# Patient Record
Sex: FEMALE | Race: WHITE | Employment: OTHER | URBAN - METROPOLITAN AREA
[De-identification: names, ages, dates, MRNs, and addresses within clinical notes are randomized per-mention and may not be internally consistent; named-entity substitution may affect disease eponyms.]

---

## 2017-02-20 ENCOUNTER — HOSPITAL ENCOUNTER (OUTPATIENT)
Dept: GENERAL RADIOLOGY | Age: 82
Discharge: HOME | End: 2017-02-20
Payer: MEDICARE

## 2017-02-20 DIAGNOSIS — R52 PAIN: ICD-10-CM

## 2017-02-20 PROCEDURE — 72110 X-RAY EXAM L-2 SPINE 4/>VWS: CPT

## 2018-02-07 ENCOUNTER — HOSPITAL ENCOUNTER (OUTPATIENT)
Dept: MRI IMAGING | Age: 83
Discharge: HOME | End: 2018-02-09
Payer: MEDICARE

## 2018-02-07 DIAGNOSIS — R52 PAIN: ICD-10-CM

## 2018-02-07 PROCEDURE — 72148 MRI LUMBAR SPINE W/O DYE: CPT

## 2018-06-25 ENCOUNTER — HOSPITAL ENCOUNTER (OUTPATIENT)
Dept: MRI IMAGING | Age: 83
Discharge: HOME | End: 2018-06-27
Payer: MEDICARE

## 2018-06-25 DIAGNOSIS — M54.2 NECK PAIN: ICD-10-CM

## 2018-06-25 DIAGNOSIS — M47.812 OSTEOARTHRITIS OF CERVICAL SPINE, UNSPECIFIED SPINAL OSTEOARTHRITIS COMPLICATION STATUS: ICD-10-CM

## 2018-06-25 DIAGNOSIS — M54.12 CERVICAL RADICULOPATHY: ICD-10-CM

## 2018-06-25 PROCEDURE — 72141 MRI NECK SPINE W/O DYE: CPT

## 2018-08-20 ENCOUNTER — HOSPITAL ENCOUNTER (OUTPATIENT)
Dept: WOMENS IMAGING | Age: 83
Discharge: HOME | End: 2018-08-22
Payer: MEDICARE

## 2018-08-20 DIAGNOSIS — Z12.39 ENCOUNTER FOR SCREENING BREAST EXAMINATION: ICD-10-CM

## 2018-08-20 PROCEDURE — 77067 SCR MAMMO BI INCL CAD: CPT

## 2023-06-05 LAB
ANION GAP IN SER/PLAS: 14 MMOL/L (ref 10–20)
CARBON DIOXIDE, TOTAL (MMOL/L) IN SER/PLAS: 26 MMOL/L (ref 21–32)
CHLORIDE (MMOL/L) IN SER/PLAS: 103 MMOL/L (ref 98–107)
CREATININE (MG/DL) IN SER/PLAS: 1.52 MG/DL (ref 0.5–1.05)
GFR FEMALE: 33 ML/MIN/1.73M2
POTASSIUM (MMOL/L) IN SER/PLAS: 4.1 MMOL/L (ref 3.5–5.3)
SODIUM (MMOL/L) IN SER/PLAS: 139 MMOL/L (ref 136–145)
THYROTROPIN (MIU/L) IN SER/PLAS BY DETECTION LIMIT <= 0.05 MIU/L: 1.72 MIU/L (ref 0.44–3.98)
UREA NITROGEN (MG/DL) IN SER/PLAS: 44 MG/DL (ref 6–23)

## 2023-06-16 LAB
ALBUMIN (G/DL) IN SER/PLAS: 3.8 G/DL (ref 3.4–5)
ALBUMIN (MG/L) IN URINE: 449.9 MG/L
ALBUMIN/CREATININE (UG/MG) IN URINE: 1061.1 UG/MG CRT (ref 0–30)
ANION GAP IN SER/PLAS: 12 MMOL/L (ref 10–20)
CALCIUM (MG/DL) IN SER/PLAS: 9.2 MG/DL (ref 8.6–10.3)
CARBON DIOXIDE, TOTAL (MMOL/L) IN SER/PLAS: 28 MMOL/L (ref 21–32)
CHLORIDE (MMOL/L) IN SER/PLAS: 103 MMOL/L (ref 98–107)
CREATININE (MG/DL) IN SER/PLAS: 1.69 MG/DL (ref 0.5–1.05)
CREATININE (MG/DL) IN URINE: 42.4 MG/DL (ref 20–320)
GFR FEMALE: 29 ML/MIN/1.73M2
GLUCOSE (MG/DL) IN SER/PLAS: 86 MG/DL (ref 74–99)
PHOSPHATE (MG/DL) IN SER/PLAS: 4.7 MG/DL (ref 2.5–4.9)
POTASSIUM (MMOL/L) IN SER/PLAS: 4.8 MMOL/L (ref 3.5–5.3)
SODIUM (MMOL/L) IN SER/PLAS: 138 MMOL/L (ref 136–145)
URATE (MG/DL) IN SER/PLAS: 8.4 MG/DL (ref 2.3–6.7)
UREA NITROGEN (MG/DL) IN SER/PLAS: 47 MG/DL (ref 6–23)

## 2023-06-18 LAB
IMMUNOGLOBULIN LIGHT CHAINS KAPPA/LAMBDA (MASS RATIO) IN SERUM: 1.55 (ref 0.26–1.65)
IMMUNOGLOBULIN LIGHT CHAINS.KAPPA (MG/DL) IN SERUM: 4.44 MG/DL (ref 0.33–1.94)
IMMUNOGLOBULIN LIGHT CHAINS.LAMBDA (MG/DL) IN SERUM: 2.86 MG/DL (ref 0.57–2.63)

## 2023-07-25 LAB — URATE (MG/DL) IN SER/PLAS: 8.9 MG/DL (ref 2.3–6.7)

## 2023-07-31 ENCOUNTER — OFFICE VISIT (OUTPATIENT)
Dept: PRIMARY CARE | Facility: CLINIC | Age: 88
End: 2023-07-31
Payer: MEDICARE

## 2023-07-31 VITALS
DIASTOLIC BLOOD PRESSURE: 88 MMHG | WEIGHT: 136 LBS | BODY MASS INDEX: 25.03 KG/M2 | OXYGEN SATURATION: 96 % | HEIGHT: 62 IN | RESPIRATION RATE: 16 BRPM | SYSTOLIC BLOOD PRESSURE: 158 MMHG | HEART RATE: 56 BPM

## 2023-07-31 DIAGNOSIS — H40.003 GLAUCOMA SUSPECT OF BOTH EYES: ICD-10-CM

## 2023-07-31 DIAGNOSIS — J45.30 MILD PERSISTENT REACTIVE AIRWAY DISEASE WITHOUT COMPLICATION (HHS-HCC): ICD-10-CM

## 2023-07-31 DIAGNOSIS — I10 PRIMARY HYPERTENSION: Primary | ICD-10-CM

## 2023-07-31 DIAGNOSIS — R53.82 CHRONIC FATIGUE: ICD-10-CM

## 2023-07-31 DIAGNOSIS — E05.90 HYPERTHYROIDISM: ICD-10-CM

## 2023-07-31 DIAGNOSIS — E78.2 COMBINED HYPERLIPIDEMIA: ICD-10-CM

## 2023-07-31 DIAGNOSIS — E55.9 VITAMIN D DEFICIENCY: ICD-10-CM

## 2023-07-31 PROBLEM — S82.009A PATELLA FRACTURE: Status: ACTIVE | Noted: 2023-07-31

## 2023-07-31 PROBLEM — H02.834 DERMATOCHALASIS OF BOTH UPPER EYELIDS: Status: ACTIVE | Noted: 2018-06-18

## 2023-07-31 PROBLEM — H52.4 HYPEROPIA WITH PRESBYOPIA OF BOTH EYES: Status: ACTIVE | Noted: 2018-06-18

## 2023-07-31 PROBLEM — S82.042D: Status: ACTIVE | Noted: 2023-07-31

## 2023-07-31 PROBLEM — H47.393 CUP TO DISC ASYMMETRY, BILATERAL: Status: ACTIVE | Noted: 2018-06-18

## 2023-07-31 PROBLEM — H02.831 DERMATOCHALASIS OF BOTH UPPER EYELIDS: Status: ACTIVE | Noted: 2018-06-18

## 2023-07-31 PROBLEM — H40.033 ANATOMICAL NARROW ANGLE, BILATERAL: Status: ACTIVE | Noted: 2018-06-18

## 2023-07-31 PROBLEM — Z96.1 PSEUDOPHAKIA: Status: ACTIVE | Noted: 2018-07-18

## 2023-07-31 PROBLEM — I35.0 AORTIC STENOSIS: Status: ACTIVE | Noted: 2023-07-31

## 2023-07-31 PROBLEM — Z86.010 HISTORY OF COLONIC POLYPS: Status: ACTIVE | Noted: 2023-07-31

## 2023-07-31 PROBLEM — H52.03 HYPEROPIA WITH PRESBYOPIA OF BOTH EYES: Status: ACTIVE | Noted: 2018-06-18

## 2023-07-31 PROBLEM — M25.569 KNEE PAIN: Status: ACTIVE | Noted: 2023-07-31

## 2023-07-31 PROBLEM — R06.09 DYSPNEA ON MINIMAL EXERTION: Status: ACTIVE | Noted: 2023-07-31

## 2023-07-31 PROBLEM — Z86.0100 HISTORY OF COLONIC POLYPS: Status: ACTIVE | Noted: 2023-07-31

## 2023-07-31 PROBLEM — R42 LIGHTHEADEDNESS: Status: ACTIVE | Noted: 2023-07-31

## 2023-07-31 PROCEDURE — 99214 OFFICE O/P EST MOD 30 MIN: CPT | Performed by: FAMILY MEDICINE

## 2023-07-31 RX ORDER — HYDROCODONE BITARTRATE AND ACETAMINOPHEN 5; 325 MG/1; MG/1
1 TABLET ORAL EVERY 6 HOURS PRN
COMMUNITY

## 2023-07-31 RX ORDER — VALSARTAN AND HYDROCHLOROTHIAZIDE 160; 12.5 MG/1; MG/1
1 TABLET, FILM COATED ORAL
COMMUNITY
Start: 2022-08-15 | End: 2023-08-07 | Stop reason: DRUGHIGH

## 2023-07-31 RX ORDER — METHIMAZOLE 5 MG/1
TABLET ORAL
COMMUNITY
Start: 2018-06-25 | End: 2024-01-02 | Stop reason: SDUPTHER

## 2023-07-31 RX ORDER — ATORVASTATIN CALCIUM 20 MG/1
20 TABLET, FILM COATED ORAL
COMMUNITY
Start: 2023-06-16 | End: 2024-01-16 | Stop reason: SDUPTHER

## 2023-07-31 RX ORDER — GABAPENTIN 300 MG/1
1 CAPSULE ORAL DAILY
COMMUNITY
Start: 2022-10-10 | End: 2024-01-08 | Stop reason: ALTCHOICE

## 2023-07-31 RX ORDER — ACEBUTOLOL HYDROCHLORIDE 200 MG/1
200 CAPSULE ORAL 2 TIMES DAILY
COMMUNITY
Start: 2022-08-15 | End: 2024-03-04 | Stop reason: SDUPTHER

## 2023-07-31 RX ORDER — FAMOTIDINE 20 MG/1
1 TABLET, FILM COATED ORAL NIGHTLY
COMMUNITY
Start: 2023-06-16 | End: 2024-01-02 | Stop reason: SDUPTHER

## 2023-07-31 RX ORDER — AMLODIPINE BESYLATE 10 MG/1
10 TABLET ORAL
Qty: 30 TABLET | Refills: 2 | Status: SHIPPED | OUTPATIENT
Start: 2023-07-31 | End: 2023-08-22 | Stop reason: SDUPTHER

## 2023-07-31 RX ORDER — AMLODIPINE BESYLATE 5 MG/1
5 TABLET ORAL
COMMUNITY
Start: 2023-07-25 | End: 2023-07-31 | Stop reason: SDUPTHER

## 2023-07-31 RX ORDER — LISINOPRIL AND HYDROCHLOROTHIAZIDE 12.5; 2 MG/1; MG/1
1 TABLET ORAL DAILY
COMMUNITY
Start: 2023-06-17 | End: 2023-07-31 | Stop reason: ALTCHOICE

## 2023-07-31 ASSESSMENT — ENCOUNTER SYMPTOMS
DEPRESSION: 0
OCCASIONAL FEELINGS OF UNSTEADINESS: 0
LOSS OF SENSATION IN FEET: 0

## 2023-07-31 NOTE — PROGRESS NOTES
Subjective   Patient ID: Maggy Gore is a 87 y.o. female who presents for Establish Care.    Pt is in to establish care with PCP.     This patient is here today to follow up on HTN. This patient is currently taking losartan . This patient states that their current medication treatment for this issue is working well for them. This patient does take their blood pressure at home and states that it is usually within normal ranges. This patient denies any symptoms of headache, dizziness, blurry vision, or lightheadedness.  Has been having high reading. She documents.            Review of Systems  12 Systems have been reviewed as follows.  Constitutional: Fever, weight gain, weight loss, appetite change, night sweats, fatigue, chills.  Eyes : blurry, double vision, vision, loss, tearing, redness, pain, sensitivity to light, glaucoma.  Ears, nose, mouth, and throat: Hearing loss, ringing in the ears, ear pain, nasal congestion, nasal drainage, nosebleeds, mouth, throat, irritation tooth problem.  Cardiovascular :chest pain, pressure, heart racing, palpitations, sweating, leg swelling, high or low blood pressure  Pulmonary: Cough, yellow or green sputum, blood and sputum, shortness of breath, wheezing  Gastrointestinal: Nausea, vomiting, diarrhea, constipation, pain, blood in stool, or vomitus, heartburn, difficulty swallowing  Genitourinary: incontinence, abnormal bleeding, abnormal discharge, urinary frequency, urinary hesitancy, pain, impotence sexual problem, infection, urinary retention  Musculoskeletal: Pain, stiffness, joint, redness or warmth, arthritis, back pain, weakness, muscle wasting, sprain or fracture  Neuro: Weight weakness, dizziness, change in voice, change in taste change in vision, change in hearing, loss, or change of sensation, trouble walking, balance problems coordination problems, shaking, speech problem  Endocrine , cold or heat intolerance, blood sugar problem, weight gain or loss missed  "periods hot flashes, sweats, change in body hair, change in libido, increased thirst, increased urination  Heme/lymph: Swelling, bleeding, problem anemia, bruising, enlarged lymph nodes  Allergic/immunologic: H. plus nasal drip, watery itchy eyes, nasal drainage, immunosuppressed  The above were reviewed and noted negative except as noted in HPI and Problem List.    Objective   /88   Pulse 56   Resp 16   Ht 1.575 m (5' 2\")   Wt 61.7 kg (136 lb)   SpO2 96%   BMI 24.87 kg/m²     Physical Exam  Constitutional: Well developed, well nourished, alert and in no acute distress   Eyes: Normal external exam. Pupils equally round and reactive to light with normal accommodation and extraocular movements intact.  Neck: Supple, no lymphadenopathy or masses.   Cardiovascular: Regular rate and rhythm, normal S1 and S2, no murmurs, gallops, or rubs. Radial pulses normal. No peripheral edema.  Pulmonary: No respiratory distress, lungs clear to auscultation bilaterally. No wheezes, rhonchi, rales.  Abdomen: soft,non tender, non distended, without masses or HSM  Skin: Warm, well perfused, normal skin turgor and color.   Neurologic: Cranial nerves II-XII grossly intact.   Psychiatric: Mood calm and affect normal  Musculoskeletal: Moving all extremities without restriction    Assessment/Plan   Problem List Items Addressed This Visit       Glaucoma suspect of both eyes    Combined hyperlipidemia    Hyperthyroidism    Relevant Orders    Free T4 Index    Vitamin D deficiency    Relevant Orders    Vitamin D, Total    Reactive airway disease without complication     Other Visit Diagnoses       Primary hypertension    -  Primary    Relevant Medications    amLODIPine (Norvasc) 10 mg tablet    Other Relevant Orders    Lipid Panel    Follow Up In [facility] - PCP - Established    Chronic fatigue        Relevant Orders    CBC and Auto Differential         Bring in meds next    Continue current medications and therapy for " chronic medical conditions    Get BW

## 2023-08-01 ENCOUNTER — LAB (OUTPATIENT)
Dept: LAB | Facility: LAB | Age: 88
End: 2023-08-01
Payer: MEDICARE

## 2023-08-01 DIAGNOSIS — E55.9 VITAMIN D DEFICIENCY: ICD-10-CM

## 2023-08-01 DIAGNOSIS — I10 PRIMARY HYPERTENSION: ICD-10-CM

## 2023-08-01 DIAGNOSIS — R53.82 CHRONIC FATIGUE: ICD-10-CM

## 2023-08-01 DIAGNOSIS — E05.90 HYPERTHYROIDISM: ICD-10-CM

## 2023-08-01 PROBLEM — J45.909 REACTIVE AIRWAY DISEASE WITHOUT COMPLICATION (HHS-HCC): Status: ACTIVE | Noted: 2023-08-01

## 2023-08-01 LAB
BASOPHILS (10*3/UL) IN BLOOD BY AUTOMATED COUNT: 0.04 X10E9/L (ref 0–0.1)
BASOPHILS/100 LEUKOCYTES IN BLOOD BY AUTOMATED COUNT: 0.6 % (ref 0–2)
CALCIDIOL (25 OH VITAMIN D3) (NG/ML) IN SER/PLAS: 33 NG/ML
CHOLESTEROL (MG/DL) IN SER/PLAS: 158 MG/DL (ref 0–199)
CHOLESTEROL IN HDL (MG/DL) IN SER/PLAS: 65 MG/DL
CHOLESTEROL/HDL RATIO: 2.4
EOSINOPHILS (10*3/UL) IN BLOOD BY AUTOMATED COUNT: 0.31 X10E9/L (ref 0–0.4)
EOSINOPHILS/100 LEUKOCYTES IN BLOOD BY AUTOMATED COUNT: 4.7 % (ref 0–6)
ERYTHROCYTE DISTRIBUTION WIDTH (RATIO) BY AUTOMATED COUNT: 13.8 % (ref 11.5–14.5)
ERYTHROCYTE MEAN CORPUSCULAR HEMOGLOBIN CONCENTRATION (G/DL) BY AUTOMATED: 31.4 G/DL (ref 32–36)
ERYTHROCYTE MEAN CORPUSCULAR VOLUME (FL) BY AUTOMATED COUNT: 95 FL (ref 80–100)
ERYTHROCYTES (10*6/UL) IN BLOOD BY AUTOMATED COUNT: 3.57 X10E12/L (ref 4–5.2)
HEMATOCRIT (%) IN BLOOD BY AUTOMATED COUNT: 33.8 % (ref 36–46)
HEMOGLOBIN (G/DL) IN BLOOD: 10.6 G/DL (ref 12–16)
IMMATURE GRANULOCYTES/100 LEUKOCYTES IN BLOOD BY AUTOMATED COUNT: 0.6 % (ref 0–0.9)
LDL: 71 MG/DL (ref 0–99)
LEUKOCYTES (10*3/UL) IN BLOOD BY AUTOMATED COUNT: 6.6 X10E9/L (ref 4.4–11.3)
LYMPHOCYTES (10*3/UL) IN BLOOD BY AUTOMATED COUNT: 1.78 X10E9/L (ref 0.8–3)
LYMPHOCYTES/100 LEUKOCYTES IN BLOOD BY AUTOMATED COUNT: 26.8 % (ref 13–44)
MONOCYTES (10*3/UL) IN BLOOD BY AUTOMATED COUNT: 0.78 X10E9/L (ref 0.05–0.8)
MONOCYTES/100 LEUKOCYTES IN BLOOD BY AUTOMATED COUNT: 11.7 % (ref 2–10)
NEUTROPHILS (10*3/UL) IN BLOOD BY AUTOMATED COUNT: 3.69 X10E9/L (ref 1.6–5.5)
NEUTROPHILS/100 LEUKOCYTES IN BLOOD BY AUTOMATED COUNT: 55.6 % (ref 40–80)
PLATELETS (10*3/UL) IN BLOOD AUTOMATED COUNT: 396 X10E9/L (ref 150–450)
THYROXINE (T4) (UG/DL) IN SER/PLAS: 8.1 UG/DL (ref 4.5–11.1)
THYROXINE (T4) FREE INDEX IN SER/PLAS BY CALCULATION: 3.2 (ref 1.6–4.7)
TRIGLYCERIDE (MG/DL) IN SER/PLAS: 111 MG/DL (ref 0–149)
TRIIODOTHYRONINE RESIN UPTAKE (T3RU) % IN SER/PLAS: 40 % (ref 24–41)
VLDL: 22 MG/DL (ref 0–40)

## 2023-08-01 PROCEDURE — 84479 ASSAY OF THYROID (T3 OR T4): CPT

## 2023-08-01 PROCEDURE — 36415 COLL VENOUS BLD VENIPUNCTURE: CPT

## 2023-08-01 PROCEDURE — 84436 ASSAY OF TOTAL THYROXINE: CPT

## 2023-08-01 PROCEDURE — 80061 LIPID PANEL: CPT

## 2023-08-01 PROCEDURE — 82306 VITAMIN D 25 HYDROXY: CPT

## 2023-08-01 PROCEDURE — 85025 COMPLETE CBC W/AUTO DIFF WBC: CPT

## 2023-08-07 ENCOUNTER — OFFICE VISIT (OUTPATIENT)
Dept: PRIMARY CARE | Facility: CLINIC | Age: 88
End: 2023-08-07
Payer: MEDICARE

## 2023-08-07 ENCOUNTER — LAB (OUTPATIENT)
Dept: LAB | Facility: LAB | Age: 88
End: 2023-08-07
Payer: MEDICARE

## 2023-08-07 VITALS
RESPIRATION RATE: 16 BRPM | OXYGEN SATURATION: 94 % | HEIGHT: 62 IN | HEART RATE: 64 BPM | SYSTOLIC BLOOD PRESSURE: 160 MMHG | WEIGHT: 135 LBS | BODY MASS INDEX: 24.84 KG/M2 | DIASTOLIC BLOOD PRESSURE: 80 MMHG

## 2023-08-07 DIAGNOSIS — E05.90 HYPERTHYROIDISM: ICD-10-CM

## 2023-08-07 DIAGNOSIS — N18.31 STAGE 3A CHRONIC KIDNEY DISEASE (MULTI): ICD-10-CM

## 2023-08-07 DIAGNOSIS — I10 PRIMARY HYPERTENSION: ICD-10-CM

## 2023-08-07 DIAGNOSIS — E78.2 COMBINED HYPERLIPIDEMIA: ICD-10-CM

## 2023-08-07 PROBLEM — R68.89 SUSPECTED GLAUCOMA OF BOTH EYES: Status: ACTIVE | Noted: 2018-06-18

## 2023-08-07 PROBLEM — H25.10 NUCLEAR SENILE CATARACT: Status: ACTIVE | Noted: 2018-06-25

## 2023-08-07 LAB
ALANINE AMINOTRANSFERASE (SGPT) (U/L) IN SER/PLAS: 13 U/L (ref 7–45)
ALBUMIN (G/DL) IN SER/PLAS: 3.9 G/DL (ref 3.4–5)
ALKALINE PHOSPHATASE (U/L) IN SER/PLAS: 71 U/L (ref 33–136)
ANION GAP IN SER/PLAS: 11 MMOL/L (ref 10–20)
ASPARTATE AMINOTRANSFERASE (SGOT) (U/L) IN SER/PLAS: 16 U/L (ref 9–39)
BILIRUBIN TOTAL (MG/DL) IN SER/PLAS: 0.5 MG/DL (ref 0–1.2)
CALCIUM (MG/DL) IN SER/PLAS: 8.6 MG/DL (ref 8.6–10.3)
CARBON DIOXIDE, TOTAL (MMOL/L) IN SER/PLAS: 30 MMOL/L (ref 21–32)
CHLORIDE (MMOL/L) IN SER/PLAS: 99 MMOL/L (ref 98–107)
CREATININE (MG/DL) IN SER/PLAS: 1.55 MG/DL (ref 0.5–1.05)
GFR FEMALE: 32 ML/MIN/1.73M2
GLUCOSE (MG/DL) IN SER/PLAS: 89 MG/DL (ref 74–99)
POTASSIUM (MMOL/L) IN SER/PLAS: 4.3 MMOL/L (ref 3.5–5.3)
PROTEIN TOTAL: 6.5 G/DL (ref 6.4–8.2)
SODIUM (MMOL/L) IN SER/PLAS: 136 MMOL/L (ref 136–145)
UREA NITROGEN (MG/DL) IN SER/PLAS: 34 MG/DL (ref 6–23)

## 2023-08-07 PROCEDURE — 36415 COLL VENOUS BLD VENIPUNCTURE: CPT

## 2023-08-07 PROCEDURE — 99214 OFFICE O/P EST MOD 30 MIN: CPT | Performed by: FAMILY MEDICINE

## 2023-08-07 PROCEDURE — 80053 COMPREHEN METABOLIC PANEL: CPT

## 2023-08-07 RX ORDER — CYCLOBENZAPRINE HCL 10 MG
10 TABLET ORAL 2 TIMES DAILY PRN
COMMUNITY
Start: 2022-10-17 | End: 2024-03-04 | Stop reason: SDUPTHER

## 2023-08-07 RX ORDER — NIFEDIPINE 90 MG/1
90 TABLET, EXTENDED RELEASE ORAL DAILY
COMMUNITY
Start: 2021-07-28 | End: 2023-08-07 | Stop reason: ALTCHOICE

## 2023-08-07 RX ORDER — DAPAGLIFLOZIN 10 MG/1
10 TABLET, FILM COATED ORAL DAILY
Qty: 30 TABLET | Refills: 1 | Status: SHIPPED | OUTPATIENT
Start: 2023-08-07 | End: 2023-10-01

## 2023-08-07 RX ORDER — ONDANSETRON HYDROCHLORIDE 8 MG/1
8 TABLET, FILM COATED ORAL EVERY 8 HOURS PRN
COMMUNITY
End: 2024-02-16 | Stop reason: ENTERED-IN-ERROR

## 2023-08-07 RX ORDER — VALSARTAN AND HYDROCHLOROTHIAZIDE 320; 25 MG/1; MG/1
1 TABLET, FILM COATED ORAL DAILY
Qty: 30 TABLET | Refills: 1 | Status: SHIPPED | OUTPATIENT
Start: 2023-08-07 | End: 2023-10-01

## 2023-08-07 ASSESSMENT — ENCOUNTER SYMPTOMS: HYPERTENSION: 1

## 2023-08-07 NOTE — PROGRESS NOTES
Subjective   Patient ID: Maggy Gore is a 87 y.o. female who presents for Hypertension (Pt forgot to bring in her medication. Pt denies any associated symptoms. ).    Hypertension  This is a chronic problem. The current episode started more than 1 year ago. The problem is unchanged. The problem is uncontrolled. There are no associated agents to hypertension. Past treatments include beta blockers. The current treatment provides mild improvement.        Review of Systems  Constitutional: Fever, weight gain, weight loss, appetite change, night sweats, fatigue, chills.  Eyes : blurry, double vision, vision, loss, tearing, redness, pain, sensitivity to light, glaucoma.  Ears: nose, mouth, and throat: Hearing loss, ringing in the ears, ear pain, nasal congestion, nasal drainage, nosebleeds, mouth, throat, irritation tooth problem.  Cardiovascular: chest pain, pressure, heart racing, palpitations, sweating, leg swelling, high or low blood pressure  Pulmonary: Cough, yellow or green sputum, blood and sputum, shortness of breath, wheezing  Gastrointestinal: Nausea, vomiting, diarrhea, constipation, pain, blood in stool, or vomitus, heartburn, difficulty swallowing  Genitourinary: incontinence, abnormal bleeding, abnormal discharge, urinary frequency, urinary hesitancy, pain, impotence sexual problem, infection, urinary retention  Musculoskeletal: Pain, stiffness, joint, redness or warmth, arthritis, back pain, weakness, muscle wasting, sprain or fracture  Neuro: Weight weakness, dizziness, change in voice, change in taste change in vision, change in hearing, loss, or change of sensation, trouble walking, balance problems coordination problems, shaking, speech problem  Endocrine: cold or heat intolerance, blood sugar problem, weight gain or loss missed periods hot flashes, sweats, change in body hair, change in libido, increased thirst, increased urination  Heme/lymph: Swelling, bleeding, problem anemia, bruising,  "enlarged lymph nodes  Allergic/immunologic: H. plus nasal drip, watery itchy eyes, nasal drainage, immunosuppressed  The above were reviewed and noted negative except as noted in HPI and Problem List.   Objective   /80 (BP Location: Right arm, Patient Position: Sitting, BP Cuff Size: Adult)   Pulse 64   Resp 16   Ht 1.575 m (5' 2\")   Wt 61.2 kg (135 lb)   SpO2 94%   BMI 24.69 kg/m²     Physical Exam  Constitutional: Well developed, well nourished, alert and in no acute distress   Eyes: Normal external exam. Pupils equally round and reactive to light with normal accommodation and extraocular movements intact.  Neck: Supple, no lymphadenopathy or masses.   Cardiovascular: Regular rate and rhythm, normal S1 and S2, no murmurs, gallops, or rubs. Radial pulses normal. No peripheral edema.  Pulmonary: No respiratory distress, lungs clear to auscultation bilaterally. No wheezes, rhonchi, rales.  Abdomen: soft,non tender, non distended, without masses or HSM  Skin: Warm, well perfused, normal skin turgor and color.   Neurologic: Cranial nerves II-XII grossly intact.   Psychiatric: Mood calm and affect normal  Musculoskeletal: Moving all extremities without restriction     Assessment/Plan   Problem List Items Addressed This Visit       Combined hyperlipidemia    Relevant Orders    Comprehensive Metabolic Panel    Follow Up In [facility] - PCP - Established    Hyperthyroidism    Relevant Orders    Comprehensive Metabolic Panel    Follow Up [facility] - PCP - Established     Other Visit Diagnoses       Primary hypertension        Relevant Medications    valsartan-hydrochlorothiazide (Diovan-HCT) 320-25 mg tablet    Other Relevant Orders    Comprehensive Metabolic Panel    Follow Up In [facility] - PCP - Established    Stage 3a chronic kidney disease (CMS/HCC)        Relevant Medications    dapagliflozin propanediol (Farxiga) 10 mg    Other Relevant Orders    Comprehensive " Metabolic Panel    Follow Up In [facility] - PCP - Established          Patient was advised importance of proper diet/nutrition in addition adequate hydration. Patient was encouraged moderate exercise program to include 30 minutes daily for 5 days of the week or 150 minutes weekly. Patient will follow-up with us as scheduled.     Begin taking Farxiga.    Increase Valsartan hydrochlorothiazide to 320-25 mg     Discontinue taking nifedipine.     continue all current medications and therapy for chronic medical conditions     CMP ordered.     Bring in meds next     Scribe Attestation  By signing my name below, I, BEST [place]   , [place]   attest that this documentation has been prepared under the direction and in the presence of Ace Dhillon MD.     Provider Attestation - Scribe documentation    All medical record entries made by the Scribe were at my direction and personally dictated by me. I have reviewed the chart and agree that the record accurately reflects my personal performance of the history, physical exam, discussion and plan.

## 2023-08-22 ENCOUNTER — OFFICE VISIT (OUTPATIENT)
Dept: PRIMARY CARE | Facility: CLINIC | Age: 88
End: 2023-08-22
Payer: MEDICARE

## 2023-08-22 VITALS
RESPIRATION RATE: 16 BRPM | HEIGHT: 62 IN | WEIGHT: 136 LBS | SYSTOLIC BLOOD PRESSURE: 140 MMHG | BODY MASS INDEX: 25.03 KG/M2 | OXYGEN SATURATION: 98 % | HEART RATE: 77 BPM | DIASTOLIC BLOOD PRESSURE: 66 MMHG

## 2023-08-22 DIAGNOSIS — I10 PRIMARY HYPERTENSION: ICD-10-CM

## 2023-08-22 DIAGNOSIS — E05.90 HYPERTHYROIDISM: ICD-10-CM

## 2023-08-22 DIAGNOSIS — I10 ESSENTIAL HYPERTENSION: Primary | ICD-10-CM

## 2023-08-22 DIAGNOSIS — J45.20 MILD INTERMITTENT REACTIVE AIRWAY DISEASE WITHOUT COMPLICATION (HHS-HCC): ICD-10-CM

## 2023-08-22 DIAGNOSIS — E55.9 VITAMIN D DEFICIENCY: ICD-10-CM

## 2023-08-22 PROBLEM — M10.9 GOUT: Status: ACTIVE | Noted: 2023-08-22

## 2023-08-22 PROCEDURE — 99214 OFFICE O/P EST MOD 30 MIN: CPT | Performed by: FAMILY MEDICINE

## 2023-08-22 RX ORDER — AMLODIPINE BESYLATE 5 MG/1
5 TABLET ORAL
Start: 2023-08-22 | End: 2023-09-26 | Stop reason: SDUPTHER

## 2023-08-22 NOTE — PROGRESS NOTES
Subjective   Patient ID: Maggy Gore is a 87 y.o. female who presents for Hypertension and Chronic Kidney Disease.    Pt presents today for hypertension follow up. Pt is currently taking valsartan-hydrochlorothiazide 320-25 mg and amlodipine 5 mg.     Pt started taking farxiga 10 mg.          Review of Systems  12 Systems have been reviewed as follows.  Constitutional: Fever, weight gain, weight loss, appetite change, night sweats, fatigue, chills.  Eyes : blurry, double vision, vision, loss, tearing, redness, pain, sensitivity to light, glaucoma.  Ears, nose, mouth, and throat: Hearing loss, ringing in the ears, ear pain, nasal congestion, nasal drainage, nosebleeds, mouth, throat, irritation tooth problem.  Cardiovascular :chest pain, pressure, heart racing, palpitations, sweating, leg swelling, high or low blood pressure  Pulmonary: Cough, yellow or green sputum, blood and sputum, shortness of breath, wheezing  Gastrointestinal: Nausea, vomiting, diarrhea, constipation, pain, blood in stool, or vomitus, heartburn, difficulty swallowing  Genitourinary: incontinence, abnormal bleeding, abnormal discharge, urinary frequency, urinary hesitancy, pain, impotence sexual problem, infection, urinary retention  Musculoskeletal: Pain, stiffness, joint, redness or warmth, arthritis, back pain, weakness, muscle wasting, sprain or fracture  Neuro: Weight weakness, dizziness, change in voice, change in taste change in vision, change in hearing, loss, or change of sensation, trouble walking, balance problems coordination problems, shaking, speech problem  Endocrine , cold or heat intolerance, blood sugar problem, weight gain or loss missed periods hot flashes, sweats, change in body hair, change in libido, increased thirst, increased urination  Heme/lymph: Swelling, bleeding, problem anemia, bruising, enlarged lymph nodes  Allergic/immunologic: H. plus nasal drip, watery itchy eyes, nasal drainage, immunosuppressed  The  "above were reviewed and noted negative except as noted in HPI and Problem List.    Objective   /66 (BP Location: Left arm, Patient Position: Sitting, BP Cuff Size: Small adult)   Pulse 77   Resp 16   Ht 1.575 m (5' 2\")   Wt 61.7 kg (136 lb)   SpO2 98%   BMI 24.87 kg/m²     Physical Exam  Constitutional: Well developed, well nourished, alert and in no acute distress   Eyes: Normal external exam. Pupils equally round and reactive to light with normal accommodation and extraocular movements intact.  Neck: Supple, no lymphadenopathy or masses.   Cardiovascular: Regular rate and rhythm, normal S1 and S2, no murmurs, gallops, or rubs. Radial pulses normal. No peripheral edema.  Pulmonary: No respiratory distress, lungs clear to auscultation bilaterally. No wheezes, rhonchi, rales.  Abdomen: soft,non tender, non distended, without masses or HSM  Skin: Warm, well perfused, normal skin turgor and color.   Neurologic: Cranial nerves II-XII grossly intact.   Psychiatric: Mood calm and affect normal  Musculoskeletal: Moving all extremities without restriction    Assessment/Plan   Problem List Items Addressed This Visit       Hyperthyroidism    Vitamin D deficiency    Reactive airway disease without complication    Essential hypertension - Primary     Other Visit Diagnoses       Primary hypertension        Relevant Medications    amLODIPine (Norvasc) 5 mg tablet    Other Relevant Orders    Follow Up In [facility] - PCP - Established        Continue current medications and therapy for chronic medical conditions    Cont tapazole    BP improved           "

## 2023-09-26 ENCOUNTER — OFFICE VISIT (OUTPATIENT)
Dept: PRIMARY CARE | Facility: CLINIC | Age: 88
End: 2023-09-26
Payer: MEDICARE

## 2023-09-26 VITALS
DIASTOLIC BLOOD PRESSURE: 96 MMHG | RESPIRATION RATE: 16 BRPM | SYSTOLIC BLOOD PRESSURE: 168 MMHG | HEIGHT: 62 IN | HEART RATE: 61 BPM | OXYGEN SATURATION: 98 % | BODY MASS INDEX: 24.48 KG/M2 | WEIGHT: 133 LBS

## 2023-09-26 DIAGNOSIS — Z00.00 ROUTINE GENERAL MEDICAL EXAMINATION AT HEALTH CARE FACILITY: ICD-10-CM

## 2023-09-26 DIAGNOSIS — Z23 NEED FOR INFLUENZA VACCINATION: ICD-10-CM

## 2023-09-26 DIAGNOSIS — K21.9 GASTROESOPHAGEAL REFLUX DISEASE, UNSPECIFIED WHETHER ESOPHAGITIS PRESENT: ICD-10-CM

## 2023-09-26 DIAGNOSIS — Z00.00 ENCOUNTER FOR MEDICARE ANNUAL WELLNESS EXAM: Primary | ICD-10-CM

## 2023-09-26 DIAGNOSIS — J45.20 MILD INTERMITTENT REACTIVE AIRWAY DISEASE WITHOUT COMPLICATION (HHS-HCC): ICD-10-CM

## 2023-09-26 DIAGNOSIS — I10 ESSENTIAL HYPERTENSION: ICD-10-CM

## 2023-09-26 DIAGNOSIS — I10 PRIMARY HYPERTENSION: ICD-10-CM

## 2023-09-26 PROBLEM — S82.042D: Status: RESOLVED | Noted: 2023-07-31 | Resolved: 2023-09-26

## 2023-09-26 PROBLEM — R06.09 DYSPNEA ON MINIMAL EXERTION: Status: RESOLVED | Noted: 2023-07-31 | Resolved: 2023-09-26

## 2023-09-26 PROBLEM — M25.569 KNEE PAIN: Status: RESOLVED | Noted: 2023-07-31 | Resolved: 2023-09-26

## 2023-09-26 PROBLEM — R42 LIGHTHEADEDNESS: Status: RESOLVED | Noted: 2023-07-31 | Resolved: 2023-09-26

## 2023-09-26 PROCEDURE — G0008 ADMIN INFLUENZA VIRUS VAC: HCPCS | Performed by: FAMILY MEDICINE

## 2023-09-26 PROCEDURE — G0439 PPPS, SUBSEQ VISIT: HCPCS | Performed by: FAMILY MEDICINE

## 2023-09-26 PROCEDURE — 99214 OFFICE O/P EST MOD 30 MIN: CPT | Performed by: FAMILY MEDICINE

## 2023-09-26 PROCEDURE — 90662 IIV NO PRSV INCREASED AG IM: CPT | Performed by: FAMILY MEDICINE

## 2023-09-26 RX ORDER — AMLODIPINE BESYLATE 10 MG/1
10 TABLET ORAL
Qty: 30 TABLET | Refills: 3 | Status: SHIPPED | OUTPATIENT
Start: 2023-09-26 | End: 2024-01-02 | Stop reason: SDUPTHER

## 2023-09-26 RX ORDER — PANTOPRAZOLE SODIUM 40 MG/1
40 TABLET, DELAYED RELEASE ORAL DAILY
Qty: 30 TABLET | Refills: 1 | Status: SHIPPED | OUTPATIENT
Start: 2023-09-26 | End: 2023-11-28

## 2023-09-26 ASSESSMENT — PATIENT HEALTH QUESTIONNAIRE - PHQ9
1. LITTLE INTEREST OR PLEASURE IN DOING THINGS: NOT AT ALL
2. FEELING DOWN, DEPRESSED OR HOPELESS: NOT AT ALL
SUM OF ALL RESPONSES TO PHQ9 QUESTIONS 1 AND 2: 0

## 2023-09-26 ASSESSMENT — ENCOUNTER SYMPTOMS
OCCASIONAL FEELINGS OF UNSTEADINESS: 1
LOSS OF SENSATION IN FEET: 0
DEPRESSION: 0

## 2023-09-26 ASSESSMENT — ACTIVITIES OF DAILY LIVING (ADL)
TAKING_MEDICATION: INDEPENDENT
MANAGING_FINANCES: INDEPENDENT
DRESSING: INDEPENDENT
DOING_HOUSEWORK: INDEPENDENT
BATHING: INDEPENDENT
GROCERY_SHOPPING: INDEPENDENT

## 2023-09-26 NOTE — PROGRESS NOTES
"Subjective   Reason for Visit: Maggy Gore is an 87 y.o. female here for a Medicare Wellness visit.     Past Medical, Surgical, and Family History reviewed and updated in chart.    Reviewed all medications by prescribing practitioner or clinical pharmacist (such as prescriptions, OTCs, herbal therapies and supplements) and documented in the medical record.    HPI    Patient Care Team:  Ace Dhillon MD as PCP - General (Family Medicine)     Review of Systems    Objective   Vitals:  BP (!) 168/96   Pulse 61   Resp 16   Ht 1.575 m (5' 2\")   Wt 60.3 kg (133 lb)   SpO2 98%   BMI 24.33 kg/m²       Physical Exam    Assessment/Plan   Problem List Items Addressed This Visit     Reactive airway disease without complication    Essential hypertension    Gastroesophageal reflux disease   Other Visit Diagnoses     Encounter for Medicare annual wellness exam    -  Primary    Primary hypertension        Need for influenza vaccination        Routine general medical examination at health care facility                 "

## 2023-09-26 NOTE — PROGRESS NOTES
Subjective   Reason for Visit: Maggy Gore is an 87 y.o. female here for a Medicare Wellness visit.     Past Medical, Surgical, and Family History reviewed and updated in chart.    Reviewed all medications by prescribing practitioner or clinical pharmacist (such as prescriptions, OTCs, herbal therapies and supplements) and documented in the medical record.    Pt c/o nausea and vomiting, and head aches in the morning X week ago.         Patient Care Team:  Ace Dhillon MD as PCP - General (Family Medicine)     Review of Systems  12 Systems have been reviewed as follows.  Constitutional: Fever, weight gain, weight loss, appetite change, night sweats, fatigue, chills.  Eyes : blurry, double vision, vision, loss, tearing, redness, pain, sensitivity to light, glaucoma.  Ears, nose, mouth, and throat: Hearing loss, ringing in the ears, ear pain, nasal congestion, nasal drainage, nosebleeds, mouth, throat, irritation tooth problem.  Cardiovascular :chest pain, pressure, heart racing, palpitations, sweating, leg swelling, high or low blood pressure  Pulmonary: Cough, yellow or green sputum, blood and sputum, shortness of breath, wheezing  Gastrointestinal: Nausea, vomiting, diarrhea, constipation, pain, blood in stool, or vomitus, heartburn, difficulty swallowing  Genitourinary: incontinence, abnormal bleeding, abnormal discharge, urinary frequency, urinary hesitancy, pain, impotence sexual problem, infection, urinary retention  Musculoskeletal: Pain, stiffness, joint, redness or warmth, arthritis, back pain, weakness, muscle wasting, sprain or fracture  Neuro: Weight weakness, dizziness, change in voice, change in taste change in vision, change in hearing, loss, or change of sensation, trouble walking, balance problems coordination problems, shaking, speech problem  Endocrine , cold or heat intolerance, blood sugar problem, weight gain or loss missed periods hot flashes, sweats, change in body hair, change in  "libido, increased thirst, increased urination  Heme/lymph: Swelling, bleeding, problem anemia, bruising, enlarged lymph nodes  Allergic/immunologic: H. plus nasal drip, watery itchy eyes, nasal drainage, immunosuppressed  The above were reviewed and noted negative except as noted in HPI and Problem List.    Objective   Vitals:  BP (!) 168/96   Pulse 61   Resp 16   Ht 1.575 m (5' 2\")   Wt 60.3 kg (133 lb)   SpO2 98%   BMI 24.33 kg/m²       Physical Exam  Constitutional: Well developed, well nourished, alert and in no acute distress   Eyes: Normal external exam. Pupils equally round and reactive to light with normal accommodation and extraocular movements intact.  Neck: Supple, no lymphadenopathy or masses.   Cardiovascular: Regular rate and rhythm, normal S1 and S2, no murmurs, gallops, or rubs. Radial pulses normal. No peripheral edema.  Pulmonary: No respiratory distress, lungs clear to auscultation bilaterally. No wheezes, rhonchi, rales.  Abdomen: soft,non tender, non distended, without masses or HSM  Skin: Warm, well perfused, normal skin turgor and color.   Neurologic: Cranial nerves II-XII grossly intact.   Psychiatric: Mood calm and affect normal  Musculoskeletal: Moving all extremities without restriction    Assessment/Plan   Problem List Items Addressed This Visit       Reactive airway disease without complication    Relevant Orders    Follow Up In [facility] - PCP - Established    Essential hypertension    Relevant Orders    Follow Up [facility] - PCP - Established    Gastroesophageal reflux disease    Relevant Medications    pantoprazole (ProtoNix) 40 mg EC tablet    Other Relevant Orders    Follow Up [facility] - PCP - Established     Other Visit Diagnoses       Encounter for Medicare annual wellness exam    -  Primary    Relevant Orders    Follow Up [facility] - PCP - Established    Primary hypertension        Relevant Medications    amLODIPine " (Norvasc) 10 mg tablet    Other Relevant Orders    Follow Up [facility] - PCP - Established    Need for influenza vaccination        Relevant Orders    Flu vaccine, quadrivalent, high-dose, preservative free, age 65y+ (FLUZONE) (Completed)    Follow Up In [facility] - PCP - Established    Routine general medical examination at health care facility                     Continue current medications and therapy for chronic medical conditions    Provider Attestation - Scribe documentation    All medical record entries made by the Scribe were at my direction and personally dictated by me. I have reviewed the chart and agree that the record accurately reflects my personal performance of the history, physical exam, discussion and plan.    Scribe Attestation  By signing my name below, I, Ace Dhillon MD   , Scribe   attest that this documentation has been prepared under the direction and in the presence of Ace Dhillon MD.    Increase amlodipine to 10 mg     BP next    Start protonix 40 mg daily    Flu shot today    Fibercon qd

## 2023-09-28 DIAGNOSIS — I10 PRIMARY HYPERTENSION: ICD-10-CM

## 2023-09-28 DIAGNOSIS — N18.31 STAGE 3A CHRONIC KIDNEY DISEASE (MULTI): ICD-10-CM

## 2023-10-01 RX ORDER — VALSARTAN AND HYDROCHLOROTHIAZIDE 320; 25 MG/1; MG/1
1 TABLET, FILM COATED ORAL DAILY
Qty: 90 TABLET | Refills: 1 | Status: SHIPPED | OUTPATIENT
Start: 2023-10-01 | End: 2024-03-04 | Stop reason: SDUPTHER

## 2023-10-01 RX ORDER — DAPAGLIFLOZIN 10 MG/1
10 TABLET, FILM COATED ORAL DAILY
Qty: 90 TABLET | Refills: 1 | Status: SHIPPED | OUTPATIENT
Start: 2023-10-01 | End: 2024-01-08

## 2023-10-06 ENCOUNTER — OFFICE VISIT (OUTPATIENT)
Dept: ORTHOPEDIC SURGERY | Facility: CLINIC | Age: 88
End: 2023-10-06
Payer: MEDICARE

## 2023-10-06 ENCOUNTER — ANCILLARY PROCEDURE (OUTPATIENT)
Dept: RADIOLOGY | Facility: CLINIC | Age: 88
End: 2023-10-06
Payer: MEDICARE

## 2023-10-06 DIAGNOSIS — M19.071 OSTEOARTHRITIS OF JOINT OF TOE OF RIGHT FOOT: Primary | ICD-10-CM

## 2023-10-06 DIAGNOSIS — M79.674 PAIN OF TOE OF RIGHT FOOT: ICD-10-CM

## 2023-10-06 PROCEDURE — 1159F MED LIST DOCD IN RCRD: CPT | Performed by: FAMILY MEDICINE

## 2023-10-06 PROCEDURE — 1036F TOBACCO NON-USER: CPT | Performed by: FAMILY MEDICINE

## 2023-10-06 PROCEDURE — 99213 OFFICE O/P EST LOW 20 MIN: CPT | Mod: PO,25 | Performed by: FAMILY MEDICINE

## 2023-10-06 PROCEDURE — 1160F RVW MEDS BY RX/DR IN RCRD: CPT | Performed by: FAMILY MEDICINE

## 2023-10-06 PROCEDURE — 99213 OFFICE O/P EST LOW 20 MIN: CPT | Performed by: FAMILY MEDICINE

## 2023-10-06 PROCEDURE — 73630 X-RAY EXAM OF FOOT: CPT | Mod: RIGHT SIDE | Performed by: FAMILY MEDICINE

## 2023-10-06 PROCEDURE — 73630 X-RAY EXAM OF FOOT: CPT | Mod: RT,FY

## 2023-10-06 PROCEDURE — L3260 AMBULATORY SURGICAL BOOT EAC: HCPCS | Performed by: FAMILY MEDICINE

## 2023-10-06 RX ORDER — NAPROXEN 500 MG/1
500 TABLET ORAL
Qty: 28 TABLET | Refills: 0 | Status: SHIPPED | OUTPATIENT
Start: 2023-10-06 | End: 2023-10-20

## 2023-10-06 RX ORDER — METHYLPREDNISOLONE 4 MG/1
TABLET ORAL
Qty: 1 EACH | Refills: 0 | Status: SHIPPED | OUTPATIENT
Start: 2023-10-06 | End: 2023-12-28 | Stop reason: HOSPADM

## 2023-10-06 NOTE — LETTER
October 6, 2023     Patient: Maggy Gore   YOB: 1935   Date of Visit: 10/6/2023       To Whom It May Concern:    Please excuse Maggy Gore from any time missed on 10/6/23. It was necessary to receive treatment on this date.    If you have any questions or concerns, please don't hesitate to call.         Sincerely,        Cole C Budinsky, MD

## 2023-10-06 NOTE — PROGRESS NOTES
Acute Injury New Patient Visit    CC: No chief complaint on file.      HPI: 87-year-old female presents here today with complaints of right great toe pain over the last several days.  She denies any obvious injury or trauma.  Denies any numbness tingling or burning.  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  She denies any open cuts or sores.  Previous injection to the knuckles in the past of the hands have helped with her arthritic discomfort.  She presents here today for further evaluation.  States that maybe her shoes are little bit tight pressing on the medial aspect of her big toe.  No history of prior injury or trauma outside this event.    Review of Systems   GENERAL: Negative for malaise, significant weight loss, fever  MUSCULOSKELETAL: See HPI  NEURO:  Negative for numbness / tingling     Past Medical History  Past Medical History:   Diagnosis Date    Closed comminuted fracture of left patella with routine healing 07/31/2023    HTN (hypertension)        Medication review  Medication Documentation Review Audit       Reviewed by Cole C Budinsky, MD (Physician) on 10/06/23 at 1035      Medication Order Taking? Sig Documenting Provider Last Dose Status   acebutolol (Sectral) 200 mg capsule [id number] No Take 1 capsule (200 mg) by mouth twice a day. Historical Provider, MD Taking Active   amLODIPine (Norvasc) 10 mg tablet [id number]  Take 1 tablet (10 mg) by mouth once daily. Ace Dhillon MD  Active   atorvastatin (Lipitor) 20 mg tablet [id number] No Take 1 tablet (20 mg) by mouth once daily. Pari Provider, MD Taking Active   cyclobenzaprine (Flexeril) 10 mg tablet [id number] No Take 1 tablet (10 mg) by mouth 2 times a day as needed. Pari Provider, MD Taking Active   Discontinued 10/01/23 0010   famotidine (Pepcid) 20 mg tablet [id number] No Take 1 tablet (20 mg) by mouth once daily at bedtime. Historical Provider, MD Taking Active   Farxiga 10  mg [id number]  TAKE ONE TABLET BY MOUTH EVERY DAY Ace Dhillon MD  Active   gabapentin (Neurontin) 300 mg capsule [id number] No Take 1 capsule (300 mg) by mouth once daily. Historical Provider, MD Taking Active   HYDROcodone-acetaminophen (Norco) 7.5-325 mg tablet [id number] No Take 1 tablet by mouth 2 times a day. Historical Provider, MD Taking Active   methIMAzole (Tapazole) 5 mg tablet [id number] No  Historical Provider, MD Taking Active   ondansetron (Zofran) 8 mg tablet [id number] No Take 1 tablet (8 mg) by mouth every 8 hours if needed for nausea. Historical Provider, MD Taking Active   pantoprazole (ProtoNix) 40 mg EC tablet [id number]  Take 1 tablet (40 mg) by mouth once daily. Do not crush, chew, or split. Ace Dhillon MD  Active   Discontinued 10/01/23 0010   valsartan-hydrochlorothiazide (Diovan-HCT) 320-25 mg tablet [id number]  TAKE ONE TABLET BY MOUTH EVERY DAY Ace Dhillon MD  Active                    Allergies  Allergies   Allergen Reactions    Sulfa (Sulfonamide Antibiotics) Rash       Social History  Social History     Socioeconomic History    Marital status:      Spouse name: Not on file    Number of children: Not on file    Years of education: Not on file    Highest education level: Not on file   Occupational History    Not on file   Tobacco Use    Smoking status: Never    Smokeless tobacco: Never   Substance and Sexual Activity    Alcohol use: Never    Drug use: Never    Sexual activity: Not on file   Other Topics Concern    Not on file   Social History Narrative    Not on file     Social Determinants of Health     Financial Resource Strain: Not on file   Food Insecurity: Not on file   Transportation Needs: Not on file   Physical Activity: Not on file   Stress: Not on file   Social Connections: Not on file   Intimate Partner Violence: Not on file   Housing Stability: Not on file       Surgical History  Past Surgical History:   Procedure Laterality Date    APPENDECTOMY       HYSTERECTOMY         Physical Exam:  GENERAL:  Patient is awake, alert, and oriented to person place and time.  Patient appears well nourished and well kept.  Affect Calm, Not Acutely Distressed.  HEENT:  Normocephalic, Atraumatic, EOMI  CARDIOVASCULAR:  Hemodynamically stable.  RESPIRATORY:  Normal respirations with unlabored breathing.  NEURO: Gross sensation intact to the lower extremities bilaterally.  Extremity: Right great toe exam: On inspection obvious soft tissue swelling about the first MTP with minimal hallux deformity.  She has excellent great toe extension limited flexion secondary to pain tenderness over the extensor tendon noted swelling and tenderness palpation circumferentially about the MCP.  IP joint is unaffected negative midfoot squeeze high arch noted.  Calf is soft nontender.  Mild antalgic gait noted.      Diagnostics: See dictated report from today  XR foot right 3+ views  Interpreted By:  Budinsky, Cole,   STUDY:  XR FOOT RIGHT 3+ VIEWS;  ;  10/6/2023 10:17 am      INDICATION:  Signs/Symptoms:PAIN.      ACCESSION NUMBER(S):  [id number]      ORDERING CLINICIAN:  COLE BUDINSKY      FINDINGS:  Three views right foot demonstrate chronic degenerative changes and  soft tissue swelling about the 1st MTP joint. No obvious fracture or  dislocation. Bipartite medial sesamoid noted. Mild midfoot  osteoarthritic changes are also noted.          Signed by: Cole Budinsky 10/6/2023 10:35 AM  Dictation workstation:   [id number]      Procedure: None    Assessment: Right great toe pain, osteoarthritis    Plan: Discussed multiple options of treatment strategies going forward with the patient.  For now we will do a steroid pack anti-inflammatory and a postop shoe.  She was given a work note for today.  We will see her back in 3 to 4 weeks for repeat evaluation consider repeat x-rays if still having bony pain or discomfort.  We also discussed potentially considering a ultrasound injection at that time for  symptomatic relief.  She should consider using over-the-counter shoe inserts orthotics, she would also likely benefit from a wider toe box shoe.  If she is feeling significantly better does not need a shot but still has pain we can consider a carbon fiber insert next visit to use in her regular shoes.  Orders Placed This Encounter    Post-op shoe    XR foot right 3+ views    naproxen (EC Naprosyn) 500 mg EC tablet    methylPREDNISolone (Medrol Dospak) 4 mg tablets      At the conclusion of the visit there were no further questions by the patient/family regarding their plan of care.  Patient was instructed to call or return with any issues, questions, or concerns regarding their injury and/or treatment plan described above.     10/06/23 at 10:36 AM - Cole C Budinsky, MD    Office: [phone]    This note was prepared using voice recognition software.  The details of this note are correct and have been reviewed, and corrected to the best of my ability.  Some grammatical errors may persist related to the Dragon software.

## 2023-10-24 ENCOUNTER — OFFICE VISIT (OUTPATIENT)
Dept: PRIMARY CARE | Facility: CLINIC | Age: 88
End: 2023-10-24
Payer: MEDICARE

## 2023-10-24 VITALS
WEIGHT: 135 LBS | HEIGHT: 62 IN | OXYGEN SATURATION: 100 % | HEART RATE: 66 BPM | DIASTOLIC BLOOD PRESSURE: 58 MMHG | BODY MASS INDEX: 24.84 KG/M2 | SYSTOLIC BLOOD PRESSURE: 118 MMHG

## 2023-10-24 DIAGNOSIS — E03.9 ACQUIRED HYPOTHYROIDISM: ICD-10-CM

## 2023-10-24 DIAGNOSIS — E78.5 DYSLIPIDEMIA: ICD-10-CM

## 2023-10-24 DIAGNOSIS — R53.83 FATIGUE, UNSPECIFIED TYPE: ICD-10-CM

## 2023-10-24 DIAGNOSIS — K21.9 GASTROESOPHAGEAL REFLUX DISEASE, UNSPECIFIED WHETHER ESOPHAGITIS PRESENT: ICD-10-CM

## 2023-10-24 DIAGNOSIS — D64.9 ANEMIA, UNSPECIFIED TYPE: Primary | ICD-10-CM

## 2023-10-24 DIAGNOSIS — E55.9 VITAMIN D DEFICIENCY: ICD-10-CM

## 2023-10-24 DIAGNOSIS — I10 ESSENTIAL HYPERTENSION: ICD-10-CM

## 2023-10-24 DIAGNOSIS — K59.00 CONSTIPATION, UNSPECIFIED CONSTIPATION TYPE: ICD-10-CM

## 2023-10-24 DIAGNOSIS — J45.20 MILD INTERMITTENT REACTIVE AIRWAY DISEASE WITHOUT COMPLICATION (HHS-HCC): ICD-10-CM

## 2023-10-24 PROCEDURE — 99214 OFFICE O/P EST MOD 30 MIN: CPT | Performed by: FAMILY MEDICINE

## 2023-10-24 NOTE — PROGRESS NOTES
Subjective   Patient ID: Maggy Gore is a 87 y.o. female who presents for Hypertension.    This patient is here today to follow up on HTN.  This patient states that their current medication treatment for this issue is working well for them. This patient does take their blood pressure at home and states that it is usually within normal ranges. This patient denies any symptoms of headache, dizziness, blurry vision, or lightheadedness.           Review of Systems  12 Systems have been reviewed as follows.  Constitutional: Fever, weight gain, weight loss, appetite change, night sweats, fatigue, chills.  Eyes : blurry, double vision, vision, loss, tearing, redness, pain, sensitivity to light, glaucoma.  Ears, nose, mouth, and throat: Hearing loss, ringing in the ears, ear pain, nasal congestion, nasal drainage, nosebleeds, mouth, throat, irritation tooth problem.  Cardiovascular :chest pain, pressure, heart racing, palpitations, sweating, leg swelling, high or low blood pressure  Pulmonary: Cough, yellow or green sputum, blood and sputum, shortness of breath, wheezing  Gastrointestinal: Nausea, vomiting, diarrhea, constipation, pain, blood in stool, or vomitus, heartburn, difficulty swallowing  Genitourinary: incontinence, abnormal bleeding, abnormal discharge, urinary frequency, urinary hesitancy, pain, impotence sexual problem, infection, urinary retention  Musculoskeletal: Pain, stiffness, joint, redness or warmth, arthritis, back pain, weakness, muscle wasting, sprain or fracture  Neuro: Weight weakness, dizziness, change in voice, change in taste change in vision, change in hearing, loss, or change of sensation, trouble walking, balance problems coordination problems, shaking, speech problem  Endocrine , cold or heat intolerance, blood sugar problem, weight gain or loss missed periods hot flashes, sweats, change in body hair, change in libido, increased thirst, increased urination  Heme/lymph: Swelling,  "bleeding, problem anemia, bruising, enlarged lymph nodes  Allergic/immunologic: H. plus nasal drip, watery itchy eyes, nasal drainage, immunosuppressed  The above were reviewed and noted negative except as noted in HPI and Problem List.    Objective   /58   Pulse 66   Ht 1.575 m (5' 2\")   Wt 61.2 kg (135 lb)   SpO2 100%   BMI 24.69 kg/m²     Physical Exam  Constitutional: Well developed, well nourished, alert and in no acute distress   Eyes: Normal external exam. Pupils equally round and reactive to light with normal accommodation and extraocular movements intact.  Neck: Supple, no lymphadenopathy or masses.   Cardiovascular: Regular rate and rhythm, normal S1 and S2, no murmurs, gallops, or rubs. Radial pulses normal. No peripheral edema.  Pulmonary: No respiratory distress, lungs clear to auscultation bilaterally. No wheezes, rhonchi, rales.  Abdomen: soft,non tender, non distended, without masses or HSM  Skin: Warm, well perfused, normal skin turgor and color.   Neurologic: Cranial nerves II-XII grossly intact.   Psychiatric: Mood calm and affect normal  Musculoskeletal: Moving all extremities without restriction    Assessment/Plan   Problem List Items Addressed This Visit             ICD-10-CM    Vitamin D deficiency E55.9    Relevant Orders    Vitamin D 25-Hydroxy,Total (for eval of Vitamin D levels)    Reactive airway disease without complication J45.909    Relevant Orders    Follow Up In [facility] - PCP - Established    Essential hypertension I10    Relevant Orders    Follow Up [facility] - PCP - Established    Gastroesophageal reflux disease K21.9    Relevant Orders    Follow Up [facility] - PCP - Established    Constipation K59.00    Relevant Orders    Follow Up [facility] - PCP - Established    Fatigue R53.83    Relevant Orders    Follow Up [facility] - PCP - Established     Other Visit Diagnoses         Codes    Anemia, " unspecified type    -  Primary D64.9    Relevant Orders    CBC and Auto Differential    Iron and TIBC    Vitamin B12    Folate    Ferritin    Acquired hypothyroidism     E03.9    Relevant Orders    Free T4 Index    Dyslipidemia     E78.5    Relevant Orders    Lipid Panel    Comprehensive Metabolic Panel               Continue current medications and therapy for chronic medical conditions    Provider Attestation - Scribe documentation    All medical record entries made by the Scribe were at my direction and personally dictated by me. I have reviewed the chart and agree that the record accurately reflects my personal performance of the history, physical exam, discussion and plan.    Scribe Attestation  By signing my name below, I, Ace Dhillon MD   , Scribe   attest that this documentation has been prepared under the direction and in the presence of Ace Dhillon MD.    BW prior    Take fibercon twice daily    BP improved     BW next

## 2023-10-27 ENCOUNTER — APPOINTMENT (OUTPATIENT)
Dept: ORTHOPEDIC SURGERY | Facility: CLINIC | Age: 88
End: 2023-10-27
Payer: MEDICARE

## 2023-11-17 ENCOUNTER — LAB (OUTPATIENT)
Dept: LAB | Facility: LAB | Age: 88
End: 2023-11-17
Payer: MEDICARE

## 2023-11-17 DIAGNOSIS — E78.5 DYSLIPIDEMIA: ICD-10-CM

## 2023-11-17 DIAGNOSIS — E55.9 VITAMIN D DEFICIENCY: ICD-10-CM

## 2023-11-17 DIAGNOSIS — E03.9 ACQUIRED HYPOTHYROIDISM: ICD-10-CM

## 2023-11-17 DIAGNOSIS — D64.9 ANEMIA, UNSPECIFIED TYPE: ICD-10-CM

## 2023-11-17 LAB
25(OH)D3 SERPL-MCNC: 30 NG/ML (ref 30–100)
ALBUMIN SERPL BCP-MCNC: 4 G/DL (ref 3.4–5)
ALP SERPL-CCNC: 68 U/L (ref 33–136)
ALT SERPL W P-5'-P-CCNC: 10 U/L (ref 7–45)
ANION GAP SERPL CALC-SCNC: 13 MMOL/L (ref 10–20)
AST SERPL W P-5'-P-CCNC: 15 U/L (ref 9–39)
BASOPHILS # BLD AUTO: 0.05 X10*3/UL (ref 0–0.1)
BASOPHILS NFR BLD AUTO: 0.8 %
BILIRUB SERPL-MCNC: 0.6 MG/DL (ref 0–1.2)
BUN SERPL-MCNC: 29 MG/DL (ref 6–23)
CALCIUM SERPL-MCNC: 9.2 MG/DL (ref 8.6–10.3)
CHLORIDE SERPL-SCNC: 103 MMOL/L (ref 98–107)
CHOLEST SERPL-MCNC: 160 MG/DL (ref 0–199)
CHOLESTEROL/HDL RATIO: 2.3
CO2 SERPL-SCNC: 29 MMOL/L (ref 21–32)
CREAT SERPL-MCNC: 1.45 MG/DL (ref 0.5–1.05)
EOSINOPHIL # BLD AUTO: 0.26 X10*3/UL (ref 0–0.4)
EOSINOPHIL NFR BLD AUTO: 4 %
ERYTHROCYTE [DISTWIDTH] IN BLOOD BY AUTOMATED COUNT: 13.2 % (ref 11.5–14.5)
FERRITIN SERPL-MCNC: 109 NG/ML (ref 8–150)
FOLATE SERPL-MCNC: 16.4 NG/ML
FT4I SERPL CALC-MCNC: 2.9 (ref 1.6–4.7)
GFR SERPL CREATININE-BSD FRML MDRD: 35 ML/MIN/1.73M*2
GLUCOSE SERPL-MCNC: 86 MG/DL (ref 74–99)
HCT VFR BLD AUTO: 38 % (ref 36–46)
HDLC SERPL-MCNC: 68.5 MG/DL
HGB BLD-MCNC: 12.2 G/DL (ref 12–16)
IMM GRANULOCYTES # BLD AUTO: 0.02 X10*3/UL (ref 0–0.5)
IMM GRANULOCYTES NFR BLD AUTO: 0.3 % (ref 0–0.9)
IRON SATN MFR SERPL: 25 % (ref 25–45)
IRON SERPL-MCNC: 80 UG/DL (ref 35–150)
LDLC SERPL CALC-MCNC: 75 MG/DL
LYMPHOCYTES # BLD AUTO: 1.84 X10*3/UL (ref 0.8–3)
LYMPHOCYTES NFR BLD AUTO: 28.4 %
MCH RBC QN AUTO: 29.8 PG (ref 26–34)
MCHC RBC AUTO-ENTMCNC: 32.1 G/DL (ref 32–36)
MCV RBC AUTO: 93 FL (ref 80–100)
MONOCYTES # BLD AUTO: 0.57 X10*3/UL (ref 0.05–0.8)
MONOCYTES NFR BLD AUTO: 8.8 %
NEUTROPHILS # BLD AUTO: 3.74 X10*3/UL (ref 1.6–5.5)
NEUTROPHILS NFR BLD AUTO: 57.7 %
NON HDL CHOLESTEROL: 92 MG/DL (ref 0–149)
NRBC BLD-RTO: 0 /100 WBCS (ref 0–0)
PLATELET # BLD AUTO: 383 X10*3/UL (ref 150–450)
POTASSIUM SERPL-SCNC: 4.6 MMOL/L (ref 3.5–5.3)
PROT SERPL-MCNC: 6.7 G/DL (ref 6.4–8.2)
RBC # BLD AUTO: 4.09 X10*6/UL (ref 4–5.2)
SODIUM SERPL-SCNC: 140 MMOL/L (ref 136–145)
T3RU NFR SERPL: 32 % (ref 24–41)
T4 SERPL-MCNC: 9 UG/DL (ref 4.5–11.1)
TIBC SERPL-MCNC: 326 UG/DL (ref 240–445)
TRIGL SERPL-MCNC: 83 MG/DL (ref 0–149)
UIBC SERPL-MCNC: 246 UG/DL (ref 110–370)
VIT B12 SERPL-MCNC: 582 PG/ML (ref 211–911)
VLDL: 17 MG/DL (ref 0–40)
WBC # BLD AUTO: 6.5 X10*3/UL (ref 4.4–11.3)

## 2023-11-17 PROCEDURE — 83550 IRON BINDING TEST: CPT

## 2023-11-17 PROCEDURE — 84479 ASSAY OF THYROID (T3 OR T4): CPT

## 2023-11-17 PROCEDURE — 82728 ASSAY OF FERRITIN: CPT

## 2023-11-17 PROCEDURE — 82746 ASSAY OF FOLIC ACID SERUM: CPT

## 2023-11-17 PROCEDURE — 85025 COMPLETE CBC W/AUTO DIFF WBC: CPT

## 2023-11-17 PROCEDURE — 80053 COMPREHEN METABOLIC PANEL: CPT

## 2023-11-17 PROCEDURE — 36415 COLL VENOUS BLD VENIPUNCTURE: CPT

## 2023-11-17 PROCEDURE — 83540 ASSAY OF IRON: CPT

## 2023-11-17 PROCEDURE — 84436 ASSAY OF TOTAL THYROXINE: CPT

## 2023-11-17 PROCEDURE — 82306 VITAMIN D 25 HYDROXY: CPT

## 2023-11-17 PROCEDURE — 80061 LIPID PANEL: CPT

## 2023-11-17 PROCEDURE — 82607 VITAMIN B-12: CPT

## 2023-11-25 DIAGNOSIS — K21.9 GASTROESOPHAGEAL REFLUX DISEASE, UNSPECIFIED WHETHER ESOPHAGITIS PRESENT: ICD-10-CM

## 2023-11-28 RX ORDER — PANTOPRAZOLE SODIUM 40 MG/1
40 TABLET, DELAYED RELEASE ORAL DAILY
Qty: 30 TABLET | Refills: 0 | Status: SHIPPED | OUTPATIENT
Start: 2023-11-28 | End: 2024-01-16 | Stop reason: SDUPTHER

## 2023-12-04 ENCOUNTER — OFFICE VISIT (OUTPATIENT)
Dept: PRIMARY CARE | Facility: CLINIC | Age: 88
End: 2023-12-04
Payer: MEDICARE

## 2023-12-04 VITALS
SYSTOLIC BLOOD PRESSURE: 138 MMHG | OXYGEN SATURATION: 94 % | WEIGHT: 135 LBS | BODY MASS INDEX: 24.69 KG/M2 | DIASTOLIC BLOOD PRESSURE: 78 MMHG | HEART RATE: 69 BPM

## 2023-12-04 DIAGNOSIS — N18.32 STAGE 3B CHRONIC KIDNEY DISEASE (MULTI): ICD-10-CM

## 2023-12-04 DIAGNOSIS — K21.9 GASTROESOPHAGEAL REFLUX DISEASE, UNSPECIFIED WHETHER ESOPHAGITIS PRESENT: ICD-10-CM

## 2023-12-04 DIAGNOSIS — I10 ESSENTIAL HYPERTENSION: ICD-10-CM

## 2023-12-04 DIAGNOSIS — Q21.12 PFO (PATENT FORAMEN OVALE) (HHS-HCC): Primary | ICD-10-CM

## 2023-12-04 DIAGNOSIS — J45.20 MILD INTERMITTENT REACTIVE AIRWAY DISEASE WITHOUT COMPLICATION (HHS-HCC): ICD-10-CM

## 2023-12-04 DIAGNOSIS — K59.00 CONSTIPATION, UNSPECIFIED CONSTIPATION TYPE: ICD-10-CM

## 2023-12-04 DIAGNOSIS — R53.83 FATIGUE, UNSPECIFIED TYPE: ICD-10-CM

## 2023-12-04 PROCEDURE — 99214 OFFICE O/P EST MOD 30 MIN: CPT | Performed by: FAMILY MEDICINE

## 2023-12-04 ASSESSMENT — ENCOUNTER SYMPTOMS: HYPERTENSION: 1

## 2023-12-04 NOTE — PROGRESS NOTES
Subjective   Patient ID: Maggy Gore is a 88 y.o. female who presents for Hypertension.    This patient is here today to follow up on HTN. This patient is currently taking amlodipine. This patient states that their current medication treatment for this issue is working well for them. This patient does take their blood pressure at home and states that it is usually within normal ranges. This patient denies any symptoms of headache, dizziness, blurry vision, or lightheadedness.      Pt c/o recurring back pain. ( Lumbar )    Hypertension         Review of Systems  12 Systems have been reviewed as follows.  Constitutional: Fever, weight gain, weight loss, appetite change, night sweats, fatigue, chills.  Eyes : blurry, double vision, vision, loss, tearing, redness, pain, sensitivity to light, glaucoma.  Ears, nose, mouth, and throat: Hearing loss, ringing in the ears, ear pain, nasal congestion, nasal drainage, nosebleeds, mouth, throat, irritation tooth problem.  Cardiovascular :chest pain, pressure, heart racing, palpitations, sweating, leg swelling, high or low blood pressure  Pulmonary: Cough, yellow or green sputum, blood and sputum, shortness of breath, wheezing  Gastrointestinal: Nausea, vomiting, diarrhea, constipation, pain, blood in stool, or vomitus, heartburn, difficulty swallowing  Genitourinary: incontinence, abnormal bleeding, abnormal discharge, urinary frequency, urinary hesitancy, pain, impotence sexual problem, infection, urinary retention  Musculoskeletal: Pain, stiffness, joint, redness or warmth, arthritis, back pain, weakness, muscle wasting, sprain or fracture  Neuro: Weight weakness, dizziness, change in voice, change in taste change in vision, change in hearing, loss, or change of sensation, trouble walking, balance problems coordination problems, shaking, speech problem  Endocrine , cold or heat intolerance, blood sugar problem, weight gain or loss missed periods hot flashes, sweats,  change in body hair, change in libido, increased thirst, increased urination  Heme/lymph: Swelling, bleeding, problem anemia, bruising, enlarged lymph nodes  Allergic/immunologic: H. plus nasal drip, watery itchy eyes, nasal drainage, immunosuppressed  The above were reviewed and noted negative except as noted in HPI and Problem List.   Objective   /78   Pulse 69   Wt 61.2 kg (135 lb)   SpO2 94%   BMI 24.69 kg/m²     Physical Exam  Constitutional: Well developed, well nourished, alert and in no acute distress   Eyes: Normal external exam. Pupils equally round and reactive to light with normal accommodation and extraocular movements intact.  Neck: Supple, no lymphadenopathy or masses.   Cardiovascular: Regular rate and rhythm, normal S1 and S2, no murmurs, gallops, or rubs. Radial pulses normal. No peripheral edema.  Pulmonary: No respiratory distress, lungs clear to auscultation bilaterally. No wheezes, rhonchi, rales.  Abdomen: soft,non tender, non distended, without masses or HSM  Skin: Warm, well perfused, normal skin turgor and color.   Neurologic: Cranial nerves II-XII grossly intact.   Psychiatric: Mood calm and affect normal  Musculoskeletal: Moving all extremities without restriction    Assessment/Plan   Problem List Items Addressed This Visit             ICD-10-CM    Reactive airway disease without complication J45.909    Relevant Orders    Follow Up [facility] - PCP - Established    Follow Up [facility] - Care Manager    Essential hypertension I10    Relevant Orders    Follow Up [facility] - PCP - Established    Follow Up [facility] - Care Manager    Gastroesophageal reflux disease K21.9    Relevant Orders    Follow Up [facility] - PCP - Established    Follow Up [facility] - Care Manager    Constipation K59.00    Relevant Orders    Follow Up [facility] - PCP - Established    Follow Up [facility] - Care Manager    Fatigue R53.83    Relevant Orders    Follow Up [facility] - PCP - Established    Follow Up [facility] - Care Manager    Stage 3b chronic kidney disease (CMS/HCC) N18.32    Relevant Orders    Follow Up [facility] - PCP - Established    Follow Up [facility] - Care Manager    PFO (patent foramen ovale) - Primary Q21.12    Relevant Orders    Referral to Cardiology    Follow Up [facility] - PCP - Established    Follow Up [facility] - Care Manager     Continue current medications and therapy for chronic medical conditions    Fibercon qid    BP stable    BW next incl thyr index    Card ref

## 2023-12-12 ENCOUNTER — OFFICE VISIT (OUTPATIENT)
Dept: CARDIOLOGY | Facility: CLINIC | Age: 88
End: 2023-12-12
Payer: MEDICARE

## 2023-12-12 VITALS
HEIGHT: 62 IN | WEIGHT: 143 LBS | SYSTOLIC BLOOD PRESSURE: 144 MMHG | BODY MASS INDEX: 26.31 KG/M2 | HEART RATE: 58 BPM | DIASTOLIC BLOOD PRESSURE: 66 MMHG

## 2023-12-12 DIAGNOSIS — N18.30 CHRONIC KIDNEY DISEASE (CKD), ACTIVE MEDICAL MANAGEMENT WITHOUT DIALYSIS, STAGE 3 (MODERATE) (MULTI): ICD-10-CM

## 2023-12-12 DIAGNOSIS — I35.0 NONRHEUMATIC AORTIC VALVE STENOSIS: ICD-10-CM

## 2023-12-12 DIAGNOSIS — I25.10 CORONARY ARTERY DISEASE INVOLVING NATIVE CORONARY ARTERY OF NATIVE HEART WITHOUT ANGINA PECTORIS: Primary | ICD-10-CM

## 2023-12-12 DIAGNOSIS — E78.2 COMBINED HYPERLIPIDEMIA: ICD-10-CM

## 2023-12-12 DIAGNOSIS — I10 ESSENTIAL HYPERTENSION: ICD-10-CM

## 2023-12-12 PROCEDURE — 1160F RVW MEDS BY RX/DR IN RCRD: CPT | Performed by: INTERNAL MEDICINE

## 2023-12-12 PROCEDURE — 93000 ELECTROCARDIOGRAM COMPLETE: CPT | Performed by: INTERNAL MEDICINE

## 2023-12-12 PROCEDURE — 1159F MED LIST DOCD IN RCRD: CPT | Performed by: INTERNAL MEDICINE

## 2023-12-12 PROCEDURE — 99214 OFFICE O/P EST MOD 30 MIN: CPT | Performed by: INTERNAL MEDICINE

## 2023-12-12 PROCEDURE — 3077F SYST BP >= 140 MM HG: CPT | Performed by: INTERNAL MEDICINE

## 2023-12-12 PROCEDURE — 3078F DIAST BP <80 MM HG: CPT | Performed by: INTERNAL MEDICINE

## 2023-12-12 PROCEDURE — 1036F TOBACCO NON-USER: CPT | Performed by: INTERNAL MEDICINE

## 2023-12-12 RX ORDER — NAPROXEN SODIUM 220 MG/1
81 TABLET, FILM COATED ORAL DAILY
Qty: 90 TABLET | Refills: 3 | Status: SHIPPED | OUTPATIENT
Start: 2023-12-12 | End: 2024-02-22 | Stop reason: SINTOL

## 2023-12-12 NOTE — PATIENT INSTRUCTIONS
"It was my pleasure to meet you.  I look forward to being your cardiologist.  I am a huge believer in communicating with my patients.  Please contact me at any time, if anything is not clear to you regarding anything we have discussed, or if new questions occur to you.     You should increase your intake of fresh fruits and vegetables.  Try to consume 9-12 servings per day of such foods.  You should increase your intake of deep sea fish such as salmon and tuna.  Try to get two servings per week of fish, but if you are a pregnant woman, talk to your obstetrician before increasing your fish intake.  You should increase your intake of unprocessed nuts such as walnuts or almonds.  Increase your intake of plant-based protein.  You should avoid fried foods.  Don't consume sugary or starchy foods and sugary drinks.  Avoid saturated fats.  Try not to dine at restaurants more than once per month, and don't dine at fast food places.  Try to get 7-9 hours of sleep every night.  Try to get 150 minutes per week of moderate intensity exercise (after I have cleared you to start an exercise program).  Try to maintain the appropriate weight for your height based on body mass index (BMI). Maintain your cholesterol, blood sugar, and blood pressure in the recommended respective normal ranges.  There is a wealth of information on the [employer] Association's website regarding this.  Just Google \"Life's Essential 8\" for more information.   Ask me about any of these details  if you have questions.    As your cardiologist, I will be available to you at any time to answer any question you have concerning your heart health.  My staff, Amina can also answer any questions you may have.  Best of luck.     It is important for us to have an accurate list of the medications, supplements, and their doses.  It is also important for us to have an accurate list of your allergies.  Please bring this information to every appointment.  " This is a vital part of the quality of care you receive through all of your providers.

## 2023-12-12 NOTE — PROGRESS NOTES
Referred by Dr. Verduzco ref. provider found for Please see below.     History Of Present Illness:    Maggy Gore is a 88 y.o. female presenting with CAD, atypical chest pain, mild aortic stenosis.    This 88-year-old hypertensive, hyperlipidemic former less than 10-pack-year smoker is a patient of Dr. Dhillon.  She had a prior echocardiogram in December 2022 disclosing an ejection fraction of 55 to 60% with mild aortic stenosis.  The peak velocity across the aortic valve was 2.29 m/s corresponding with peak and mean transaortic gradients of 21 and 12 mm respectively, a dimensionless index of 0.42, and an aortic valve area 1.3 cm².  She had a pharmacologic SPECT and January 2023 that disclosed a small proximal lateral wall infarct with no demonstrable ischemia, and an ejection fraction of 55%.  The patient reports that sometimes in the morning she experiences symptoms of chest discomfort.  It feels like someone is sitting on her chest.  The symptoms occur while she is lying in bed.  The symptoms are brief and not exertional.  There is no radiation into the neck or jaw or arms or elsewhere.  The discomfort in the chest which occurs when she is supine resolves when she gets up and starts her day and does not recur.  She works at [employer], and her job entails a lot of physical labor and activity.  The patient denies shortness of breath, palpitations, orthopnea, PND, syncope, and near syncope.    Her functional capacity is limited by chronic low back pain.  She fractured her patella several months ago after sustaining a fall.      Past Medical History:  She has a past medical history of Closed comminuted fracture of left patella with routine healing (07/31/2023) and HTN (hypertension).    Past Surgical History:  She has a past surgical history that includes Appendectomy and Hysterectomy.      Social History:  She reports that she quit smoking about 10 years ago. Her smoking use included cigarettes. She has never used  "smokeless tobacco. She reports current alcohol use. She reports that she does not use drugs.    Family History:  Family History   Problem Relation Name Age of Onset    Heart attack Mother      Other (enlarged heart.) Father      Heart defect Daughter          Allergies:  Sulfa (sulfonamide antibiotics)    Outpatient Medications:  Current Outpatient Medications   Medication Instructions    acebutolol (SECTRAL) 200 mg, oral, 2 times daily    amLODIPine (NORVASC) 10 mg, oral, Daily RT    aspirin 81 mg, oral, Daily    atorvastatin (LIPITOR) 20 mg, oral, Daily RT    cyclobenzaprine (FLEXERIL) 10 mg, oral, 2 times daily PRN    famotidine (Pepcid) 20 mg tablet 1 tablet, oral, Nightly    Farxiga 10 mg, oral, Daily    gabapentin (Neurontin) 300 mg capsule 1 capsule, oral, Daily    HYDROcodone-acetaminophen (Norco) 7.5-325 mg tablet 1 tablet, oral, 2 times daily    methIMAzole (Tapazole) 5 mg tablet     methylPREDNISolone (Medrol Dospak) 4 mg tablets Follow schedule on package instructions    ondansetron (ZOFRAN) 8 mg, oral, Every 8 hours PRN    pantoprazole (PROTONIX) 40 mg, oral, Daily, do not crush chew or split    valsartan-hydrochlorothiazide (Diovan-HCT) 320-25 mg tablet 1 tablet, oral, Daily        Last Recorded Vitals:  Vitals:    12/12/23 0700   BP: 144/66   BP Location: Right arm   Patient Position: Sitting   Pulse: 58   Weight: 64.9 kg (143 lb)   Height: 1.575 m (5' 2\")       Physical Exam:  GENERAL:  pleasant 88 year-old  HEENT: No xanthelasma  NECK: Supple, no palpable adenopathy or thyromegaly  CHEST: Clear to auscultation, respiratory effort unlabored  CARDIAC: RRR, normal S1 and S2, no audible murmur, rub, gallop, carotids are brisk, PMI is not displaced  ABD: Active bowel sounds, nontender, no organomegaly, no evidence of ascites  EXT: No clubbing, cyanosis, edema, or tenderness  NEURO: Awake, alert, appropriate, speech is fluent     Last Labs:  CBC -  Lab Results   Component Value Date    WBC 6.5 11/17/2023 "    HGB 12.2 11/17/2023    HCT 38.0 11/17/2023    MCV 93 11/17/2023     11/17/2023       CMP -  Lab Results   Component Value Date    CALCIUM 9.2 11/17/2023    PHOS 4.7 06/16/2023    PROT 6.7 11/17/2023    ALBUMIN 4.0 11/17/2023    AST 15 11/17/2023    ALT 10 11/17/2023    ALKPHOS 68 11/17/2023    BILITOT 0.6 11/17/2023       LIPID PANEL -   Lab Results   Component Value Date    CHOL 160 11/17/2023    TRIG 83 11/17/2023    HDL 68.5 11/17/2023    CHHDL 2.3 11/17/2023    LDLF 71 08/01/2023    VLDL 17 11/17/2023    NHDL 92 11/17/2023       RENAL FUNCTION PANEL -   Lab Results   Component Value Date    GLUCOSE 86 11/17/2023     11/17/2023    K 4.6 11/17/2023     11/17/2023    CO2 29 11/17/2023    ANIONGAP 13 11/17/2023    BUN 29 (H) 11/17/2023    CREATININE 1.45 (H) 11/17/2023    CALCIUM 9.2 11/17/2023    PHOS 4.7 06/16/2023    ALBUMIN 4.0 11/17/2023        Lab Results   Component Value Date     (H) 11/22/2022     Lab review: I have Chemistry CMP:   Lab Results   Component Value Date    ALBUMIN 4.0 11/17/2023    CALCIUM 9.2 11/17/2023    CO2 29 11/17/2023    CREATININE 1.45 (H) 11/17/2023    GLUCOSE 86 11/17/2023    BILITOT 0.6 11/17/2023    PROT 6.7 11/17/2023    ALT 10 11/17/2023    AST 15 11/17/2023    ALKPHOS 68 11/17/2023   , Chemistry BMP   Lab Results   Component Value Date    GLUCOSE 86 11/17/2023    CALCIUM 9.2 11/17/2023    CO2 29 11/17/2023    CREATININE 1.45 (H) 11/17/2023   , CBC:  Lab Results   Component Value Date    WBC 6.5 11/17/2023    RBC 4.09 11/17/2023    HGB 12.2 11/17/2023    HCT 38.0 11/17/2023    MCV 93 11/17/2023    MCH 29.8 11/17/2023    MCHC 32.1 11/17/2023    RDW 13.2 11/17/2023    NRBC 0.0 11/17/2023   , and Lipids:   Lab Results   Component Value Date    CHOL 160 11/17/2023    HDL 68.5 11/17/2023    LDLCALC 75 11/17/2023    TRIG 83 11/17/2023     Diagnostic review: I have personally reviewed the result(s) of the Echocardiogram and SPECT 1/2023 .   See the reports  for complete details.    Assessment/Plan   Problem List Items Addressed This Visit          Cardiac and Vasculature    Aortic stenosis    Relevant Orders    Follow Up In Cardiology    ECG 12 lead (Clinic Performed) (Completed)    Combined hyperlipidemia    Essential hypertension    Coronary artery 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 - Primary    Relevant Medications    aspirin 81 mg chewable tablet    Other Relevant Orders    Follow Up In Cardiology    ECG 12 lead (Clinic Performed) (Completed)       Genitourinary and Reproductive    Chronic kidney disease (CKD), active medical management without dialysis, stage 3 (moderate) (CMS/HCC)     This 88-year-old hypertensive, hyperlipidemic woman with chronic kidney disease stage III and osteoarthritis has stable class I coronary artery disease and atypical chest pain as described.  A prior SPECT in January 2023 disclosed evidence of a lateral wall infarct with no demonstrable ischemia.  An echocardiogram done in December 2022 revealed an ejection fraction of 55 to 60% with mild aortic stenosis with a V-max of 2.29 m/s corresponding with peak and mean transaortic gradients of 21 and 12 mm respectively.  The aortic valve area by VTI continuity equation was 1.3 cm² with a dimensionless index of 0.42.  Our approach:    1.  Continue conservative medical management  2.  Add aspirin 81 g daily  3.  HTN: BP is not well controlled.   We discussed sodium restriction, lifestyle modification, and the DASH diet.  I advised the patient to log blood pressures daily, and to bring the data to the next appointment.  If home BPs are also high, will need to add, or adjust medications.    Jas Blanchard MD

## 2023-12-13 ENCOUNTER — PATIENT OUTREACH (OUTPATIENT)
Dept: PRIMARY CARE | Facility: CLINIC | Age: 88
End: 2023-12-13
Payer: MEDICARE

## 2023-12-18 ENCOUNTER — APPOINTMENT (OUTPATIENT)
Dept: RADIOLOGY | Facility: HOSPITAL | Age: 88
End: 2023-12-18
Payer: MEDICARE

## 2023-12-18 ENCOUNTER — OFFICE VISIT (OUTPATIENT)
Dept: PRIMARY CARE | Facility: CLINIC | Age: 88
End: 2023-12-18
Payer: MEDICARE

## 2023-12-18 ENCOUNTER — HOSPITAL ENCOUNTER (EMERGENCY)
Facility: HOSPITAL | Age: 88
Discharge: HOME | End: 2023-12-19
Attending: EMERGENCY MEDICINE
Payer: MEDICARE

## 2023-12-18 VITALS
TEMPERATURE: 97.5 F | OXYGEN SATURATION: 96 % | SYSTOLIC BLOOD PRESSURE: 140 MMHG | HEIGHT: 62 IN | RESPIRATION RATE: 15 BRPM | HEART RATE: 64 BPM | WEIGHT: 148.6 LBS | BODY MASS INDEX: 27.34 KG/M2 | DIASTOLIC BLOOD PRESSURE: 70 MMHG

## 2023-12-18 DIAGNOSIS — K62.3 RECTAL PROLAPSE: Primary | ICD-10-CM

## 2023-12-18 DIAGNOSIS — K62.5 BRIGHT RED RECTAL BLEEDING: ICD-10-CM

## 2023-12-18 DIAGNOSIS — K62.89 RECTAL MASS: Primary | ICD-10-CM

## 2023-12-18 DIAGNOSIS — R10.84 GENERALIZED ABDOMINAL PAIN: ICD-10-CM

## 2023-12-18 LAB
ALBUMIN SERPL BCP-MCNC: 4 G/DL (ref 3.4–5)
ALP SERPL-CCNC: 72 U/L (ref 33–136)
ALT SERPL W P-5'-P-CCNC: 14 U/L (ref 7–45)
ANION GAP BLDV CALCULATED.4IONS-SCNC: 14 MMOL/L (ref 10–25)
ANION GAP SERPL CALC-SCNC: 15 MMOL/L (ref 10–20)
AST SERPL W P-5'-P-CCNC: 19 U/L (ref 9–39)
BASE EXCESS BLDV CALC-SCNC: -1.7 MMOL/L (ref -2–3)
BASOPHILS # BLD AUTO: 0.04 X10*3/UL (ref 0–0.1)
BASOPHILS NFR BLD AUTO: 0.4 %
BILIRUB SERPL-MCNC: 0.5 MG/DL (ref 0–1.2)
BODY TEMPERATURE: ABNORMAL
BUN SERPL-MCNC: 28 MG/DL (ref 6–23)
CA-I BLDV-SCNC: 1.16 MMOL/L (ref 1.1–1.33)
CALCIUM SERPL-MCNC: 9.1 MG/DL (ref 8.6–10.3)
CHLORIDE BLDV-SCNC: 103 MMOL/L (ref 98–107)
CHLORIDE SERPL-SCNC: 102 MMOL/L (ref 98–107)
CO2 SERPL-SCNC: 23 MMOL/L (ref 21–32)
CREAT SERPL-MCNC: 1.68 MG/DL (ref 0.5–1.05)
EOSINOPHIL # BLD AUTO: 0.25 X10*3/UL (ref 0–0.4)
EOSINOPHIL NFR BLD AUTO: 2.7 %
ERYTHROCYTE [DISTWIDTH] IN BLOOD BY AUTOMATED COUNT: 13.1 % (ref 11.5–14.5)
GFR SERPL CREATININE-BSD FRML MDRD: 30 ML/MIN/1.73M*2
GLUCOSE BLDV-MCNC: 109 MG/DL (ref 74–99)
GLUCOSE SERPL-MCNC: 109 MG/DL (ref 74–99)
HCO3 BLDV-SCNC: 23 MMOL/L (ref 22–26)
HCT VFR BLD AUTO: 32 % (ref 36–46)
HCT VFR BLD EST: 32 % (ref 36–46)
HGB BLD-MCNC: 10.4 G/DL (ref 12–16)
HGB BLDV-MCNC: 10.5 G/DL (ref 12–16)
IMM GRANULOCYTES # BLD AUTO: 0.03 X10*3/UL (ref 0–0.5)
IMM GRANULOCYTES NFR BLD AUTO: 0.3 % (ref 0–0.9)
INHALED O2 CONCENTRATION: 21 %
LACTATE BLDV-SCNC: 0.8 MMOL/L (ref 0.4–2)
LYMPHOCYTES # BLD AUTO: 1.86 X10*3/UL (ref 0.8–3)
LYMPHOCYTES NFR BLD AUTO: 20.1 %
MCH RBC QN AUTO: 29.3 PG (ref 26–34)
MCHC RBC AUTO-ENTMCNC: 32.5 G/DL (ref 32–36)
MCV RBC AUTO: 90 FL (ref 80–100)
MONOCYTES # BLD AUTO: 0.89 X10*3/UL (ref 0.05–0.8)
MONOCYTES NFR BLD AUTO: 9.6 %
NEUTROPHILS # BLD AUTO: 6.19 X10*3/UL (ref 1.6–5.5)
NEUTROPHILS NFR BLD AUTO: 66.9 %
NRBC BLD-RTO: 0 /100 WBCS (ref 0–0)
OXYHGB MFR BLDV: 69.2 % (ref 45–75)
PCO2 BLDV: 38 MM HG (ref 41–51)
PH BLDV: 7.39 PH (ref 7.33–7.43)
PLATELET # BLD AUTO: 355 X10*3/UL (ref 150–450)
PO2 BLDV: 43 MM HG (ref 35–45)
POTASSIUM BLDV-SCNC: 4.3 MMOL/L (ref 3.5–5.3)
POTASSIUM SERPL-SCNC: 4.2 MMOL/L (ref 3.5–5.3)
PROT SERPL-MCNC: 7 G/DL (ref 6.4–8.2)
RBC # BLD AUTO: 3.55 X10*6/UL (ref 4–5.2)
SAO2 % BLDV: 71 % (ref 45–75)
SODIUM BLDV-SCNC: 136 MMOL/L (ref 136–145)
SODIUM SERPL-SCNC: 136 MMOL/L (ref 136–145)
WBC # BLD AUTO: 9.3 X10*3/UL (ref 4.4–11.3)

## 2023-12-18 PROCEDURE — 84295 ASSAY OF SERUM SODIUM: CPT | Performed by: PHYSICIAN ASSISTANT

## 2023-12-18 PROCEDURE — 74176 CT ABD & PELVIS W/O CONTRAST: CPT

## 2023-12-18 PROCEDURE — 84132 ASSAY OF SERUM POTASSIUM: CPT | Performed by: PHYSICIAN ASSISTANT

## 2023-12-18 PROCEDURE — 36415 COLL VENOUS BLD VENIPUNCTURE: CPT | Performed by: PHYSICIAN ASSISTANT

## 2023-12-18 PROCEDURE — 85025 COMPLETE CBC W/AUTO DIFF WBC: CPT | Performed by: PHYSICIAN ASSISTANT

## 2023-12-18 PROCEDURE — 96360 HYDRATION IV INFUSION INIT: CPT

## 2023-12-18 PROCEDURE — 99284 EMERGENCY DEPT VISIT MOD MDM: CPT | Performed by: PHYSICIAN ASSISTANT

## 2023-12-18 PROCEDURE — 74176 CT ABD & PELVIS W/O CONTRAST: CPT | Performed by: RADIOLOGY

## 2023-12-18 PROCEDURE — 2500000004 HC RX 250 GENERAL PHARMACY W/ HCPCS (ALT 636 FOR OP/ED): Performed by: PHYSICIAN ASSISTANT

## 2023-12-18 PROCEDURE — 99284 EMERGENCY DEPT VISIT MOD MDM: CPT | Performed by: EMERGENCY MEDICINE

## 2023-12-18 PROCEDURE — 99213 OFFICE O/P EST LOW 20 MIN: CPT | Performed by: NURSE PRACTITIONER

## 2023-12-18 RX ADMIN — SODIUM CHLORIDE, POTASSIUM CHLORIDE, SODIUM LACTATE AND CALCIUM CHLORIDE 1000 ML: 600; 310; 30; 20 INJECTION, SOLUTION INTRAVENOUS at 21:03

## 2023-12-18 ASSESSMENT — ENCOUNTER SYMPTOMS
CHILLS: 0
CONSTIPATION: 1
FATIGUE: 0
DIZZINESS: 0
HEADACHES: 0
NAUSEA: 1
ABDOMINAL PAIN: 1
DIARRHEA: 0
FEVER: 0
VOMITING: 0
WEAKNESS: 0
BLOOD IN STOOL: 1

## 2023-12-18 ASSESSMENT — LIFESTYLE VARIABLES
REASON UNABLE TO ASSESS: NO
HAVE PEOPLE ANNOYED YOU BY CRITICIZING YOUR DRINKING: NO
EVER HAD A DRINK FIRST THING IN THE MORNING TO STEADY YOUR NERVES TO GET RID OF A HANGOVER: NO
EVER FELT BAD OR GUILTY ABOUT YOUR DRINKING: NO
HAVE YOU EVER FELT YOU SHOULD CUT DOWN ON YOUR DRINKING: NO

## 2023-12-18 ASSESSMENT — PAIN SCALES - GENERAL: PAINLEVEL_OUTOF10: 6

## 2023-12-18 ASSESSMENT — COLUMBIA-SUICIDE SEVERITY RATING SCALE - C-SSRS
6. HAVE YOU EVER DONE ANYTHING, STARTED TO DO ANYTHING, OR PREPARED TO DO ANYTHING TO END YOUR LIFE?: NO
2. HAVE YOU ACTUALLY HAD ANY THOUGHTS OF KILLING YOURSELF?: NO
1. IN THE PAST MONTH, HAVE YOU WISHED YOU WERE DEAD OR WISHED YOU COULD GO TO SLEEP AND NOT WAKE UP?: NO

## 2023-12-18 ASSESSMENT — PAIN DESCRIPTION - LOCATION: LOCATION: BUTTOCKS

## 2023-12-18 ASSESSMENT — PAIN - FUNCTIONAL ASSESSMENT: PAIN_FUNCTIONAL_ASSESSMENT: 0-10

## 2023-12-18 NOTE — PATIENT INSTRUCTIONS
Due to your current symptoms and exam, please proceed to emergency room for further evaluation/treatment.

## 2023-12-18 NOTE — PROGRESS NOTES
"Subjective   Patient ID: Maggy Gore is a 88 y.o. female who presents for Hemorrhoids.    Patient is here with possible hemorrhoids.    Patient states having a leaking of blood from her anus and other liquid draining since yesterday. Patient states having stomach ache for few days now. Patient states having some nausea.    Patient denies any symptoms or concerns today.      88-year-old female presents today complaining of a possible hemorrhoid.  She states that she noticed a mass near her anus yesterday that is tender and the size of an egg.  She has no history of hemorrhoids.  She does state that she strains a lot with bowel movements.  The area does seem to be bleeding, she is wearing a sanitary pad due to the amount of blood.  She states she is having some abdominal pain as well and is feeling nauseous.    Review of Systems   Constitutional:  Negative for chills, fatigue and fever.   Gastrointestinal:  Positive for abdominal pain, blood in stool, constipation and nausea. Negative for diarrhea and vomiting.   Neurological:  Negative for dizziness, weakness and headaches.       Objective   /70 (BP Location: Right arm, Patient Position: Sitting, BP Cuff Size: Adult)   Pulse 64   Temp 36.4 °C (97.5 °F)   Resp 15   Ht 1.575 m (5' 2\")   Wt 67.4 kg (148 lb 9.6 oz)   SpO2 96%   BMI 27.18 kg/m²     Physical Exam  Vitals and nursing note reviewed.   Constitutional:       Appearance: Normal appearance.   Cardiovascular:      Rate and Rhythm: Normal rate and regular rhythm.      Heart sounds: Normal heart sounds.   Pulmonary:      Effort: Pulmonary effort is normal.      Breath sounds: Normal breath sounds.   Abdominal:      General: Abdomen is flat. Bowel sounds are normal.      Palpations: Abdomen is soft.      Tenderness: There is no abdominal tenderness.   Genitourinary:     Rectum: Mass present.      Comments: Large external rectal mass with active bleeding noted  Neurological:      Mental Status: She " is alert.         Assessment/Plan   Problem List Items Addressed This Visit    None  Visit Diagnoses         Codes    Rectal mass    -  Primary K62.89    Bright red rectal bleeding     K62.5    Generalized abdominal pain     R10.84    BMI 27.0-27.9,adult     Z68.27        Likely rectal prolapse. Discussed with Dr. Dhillon. Due to size and active bleeding, will refer to ER for further evaluation/treatment. Patient is in agreement with plan and will have her daughter drive her to the ER today for further evaluation/treatment.

## 2023-12-19 VITALS
TEMPERATURE: 97.5 F | RESPIRATION RATE: 18 BRPM | OXYGEN SATURATION: 92 % | HEIGHT: 62 IN | SYSTOLIC BLOOD PRESSURE: 167 MMHG | HEART RATE: 68 BPM | BODY MASS INDEX: 24.84 KG/M2 | DIASTOLIC BLOOD PRESSURE: 75 MMHG | WEIGHT: 135 LBS

## 2023-12-19 RX ORDER — POLYETHYLENE GLYCOL 3350 17 G/17G
17 POWDER, FOR SOLUTION ORAL DAILY
Qty: 3 PACKET | Refills: 0 | Status: ON HOLD | OUTPATIENT
Start: 2023-12-19 | End: 2023-12-28

## 2023-12-19 NOTE — ED PROVIDER NOTES
Emergency Medicine Transition of Care Note.    I received Maggy Gore in signout from outgoing PA.  Please see the previous ED provider note for all HPI, PE and MDM up to the time of signout at 2200. This is in addition to the primary record.    In brief Maggy Gore is an 88 y.o. female presenting for   Chief Complaint   Patient presents with   • Rectal Pain     Pt noticed something coming out of her rectum last evening. Pt saw PMD today and was told to come here for rule out rectal prolapse.      At the time of signout we were awaiting: CT imaging    ED Course as of 12/19/23 0029   Tue Dec 19, 2023   0027 CT abdomen pelvis wo IV contrast  Patient signed out to me by outgoing PA pending CT imaging    IMPRESSION:  Gallbladder distension and cholelithiasis with mild ductal dilatation  similar to prior imaging.      Small pleural effusions and mild anasarca. Mild mesenteric haziness  noted. Clinical correlation suggested.      Diffuse thickening of the rectal wall again noted. Clinical  correlation and direct visualization may be considered.      Extensive vascular calcifications with ectatic infrarenal aorta.  Small hiatal hernia and additional findings as detailed.   [MJ]   0027 Given his relatively unremarkable CT imaging and the fact that patient has not had repeated rectal prolapse while in ER, do feel comfortable discharging patient home.  She was instructed to follow-up with her primary care physician.  She was also instructed to establish care with colorectal surgery.  She was given Dr. Johnson's information.  Patient was given strict return precautions.  She was understanding and agreeable with plan for discharge. [MJ]      ED Course User Index  [MJ] Russ Carmen DO         Diagnoses as of 12/19/23 0029   Rectal prolapse       Medical Decision Making      Final diagnoses:   [K62.3] Rectal prolapse           Procedure  Procedures    DO Russ Wilkinson,  DO  Resident  12/19/23 0029

## 2023-12-19 NOTE — DISCHARGE INSTRUCTIONS
Please follow-up with your primary care physician and the colorectal surgeon as instructed.  If you develop any repeated rectal prolapse, severe abdominal pain, or if you have any other concerns, please return to the nearest ER for further care.

## 2023-12-19 NOTE — ED PROVIDER NOTES
"HPI   Chief Complaint   Patient presents with    Rectal Pain     Pt noticed something coming out of her rectum last evening. Pt saw PMD today and was told to come here for rule out rectal prolapse.        This is a 88-year-old female with a past medical history of hyperthyroidism, vitamin D deficiency, hypertension, gout, GERD, CKD who presents as instructed by her primary care provider with concern for a rectal mass.  The patient presented with concern for a bump to her rectum that she noticed yesterday.  There was concern for potential rectal prolapse on primary care's examination with visualization of a \"large external rectal mass with active bleeding\" which prompted her to present to the emergency department.  The patient upon my encounter denies any discomfort, has no abdominal pain at this time, endorses chronic constipation, did not \"can go for days at least without a bowel movement.\"  Last bowel movement was 2 days ago.  The patient believes she was on the toilet straining yesterday when she noticed a bump.  She has no history of hemorrhoids or rectal abscess/mass.  Denies fever, endorses some chills.  Has no chest pain, shortness of breath, nausea, vomiting, urinary symptoms, vaginal symptoms.               [place] Coma Scale Score: 15                  Patient History   Past Medical History:   Diagnosis Date    Closed comminuted fracture of left patella with routine healing 07/31/2023    HTN (hypertension)      Past Surgical History:   Procedure Laterality Date    APPENDECTOMY      HYSTERECTOMY       Family History   Problem Relation Name Age of Onset    Heart attack Mother      Other (enlarged heart.) Father      Heart defect Daughter       Social History     Tobacco Use    Smoking status: Former     Types: Cigarettes     Quit date: 2013     Years since quitting: 10.9    Smokeless tobacco: Never   Substance Use Topics    Alcohol use: Yes     Comment: Holidays    Drug use: Never       Physical Exam   ED " Triage Vitals [12/18/23 1657]   Temp Heart Rate Resp BP   36.4 °C (97.5 °F) 74 15 176/79      SpO2 Temp Source Heart Rate Source Patient Position   93 % Temporal Monitor Sitting      BP Location FiO2 (%)     Right arm --       Physical Exam  Vitals and nursing note reviewed. Exam conducted with a chaperone present.   Constitutional:       Appearance: Normal appearance.   HENT:      Head: Normocephalic and atraumatic.   Cardiovascular:      Rate and Rhythm: Normal rate and regular rhythm.   Pulmonary:      Effort: Pulmonary effort is normal.      Breath sounds: Normal breath sounds.   Abdominal:      Palpations: Abdomen is soft.      Tenderness: There is no abdominal tenderness.   Genitourinary:     Comments: Perianal skin tag, no hemorrhoid or fissure or external mass, digital insertion with mild tenderness, equivocal sensation of stool ball versus mass  Neurological:      Mental Status: She is alert.         ED Course & MDM        Medical Decision Making  88-year-old female, is alert and oriented x 3, afebrile and hemodynamically stable presenting with concern for a rectal mass, brought in with her primary care provider's recommendation.    Her abdomen is soft and nontender.  Rectal examination performed revealing a small skin tag in the perianal area, no perianal fluctuance or tenderness noted, no evidence of hemorrhoids or fissure, digital insertion exam equivocal with palpation of a possible aline ball vs mass. No significant tenderness. +Hemoccult.     Laboratory workup revealing anemia with a hemoglobin of 10.4 compared to 12.2 one month ago.  She was ordered for a CT of the abdomen/pelvis with IV contrast to rule out rectal mass.  Signed out to incoming team pending CT.        Procedure  Procedures     eTrrance Carlson PA-C  12/18/23 2415

## 2023-12-19 NOTE — ED NOTES
Pt presents to the ED with a large lump on her anus. Pt saw her doctor today: Likely rectal prolapse. Discussed with Dr. Dhillon. Due to size and active bleeding, will refer to ER for further evaluation/treatment. Patient is in agreement with plan and will have her daughter drive her to the ER today for further evaluation/treatment.        Juliette Hernandez RN  12/18/23 1955

## 2023-12-23 ENCOUNTER — APPOINTMENT (OUTPATIENT)
Dept: CARDIOLOGY | Facility: HOSPITAL | Age: 88
DRG: 445 | End: 2023-12-23
Payer: MEDICARE

## 2023-12-23 ENCOUNTER — APPOINTMENT (OUTPATIENT)
Dept: RADIOLOGY | Facility: HOSPITAL | Age: 88
DRG: 445 | End: 2023-12-23
Payer: MEDICARE

## 2023-12-23 ENCOUNTER — HOSPITAL ENCOUNTER (INPATIENT)
Facility: HOSPITAL | Age: 88
LOS: 5 days | Discharge: HOME | DRG: 445 | End: 2023-12-28
Attending: STUDENT IN AN ORGANIZED HEALTH CARE EDUCATION/TRAINING PROGRAM | Admitting: STUDENT IN AN ORGANIZED HEALTH CARE EDUCATION/TRAINING PROGRAM
Payer: MEDICARE

## 2023-12-23 DIAGNOSIS — K62.3 RECTAL PROLAPSE: ICD-10-CM

## 2023-12-23 DIAGNOSIS — K81.9 CHOLECYSTITIS: Primary | ICD-10-CM

## 2023-12-23 DIAGNOSIS — I35.0 NONRHEUMATIC AORTIC VALVE STENOSIS: ICD-10-CM

## 2023-12-23 LAB
ALBUMIN SERPL BCP-MCNC: 3.9 G/DL (ref 3.4–5)
ALP SERPL-CCNC: 77 U/L (ref 33–136)
ALT SERPL W P-5'-P-CCNC: 12 U/L (ref 7–45)
ANION GAP SERPL CALC-SCNC: 15 MMOL/L (ref 10–20)
APPEARANCE UR: ABNORMAL
AST SERPL W P-5'-P-CCNC: 17 U/L (ref 9–39)
BACTERIA #/AREA URNS AUTO: ABNORMAL /HPF
BASOPHILS # BLD AUTO: 0.04 X10*3/UL (ref 0–0.1)
BASOPHILS NFR BLD AUTO: 0.4 %
BILIRUB SERPL-MCNC: 0.5 MG/DL (ref 0–1.2)
BILIRUB UR STRIP.AUTO-MCNC: NEGATIVE MG/DL
BUN SERPL-MCNC: 22 MG/DL (ref 6–23)
CALCIUM SERPL-MCNC: 8.7 MG/DL (ref 8.6–10.3)
CARDIAC TROPONIN I PNL SERPL HS: 18 NG/L (ref 0–13)
CARDIAC TROPONIN I PNL SERPL HS: 19 NG/L (ref 0–13)
CHLORIDE SERPL-SCNC: 101 MMOL/L (ref 98–107)
CO2 SERPL-SCNC: 24 MMOL/L (ref 21–32)
COLOR UR: YELLOW
CREAT SERPL-MCNC: 1.76 MG/DL (ref 0.5–1.05)
EOSINOPHIL # BLD AUTO: 0.1 X10*3/UL (ref 0–0.4)
EOSINOPHIL NFR BLD AUTO: 1.1 %
ERYTHROCYTE [DISTWIDTH] IN BLOOD BY AUTOMATED COUNT: 13.3 % (ref 11.5–14.5)
GFR SERPL CREATININE-BSD FRML MDRD: 28 ML/MIN/1.73M*2
GLUCOSE SERPL-MCNC: 124 MG/DL (ref 74–99)
GLUCOSE UR STRIP.AUTO-MCNC: NEGATIVE MG/DL
HCT VFR BLD AUTO: 32.7 % (ref 36–46)
HGB BLD-MCNC: 10.6 G/DL (ref 12–16)
IMM GRANULOCYTES # BLD AUTO: 0.03 X10*3/UL (ref 0–0.5)
IMM GRANULOCYTES NFR BLD AUTO: 0.3 % (ref 0–0.9)
KETONES UR STRIP.AUTO-MCNC: NEGATIVE MG/DL
LACTATE SERPL-SCNC: 0.6 MMOL/L (ref 0.4–2)
LEUKOCYTE ESTERASE UR QL STRIP.AUTO: ABNORMAL
LIPASE SERPL-CCNC: <3 U/L (ref 9–82)
LYMPHOCYTES # BLD AUTO: 0.78 X10*3/UL (ref 0.8–3)
LYMPHOCYTES NFR BLD AUTO: 8.7 %
MCH RBC QN AUTO: 29.2 PG (ref 26–34)
MCHC RBC AUTO-ENTMCNC: 32.4 G/DL (ref 32–36)
MCV RBC AUTO: 90 FL (ref 80–100)
MONOCYTES # BLD AUTO: 1.17 X10*3/UL (ref 0.05–0.8)
MONOCYTES NFR BLD AUTO: 13.1 %
NEUTROPHILS # BLD AUTO: 6.81 X10*3/UL (ref 1.6–5.5)
NEUTROPHILS NFR BLD AUTO: 76.4 %
NITRITE UR QL STRIP.AUTO: NEGATIVE
NRBC BLD-RTO: 0 /100 WBCS (ref 0–0)
PH UR STRIP.AUTO: 5 [PH]
PLATELET # BLD AUTO: 435 X10*3/UL (ref 150–450)
POTASSIUM SERPL-SCNC: 4.2 MMOL/L (ref 3.5–5.3)
PROT SERPL-MCNC: 6.9 G/DL (ref 6.4–8.2)
PROT UR STRIP.AUTO-MCNC: ABNORMAL MG/DL
RBC # BLD AUTO: 3.63 X10*6/UL (ref 4–5.2)
RBC # UR STRIP.AUTO: ABNORMAL /UL
RBC #/AREA URNS AUTO: ABNORMAL /HPF
SODIUM SERPL-SCNC: 136 MMOL/L (ref 136–145)
SP GR UR STRIP.AUTO: 1.01
SQUAMOUS #/AREA URNS AUTO: ABNORMAL /HPF
UROBILINOGEN UR STRIP.AUTO-MCNC: <2 MG/DL
WBC # BLD AUTO: 8.9 X10*3/UL (ref 4.4–11.3)
WBC #/AREA URNS AUTO: ABNORMAL /HPF

## 2023-12-23 PROCEDURE — 85025 COMPLETE CBC W/AUTO DIFF WBC: CPT

## 2023-12-23 PROCEDURE — 2500000004 HC RX 250 GENERAL PHARMACY W/ HCPCS (ALT 636 FOR OP/ED): Performed by: STUDENT IN AN ORGANIZED HEALTH CARE EDUCATION/TRAINING PROGRAM

## 2023-12-23 PROCEDURE — 99285 EMERGENCY DEPT VISIT HI MDM: CPT | Performed by: STUDENT IN AN ORGANIZED HEALTH CARE EDUCATION/TRAINING PROGRAM

## 2023-12-23 PROCEDURE — 2500000004 HC RX 250 GENERAL PHARMACY W/ HCPCS (ALT 636 FOR OP/ED)

## 2023-12-23 PROCEDURE — 0F9430Z DRAINAGE OF GALLBLADDER WITH DRAINAGE DEVICE, PERCUTANEOUS APPROACH: ICD-10-PCS | Performed by: NURSE PRACTITIONER

## 2023-12-23 PROCEDURE — 84484 ASSAY OF TROPONIN QUANT: CPT

## 2023-12-23 PROCEDURE — 36415 COLL VENOUS BLD VENIPUNCTURE: CPT

## 2023-12-23 PROCEDURE — 96365 THER/PROPH/DIAG IV INF INIT: CPT

## 2023-12-23 PROCEDURE — 83605 ASSAY OF LACTIC ACID: CPT

## 2023-12-23 PROCEDURE — 93005 ELECTROCARDIOGRAM TRACING: CPT

## 2023-12-23 PROCEDURE — 1100000001 HC PRIVATE ROOM DAILY

## 2023-12-23 PROCEDURE — 80053 COMPREHEN METABOLIC PANEL: CPT

## 2023-12-23 PROCEDURE — 93010 ELECTROCARDIOGRAM REPORT: CPT | Performed by: INTERNAL MEDICINE

## 2023-12-23 PROCEDURE — 81001 URINALYSIS AUTO W/SCOPE: CPT

## 2023-12-23 PROCEDURE — 74176 CT ABD & PELVIS W/O CONTRAST: CPT | Performed by: STUDENT IN AN ORGANIZED HEALTH CARE EDUCATION/TRAINING PROGRAM

## 2023-12-23 PROCEDURE — 83690 ASSAY OF LIPASE: CPT

## 2023-12-23 PROCEDURE — 96375 TX/PRO/DX INJ NEW DRUG ADDON: CPT

## 2023-12-23 PROCEDURE — 99223 1ST HOSP IP/OBS HIGH 75: CPT | Performed by: STUDENT IN AN ORGANIZED HEALTH CARE EDUCATION/TRAINING PROGRAM

## 2023-12-23 PROCEDURE — 74176 CT ABD & PELVIS W/O CONTRAST: CPT

## 2023-12-23 RX ORDER — ONDANSETRON HYDROCHLORIDE 2 MG/ML
4 INJECTION, SOLUTION INTRAVENOUS ONCE
Status: COMPLETED | OUTPATIENT
Start: 2023-12-23 | End: 2023-12-23

## 2023-12-23 RX ORDER — FUROSEMIDE 10 MG/ML
20 INJECTION INTRAMUSCULAR; INTRAVENOUS ONCE
Status: COMPLETED | OUTPATIENT
Start: 2023-12-23 | End: 2023-12-23

## 2023-12-23 RX ORDER — MORPHINE SULFATE 4 MG/ML
4 INJECTION, SOLUTION INTRAMUSCULAR; INTRAVENOUS ONCE
Status: COMPLETED | OUTPATIENT
Start: 2023-12-23 | End: 2023-12-23

## 2023-12-23 RX ADMIN — ONDANSETRON 4 MG: 2 INJECTION INTRAMUSCULAR; INTRAVENOUS at 18:24

## 2023-12-23 RX ADMIN — FUROSEMIDE 20 MG: 10 INJECTION, SOLUTION INTRAMUSCULAR; INTRAVENOUS at 20:38

## 2023-12-23 RX ADMIN — PIPERACILLIN SODIUM AND TAZOBACTAM SODIUM 3.38 G: 3; .375 INJECTION, SOLUTION INTRAVENOUS at 20:39

## 2023-12-23 RX ADMIN — MORPHINE SULFATE 4 MG: 4 INJECTION, SOLUTION INTRAMUSCULAR; INTRAVENOUS at 18:24

## 2023-12-23 ASSESSMENT — PAIN DESCRIPTION - PAIN TYPE: TYPE: ACUTE PAIN

## 2023-12-23 ASSESSMENT — PAIN SCALES - GENERAL
PAINLEVEL_OUTOF10: 7
PAINLEVEL_OUTOF10: 4

## 2023-12-23 ASSESSMENT — COLUMBIA-SUICIDE SEVERITY RATING SCALE - C-SSRS
2. HAVE YOU ACTUALLY HAD ANY THOUGHTS OF KILLING YOURSELF?: NO
1. IN THE PAST MONTH, HAVE YOU WISHED YOU WERE DEAD OR WISHED YOU COULD GO TO SLEEP AND NOT WAKE UP?: NO
6. HAVE YOU EVER DONE ANYTHING, STARTED TO DO ANYTHING, OR PREPARED TO DO ANYTHING TO END YOUR LIFE?: NO

## 2023-12-23 ASSESSMENT — PAIN DESCRIPTION - LOCATION: LOCATION: ABDOMEN

## 2023-12-23 ASSESSMENT — PAIN DESCRIPTION - FREQUENCY: FREQUENCY: CONSTANT/CONTINUOUS

## 2023-12-23 ASSESSMENT — PAIN - FUNCTIONAL ASSESSMENT: PAIN_FUNCTIONAL_ASSESSMENT: 0-10

## 2023-12-23 NOTE — Clinical Note
ED UM Review Complete - Patient Meets Inpatient Criteria  [email or web address]@

## 2023-12-23 NOTE — Clinical Note
Procedure complete. 8F drain to RUQ. Sutured in place. Covered with M-Fixx, 4x4, Tegaderm. Connected to drainage bag.

## 2023-12-23 NOTE — ED PROVIDER NOTES
EMERGENCY DEPARTMENT ENCOUNTER      Pt Name: Maggy Gore  MRN: [id number]  Birthdate 1935  Date of evaluation: 12/23/2023    HISTORY OF PRESENT ILLNESS    Maggy Gore is an 88 y.o. female with history including hypertension, hyperlipidemia, GERD, CKD, CAD presenting to the emergency department for abdominal pain.  Patient was seen here 6 days ago for a rectal prolapse.  Patient states that the prolapse has returned and she is having worsening abdominal pain and discomfort.  Eventually the rectal prolapse was reduced last ER visit and patient was discharged home.  Patient states that the abdominal pain started in this last week and has continually worsened.  She states that the pain became severe this morning and is nauseous.  Patient did have an episode of vomiting.  She states that she has also not had a bowel movement since her last ER visit.  Patient states the pain is 10/10 in the right upper quadrant severe sharp pain.  Patient still has her gallbladder.  Patient denies any fevers, chills, chest pain or shortness of breath.      PAST MEDICAL HISTORY     Past Medical History:   Diagnosis Date    Closed comminuted fracture of left patella with routine healing 07/31/2023    HTN (hypertension)        SURGICAL HISTORY       Past Surgical History:   Procedure Laterality Date    APPENDECTOMY      HYSTERECTOMY         CURRENT MEDICATIONS       Current Discharge Medication List        CONTINUE these medications which have NOT CHANGED    Details   acebutolol (Sectral) 200 mg capsule Take 1 capsule (200 mg) by mouth twice a day.      amLODIPine (Norvasc) 10 mg tablet Take 1 tablet (10 mg) by mouth once daily.  Qty: 30 tablet, Refills: 3    Associated Diagnoses: Primary hypertension      aspirin 81 mg chewable tablet Chew 1 tablet (81 mg) once daily.  Qty: 90 tablet, Refills: 3    Associated Diagnoses: Coronary artery disease involving native coronary artery of native heart without angina pectoris       atorvastatin (Lipitor) 20 mg tablet Take 1 tablet (20 mg) by mouth once daily.      cyclobenzaprine (Flexeril) 10 mg tablet Take 1 tablet (10 mg) by mouth 2 times a day as needed.      famotidine (Pepcid) 20 mg tablet Take 1 tablet (20 mg) by mouth once daily at bedtime.      Farxiga 10 mg TAKE ONE TABLET BY MOUTH EVERY DAY  Qty: 90 tablet, Refills: 1    Associated Diagnoses: Stage 3a chronic kidney disease (CMS/HCC)      gabapentin (Neurontin) 300 mg capsule Take 1 capsule (300 mg) by mouth once daily.      HYDROcodone-acetaminophen (Norco) 5-325 mg tablet Take 1 tablet by mouth every 6 hours if needed for severe pain (7 - 10).      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      pantoprazole (ProtoNix) 40 mg EC tablet TAKE ONE TABLET BY MOUTH EVERY DAY. DO NOT CRUSH CHEW OR SPLIT  Qty: 30 tablet, Refills: 0    Associated Diagnoses: Gastroesophageal reflux disease, unspecified whether esophagitis present      valsartan-hydrochlorothiazide (Diovan-HCT) 320-25 mg tablet TAKE ONE TABLET BY MOUTH EVERY DAY  Qty: 90 tablet, Refills: 1    Associated Diagnoses: Primary hypertension             ALLERGIES     Sulfa (sulfonamide antibiotics)    FAMILY HISTORY       Family History   Problem Relation Name Age of Onset    Heart attack Mother      Other (enlarged heart.) Father      Heart defect Daughter          SOCIAL HISTORY       Social History     Socioeconomic History    Marital status:      Spouse name: None    Number of children: None    Years of education: None    Highest education level: None   Occupational History    None   Tobacco Use    Smoking status: Former     Types: Cigarettes     Quit date: 2013      Years since quitting: 10.9    Smokeless tobacco: Never   Substance and Sexual Activity    Alcohol use: Yes     Comment: Holidays    Drug use: Never    Sexual activity: Defer   Other Topics Concern    None   Social History Narrative    None     Social Determinants of Health     Financial Resource Strain: Patient Declined (12/24/2023)    Overall Financial Resource Strain (CARDIA)     Difficulty of Paying Living Expenses: Patient declined   Food Insecurity: Not on file   Transportation Needs: No Transportation Needs (12/24/2023)    PRAPARE - Transportation     Lack of Transportation (Medical): No     Lack of Transportation (Non-Medical): No   Physical Activity: Not on file   Stress: Not on file   Social Connections: Not on file   Intimate Partner Violence: Not on file   Housing Stability: Unknown (12/24/2023)    Housing Stability Vital Sign     Unable to Pay for Housing in the Last Year: Patient declined     Number of Places Lived in the Last Year: 1     Unstable Housing in the Last Year: No       PHYSICAL EXAM       ED Triage Vitals   Temp Pulse Resp BP   -- -- -- --      SpO2 Temp src Heart Rate Source Patient Position   -- -- -- --      BP Location FiO2 (%)     -- --       Physical Exam  Vitals and nursing note reviewed.   Constitutional:       General: She is not in acute distress.     Appearance: She is well-developed.   HENT:      Head: Normocephalic and atraumatic.   Eyes:      Conjunctiva/sclera: Conjunctivae normal.   Cardiovascular:      Rate and Rhythm: Normal rate and regular rhythm.      Heart sounds: No murmur heard.  Pulmonary:      Effort: Pulmonary effort is normal. No respiratory distress.      Breath sounds: Normal breath sounds.   Abdominal:      Palpations: Abdomen is soft.      Tenderness: There is abdominal tenderness in the right upper quadrant. There is guarding. Positive signs include Sandy's sign.   Musculoskeletal:         General: No swelling.      Cervical back: Neck supple.   Skin:      General: Skin is warm and dry.      Capillary Refill: Capillary refill takes less than 2 seconds.   Neurological:      Mental Status: She is alert.   Psychiatric:         Mood and Affect: Mood normal.          DIAGNOSTIC RESULTS     LABS:  Labs Reviewed   CBC WITH AUTO DIFFERENTIAL - Abnormal       Result Value    WBC 8.9      nRBC 0.0      RBC 3.63 (*)     Hemoglobin 10.6 (*)     Hematocrit 32.7 (*)     MCV 90      MCH 29.2      MCHC 32.4      RDW 13.3      Platelets 435      Neutrophils % 76.4      Immature Granulocytes %, Automated 0.3      Lymphocytes % 8.7      Monocytes % 13.1      Eosinophils % 1.1      Basophils % 0.4      Neutrophils Absolute 6.81 (*)     Immature Granulocytes Absolute, Automated 0.03      Lymphocytes Absolute 0.78 (*)     Monocytes Absolute 1.17 (*)     Eosinophils Absolute 0.10      Basophils Absolute 0.04     COMPREHENSIVE METABOLIC PANEL - Abnormal    Glucose 124 (*)     Sodium 136      Potassium 4.2      Chloride 101      Bicarbonate 24      Anion Gap 15      Urea Nitrogen 22      Creatinine 1.76 (*)     eGFR 28 (*)     Calcium 8.7      Albumin 3.9      Alkaline Phosphatase 77      Total Protein 6.9      AST 17      Bilirubin, Total 0.5      ALT 12     LIPASE - Abnormal    Lipase <3 (*)     Narrative:     Venipuncture immediately after or during the administration of Metamizole may lead to falsely low results. Testing should be performed immediately prior to Metamizole dosing.   URINALYSIS WITH REFLEX MICROSCOPIC - Abnormal    Color, Urine Yellow      Appearance, Urine Hazy (*)     Specific Gravity, Urine 1.013      pH, Urine 5.0      Protein, Urine >=500 (3+) (*)     Glucose, Urine NEGATIVE      Blood, Urine LARGE (3+) (*)     Ketones, Urine NEGATIVE      Bilirubin, Urine NEGATIVE      Urobilinogen, Urine <2.0      Nitrite, Urine NEGATIVE      Leukocyte Esterase, Urine TRACE (*)    SERIAL TROPONIN-INITIAL - Abnormal    Troponin I, High Sensitivity 18 (*)     Narrative:     Less than  99th percentile of normal range cutoff-  Female and children under 18 years old <14 ng/L; Male <21 ng/L: Negative  Repeat testing should be performed if clinically indicated.     Female and children under 18 years old 14-50 ng/L; Male 21-50 ng/L:  Consistent with possible cardiac damage and possible increased clinical   risk. Serial measurements may help to assess extent of myocardial damage.     >50 ng/L: Consistent with cardiac damage, increased clinical risk and  myocardial infarction. Serial measurements may help assess extent of   myocardial damage.      NOTE: Children less than 1 year old may have higher baseline troponin   levels and results should be interpreted in conjunction with the overall   clinical context.     NOTE: Troponin I testing is performed using a different   testing methodology at [facility] than at other   [facility]. Direct result comparisons should only   be made within the same method.   SERIAL TROPONIN, 1 HOUR - Abnormal    Troponin I, High Sensitivity 19 (*)     Narrative:     Less than 99th percentile of normal range cutoff-  Female and children under 18 years old <14 ng/L; Male <21 ng/L: Negative  Repeat testing should be performed if clinically indicated.     Female and children under 18 years old 14-50 ng/L; Male 21-50 ng/L:  Consistent with possible cardiac damage and possible increased clinical   risk. Serial measurements may help to assess extent of myocardial damage.     >50 ng/L: Consistent with cardiac damage, increased clinical risk and  myocardial infarction. Serial measurements may help assess extent of   myocardial damage.      NOTE: Children less than 1 year old may have higher baseline troponin   levels and results should be interpreted in conjunction with the overall   clinical context.     NOTE: Troponin I testing is performed using a different   testing methodology at [facility] than at other   [facility]. Direct result  comparisons should only   be made within the same method.   MICROSCOPIC ONLY, URINE - Abnormal    WBC, Urine 1-5      RBC, Urine 11-20 (*)     Squamous Epithelial Cells, Urine 1-9 (SPARSE)      Bacteria, Urine 1+ (*)    LACTATE - Normal    Lactate 0.6      Narrative:     Venipuncture immediately after or during the administration of Metamizole may lead to falsely low results. Testing should be performed immediately  prior to Metamizole dosing.   TSH WITH REFLEX TO FREE T4 IF ABNORMAL - Normal    Thyroid Stimulating Hormone 0.89      Narrative:     TSH testing is performed using different testing methodology at [facility] than at other [facility]. Direct result comparisons should only be made within the same method.     TROPONIN SERIES- (INITIAL, 1 HR)    Narrative:     The following orders were created for panel order Troponin Series, (0, 1 HR).  Procedure                               Abnormality         Status                     ---------                               -----------         ------                     Troponin I, High Sensiti...[[id number]]  Abnormal            Final result               Troponin, High Sensitivi...[[id number]]  Abnormal            Final result                 Please view results for these tests on the individual orders.       All other labs were within normal range or not returned as of this dictation.    Imaging  CT abdomen pelvis wo IV contrast   Final Result   1. Significant worsening of gallbladder dilatation with mild wall   thickening concerning for acute cholecystitis.        2. Unchanged mild extrahepatic biliary ductal dilatation with the   common bile duct measuring 1.1 cm, likely a unrelated to   choledocholithiasis given normal liver function testing.        3. Improved rectal inflammation from 12/18/2023, however with   worsening of rectal prolapse compared to prior study.        4. Worsening of pulmonary edema, worsening diffuse anasarca, and   slight  enlargement of small bilateral pleural effusions (right >   left).        5. 3.1 cm infrarenal abdominal aortic aneurysm.        MACRO:   None.        Signed by: Anthony Eubanks 12/23/2023 8:00 PM   Dictation workstation:   [id number]      Transthoracic Echo (TTE) Complete    (Results Pending)        Procedures  Procedures     EMERGENCY DEPARTMENT COURSE/MDM:   Medical Decision Making  Maggy Gore is an 88 y.o. female with history including hypertension, hyperlipidemia, GERD, CKD, CAD presenting to the emergency department for abdominal pain.  Patient was given morphine and Zofran for pain control and nausea control.  Due to patient's age cardiac workup included as well as CT abdomen pelvis with IV contrast and labs for evaluation of possible acute pancreatitis.    Patient's labs were reviewed and interpreted independently.  Patient has no elevated liver enzymes or bilirubin.  Lipase is negative.  Troponin negative.  Most likely not ACS involvement.  Patient's CT abdomen pelvis shows evidence of acute cholecystitis.  Patient's previous CT on 12/18 was reviewed showed no evidence of fat stranding or.  Cholecystic fluid or pericholecystic edema.  All of these findings are now seen on the CT imaging.  Gallstones were seen on previous imaging.  No change in patient's extrabiliary duct dilatation.  Measuring 1.1 cm was seen on previous imaging no evidence of acute choledocholithiasis.  Patient will be covered with Zosyn for acute cholecystitis.    Discussed case with general surgery Dr. Aiken he recommends admission to medicine and will follow-up inpatient.  He recommends n.p.o. after midnight though patient would be high risk for surgical intervention.  Patient admitted to medical service for further care.        Diagnoses as of 12/24/23 1159   Cholecystitis        External records reviewed: recent inpatient, clinic, and prior ED notes  Labs and Diagnostic imaging independently reviewed/interpreted by  me.    Patient plan, care, lab results and imaging were all discussed with attending.    ED Medications administered this visit:    Medications   heparin (porcine) injection 5,000 Units ( subcutaneous Dose Auto Held 12/28/23 1615)   ondansetron ODT (Zofran-ODT) disintegrating tablet 4 mg ( oral See Alternative 12/24/23 0831)     Or   ondansetron (Zofran) injection 4 mg (4 mg intravenous Given 12/24/23 0831)   polyethylene glycol (Glycolax, Miralax) packet 17 g (17 g oral Given 12/24/23 0819)   sennosides (Senokot) tablet 17.2 mg (17.2 mg oral Given 12/24/23 0819)   piperacillin-tazobactam-dextrose (Zosyn) IV 3.375 g (3.375 g intravenous New Bag 12/24/23 1115)   HYDROmorphone (Dilaudid) injection 0.6 mg (0.6 mg intravenous Given 12/24/23 0526)   pneumococcal conjugate vaccine, 13-valent (PREVNAR 13) (has no administration in time range)   perflutren lipid microspheres (Definity) injection 0.5-10 mL of dilution (has no administration in time range)   morphine injection 4 mg (4 mg intravenous Given 12/23/23 1824)   ondansetron (Zofran) injection 4 mg (4 mg intravenous Given 12/23/23 1824)   furosemide (Lasix) injection 20 mg (20 mg intravenous Given 12/23/23 2038)   piperacillin-tazobactam-dextrose (Zosyn) IV 3.375 g (0 g intravenous Stopped 12/23/23 2143)     New Prescriptions from this visit:    Current Discharge Medication List          (Please note that portions of this note were completed with a voice recognition program.  Efforts were made to edit the dictations but occasionally words are mis-transcribed.)     Dee Fowler, DO  Resident  12/24/23 [id number]

## 2023-12-24 LAB — TSH SERPL-ACNC: 0.89 MIU/L (ref 0.44–3.98)

## 2023-12-24 PROCEDURE — 2500000004 HC RX 250 GENERAL PHARMACY W/ HCPCS (ALT 636 FOR OP/ED): Performed by: STUDENT IN AN ORGANIZED HEALTH CARE EDUCATION/TRAINING PROGRAM

## 2023-12-24 PROCEDURE — 2500000001 HC RX 250 WO HCPCS SELF ADMINISTERED DRUGS (ALT 637 FOR MEDICARE OP): Performed by: STUDENT IN AN ORGANIZED HEALTH CARE EDUCATION/TRAINING PROGRAM

## 2023-12-24 PROCEDURE — 1100000001 HC PRIVATE ROOM DAILY

## 2023-12-24 PROCEDURE — 96372 THER/PROPH/DIAG INJ SC/IM: CPT | Performed by: STUDENT IN AN ORGANIZED HEALTH CARE EDUCATION/TRAINING PROGRAM

## 2023-12-24 PROCEDURE — 36415 COLL VENOUS BLD VENIPUNCTURE: CPT | Performed by: STUDENT IN AN ORGANIZED HEALTH CARE EDUCATION/TRAINING PROGRAM

## 2023-12-24 PROCEDURE — 99233 SBSQ HOSP IP/OBS HIGH 50: CPT | Performed by: STUDENT IN AN ORGANIZED HEALTH CARE EDUCATION/TRAINING PROGRAM

## 2023-12-24 PROCEDURE — 84443 ASSAY THYROID STIM HORMONE: CPT | Performed by: STUDENT IN AN ORGANIZED HEALTH CARE EDUCATION/TRAINING PROGRAM

## 2023-12-24 RX ORDER — SENNOSIDES 8.6 MG/1
2 TABLET ORAL 2 TIMES DAILY
Status: DISCONTINUED | OUTPATIENT
Start: 2023-12-24 | End: 2023-12-28 | Stop reason: HOSPADM

## 2023-12-24 RX ORDER — ATORVASTATIN CALCIUM 20 MG/1
20 TABLET, FILM COATED ORAL NIGHTLY
Status: DISCONTINUED | OUTPATIENT
Start: 2023-12-24 | End: 2023-12-28 | Stop reason: HOSPADM

## 2023-12-24 RX ORDER — PANTOPRAZOLE SODIUM 40 MG/1
40 TABLET, DELAYED RELEASE ORAL DAILY
Status: DISCONTINUED | OUTPATIENT
Start: 2023-12-24 | End: 2023-12-28 | Stop reason: HOSPADM

## 2023-12-24 RX ORDER — FAMOTIDINE 20 MG/1
20 TABLET, FILM COATED ORAL NIGHTLY
Status: DISCONTINUED | OUTPATIENT
Start: 2023-12-24 | End: 2023-12-28 | Stop reason: HOSPADM

## 2023-12-24 RX ORDER — POLYETHYLENE GLYCOL 3350 17 G/17G
17 POWDER, FOR SOLUTION ORAL 2 TIMES DAILY
Status: DISCONTINUED | OUTPATIENT
Start: 2023-12-24 | End: 2023-12-26

## 2023-12-24 RX ORDER — ONDANSETRON HYDROCHLORIDE 2 MG/ML
4 INJECTION, SOLUTION INTRAVENOUS EVERY 8 HOURS PRN
Status: DISCONTINUED | OUTPATIENT
Start: 2023-12-24 | End: 2023-12-28 | Stop reason: HOSPADM

## 2023-12-24 RX ORDER — HYDROMORPHONE HYDROCHLORIDE 1 MG/ML
0.6 INJECTION, SOLUTION INTRAMUSCULAR; INTRAVENOUS; SUBCUTANEOUS EVERY 4 HOURS PRN
Status: DISCONTINUED | OUTPATIENT
Start: 2023-12-24 | End: 2023-12-28 | Stop reason: HOSPADM

## 2023-12-24 RX ORDER — HYDRALAZINE HYDROCHLORIDE 25 MG/1
25 TABLET, FILM COATED ORAL 3 TIMES DAILY
Status: DISCONTINUED | OUTPATIENT
Start: 2023-12-24 | End: 2023-12-24

## 2023-12-24 RX ORDER — NAPROXEN SODIUM 220 MG/1
81 TABLET, FILM COATED ORAL DAILY
Status: DISCONTINUED | OUTPATIENT
Start: 2023-12-24 | End: 2023-12-28 | Stop reason: HOSPADM

## 2023-12-24 RX ORDER — POLYETHYLENE GLYCOL 3350 17 G/17G
17 POWDER, FOR SOLUTION ORAL DAILY
Status: DISCONTINUED | OUTPATIENT
Start: 2023-12-24 | End: 2023-12-28 | Stop reason: HOSPADM

## 2023-12-24 RX ORDER — ONDANSETRON 4 MG/1
4 TABLET, ORALLY DISINTEGRATING ORAL EVERY 8 HOURS PRN
Status: DISCONTINUED | OUTPATIENT
Start: 2023-12-24 | End: 2023-12-28 | Stop reason: HOSPADM

## 2023-12-24 RX ORDER — METHIMAZOLE 5 MG/1
5 TABLET ORAL DAILY
Status: DISCONTINUED | OUTPATIENT
Start: 2023-12-24 | End: 2023-12-28 | Stop reason: HOSPADM

## 2023-12-24 RX ORDER — ACEBUTOLOL HYDROCHLORIDE 200 MG/1
200 CAPSULE ORAL 2 TIMES DAILY
Status: DISCONTINUED | OUTPATIENT
Start: 2023-12-24 | End: 2023-12-28 | Stop reason: HOSPADM

## 2023-12-24 RX ORDER — SODIUM CHLORIDE, SODIUM LACTATE, POTASSIUM CHLORIDE, CALCIUM CHLORIDE 600; 310; 30; 20 MG/100ML; MG/100ML; MG/100ML; MG/100ML
75 INJECTION, SOLUTION INTRAVENOUS CONTINUOUS
Status: DISCONTINUED | OUTPATIENT
Start: 2023-12-24 | End: 2023-12-24

## 2023-12-24 RX ORDER — GABAPENTIN 300 MG/1
300 CAPSULE ORAL DAILY
Status: DISCONTINUED | OUTPATIENT
Start: 2023-12-24 | End: 2023-12-28 | Stop reason: HOSPADM

## 2023-12-24 RX ORDER — AMLODIPINE BESYLATE 10 MG/1
10 TABLET ORAL
Status: DISCONTINUED | OUTPATIENT
Start: 2023-12-24 | End: 2023-12-28 | Stop reason: HOSPADM

## 2023-12-24 RX ORDER — DAPAGLIFLOZIN 10 MG/1
10 TABLET, FILM COATED ORAL DAILY
Status: DISCONTINUED | OUTPATIENT
Start: 2023-12-24 | End: 2023-12-28 | Stop reason: HOSPADM

## 2023-12-24 RX ORDER — HEPARIN SODIUM 5000 [USP'U]/ML
5000 INJECTION, SOLUTION INTRAVENOUS; SUBCUTANEOUS EVERY 8 HOURS
Status: DISCONTINUED | OUTPATIENT
Start: 2023-12-24 | End: 2023-12-26

## 2023-12-24 RX ORDER — ENOXAPARIN SODIUM 100 MG/ML
30 INJECTION SUBCUTANEOUS EVERY 24 HOURS
Status: DISCONTINUED | OUTPATIENT
Start: 2023-12-24 | End: 2023-12-28 | Stop reason: HOSPADM

## 2023-12-24 RX ADMIN — POLYETHYLENE GLYCOL 3350 17 G: 17 POWDER, FOR SOLUTION ORAL at 20:01

## 2023-12-24 RX ADMIN — DAPAGLIFLOZIN 10 MG: 10 TABLET, FILM COATED ORAL at 14:05

## 2023-12-24 RX ADMIN — SENNOSIDES 17.2 MG: 8.6 TABLET, FILM COATED ORAL at 20:00

## 2023-12-24 RX ADMIN — AMLODIPINE BESYLATE 10 MG: 10 TABLET ORAL at 14:05

## 2023-12-24 RX ADMIN — METHIMAZOLE 5 MG: 5 TABLET ORAL at 14:03

## 2023-12-24 RX ADMIN — POLYETHYLENE GLYCOL 3350 17 G: 17 POWDER, FOR SOLUTION ORAL at 08:19

## 2023-12-24 RX ADMIN — VALSARTAN: 160 TABLET, FILM COATED ORAL at 14:03

## 2023-12-24 RX ADMIN — GABAPENTIN 300 MG: 300 CAPSULE ORAL at 14:04

## 2023-12-24 RX ADMIN — ACEBUTOLOL HYDROCHLORIDE 200 MG: 200 CAPSULE ORAL at 14:02

## 2023-12-24 RX ADMIN — FAMOTIDINE 20 MG: 20 TABLET, FILM COATED ORAL at 20:01

## 2023-12-24 RX ADMIN — SENNOSIDES 17.2 MG: 8.6 TABLET, FILM COATED ORAL at 08:19

## 2023-12-24 RX ADMIN — ENOXAPARIN SODIUM 30 MG: 30 INJECTION SUBCUTANEOUS at 20:02

## 2023-12-24 RX ADMIN — HYDROMORPHONE HYDROCHLORIDE 0.6 MG: 1 INJECTION, SOLUTION INTRAMUSCULAR; INTRAVENOUS; SUBCUTANEOUS at 05:26

## 2023-12-24 RX ADMIN — PANTOPRAZOLE SODIUM 40 MG: 40 TABLET, DELAYED RELEASE ORAL at 14:05

## 2023-12-24 RX ADMIN — HEPARIN SODIUM 5000 UNITS: 5000 INJECTION INTRAVENOUS; SUBCUTANEOUS at 00:36

## 2023-12-24 RX ADMIN — ONDANSETRON 4 MG: 2 INJECTION INTRAMUSCULAR; INTRAVENOUS at 00:35

## 2023-12-24 RX ADMIN — PIPERACILLIN SODIUM AND TAZOBACTAM SODIUM 3.38 G: 3; .375 INJECTION, SOLUTION INTRAVENOUS at 03:30

## 2023-12-24 RX ADMIN — HYDROMORPHONE HYDROCHLORIDE 0.6 MG: 1 INJECTION, SOLUTION INTRAMUSCULAR; INTRAVENOUS; SUBCUTANEOUS at 00:35

## 2023-12-24 RX ADMIN — ACEBUTOLOL HYDROCHLORIDE 200 MG: 200 CAPSULE ORAL at 21:26

## 2023-12-24 RX ADMIN — SODIUM CHLORIDE, POTASSIUM CHLORIDE, SODIUM LACTATE AND CALCIUM CHLORIDE 75 ML/HR: 600; 310; 30; 20 INJECTION, SOLUTION INTRAVENOUS at 00:34

## 2023-12-24 RX ADMIN — HYDROMORPHONE HYDROCHLORIDE 0.6 MG: 1 INJECTION, SOLUTION INTRAMUSCULAR; INTRAVENOUS; SUBCUTANEOUS at 23:53

## 2023-12-24 RX ADMIN — PIPERACILLIN SODIUM AND TAZOBACTAM SODIUM 3.38 G: 3; .375 INJECTION, SOLUTION INTRAVENOUS at 20:02

## 2023-12-24 RX ADMIN — PIPERACILLIN SODIUM AND TAZOBACTAM SODIUM 3.38 G: 3; .375 INJECTION, SOLUTION INTRAVENOUS at 11:15

## 2023-12-24 RX ADMIN — ASPIRIN 81 MG CHEWABLE TABLET 81 MG: 81 TABLET CHEWABLE at 14:04

## 2023-12-24 RX ADMIN — HYDROMORPHONE HYDROCHLORIDE 0.6 MG: 1 INJECTION, SOLUTION INTRAMUSCULAR; INTRAVENOUS; SUBCUTANEOUS at 16:45

## 2023-12-24 RX ADMIN — ATORVASTATIN CALCIUM 20 MG: 20 TABLET, FILM COATED ORAL at 20:01

## 2023-12-24 RX ADMIN — ONDANSETRON 4 MG: 2 INJECTION INTRAMUSCULAR; INTRAVENOUS at 08:31

## 2023-12-24 SDOH — SOCIAL STABILITY: SOCIAL INSECURITY: HAVE YOU HAD THOUGHTS OF HARMING ANYONE ELSE?: NO

## 2023-12-24 SDOH — SOCIAL STABILITY: SOCIAL INSECURITY: ABUSE: ADULT

## 2023-12-24 SDOH — SOCIAL STABILITY: SOCIAL INSECURITY: DO YOU FEEL UNSAFE GOING BACK TO THE PLACE WHERE YOU ARE LIVING?: NO

## 2023-12-24 SDOH — SOCIAL STABILITY: SOCIAL INSECURITY: DOES ANYONE TRY TO KEEP YOU FROM HAVING/CONTACTING OTHER FRIENDS OR DOING THINGS OUTSIDE YOUR HOME?: NO

## 2023-12-24 SDOH — SOCIAL STABILITY: SOCIAL INSECURITY: HAS ANYONE EVER THREATENED TO HURT YOUR FAMILY OR YOUR PETS?: NO

## 2023-12-24 SDOH — SOCIAL STABILITY: SOCIAL INSECURITY: ARE YOU OR HAVE YOU BEEN THREATENED OR ABUSED PHYSICALLY, EMOTIONALLY, OR SEXUALLY BY ANYONE?: NO

## 2023-12-24 SDOH — SOCIAL STABILITY: SOCIAL INSECURITY: ARE THERE ANY APPARENT SIGNS OF INJURIES/BEHAVIORS THAT COULD BE RELATED TO ABUSE/NEGLECT?: NO

## 2023-12-24 SDOH — SOCIAL STABILITY: SOCIAL INSECURITY: DO YOU FEEL ANYONE HAS EXPLOITED OR TAKEN ADVANTAGE OF YOU FINANCIALLY OR OF YOUR PERSONAL PROPERTY?: NO

## 2023-12-24 ASSESSMENT — ACTIVITIES OF DAILY LIVING (ADL)
ADEQUATE_TO_COMPLETE_ADL: YES
PATIENT'S MEMORY ADEQUATE TO SAFELY COMPLETE DAILY ACTIVITIES?: YES
ADEQUATE_TO_COMPLETE_ADL: YES
JUDGMENT_ADEQUATE_SAFELY_COMPLETE_DAILY_ACTIVITIES: YES
DRESSING YOURSELF: INDEPENDENT
PATIENT'S MEMORY ADEQUATE TO SAFELY COMPLETE DAILY ACTIVITIES?: YES
HEARING - LEFT EAR: FUNCTIONAL
BATHING: INDEPENDENT
HEARING - RIGHT EAR: FUNCTIONAL
TOILETING: INDEPENDENT
WALKS IN HOME: INDEPENDENT
FEEDING YOURSELF: INDEPENDENT
WALKS IN HOME: INDEPENDENT
LACK_OF_TRANSPORTATION: NO
JUDGMENT_ADEQUATE_SAFELY_COMPLETE_DAILY_ACTIVITIES: YES
HEARING - LEFT EAR: FUNCTIONAL
HEARING - RIGHT EAR: FUNCTIONAL
BATHING: INDEPENDENT
TOILETING: INDEPENDENT
FEEDING YOURSELF: INDEPENDENT
GROOMING: INDEPENDENT
GROOMING: INDEPENDENT

## 2023-12-24 ASSESSMENT — PATIENT HEALTH QUESTIONNAIRE - PHQ9
SUM OF ALL RESPONSES TO PHQ9 QUESTIONS 1 & 2: 0
2. FEELING DOWN, DEPRESSED OR HOPELESS: NOT AT ALL
1. LITTLE INTEREST OR PLEASURE IN DOING THINGS: NOT AT ALL

## 2023-12-24 ASSESSMENT — PAIN SCALES - GENERAL
PAINLEVEL_OUTOF10: 2
PAINLEVEL_OUTOF10: 6
PAINLEVEL_OUTOF10: 0 - NO PAIN

## 2023-12-24 ASSESSMENT — LIFESTYLE VARIABLES
AUDIT-C TOTAL SCORE: -1
HOW OFTEN DO YOU HAVE A DRINK CONTAINING ALCOHOL: NEVER
HOW OFTEN DO YOU HAVE 6 OR MORE DRINKS ON ONE OCCASION: PATIENT DECLINED
SKIP TO QUESTIONS 9-10: 0
HOW MANY STANDARD DRINKS CONTAINING ALCOHOL DO YOU HAVE ON A TYPICAL DAY: PATIENT DECLINED
AUDIT-C TOTAL SCORE: -1

## 2023-12-24 ASSESSMENT — COGNITIVE AND FUNCTIONAL STATUS - GENERAL
MOBILITY SCORE: 23
CLIMB 3 TO 5 STEPS WITH RAILING: A LITTLE
PATIENT BASELINE BEDBOUND: NO
DAILY ACTIVITIY SCORE: 24

## 2023-12-24 ASSESSMENT — PAIN - FUNCTIONAL ASSESSMENT
PAIN_FUNCTIONAL_ASSESSMENT: 0-10
PAIN_FUNCTIONAL_ASSESSMENT: 0-10

## 2023-12-24 NOTE — H&P
History Of Present Illness  Maggy Gore is a 88 y.o. female presenting with abdominal pain.  She has had around 1 week of increasing abdominal pain that localizes to the right upper quadrant that is exacerbated by food with nausea and vomiting.  Also complaining of rectal prolapse that was diagnosed on recent ED visit that is being exacerbated by her nausea and vomiting.  Endorses quite severe constipation over the same period of time.  Otherwise, states that she has been in her normal state of health, denies any fevers, chills, sweats, rashes.    In the ED she was hemodynamically stable and afebrile  CT abdomen performed showing acute cholecystitis with known rectal prolapse that was worse than prior imaging performed last week, bilateral pleural effusions with pulmonary edema and a 3 cm infrarenal aortic aneurysm  EKG showing normal sinus rhythm without ischemic changes, troponins negative  Creatinine 1.76 which is near her baseline  Urinalysis showing 3+ blood and 3+ protein however suspect this was contaminated by her hematochezia/prolapse  Baseline hemoglobin around 11, 10.6 on arrival, neutrophilia of 6.8    Received IV Lasix, morphine, Zofran and Zosyn in the ED.  Spoke with surgery from the ED who requested admission and will formally evaluate the patient tomorrow for possible cholecystectomy.     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     Social History  She reports that she quit smoking about 10 years ago. Her smoking use included cigarettes. She has never used smokeless tobacco. She reports current alcohol use. She reports that she does not use drugs.    Family History  Family History   Problem Relation Name Age of Onset    Heart attack Mother      Other (enlarged heart.) Father      Heart defect Daughter         "  Allergies  Sulfa (sulfonamide antibiotics)    Full code    Physical Exam  Constitutional: Awake/alert/oriented x3, c/o nausea, appears uncomfortable  Eyes: Clear sclera  Head/Neck: Normocephalic, atraumatic  Respiratory/Thorax: CTAB  Cardiovascular: RRR  Gastrointestinal: NTTP  Musculoskeletal: FROM  Extremities: No clubbing  Skin: Warm and dry, no jaundice     Last Recorded Vitals  Blood pressure 163/71, pulse 88, temperature 36.7 °C (98.1 °F), resp. rate (!) 148, height 1.676 m (5' 6\"), weight 65.8 kg (145 lb), SpO2 99 %.    Relevant Results    Results for orders placed or performed during the hospital encounter of 12/23/23 (from the past 24 hour(s))   CBC and Auto Differential   Result Value Ref Range    WBC 8.9 4.4 - 11.3 x10*3/uL    nRBC 0.0 0.0 - 0.0 /100 WBCs    RBC 3.63 (L) 4.00 - 5.20 x10*6/uL    Hemoglobin 10.6 (L) 12.0 - 16.0 g/dL    Hematocrit 32.7 (L) 36.0 - 46.0 %    MCV 90 80 - 100 fL    MCH 29.2 26.0 - 34.0 pg    MCHC 32.4 32.0 - 36.0 g/dL    RDW 13.3 11.5 - 14.5 %    Platelets 435 150 - 450 x10*3/uL    Neutrophils % 76.4 40.0 - 80.0 %    Immature Granulocytes %, Automated 0.3 0.0 - 0.9 %    Lymphocytes % 8.7 13.0 - 44.0 %    Monocytes % 13.1 2.0 - 10.0 %    Eosinophils % 1.1 0.0 - 6.0 %    Basophils % 0.4 0.0 - 2.0 %    Neutrophils Absolute 6.81 (H) 1.60 - 5.50 x10*3/uL    Immature Granulocytes Absolute, Automated 0.03 0.00 - 0.50 x10*3/uL    Lymphocytes Absolute 0.78 (L) 0.80 - 3.00 x10*3/uL    Monocytes Absolute 1.17 (H) 0.05 - 0.80 x10*3/uL    Eosinophils Absolute 0.10 0.00 - 0.40 x10*3/uL    Basophils Absolute 0.04 0.00 - 0.10 x10*3/uL   Comprehensive metabolic panel   Result Value Ref Range    Glucose 124 (H) 74 - 99 mg/dL    Sodium 136 136 - 145 mmol/L    Potassium 4.2 3.5 - 5.3 mmol/L    Chloride 101 98 - 107 mmol/L    Bicarbonate 24 21 - 32 mmol/L    Anion Gap 15 10 - 20 mmol/L    Urea Nitrogen 22 6 - 23 mg/dL    Creatinine 1.76 (H) 0.50 - 1.05 mg/dL    eGFR 28 (L) >60 mL/min/1.73m*2    " Calcium 8.7 8.6 - 10.3 mg/dL    Albumin 3.9 3.4 - 5.0 g/dL    Alkaline Phosphatase 77 33 - 136 U/L    Total Protein 6.9 6.4 - 8.2 g/dL    AST 17 9 - 39 U/L    Bilirubin, Total 0.5 0.0 - 1.2 mg/dL    ALT 12 7 - 45 U/L   Lipase   Result Value Ref Range    Lipase <3 (L) 9 - 82 U/L   Lactate   Result Value Ref Range    Lactate 0.6 0.4 - 2.0 mmol/L   Troponin I, High Sensitivity, Initial   Result Value Ref Range    Troponin I, High Sensitivity 18 (H) 0 - 13 ng/L   Troponin, High Sensitivity, 1 Hour   Result Value Ref Range    Troponin I, High Sensitivity 19 (H) 0 - 13 ng/L   Urinalysis with Reflex Microscopic   Result Value Ref Range    Color, Urine Yellow Straw, Yellow    Appearance, Urine Hazy (N) Clear    Specific Gravity, Urine 1.013 1.005 - 1.035    pH, Urine 5.0 5.0, 5.5, 6.0, 6.5, 7.0, 7.5, 8.0    Protein, Urine >=500 (3+) (N) NEGATIVE mg/dL    Glucose, Urine NEGATIVE NEGATIVE mg/dL    Blood, Urine LARGE (3+) (A) NEGATIVE    Ketones, Urine NEGATIVE NEGATIVE mg/dL    Bilirubin, Urine NEGATIVE NEGATIVE    Urobilinogen, Urine <2.0 <2.0 mg/dL    Nitrite, Urine NEGATIVE NEGATIVE    Leukocyte Esterase, Urine TRACE (A) NEGATIVE   Microscopic Only, Urine   Result Value Ref Range    WBC, Urine 1-5 1-5, NONE /HPF    RBC, Urine 11-20 (A) NONE, 1-2, 3-5 /HPF    Squamous Epithelial Cells, Urine 1-9 (SPARSE) Reference range not established. /HPF    Bacteria, Urine 1+ (A) NONE SEEN /HPF        Results for orders placed during the hospital encounter of 12/23/23    CT abdomen pelvis wo IV contrast    Narrative  Interpreted By:  Anthony Eubanks,  STUDY:  CT ABDOMEN PELVIS WO IV CONTRAST;  12/23/2023 7:08 pm    INDICATION:  Signs/Symptoms:pain, generalized.    COMPARISON:  12/18/2023    ACCESSION NUMBER(S):  [id number]    ORDERING CLINICIAN:  SANCHEZ KLINE    TECHNIQUE:  Contiguous axial images of the abdomen and pelvis were obtained  without intravenous contrast. Coronal and sagittal reformatted images  were reconstructed from  the axial data.    FINDINGS:  LOWER CHEST: Heart is moderately enlarged with coronary artery  calcifications. There slightly worsening pulmonary edema in the lung  bases. There is slight enlargement of small (right > left) bilateral  pleural effusions. There is a tiny hiatal hernia.    Note: The absence of intravenous contrast limits evaluation of the  solid organs and vasculature.    ABDOMEN/PELVIS:    ABDOMINAL WALL: Mild diffuse anasarca. Tiny fat containing right  inguinal hernia.    LIVER:  The liver demonstrates a normal noncontrast attenuation and  is again enlarged (17.8 cm craniocaudal dimension).    BILE DUCTS: The common bile duct is unchanged measuring 1.1 cm within  the pancreatic head without premature truncation or termination.  There is also mild dilatation of the common hepatic duct and  right/left hepatic ducts similar to prior study.    GALLBLADDER: There is significant worsening of gallbladder dilatation  compared to prior study, now measuring 5.3 cm in transverse  dimension, previously 3.8 cm. There is a gallstone in the fundus. The  gallbladder wall appears slightly thickened. There is new trace  pericholecystic fluid near the fundus.    PANCREAS: No significant abnormality.    SPLEEN: Calcified granulomas. The spleen is normal in size.    ADRENALS: No significant abnormality.    KIDNEYS, URETERS, BLADDER: There is simple exophytic right renal cyst  measuring 2.8 cm. The right kidney is moderately atrophic. No  nephroureterolithiasis or hydroureteronephrosis. The bladder wall is  normal thickness for degree of distention.    REPRODUCTIVE ORGANS: Surgically absent uterus.    VESSELS: There redemonstration tortuous aorta with an infrarenal  aneurysm measuring 3.1 cm in transverse dimension.    RETROPERITONEUM/LYMPH NODES: No enlarged lymph nodes. No acute  retroperitoneal abnormality.    BOWEL/MESENTERY/PERITONEUM: There is diffuse mild gastric fold  thickening similar prior study. There is  worsening of rectal prolapse  compared to prior study, though the rectal wall thickening is  significantly improved/resolved. There is worsening nonspecific  presacral edema that likely relates to overall worsening of anasarca  given worsening mesenteric edema and new small volume ascites  throughout the upper quadrants. The appendix is not identified;  however, there are no pericecal inflammatory changes.    No free air.      MUSCULOSKELETAL: No acute osseous abnormality.    Impression  1. Significant worsening of gallbladder dilatation with mild wall  thickening concerning for acute cholecystitis.    2. Unchanged mild extrahepatic biliary ductal dilatation with the  common bile duct measuring 1.1 cm, likely a unrelated to  choledocholithiasis given normal liver function testing.    3. Improved rectal inflammation from 12/18/2023, however with  worsening of rectal prolapse compared to prior study.    4. Worsening of pulmonary edema, worsening diffuse anasarca, and  slight enlargement of small bilateral pleural effusions (right >  left).    5. 3.1 cm infrarenal abdominal aortic aneurysm.    MACRO:  None.    Signed by: Anthony Eubanks 12/23/2023 8:00 PM  Dictation workstation:   [id number]      Assessment/Plan    Principal Problem:    Cholecystitis    Acute cholecystitis  N/V  Constipation  Surgery was made aware of the patient in the ED and requested admission and formal eval is pending  N.p.o.  Zosyn  Zofran as needed  Dilaudid for pain  Bowel regimen    Pulmonary edema with pleural effusions  HFpEF  Mild AS  Prior echocardiogram in December 2022 disclosing an ejection fraction of 55 to 60% with mild aortic stenosis and grade 1 DD  Saturating well on room air  Received Lasix 20 mg IV in the ED  Trending renal function and electrolytes  Echocardiogram pending for cardiac evaluation prior to surgery    Rectal prolapse  Endorsing small amount of hematochezia, hemoglobin stable  Surgical evaluation  pending    CKD3  Renal function stable  Trending    Primary HTN  Holding BP meds overnight given acute state and likely need for surgery, BP now 158/72    Full code    Dispo: Pending PT/OT and eval and surgical plan. LOS > 2 MN    Plan of care was discussed extensively with patient. Patient verbalized understanding through teach back method. All questions and concerns addressed upon examination.   Eyal Harden DO  Complexity: High  Total visit time = > 75 minutes; more than 50% spent counseling/coordinating care  ###Of note, this documentation is completed using the Dragon Dictation system (voice recognition software). There may be spelling and/or grammatical errors that were not corrected prior to final submission.**

## 2023-12-24 NOTE — PROGRESS NOTES
"Maggy Gore is a 88 y.o. female on day 1 of admission presenting with Cholecystitis.    Subjective   No overnight events.  Patient complaining of nausea this morning but otherwise stated she felt well.  Denied chest pain, shortness of breath, edema.  Patient's grandson telephone and updated on plan of care.       Objective     Physical Exam    Constitutional: Well developed, awake/alert/oriented x3, no distress, alert and cooperative  HEENT: anicteric sclera, eomi, no oral lesions noted  Cardiovascular: Regular, rate and rhythm, no murmurs, 2+ equal pulses of the extremities, normal S1 and S2  Respiratory/Thorax: Patent airways, CTAB, normal breath sounds with good chest expansion, thorax symmetric, no conversational dyspnea, 2L  Gastrointestinal: +BS, Nondistended, soft, non-tender, no rebound tenderness or guarding, no masses palpable, no organomegaly  Musculoskeletal: ROM intact, no joint swelling, normal strength  Extremities: no edema  Skin: warm and dry. No rashes nor lesions noted  Neurological: alert and oriented x3, intact senses,normal strength, follows commands, clear speech, no facial droop, 5/5 strength    Last Recorded Vitals  Blood pressure 128/60, pulse 63, temperature 37.1 °C (98.8 °F), temperature source Temporal, resp. rate 17, height 1.676 m (5' 5.98\"), weight 66.2 kg (145 lb 15.1 oz), SpO2 93 %.  Intake/Output last 3 Shifts:  I/O last 3 completed shifts:  In: 325 (4.9 mL/kg) [I.V.:275 (4.2 mL/kg); IV Piggyback:50]  Out: - (0 mL/kg)   Weight: 66.2 kg     Relevant Results  Scheduled medications  acebutolol, 200 mg, oral, BID  amLODIPine, 10 mg, oral, Daily  aspirin, 81 mg, oral, Daily  atorvastatin, 20 mg, oral, Nightly  dapagliflozin propanediol, 10 mg, oral, Daily  famotidine, 20 mg, oral, Nightly  gabapentin, 300 mg, oral, Daily  [Held by provider] heparin (porcine), 5,000 Units, subcutaneous, q8h  methIMAzole, 5 mg, oral, Daily  pantoprazole, 40 mg, oral, Daily  perflutren lipid " microspheres, 0.5-10 mL of dilution, intravenous, Once in imaging  piperacillin-tazobactam, 3.375 g, intravenous, q8h  pneumococcal conjugate, 0.5 mL, intramuscular, During hospitalization  polyethylene glycol, 17 g, oral, BID  polyethylene glycol, 17 g, oral, Daily  sennosides, 2 tablet, oral, BID  valsartan 320 mg, hydroCHLOROthiazide 25 mg for Diovan HCT, , oral, Daily      Continuous medications     PRN medications  PRN medications: HYDROmorphone, ondansetron ODT **OR** ondansetron  Results from last 7 days   Lab Units 12/23/23  1849 12/18/23 2059   WBC AUTO x10*3/uL 8.9 9.3   RBC AUTO x10*6/uL 3.63* 3.55*   HEMOGLOBIN g/dL 10.6* 10.4*     Results from last 7 days   Lab Units 12/23/23 1849 12/18/23 2059   SODIUM mmol/L 136 136   POTASSIUM mmol/L 4.2 4.2   CHLORIDE mmol/L 101 102   CO2 mmol/L 24 23   BUN mg/dL 22 28*   CREATININE mg/dL 1.76* 1.68*   CALCIUM mg/dL 8.7 9.1   BILIRUBIN TOTAL mg/dL 0.5 0.5   ALT U/L 12 14   AST U/L 17 19       CT abdomen pelvis wo IV contrast    Result Date: 12/23/2023  Interpreted By:  Anthony Eubanks, STUDY: CT ABDOMEN PELVIS WO IV CONTRAST;  12/23/2023 7:08 pm   INDICATION: Signs/Symptoms:pain, generalized.   COMPARISON: 12/18/2023   ACCESSION NUMBER(S): [id number]   ORDERING CLINICIAN: SANCHEZ KLINE   TECHNIQUE: Contiguous axial images of the abdomen and pelvis were obtained without intravenous contrast. Coronal and sagittal reformatted images were reconstructed from the axial data.   FINDINGS: LOWER CHEST: Heart is moderately enlarged with coronary artery calcifications. There slightly worsening pulmonary edema in the lung bases. There is slight enlargement of small (right > left) bilateral pleural effusions. There is a tiny hiatal hernia.   Note: The absence of intravenous contrast limits evaluation of the solid organs and vasculature.   ABDOMEN/PELVIS:   ABDOMINAL WALL: Mild diffuse anasarca. Tiny fat containing right inguinal hernia.   LIVER:  The liver demonstrates a  normal noncontrast attenuation and is again enlarged (17.8 cm craniocaudal dimension).   BILE DUCTS: The common bile duct is unchanged measuring 1.1 cm within the pancreatic head without premature truncation or termination. There is also mild dilatation of the common hepatic duct and right/left hepatic ducts similar to prior study.   GALLBLADDER: There is significant worsening of gallbladder dilatation compared to prior study, now measuring 5.3 cm in transverse dimension, previously 3.8 cm. There is a gallstone in the fundus. The gallbladder wall appears slightly thickened. There is new trace pericholecystic fluid near the fundus.   PANCREAS: No significant abnormality.   SPLEEN: Calcified granulomas. The spleen is normal in size.   ADRENALS: No significant abnormality.   KIDNEYS, URETERS, BLADDER: There is simple exophytic right renal cyst measuring 2.8 cm. The right kidney is moderately atrophic. No nephroureterolithiasis or hydroureteronephrosis. The bladder wall is normal thickness for degree of distention.   REPRODUCTIVE ORGANS: Surgically absent uterus.   VESSELS: There redemonstration tortuous aorta with an infrarenal aneurysm measuring 3.1 cm in transverse dimension.   RETROPERITONEUM/LYMPH NODES: No enlarged lymph nodes. No acute retroperitoneal abnormality.   BOWEL/MESENTERY/PERITONEUM: There is diffuse mild gastric fold thickening similar prior study. There is worsening of rectal prolapse compared to prior study, though the rectal wall thickening is significantly improved/resolved. There is worsening nonspecific presacral edema that likely relates to overall worsening of anasarca given worsening mesenteric edema and new small volume ascites throughout the upper quadrants. The appendix is not identified; however, there are no pericecal inflammatory changes.   No free air.     MUSCULOSKELETAL: No acute osseous abnormality.       1. Significant worsening of gallbladder dilatation with mild wall thickening  concerning for acute cholecystitis.   2. Unchanged mild extrahepatic biliary ductal dilatation with the common bile duct measuring 1.1 cm, likely a unrelated to choledocholithiasis given normal liver function testing.   3. Improved rectal inflammation from 12/18/2023, however with worsening of rectal prolapse compared to prior study.   4. Worsening of pulmonary edema, worsening diffuse anasarca, and slight enlargement of small bilateral pleural effusions (right > left).   5. 3.1 cm infrarenal abdominal aortic aneurysm.   MACRO: None.   Signed by: Anthony Eubanks 12/23/2023 8:00 PM Dictation workstation:   [id number]       Assessment/Plan   Principal Problem:    Cholecystitis      Acute cholecystitis  N/V  Constipation  Surgery following-->plan for PTHC placement vs lap yobany pending echo results and pt's overall clinical course. No indication for emergent OR today  Zosyn  Zofran as needed  Dilaudid for pain  Bowel regimen     Pulmonary edema with pleural effusions  HFpEF  Mild AS  Prior echocardiogram in December 2022 disclosing an ejection fraction of 55 to 60% with mild aortic stenosis and grade 1 DD  Saturating well on room air  Received Lasix 20 mg IV in the ED-->trend I/O  Trending renal function and electrolytes  Echocardiogram pending for cardiac evaluation prior to surgery--PENDING     Rectal prolapse  Endorsing small amount of hematochezia, hemoglobin stable  Surgical evaluation completed today     CKD3  Renal function stable  Trending     Primary HTN  Resumed home BP meds    Misc  Continue other home meds  Cardiac diet  Lovenox    Dispo:PT/OT     Full code                      Leatha Walters MD

## 2023-12-24 NOTE — CONSULTS
General Surgery Consultation      Date of consultation: 12/24/23     Referring physician: Sj Harden DO  Surgeon: Pedrito Aiken MD      Assessment     1) Acute cholecystitis (R81.9)   2) Nausea with vomiting (R11.2)   3) Abnormal findings on diagnostic imaging of the gallbladder (R93.3)       Plan     1) Comprehensive medical and cardiac evaluations to assess operative risk   2) If the patient is deemed an acceptable operative risk, laparoscopic cholecystectomy would be the treatment of choice.   3) If the patient cannot be cleared and is deemed a prohibitive operative risk, then the better choice for temporizing management would be a percutaneous cholecystostomy tube via interventional radiology      DISCUSSION:  The patient is counseled that clinically, her presentation is consistent with a diagnosis of acute cholecystitis.  Although there does appear to be a single calcified gallstone within the gallbladder lumen, that stone is mobile.  An obstructing calculus is not seen within the neck region of the gallbladder on imaging.  It is highly likely that this patient's cholecystitis is on the basis of either cystic duct obstruction as a result of smooth muscle hypertrophy/edema, or that it is secondary to ischemia and not obstructing cholelithiasis.  In addition, the patient's common bile duct is dilated to a diameter of 1.1 cm, without evidence of choledocholithiasis.    From a medical and a cardiopulmonary standpoint, this patient appears to be a poor operative risk.  Medical and cardiac clearance are pending.  If the patient can be cleared for surgery, laparoscopic cholecystectomy would be the treatment of choice.  If however the patient is deemed a prohibitive operative risk, it may be necessary to temporize management by the placement of a percutaneous cholecystostomy tube.  The distended and inflamed gallbladder is in contact with the right upper quadrant lateral abdominal wall, and would be easily  "accessible via interventional radiology.      HPI  Patient is an 88-year-old elderly white female with numerous medical comorbidities, who is seen in consultation at the request of the attending hospitalist Dr. Sj Harden.  Consultation is requested for surgical evaluation and management of the patient admitted with acute cholecystitis.    The patient is seen in surgical consultation on the medical unit at [facility] at 0630 hrs. 12/24/2023.  In the course of consultation, the patient's epic EMR is reviewed.  The patient is interviewed and examined.  Results of all available laboratory tests are reviewed and noted.  Diagnostic noncontrast CT scan examinations of the abdomen and pelvis dated 12/18/2023 and 12/23/2023, are personally viewed and interpreted.    From a clinical standpoint, this patient is a very poor historian.  She conveys that she has not felt well \"for a while\" but cannot assign a number of days or weeks that she has been in this condition.  For at least 1 to 2 weeks, this patient has been complaining of increasing right subcostal/right upper quadrant abdominal pain that she states is exacerbated by oral intake.  As a result, she has not been eating well nor does she feel that she has been receiving adequate hydration.  In association with this, the patient has had both nausea and vomiting.  Concurrent with these other complaints, the patient notes severe chronic constipation and worsening problems with rectal prolapse.    On 12/18/2023, a CT scan was performed and the evaluation of rectal prolapse.  At that time, distention of the gallbladder was noted.  A solitary mobile calcified gallstone was seen within the gallbladder.  There was no evidence of an obstructing stone within the gallbladder neck.  Small bilateral pleural effusions were noted as well as soft tissue changes suggestive of anasarca.  There was diffuse thickening of the rectal wall.  Extensive atherosclerotic " vascular calcifications were noted.    Repeat abdominal pelvic CT scan 12/23/2023, shows significant worsening in the degree of gallbladder distention.  The gallbladder wall appears thickened and there is evidence of trace pericholecystic fluid.  Again, a single gallstone was identified.  The stone is mobile and no calcifications are seen in the gallbladder neck.  The gallbladder is in contact with the right upper quadrant lateral abdominal wall at the same site where the patient indicates the point of maximal tenderness on abdominal exam.  The appendix and gynecologic organs are surgically absent.    Laboratory profiles 12/18/2023, and 12/23/2023, both show a white blood cell count within the normal range.  There is no shift in the white cell differential.  Serum electrolytes are normal.  Hepatic transaminase levels, bilirubin, and alkaline phosphatase are within normal limits.  There is no elevation in serum lipase.    Past medical history is significant for hypertension, atherosclerotic coronary vascular disease, aortic stenosis, diffuse atherosclerotic peripheral vascular disease, and chronic kidney disease.  This patient is a previous cigarette smoker, having quit 10 years ago.        Review of Systems  12 point ROS is negative except as documented in the HPI      Physical Exam  General: Well-developed, chronically ill-appearing elderly white female seen laying in a fetal position in a hospital bed with complaints of right-sided abdominal pain and extreme thirst.  Integument: Warm and dry.  Normal coloration.  No flushing or diaphoresis.  No jaundice.  HEENT: Head normocephalic and atraumatic.  Pupils equally round and reactive to light.  The sclera are anicteric.  Mucous membranes are dry and tacky.  Neck: Trachea midline.  No thyromegaly.  Chest: Lungs are clear to auscultation bilaterally without rales wheezes or rhonchi.  Breath sounds are slightly diminished in the bases bilaterally.  Cardiovascular:  Regular rate and rhythm.  Abdomen: The abdomen is soft and nondistended.  There is no tympany to percussion.  No hepatosplenomegaly is detected.  Patient is markedly tender in the subcostal region of the right upper quadrant laterally.  At this location, there is a palpable Courvoisier's gallbladder.  Sandy sign is positive.  The other 4 major quadrants of the abdomen are nontender.  The right flank is tender in comparison to the left.  Rectal: Not examined  Extremities: Normal range of motion in all extremities x 4.  Neurologic: Grossly intact and without obvious focal deficits.  Psychiatric: Pleasant and conversant.  Normal mood and affect.    Vital signs for last 24 hours:  Temp:  [36.6 °C (97.9 °F)-37.1 °C (98.8 °F)] 37.1 °C (98.8 °F)  Heart Rate:  [72-88] 72  Resp:  [] 19  BP: (156-178)/(66-93) 156/70    Intake/Output this shift:  No intake/output data recorded.     Labs  Results for orders placed or performed during the hospital encounter of 12/23/23 (from the past 24 hour(s))   CBC and Auto Differential   Result Value Ref Range    WBC 8.9 4.4 - 11.3 x10*3/uL    nRBC 0.0 0.0 - 0.0 /100 WBCs    RBC 3.63 (L) 4.00 - 5.20 x10*6/uL    Hemoglobin 10.6 (L) 12.0 - 16.0 g/dL    Hematocrit 32.7 (L) 36.0 - 46.0 %    MCV 90 80 - 100 fL    MCH 29.2 26.0 - 34.0 pg    MCHC 32.4 32.0 - 36.0 g/dL    RDW 13.3 11.5 - 14.5 %    Platelets 435 150 - 450 x10*3/uL    Neutrophils % 76.4 40.0 - 80.0 %    Immature Granulocytes %, Automated 0.3 0.0 - 0.9 %    Lymphocytes % 8.7 13.0 - 44.0 %    Monocytes % 13.1 2.0 - 10.0 %    Eosinophils % 1.1 0.0 - 6.0 %    Basophils % 0.4 0.0 - 2.0 %    Neutrophils Absolute 6.81 (H) 1.60 - 5.50 x10*3/uL    Immature Granulocytes Absolute, Automated 0.03 0.00 - 0.50 x10*3/uL    Lymphocytes Absolute 0.78 (L) 0.80 - 3.00 x10*3/uL    Monocytes Absolute 1.17 (H) 0.05 - 0.80 x10*3/uL    Eosinophils Absolute 0.10 0.00 - 0.40 x10*3/uL    Basophils Absolute 0.04 0.00 - 0.10 x10*3/uL   Comprehensive  metabolic panel   Result Value Ref Range    Glucose 124 (H) 74 - 99 mg/dL    Sodium 136 136 - 145 mmol/L    Potassium 4.2 3.5 - 5.3 mmol/L    Chloride 101 98 - 107 mmol/L    Bicarbonate 24 21 - 32 mmol/L    Anion Gap 15 10 - 20 mmol/L    Urea Nitrogen 22 6 - 23 mg/dL    Creatinine 1.76 (H) 0.50 - 1.05 mg/dL    eGFR 28 (L) >60 mL/min/1.73m*2    Calcium 8.7 8.6 - 10.3 mg/dL    Albumin 3.9 3.4 - 5.0 g/dL    Alkaline Phosphatase 77 33 - 136 U/L    Total Protein 6.9 6.4 - 8.2 g/dL    AST 17 9 - 39 U/L    Bilirubin, Total 0.5 0.0 - 1.2 mg/dL    ALT 12 7 - 45 U/L   Lipase   Result Value Ref Range    Lipase <3 (L) 9 - 82 U/L   Lactate   Result Value Ref Range    Lactate 0.6 0.4 - 2.0 mmol/L   Troponin I, High Sensitivity, Initial   Result Value Ref Range    Troponin I, High Sensitivity 18 (H) 0 - 13 ng/L   Troponin, High Sensitivity, 1 Hour   Result Value Ref Range    Troponin I, High Sensitivity 19 (H) 0 - 13 ng/L   Urinalysis with Reflex Microscopic   Result Value Ref Range    Color, Urine Yellow Straw, Yellow    Appearance, Urine Hazy (N) Clear    Specific Gravity, Urine 1.013 1.005 - 1.035    pH, Urine 5.0 5.0, 5.5, 6.0, 6.5, 7.0, 7.5, 8.0    Protein, Urine >=500 (3+) (N) NEGATIVE mg/dL    Glucose, Urine NEGATIVE NEGATIVE mg/dL    Blood, Urine LARGE (3+) (A) NEGATIVE    Ketones, Urine NEGATIVE NEGATIVE mg/dL    Bilirubin, Urine NEGATIVE NEGATIVE    Urobilinogen, Urine <2.0 <2.0 mg/dL    Nitrite, Urine NEGATIVE NEGATIVE    Leukocyte Esterase, Urine TRACE (A) NEGATIVE   Microscopic Only, Urine   Result Value Ref Range    WBC, Urine 1-5 1-5, NONE /HPF    RBC, Urine 11-20 (A) NONE, 1-2, 3-5 /HPF    Squamous Epithelial Cells, Urine 1-9 (SPARSE) Reference range not established. /HPF    Bacteria, Urine 1+ (A) NONE SEEN /HPF   TSH with reflex to Free T4 if abnormal   Result Value Ref Range    Thyroid Stimulating Hormone 0.89 0.44 - 3.98 mIU/L       IMAGING  CT abdomen pelvis wo IV contrast    Result Date:  12/23/2023  Interpreted By:  Anthony Eubanks  STUDY: CT ABDOMEN PELVIS WO IV CONTRAST;  12/23/2023 7:08 pm     INDICATION: Signs/Symptoms:pain, generalized.     COMPARISON: 12/18/2023     ACCESSION NUMBER(S): [id number]   ORDERING CLINICIAN: SANCHEZ KLINE       TECHNIQUE: Contiguous axial images of the abdomen and pelvis were obtained without intravenous contrast. Coronal and sagittal reformatted images were reconstructed from the axial data.     FINDINGS:   LOWER CHEST: Heart is moderately enlarged with coronary artery calcifications. There slightly worsening pulmonary edema in the lung bases. There is slight enlargement of small (right > left) bilateral pleural effusions. There is a tiny hiatal hernia.   Note: The absence of intravenous contrast limits evaluation of the solid organs and vasculature.     ABDOMEN/PELVIS:     ABDOMINAL WALL: Mild diffuse anasarca. Tiny fat containing right inguinal hernia.     LIVER:  The liver demonstrates a normal noncontrast attenuation and is again enlarged (17.8 cm craniocaudal dimension).     BILE DUCTS: The common bile duct is unchanged measuring 1.1 cm within the pancreatic head without premature truncation or termination. There is also mild dilatation of the common hepatic duct and right/left hepatic ducts similar to prior study.     GALLBLADDER: There is significant worsening of gallbladder dilatation compared to prior study, now measuring 5.3 cm in transverse dimension, previously 3.8 cm. There is a gallstone in the fundus. The gallbladder wall appears slightly thickened. There is new trace pericholecystic fluid near the fundus.   PANCREAS: No significant abnormality.     SPLEEN: Calcified granulomas. The spleen is normal in size.     ADRENALS: No significant abnormality.     KIDNEYS, URETERS, BLADDER: There is simple exophytic right renal cyst measuring 2.8 cm. The right kidney is moderately atrophic. No nephroureterolithiasis or hydroureteronephrosis. The bladder wall  is normal thickness for degree of distention.     REPRODUCTIVE ORGANS: Surgically absent uterus.     VESSELS: There redemonstration tortuous aorta with an infrarenal aneurysm measuring 3.1 cm in transverse dimension.     RETROPERITONEUM/LYMPH NODES: No enlarged lymph nodes. No acute retroperitoneal abnormality.   BOWEL/MESENTERY/PERITONEUM: There is diffuse mild gastric fold thickening similar prior study. There is worsening of rectal prolapse compared to prior study, though the rectal wall thickening is significantly improved/resolved. There is worsening nonspecific presacral edema that likely relates to overall worsening of anasarca given worsening mesenteric edema and new small volume ascites throughout the upper quadrants. The appendix is not identified; however, there are no pericecal inflammatory changes.   No free air.       MUSCULOSKELETAL: No acute osseous abnormality.       IMPRESSION:    1. Significant worsening of gallbladder dilatation with mild wall thickening concerning for acute cholecystitis.     2. Unchanged mild extrahepatic biliary ductal dilatation with the common bile duct measuring 1.1 cm, likely a unrelated to choledocholithiasis given normal liver function testing.     3. Improved rectal inflammation from 12/18/2023, however with worsening of rectal prolapse compared to prior study.     4. Worsening of pulmonary edema, worsening diffuse anasarca, and slight enlargement of small bilateral pleural effusions (right > left).     5. 3.1 cm infrarenal abdominal aortic aneurysm.       MACRO: None.     Signed by: Anthony Eubanks 12/23/2023 8:00 PM   Dictation workstation:   [id number]        Pedrito Aiken MD

## 2023-12-25 LAB
ANION GAP SERPL CALC-SCNC: 13 MMOL/L (ref 10–20)
BUN SERPL-MCNC: 21 MG/DL (ref 6–23)
CALCIUM SERPL-MCNC: 7.7 MG/DL (ref 8.6–10.3)
CHLORIDE SERPL-SCNC: 100 MMOL/L (ref 98–107)
CO2 SERPL-SCNC: 27 MMOL/L (ref 21–32)
CREAT SERPL-MCNC: 1.64 MG/DL (ref 0.5–1.05)
ERYTHROCYTE [DISTWIDTH] IN BLOOD BY AUTOMATED COUNT: 13.3 % (ref 11.5–14.5)
GFR SERPL CREATININE-BSD FRML MDRD: 30 ML/MIN/1.73M*2
GLUCOSE SERPL-MCNC: 84 MG/DL (ref 74–99)
HCT VFR BLD AUTO: 30.7 % (ref 36–46)
HGB BLD-MCNC: 9.8 G/DL (ref 12–16)
MAGNESIUM SERPL-MCNC: 1.66 MG/DL (ref 1.6–2.4)
MCH RBC QN AUTO: 29.2 PG (ref 26–34)
MCHC RBC AUTO-ENTMCNC: 31.9 G/DL (ref 32–36)
MCV RBC AUTO: 91 FL (ref 80–100)
NRBC BLD-RTO: 0 /100 WBCS (ref 0–0)
PLATELET # BLD AUTO: 376 X10*3/UL (ref 150–450)
POTASSIUM SERPL-SCNC: 3.1 MMOL/L (ref 3.5–5.3)
RBC # BLD AUTO: 3.36 X10*6/UL (ref 4–5.2)
SODIUM SERPL-SCNC: 137 MMOL/L (ref 136–145)
WBC # BLD AUTO: 7.7 X10*3/UL (ref 4.4–11.3)

## 2023-12-25 PROCEDURE — 83735 ASSAY OF MAGNESIUM: CPT | Performed by: STUDENT IN AN ORGANIZED HEALTH CARE EDUCATION/TRAINING PROGRAM

## 2023-12-25 PROCEDURE — 36415 COLL VENOUS BLD VENIPUNCTURE: CPT | Performed by: STUDENT IN AN ORGANIZED HEALTH CARE EDUCATION/TRAINING PROGRAM

## 2023-12-25 PROCEDURE — 85027 COMPLETE CBC AUTOMATED: CPT | Performed by: STUDENT IN AN ORGANIZED HEALTH CARE EDUCATION/TRAINING PROGRAM

## 2023-12-25 PROCEDURE — 2500000001 HC RX 250 WO HCPCS SELF ADMINISTERED DRUGS (ALT 637 FOR MEDICARE OP): Performed by: STUDENT IN AN ORGANIZED HEALTH CARE EDUCATION/TRAINING PROGRAM

## 2023-12-25 PROCEDURE — 2500000004 HC RX 250 GENERAL PHARMACY W/ HCPCS (ALT 636 FOR OP/ED): Performed by: STUDENT IN AN ORGANIZED HEALTH CARE EDUCATION/TRAINING PROGRAM

## 2023-12-25 PROCEDURE — 80048 BASIC METABOLIC PNL TOTAL CA: CPT | Performed by: STUDENT IN AN ORGANIZED HEALTH CARE EDUCATION/TRAINING PROGRAM

## 2023-12-25 PROCEDURE — 99233 SBSQ HOSP IP/OBS HIGH 50: CPT | Performed by: STUDENT IN AN ORGANIZED HEALTH CARE EDUCATION/TRAINING PROGRAM

## 2023-12-25 PROCEDURE — 1100000001 HC PRIVATE ROOM DAILY

## 2023-12-25 PROCEDURE — 94760 N-INVAS EAR/PLS OXIMETRY 1: CPT

## 2023-12-25 PROCEDURE — 96372 THER/PROPH/DIAG INJ SC/IM: CPT | Performed by: STUDENT IN AN ORGANIZED HEALTH CARE EDUCATION/TRAINING PROGRAM

## 2023-12-25 PROCEDURE — 2500000005 HC RX 250 GENERAL PHARMACY W/O HCPCS: Performed by: STUDENT IN AN ORGANIZED HEALTH CARE EDUCATION/TRAINING PROGRAM

## 2023-12-25 RX ORDER — LIDOCAINE 560 MG/1
1 PATCH PERCUTANEOUS; TOPICAL; TRANSDERMAL DAILY
Status: DISCONTINUED | OUTPATIENT
Start: 2023-12-25 | End: 2023-12-28 | Stop reason: HOSPADM

## 2023-12-25 RX ORDER — HYDROCODONE BITARTRATE AND ACETAMINOPHEN 5; 325 MG/1; MG/1
1 TABLET ORAL EVERY 6 HOURS PRN
Status: DISCONTINUED | OUTPATIENT
Start: 2023-12-25 | End: 2023-12-28 | Stop reason: HOSPADM

## 2023-12-25 RX ADMIN — PIPERACILLIN SODIUM AND TAZOBACTAM SODIUM 3.38 G: 3; .375 INJECTION, SOLUTION INTRAVENOUS at 20:57

## 2023-12-25 RX ADMIN — POLYETHYLENE GLYCOL 3350 17 G: 17 POWDER, FOR SOLUTION ORAL at 08:23

## 2023-12-25 RX ADMIN — HYDROMORPHONE HYDROCHLORIDE 0.6 MG: 1 INJECTION, SOLUTION INTRAMUSCULAR; INTRAVENOUS; SUBCUTANEOUS at 06:52

## 2023-12-25 RX ADMIN — ONDANSETRON 4 MG: 2 INJECTION INTRAMUSCULAR; INTRAVENOUS at 19:03

## 2023-12-25 RX ADMIN — ATORVASTATIN CALCIUM 20 MG: 20 TABLET, FILM COATED ORAL at 20:59

## 2023-12-25 RX ADMIN — ASPIRIN 81 MG CHEWABLE TABLET 81 MG: 81 TABLET CHEWABLE at 08:22

## 2023-12-25 RX ADMIN — DAPAGLIFLOZIN 10 MG: 10 TABLET, FILM COATED ORAL at 08:22

## 2023-12-25 RX ADMIN — GABAPENTIN 300 MG: 300 CAPSULE ORAL at 08:22

## 2023-12-25 RX ADMIN — SENNOSIDES 17.2 MG: 8.6 TABLET, FILM COATED ORAL at 08:21

## 2023-12-25 RX ADMIN — VALSARTAN: 160 TABLET, FILM COATED ORAL at 08:21

## 2023-12-25 RX ADMIN — ACEBUTOLOL HYDROCHLORIDE 200 MG: 200 CAPSULE ORAL at 08:22

## 2023-12-25 RX ADMIN — METHIMAZOLE 5 MG: 5 TABLET ORAL at 08:22

## 2023-12-25 RX ADMIN — PANTOPRAZOLE SODIUM 40 MG: 40 TABLET, DELAYED RELEASE ORAL at 08:22

## 2023-12-25 RX ADMIN — PIPERACILLIN SODIUM AND TAZOBACTAM SODIUM 3.38 G: 3; .375 INJECTION, SOLUTION INTRAVENOUS at 04:07

## 2023-12-25 RX ADMIN — ACEBUTOLOL HYDROCHLORIDE 200 MG: 200 CAPSULE ORAL at 22:00

## 2023-12-25 RX ADMIN — AMLODIPINE BESYLATE 10 MG: 10 TABLET ORAL at 08:28

## 2023-12-25 RX ADMIN — FAMOTIDINE 20 MG: 20 TABLET, FILM COATED ORAL at 20:59

## 2023-12-25 RX ADMIN — PIPERACILLIN SODIUM AND TAZOBACTAM SODIUM 3.38 G: 3; .375 INJECTION, SOLUTION INTRAVENOUS at 12:27

## 2023-12-25 RX ADMIN — HYDROMORPHONE HYDROCHLORIDE 0.6 MG: 1 INJECTION, SOLUTION INTRAMUSCULAR; INTRAVENOUS; SUBCUTANEOUS at 20:57

## 2023-12-25 RX ADMIN — HYDROCODONE BITARTRATE AND ACETAMINOPHEN 1 TABLET: 5; 325 TABLET ORAL at 13:53

## 2023-12-25 RX ADMIN — ENOXAPARIN SODIUM 30 MG: 30 INJECTION SUBCUTANEOUS at 19:04

## 2023-12-25 RX ADMIN — LIDOCAINE 1 PATCH: 4 PATCH TOPICAL at 13:48

## 2023-12-25 ASSESSMENT — PAIN - FUNCTIONAL ASSESSMENT
PAIN_FUNCTIONAL_ASSESSMENT: 0-10
PAIN_FUNCTIONAL_ASSESSMENT: 0-10

## 2023-12-25 ASSESSMENT — PAIN SCALES - GENERAL
PAINLEVEL_OUTOF10: 9
PAINLEVEL_OUTOF10: 6
PAINLEVEL_OUTOF10: 9

## 2023-12-25 NOTE — PROGRESS NOTES
"Maggy Gore is a 88 y.o. female on day 2 of admission presenting with Cholecystitis.    Subjective   No overnight events. Nausea has now resolved per pt. Endorses mild R. Quadrant abd discomfort on palpation. Also endorses chronic low back pain-states she follows with pain management and takes norco at home. Denied chest pain, shortness of breath, edema.  Patient's daughter telephoned and updated on plan of care.       Objective     Physical Exam    Constitutional: Well developed, awake/alert/oriented x3, no distress, alert and cooperative  HEENT: anicteric sclera, eomi, no oral lesions noted  Cardiovascular: Regular, rate and rhythm, no murmurs, 2+ equal pulses of the extremities, normal S1 and S2  Respiratory/Thorax: Patent airways, CTAB, normal breath sounds with good chest expansion, thorax symmetric, no conversational dyspnea, 2L  Gastrointestinal: +BS, Nondistended, soft, mild RUQ tenderness, no rebound tenderness or guarding, no masses palpable, no organomegaly  Musculoskeletal: ROM intact, no joint swelling, normal strength  Extremities: no edema  Skin: warm and dry. No rashes nor lesions noted  Neurological: alert and oriented x3, intact senses,normal strength, follows commands, clear speech, no facial droop    Last Recorded Vitals  Blood pressure 160/66, pulse 66, temperature 37.7 °C (99.9 °F), temperature source Temporal, resp. rate 18, height 1.676 m (5' 5.98\"), weight 66.2 kg (145 lb 15.1 oz), SpO2 95 %.  Intake/Output last 3 Shifts:  I/O last 3 completed shifts:  In: 715 (10.8 mL/kg) [P.O.:240; I.V.:275 (4.2 mL/kg); IV Piggyback:200]  Out: 900 (13.6 mL/kg) [Urine:900 (0.4 mL/kg/hr)]  Weight: 66.2 kg     Relevant Results  Scheduled medications  acebutolol, 200 mg, oral, BID  amLODIPine, 10 mg, oral, Daily  aspirin, 81 mg, oral, Daily  atorvastatin, 20 mg, oral, Nightly  dapagliflozin propanediol, 10 mg, oral, Daily  enoxaparin, 30 mg, subcutaneous, q24h  famotidine, 20 mg, oral, " Nightly  gabapentin, 300 mg, oral, Daily  [Held by provider] heparin (porcine), 5,000 Units, subcutaneous, q8h  methIMAzole, 5 mg, oral, Daily  pantoprazole, 40 mg, oral, Daily  perflutren lipid microspheres, 0.5-10 mL of dilution, intravenous, Once in imaging  piperacillin-tazobactam, 3.375 g, intravenous, q8h  pneumococcal conjugate, 0.5 mL, intramuscular, During hospitalization  polyethylene glycol, 17 g, oral, BID  polyethylene glycol, 17 g, oral, Daily  sennosides, 2 tablet, oral, BID  valsartan 320 mg, hydroCHLOROthiazide 25 mg for Diovan HCT, , oral, Daily      Continuous medications     PRN medications  PRN medications: HYDROmorphone, ondansetron ODT **OR** ondansetron  Results from last 7 days   Lab Units 12/25/23  0522 12/23/23 1849 12/18/23 2059   WBC AUTO x10*3/uL 7.7 8.9 9.3   RBC AUTO x10*6/uL 3.36* 3.63* 3.55*   HEMOGLOBIN g/dL 9.8* 10.6* 10.4*       Results from last 7 days   Lab Units 12/25/23  0522 12/23/23 1849 12/18/23 2059   SODIUM mmol/L 137 136 136   POTASSIUM mmol/L 3.1* 4.2 4.2   CHLORIDE mmol/L 100 101 102   CO2 mmol/L 27 24 23   BUN mg/dL 21 22 28*   CREATININE mg/dL 1.64* 1.76* 1.68*   CALCIUM mg/dL 7.7* 8.7 9.1   MAGNESIUM mg/dL 1.66  --   --    BILIRUBIN TOTAL mg/dL  --  0.5 0.5   ALT U/L  --  12 14   AST U/L  --  17 19         CT abdomen pelvis wo IV contrast    Result Date: 12/23/2023  Interpreted By:  Anthony Eubanks, STUDY: CT ABDOMEN PELVIS WO IV CONTRAST;  12/23/2023 7:08 pm   INDICATION: Signs/Symptoms:pain, generalized.   COMPARISON: 12/18/2023   ACCESSION NUMBER(S): [id number]   ORDERING CLINICIAN: SANCHEZ KLINE   TECHNIQUE: Contiguous axial images of the abdomen and pelvis were obtained without intravenous contrast. Coronal and sagittal reformatted images were reconstructed from the axial data.   FINDINGS: LOWER CHEST: Heart is moderately enlarged with coronary artery calcifications. There slightly worsening pulmonary edema in the lung bases. There is slight enlargement  of small (right > left) bilateral pleural effusions. There is a tiny hiatal hernia.   Note: The absence of intravenous contrast limits evaluation of the solid organs and vasculature.   ABDOMEN/PELVIS:   ABDOMINAL WALL: Mild diffuse anasarca. Tiny fat containing right inguinal hernia.   LIVER:  The liver demonstrates a normal noncontrast attenuation and is again enlarged (17.8 cm craniocaudal dimension).   BILE DUCTS: The common bile duct is unchanged measuring 1.1 cm within the pancreatic head without premature truncation or termination. There is also mild dilatation of the common hepatic duct and right/left hepatic ducts similar to prior study.   GALLBLADDER: There is significant worsening of gallbladder dilatation compared to prior study, now measuring 5.3 cm in transverse dimension, previously 3.8 cm. There is a gallstone in the fundus. The gallbladder wall appears slightly thickened. There is new trace pericholecystic fluid near the fundus.   PANCREAS: No significant abnormality.   SPLEEN: Calcified granulomas. The spleen is normal in size.   ADRENALS: No significant abnormality.   KIDNEYS, URETERS, BLADDER: There is simple exophytic right renal cyst measuring 2.8 cm. The right kidney is moderately atrophic. No nephroureterolithiasis or hydroureteronephrosis. The bladder wall is normal thickness for degree of distention.   REPRODUCTIVE ORGANS: Surgically absent uterus.   VESSELS: There redemonstration tortuous aorta with an infrarenal aneurysm measuring 3.1 cm in transverse dimension.   RETROPERITONEUM/LYMPH NODES: No enlarged lymph nodes. No acute retroperitoneal abnormality.   BOWEL/MESENTERY/PERITONEUM: There is diffuse mild gastric fold thickening similar prior study. There is worsening of rectal prolapse compared to prior study, though the rectal wall thickening is significantly improved/resolved. There is worsening nonspecific presacral edema that likely relates to overall worsening of anasarca given  worsening mesenteric edema and new small volume ascites throughout the upper quadrants. The appendix is not identified; however, there are no pericecal inflammatory changes.   No free air.     MUSCULOSKELETAL: No acute osseous abnormality.       1. Significant worsening of gallbladder dilatation with mild wall thickening concerning for acute cholecystitis.   2. Unchanged mild extrahepatic biliary ductal dilatation with the common bile duct measuring 1.1 cm, likely a unrelated to choledocholithiasis given normal liver function testing.   3. Improved rectal inflammation from 12/18/2023, however with worsening of rectal prolapse compared to prior study.   4. Worsening of pulmonary edema, worsening diffuse anasarca, and slight enlargement of small bilateral pleural effusions (right > left).   5. 3.1 cm infrarenal abdominal aortic aneurysm.   MACRO: None.   Signed by: Anthony Eubanks 12/23/2023 8:00 PM Dictation workstation:   [id number]       Assessment/Plan   Principal Problem:    Cholecystitis      Acute cholecystitis  N/V  Constipation  Surgery following-->plan for PTHC placement vs lap yobany pending echo results and pt's overall clinical course. No indication for emergent OR currently  Zosyn  Zofran as needed  Dilaudid for pain, added home prn norco today  Add lidocaine patch for chronic back pain  Bowel regimen     Pulmonary edema with pleural effusions  HFpEF  Mild AS  Prior echocardiogram in December 2022 disclosing an ejection fraction of 55 to 60% with mild aortic stenosis and grade 1 DD  Saturating well on room air  Received Lasix 20 mg IV in the ED-->trend I/O  Trending renal function and electrolytes  Echocardiogram pending for cardiac evaluation prior to surgery--PENDING     Rectal prolapse  Endorsing small amount of hematochezia, hemoglobin stable  Surgical evaluation completed     CKD3  Renal function stable  Trending     Primary HTN  Resumed home BP meds    Misc  Continue other home meds  Cardiac  diet  Lovenox    Dispo:PT/OT     Full code                      Leatha Walters MD

## 2023-12-25 NOTE — CARE PLAN
The patient's goals for the shift include  to have decreased pain and nausea    The clinical goals for the shift include PT.WILL BE SEEN BY PCP/CONSULTS,RECIEVE PRESCRIBED MEDS/TX'S.    Goals met or pregressing,, safety maintained.

## 2023-12-26 ENCOUNTER — APPOINTMENT (OUTPATIENT)
Dept: CARDIOLOGY | Facility: HOSPITAL | Age: 88
DRG: 445 | End: 2023-12-26
Payer: MEDICARE

## 2023-12-26 ENCOUNTER — APPOINTMENT (OUTPATIENT)
Dept: RADIOLOGY | Facility: HOSPITAL | Age: 88
DRG: 445 | End: 2023-12-26
Payer: MEDICARE

## 2023-12-26 LAB
ANION GAP SERPL CALC-SCNC: 12 MMOL/L (ref 10–20)
BUN SERPL-MCNC: 17 MG/DL (ref 6–23)
CALCIUM SERPL-MCNC: 7.5 MG/DL (ref 8.6–10.3)
CHLORIDE SERPL-SCNC: 101 MMOL/L (ref 98–107)
CO2 SERPL-SCNC: 28 MMOL/L (ref 21–32)
CREAT SERPL-MCNC: 1.48 MG/DL (ref 0.5–1.05)
ERYTHROCYTE [DISTWIDTH] IN BLOOD BY AUTOMATED COUNT: 13.4 % (ref 11.5–14.5)
GFR SERPL CREATININE-BSD FRML MDRD: 34 ML/MIN/1.73M*2
GLUCOSE SERPL-MCNC: 84 MG/DL (ref 74–99)
HCT VFR BLD AUTO: 33.1 % (ref 36–46)
HGB BLD-MCNC: 10.5 G/DL (ref 12–16)
MAGNESIUM SERPL-MCNC: 1.67 MG/DL (ref 1.6–2.4)
MCH RBC QN AUTO: 29.2 PG (ref 26–34)
MCHC RBC AUTO-ENTMCNC: 31.7 G/DL (ref 32–36)
MCV RBC AUTO: 92 FL (ref 80–100)
NRBC BLD-RTO: 0 /100 WBCS (ref 0–0)
PLATELET # BLD AUTO: 402 X10*3/UL (ref 150–450)
POTASSIUM SERPL-SCNC: 2.9 MMOL/L (ref 3.5–5.3)
RBC # BLD AUTO: 3.59 X10*6/UL (ref 4–5.2)
SODIUM SERPL-SCNC: 138 MMOL/L (ref 136–145)
WBC # BLD AUTO: 7.4 X10*3/UL (ref 4.4–11.3)

## 2023-12-26 PROCEDURE — 47533 PLMT BILIARY DRAINAGE CATH: CPT | Mod: RT

## 2023-12-26 PROCEDURE — 93306 TTE W/DOPPLER COMPLETE: CPT | Performed by: INTERNAL MEDICINE

## 2023-12-26 PROCEDURE — 87186 SC STD MICRODIL/AGAR DIL: CPT | Mod: STJLAB | Performed by: STUDENT IN AN ORGANIZED HEALTH CARE EDUCATION/TRAINING PROGRAM

## 2023-12-26 PROCEDURE — 2500000001 HC RX 250 WO HCPCS SELF ADMINISTERED DRUGS (ALT 637 FOR MEDICARE OP): Performed by: STUDENT IN AN ORGANIZED HEALTH CARE EDUCATION/TRAINING PROGRAM

## 2023-12-26 PROCEDURE — 99152 MOD SED SAME PHYS/QHP 5/>YRS: CPT | Performed by: STUDENT IN AN ORGANIZED HEALTH CARE EDUCATION/TRAINING PROGRAM

## 2023-12-26 PROCEDURE — 99233 SBSQ HOSP IP/OBS HIGH 50: CPT | Performed by: STUDENT IN AN ORGANIZED HEALTH CARE EDUCATION/TRAINING PROGRAM

## 2023-12-26 PROCEDURE — 80048 BASIC METABOLIC PNL TOTAL CA: CPT | Performed by: STUDENT IN AN ORGANIZED HEALTH CARE EDUCATION/TRAINING PROGRAM

## 2023-12-26 PROCEDURE — 2500000004 HC RX 250 GENERAL PHARMACY W/ HCPCS (ALT 636 FOR OP/ED): Performed by: STUDENT IN AN ORGANIZED HEALTH CARE EDUCATION/TRAINING PROGRAM

## 2023-12-26 PROCEDURE — 2720000007 HC OR 272 NO HCPCS

## 2023-12-26 PROCEDURE — 2500000004 HC RX 250 GENERAL PHARMACY W/ HCPCS (ALT 636 FOR OP/ED): Performed by: RADIOLOGY

## 2023-12-26 PROCEDURE — 36415 COLL VENOUS BLD VENIPUNCTURE: CPT | Performed by: STUDENT IN AN ORGANIZED HEALTH CARE EDUCATION/TRAINING PROGRAM

## 2023-12-26 PROCEDURE — 96372 THER/PROPH/DIAG INJ SC/IM: CPT | Performed by: STUDENT IN AN ORGANIZED HEALTH CARE EDUCATION/TRAINING PROGRAM

## 2023-12-26 PROCEDURE — 85027 COMPLETE CBC AUTOMATED: CPT | Performed by: STUDENT IN AN ORGANIZED HEALTH CARE EDUCATION/TRAINING PROGRAM

## 2023-12-26 PROCEDURE — 47490 INCISION OF GALLBLADDER: CPT | Performed by: STUDENT IN AN ORGANIZED HEALTH CARE EDUCATION/TRAINING PROGRAM

## 2023-12-26 PROCEDURE — 1100000001 HC PRIVATE ROOM DAILY

## 2023-12-26 PROCEDURE — 76937 US GUIDE VASCULAR ACCESS: CPT | Mod: RT

## 2023-12-26 PROCEDURE — 99231 SBSQ HOSP IP/OBS SF/LOW 25: CPT | Performed by: NURSE PRACTITIONER

## 2023-12-26 PROCEDURE — C1729 CATH, DRAINAGE: HCPCS

## 2023-12-26 PROCEDURE — 2500000005 HC RX 250 GENERAL PHARMACY W/O HCPCS: Performed by: STUDENT IN AN ORGANIZED HEALTH CARE EDUCATION/TRAINING PROGRAM

## 2023-12-26 PROCEDURE — 83735 ASSAY OF MAGNESIUM: CPT | Performed by: STUDENT IN AN ORGANIZED HEALTH CARE EDUCATION/TRAINING PROGRAM

## 2023-12-26 PROCEDURE — 93306 TTE W/DOPPLER COMPLETE: CPT

## 2023-12-26 RX ORDER — MIDAZOLAM HYDROCHLORIDE 1 MG/ML
INJECTION, SOLUTION INTRAMUSCULAR; INTRAVENOUS
Status: COMPLETED | OUTPATIENT
Start: 2023-12-26 | End: 2023-12-26

## 2023-12-26 RX ORDER — POTASSIUM CHLORIDE 20 MEQ/1
40 TABLET, EXTENDED RELEASE ORAL ONCE
Status: DISCONTINUED | OUTPATIENT
Start: 2023-12-26 | End: 2023-12-26

## 2023-12-26 RX ORDER — MAGNESIUM SULFATE HEPTAHYDRATE 40 MG/ML
2 INJECTION, SOLUTION INTRAVENOUS ONCE
Status: COMPLETED | OUTPATIENT
Start: 2023-12-26 | End: 2023-12-26

## 2023-12-26 RX ORDER — FENTANYL CITRATE 50 UG/ML
INJECTION, SOLUTION INTRAMUSCULAR; INTRAVENOUS
Status: COMPLETED | OUTPATIENT
Start: 2023-12-26 | End: 2023-12-26

## 2023-12-26 RX ORDER — POTASSIUM CHLORIDE 14.9 MG/ML
20 INJECTION INTRAVENOUS
Status: DISPENSED | OUTPATIENT
Start: 2023-12-26 | End: 2023-12-26

## 2023-12-26 RX ORDER — POTASSIUM CHLORIDE 14.9 MG/ML
20 INJECTION INTRAVENOUS ONCE
Status: COMPLETED | OUTPATIENT
Start: 2023-12-26 | End: 2023-12-26

## 2023-12-26 RX ADMIN — PIPERACILLIN SODIUM AND TAZOBACTAM SODIUM 3.38 G: 3; .375 INJECTION, SOLUTION INTRAVENOUS at 04:14

## 2023-12-26 RX ADMIN — HYDROCODONE BITARTRATE AND ACETAMINOPHEN 1 TABLET: 5; 325 TABLET ORAL at 00:10

## 2023-12-26 RX ADMIN — PIPERACILLIN SODIUM AND TAZOBACTAM SODIUM 3.38 G: 3; .375 INJECTION, SOLUTION INTRAVENOUS at 20:50

## 2023-12-26 RX ADMIN — ATORVASTATIN CALCIUM 20 MG: 20 TABLET, FILM COATED ORAL at 20:50

## 2023-12-26 RX ADMIN — LIDOCAINE 1 PATCH: 4 PATCH TOPICAL at 09:18

## 2023-12-26 RX ADMIN — AMLODIPINE BESYLATE 10 MG: 10 TABLET ORAL at 09:15

## 2023-12-26 RX ADMIN — FAMOTIDINE 20 MG: 20 TABLET, FILM COATED ORAL at 20:50

## 2023-12-26 RX ADMIN — HYDROMORPHONE HYDROCHLORIDE 0.6 MG: 1 INJECTION, SOLUTION INTRAMUSCULAR; INTRAVENOUS; SUBCUTANEOUS at 18:27

## 2023-12-26 RX ADMIN — METHIMAZOLE 5 MG: 5 TABLET ORAL at 09:18

## 2023-12-26 RX ADMIN — POTASSIUM CHLORIDE 20 MEQ: 14.9 INJECTION, SOLUTION INTRAVENOUS at 14:31

## 2023-12-26 RX ADMIN — ONDANSETRON 4 MG: 2 INJECTION INTRAMUSCULAR; INTRAVENOUS at 18:32

## 2023-12-26 RX ADMIN — ASPIRIN 81 MG CHEWABLE TABLET 81 MG: 81 TABLET CHEWABLE at 09:18

## 2023-12-26 RX ADMIN — POTASSIUM CHLORIDE 20 MEQ: 14.9 INJECTION, SOLUTION INTRAVENOUS at 17:41

## 2023-12-26 RX ADMIN — GABAPENTIN 300 MG: 300 CAPSULE ORAL at 09:15

## 2023-12-26 RX ADMIN — VALSARTAN: 160 TABLET, FILM COATED ORAL at 09:15

## 2023-12-26 RX ADMIN — MAGNESIUM SULFATE HEPTAHYDRATE 2 G: 40 INJECTION, SOLUTION INTRAVENOUS at 10:54

## 2023-12-26 RX ADMIN — PIPERACILLIN SODIUM AND TAZOBACTAM SODIUM 3.38 G: 3; .375 INJECTION, SOLUTION INTRAVENOUS at 14:22

## 2023-12-26 RX ADMIN — PANTOPRAZOLE SODIUM 40 MG: 40 TABLET, DELAYED RELEASE ORAL at 09:16

## 2023-12-26 RX ADMIN — DAPAGLIFLOZIN 10 MG: 10 TABLET, FILM COATED ORAL at 09:16

## 2023-12-26 RX ADMIN — ACEBUTOLOL HYDROCHLORIDE 200 MG: 200 CAPSULE ORAL at 09:14

## 2023-12-26 RX ADMIN — FENTANYL CITRATE 50 MCG: 50 INJECTION, SOLUTION INTRAMUSCULAR; INTRAVENOUS at 12:55

## 2023-12-26 RX ADMIN — ONDANSETRON 4 MG: 2 INJECTION INTRAMUSCULAR; INTRAVENOUS at 06:29

## 2023-12-26 RX ADMIN — ENOXAPARIN SODIUM 30 MG: 30 INJECTION SUBCUTANEOUS at 18:25

## 2023-12-26 RX ADMIN — MIDAZOLAM 1 MG: 1 INJECTION INTRAMUSCULAR; INTRAVENOUS at 12:56

## 2023-12-26 RX ADMIN — ONDANSETRON 4 MG: 2 INJECTION INTRAMUSCULAR; INTRAVENOUS at 09:51

## 2023-12-26 RX ADMIN — HYDROMORPHONE HYDROCHLORIDE 0.6 MG: 1 INJECTION, SOLUTION INTRAMUSCULAR; INTRAVENOUS; SUBCUTANEOUS at 12:10

## 2023-12-26 RX ADMIN — ACEBUTOLOL HYDROCHLORIDE 200 MG: 200 CAPSULE ORAL at 20:50

## 2023-12-26 RX ADMIN — HYDROCODONE BITARTRATE AND ACETAMINOPHEN 1 TABLET: 5; 325 TABLET ORAL at 06:29

## 2023-12-26 ASSESSMENT — COGNITIVE AND FUNCTIONAL STATUS - GENERAL
DAILY ACTIVITIY SCORE: 24
TOILETING: A LITTLE
MOBILITY SCORE: 24
DAILY ACTIVITIY SCORE: 23
MOBILITY SCORE: 24

## 2023-12-26 ASSESSMENT — PAIN DESCRIPTION - LOCATION
LOCATION: ABDOMEN
LOCATION: BACK

## 2023-12-26 ASSESSMENT — PAIN - FUNCTIONAL ASSESSMENT
PAIN_FUNCTIONAL_ASSESSMENT: 0-10

## 2023-12-26 ASSESSMENT — PAIN SCALES - GENERAL
PAINLEVEL_OUTOF10: 0 - NO PAIN
PAINLEVEL_OUTOF10: 0 - NO PAIN
PAINLEVEL_OUTOF10: 3
PAINLEVEL_OUTOF10: 8
PAINLEVEL_OUTOF10: 9
PAINLEVEL_OUTOF10: 8
PAINLEVEL_OUTOF10: 0 - NO PAIN
PAINLEVEL_OUTOF10: 9
PAINLEVEL_OUTOF10: 0 - NO PAIN

## 2023-12-26 ASSESSMENT — PAIN DESCRIPTION - ORIENTATION: ORIENTATION: LOWER

## 2023-12-26 NOTE — CARE PLAN
The patient's goals for the shift include  decrease in pain and nausea    The clinical goals for the shift include remain hemodynamically stable  Goals progressing, medicated for pain and nausea this shift,. Pt for GB drain to be placed today

## 2023-12-26 NOTE — CARE PLAN
The patient's goals for the shift include remain fall free    The clinical goals for the shift include will remain afebrile

## 2023-12-26 NOTE — PROGRESS NOTES
"Maggy Gore is a 88 y.o. female on day 3 of admission presenting with Cholecystitis.    Subjective   No overnight events. C/o mild nausea this AM, denied emesis. Denied chest pain, shortness of breath, edema.  Patient's daughter telephoned and updated on plan of care.       Objective     Physical Exam    Constitutional: Well developed, awake/alert/oriented x3, no distress, alert and cooperative, appears comfortable  HEENT: anicteric sclera, eomi, no oral lesions noted  Cardiovascular: Regular, rate and rhythm, no murmurs, 2+ equal pulses of the extremities, normal S1 and S2  Respiratory/Thorax: Patent airways, CTAB, normal breath sounds with good chest expansion, thorax symmetric, no conversational dyspnea, RA  Gastrointestinal: +BS, Nondistended, soft, mild RUQ tenderness remains, no rebound tenderness or guarding  Musculoskeletal: ROM intact, no joint swelling, normal strength  Extremities: no edema  Skin: warm and dry. No rashes nor lesions noted  Neurological: alert and oriented x3, intact senses,normal strength, follows commands, clear speech, no facial droop    Last Recorded Vitals  Blood pressure 166/69, pulse 59, temperature 36.2 °C (97.2 °F), temperature source Temporal, resp. rate 16, height 1.676 m (5' 5.98\"), weight 66.2 kg (145 lb 15.1 oz), SpO2 94 %.  Intake/Output last 3 Shifts:  I/O last 3 completed shifts:  In: 290 (4.4 mL/kg) [P.O.:240; IV Piggyback:50]  Out: 1050 (15.9 mL/kg) [Urine:1050 (0.4 mL/kg/hr)]  Weight: 66.2 kg     Relevant Results  Scheduled medications  acebutolol, 200 mg, oral, BID  amLODIPine, 10 mg, oral, Daily  aspirin, 81 mg, oral, Daily  atorvastatin, 20 mg, oral, Nightly  dapagliflozin propanediol, 10 mg, oral, Daily  enoxaparin, 30 mg, subcutaneous, q24h  famotidine, 20 mg, oral, Nightly  gabapentin, 300 mg, oral, Daily  lidocaine, 1 patch, transdermal, Daily  methIMAzole, 5 mg, oral, Daily  pantoprazole, 40 mg, oral, Daily  perflutren lipid microspheres, 0.5-10 mL of " dilution, intravenous, Once in imaging  piperacillin-tazobactam, 3.375 g, intravenous, q8h  pneumococcal conjugate, 0.5 mL, intramuscular, During hospitalization  polyethylene glycol, 17 g, oral, Daily  potassium chloride, 20 mEq, intravenous, q2h  sennosides, 2 tablet, oral, BID  valsartan 320 mg, hydroCHLOROthiazide 25 mg for Diovan HCT, , oral, Daily      Continuous medications     PRN medications  PRN medications: HYDROcodone-acetaminophen, HYDROmorphone, ondansetron ODT **OR** ondansetron  Results from last 7 days   Lab Units 12/26/23  0547 12/25/23  0522 12/23/23  1849   WBC AUTO x10*3/uL 7.4 7.7 8.9   RBC AUTO x10*6/uL 3.59* 3.36* 3.63*   HEMOGLOBIN g/dL 10.5* 9.8* 10.6*       Results from last 7 days   Lab Units 12/26/23  0547 12/25/23  0522 12/23/23  1849   SODIUM mmol/L 138 137 136   POTASSIUM mmol/L 2.9* 3.1* 4.2   CHLORIDE mmol/L 101 100 101   CO2 mmol/L 28 27 24   BUN mg/dL 17 21 22   CREATININE mg/dL 1.48* 1.64* 1.76*   CALCIUM mg/dL 7.5* 7.7* 8.7   MAGNESIUM mg/dL 1.67 1.66  --    BILIRUBIN TOTAL mg/dL  --   --  0.5   ALT U/L  --   --  12   AST U/L  --   --  17         CT abdomen pelvis wo IV contrast    Result Date: 12/23/2023  Interpreted By:  Anthony Eubanks, STUDY: CT ABDOMEN PELVIS WO IV CONTRAST;  12/23/2023 7:08 pm   INDICATION: Signs/Symptoms:pain, generalized.   COMPARISON: 12/18/2023   ACCESSION NUMBER(S): [id number]   ORDERING CLINICIAN: SANCHEZ KLINE   TECHNIQUE: Contiguous axial images of the abdomen and pelvis were obtained without intravenous contrast. Coronal and sagittal reformatted images were reconstructed from the axial data.   FINDINGS: LOWER CHEST: Heart is moderately enlarged with coronary artery calcifications. There slightly worsening pulmonary edema in the lung bases. There is slight enlargement of small (right > left) bilateral pleural effusions. There is a tiny hiatal hernia.   Note: The absence of intravenous contrast limits evaluation of the solid organs and  vasculature.   ABDOMEN/PELVIS:   ABDOMINAL WALL: Mild diffuse anasarca. Tiny fat containing right inguinal hernia.   LIVER:  The liver demonstrates a normal noncontrast attenuation and is again enlarged (17.8 cm craniocaudal dimension).   BILE DUCTS: The common bile duct is unchanged measuring 1.1 cm within the pancreatic head without premature truncation or termination. There is also mild dilatation of the common hepatic duct and right/left hepatic ducts similar to prior study.   GALLBLADDER: There is significant worsening of gallbladder dilatation compared to prior study, now measuring 5.3 cm in transverse dimension, previously 3.8 cm. There is a gallstone in the fundus. The gallbladder wall appears slightly thickened. There is new trace pericholecystic fluid near the fundus.   PANCREAS: No significant abnormality.   SPLEEN: Calcified granulomas. The spleen is normal in size.   ADRENALS: No significant abnormality.   KIDNEYS, URETERS, BLADDER: There is simple exophytic right renal cyst measuring 2.8 cm. The right kidney is moderately atrophic. No nephroureterolithiasis or hydroureteronephrosis. The bladder wall is normal thickness for degree of distention.   REPRODUCTIVE ORGANS: Surgically absent uterus.   VESSELS: There redemonstration tortuous aorta with an infrarenal aneurysm measuring 3.1 cm in transverse dimension.   RETROPERITONEUM/LYMPH NODES: No enlarged lymph nodes. No acute retroperitoneal abnormality.   BOWEL/MESENTERY/PERITONEUM: There is diffuse mild gastric fold thickening similar prior study. There is worsening of rectal prolapse compared to prior study, though the rectal wall thickening is significantly improved/resolved. There is worsening nonspecific presacral edema that likely relates to overall worsening of anasarca given worsening mesenteric edema and new small volume ascites throughout the upper quadrants. The appendix is not identified; however, there are no pericecal inflammatory changes.    No free air.     MUSCULOSKELETAL: No acute osseous abnormality.       1. Significant worsening of gallbladder dilatation with mild wall thickening concerning for acute cholecystitis.   2. Unchanged mild extrahepatic biliary ductal dilatation with the common bile duct measuring 1.1 cm, likely a unrelated to choledocholithiasis given normal liver function testing.   3. Improved rectal inflammation from 12/18/2023, however with worsening of rectal prolapse compared to prior study.   4. Worsening of pulmonary edema, worsening diffuse anasarca, and slight enlargement of small bilateral pleural effusions (right > left).   5. 3.1 cm infrarenal abdominal aortic aneurysm.   MACRO: None.   Signed by: Anthony Eubanks 12/23/2023 8:00 PM Dictation workstation:   [id number]       Assessment/Plan   Principal Problem:    Cholecystitis      Acute cholecystitis  N/V  Constipation  Surgery following-->plan for PTHC placement vs lap yobany pending echo results and pt's overall clinical course. No indication for emergent OR currently  Pt taken to IR for drain placement today. Plan for outpatient lap yobany and outpatient correction of rectal prolapse. Discussed with pt and daughter  Pending stability in AM, tentative plan for dispo home with home care services   Zosyn  Zofran as needed  Dilaudid for pain, added home prn norco 12/25  Add lidocaine patch for chronic back pain  Bowel regimen     Pulmonary edema with pleural effusions  HFpEF  Mild AS  Prior echocardiogram in December 2022 disclosing an ejection fraction of 55 to 60% with mild aortic stenosis and grade 1 DD  Saturating well on room air  Received Lasix 20 mg IV in the ED-->trend I/O  Trending renal function and electrolytes  Echocardiogram pending for cardiac evaluation prior to surgery--PENDING     Rectal prolapse  Endorsing small amount of hematochezia, hemoglobin stable  Surgical evaluation completed     CKD3  Renal function stable  Trending     Primary HTN  Resumed home  BP meds    Misc  Continue other home meds  Cardiac diet  Lovenox    Dispo:PT/OT pending     Full code                      Leatha Walters MD

## 2023-12-26 NOTE — PROGRESS NOTES
Maggy Gore 88 y.o. female    Subjective  Patient seen and examined this morning.  No nausea or emesis.  Continues to have right upper quadrant abdominal pain.  Also reports pain at rectum, continues to have bowel movements, incontinence at times.      Objective  PHYSICAL EXAM:  Physical Exam  Vitals reviewed.   Constitutional:       General: She is awake.      Appearance: Normal appearance. She is ill-appearing.   Cardiovascular:      Rate and Rhythm: Normal rate and regular rhythm.      Pulses: Normal pulses.      Heart sounds: Normal heart sounds.   Pulmonary:      Effort: Pulmonary effort is normal.      Breath sounds: Normal breath sounds and air entry.   Abdominal:      General: Abdomen is flat. There is no distension.      Palpations: Abdomen is soft.      Tenderness: There is abdominal tenderness in the right upper quadrant. Positive signs include Sandy's sign.   Musculoskeletal:         General: Normal range of motion.      Cervical back: Normal range of motion.   Skin:     General: Skin is warm and dry.      Coloration: Skin is not jaundiced.   Neurological:      General: No focal deficit present.      Mental Status: She is alert and oriented to person, place, and time.   Psychiatric:         Behavior: Behavior is cooperative.         Vital signs in last 24 hours:  Vitals:    12/26/23 0800   BP: 154/72   Pulse: 65   Resp: 16   Temp: 37.2 °C (99 °F)   SpO2: 93%         Intake/Output this shift:    Intake/Output Summary (Last 24 hours) at 12/26/2023 0908  Last data filed at 12/25/2023 1858  Gross per 24 hour   Intake 240 ml   Output 150 ml   Net 90 ml        Allergies:  Allergies   Allergen Reactions    Sulfa (Sulfonamide Antibiotics) Rash        Medications:  Scheduled medications  acebutolol, 200 mg, oral, BID  amLODIPine, 10 mg, oral, Daily  aspirin, 81 mg, oral, Daily  atorvastatin, 20 mg, oral, Nightly  dapagliflozin propanediol, 10 mg, oral, Daily  enoxaparin, 30 mg, subcutaneous,  q24h  famotidine, 20 mg, oral, Nightly  gabapentin, 300 mg, oral, Daily  lidocaine, 1 patch, transdermal, Daily  methIMAzole, 5 mg, oral, Daily  pantoprazole, 40 mg, oral, Daily  perflutren lipid microspheres, 0.5-10 mL of dilution, intravenous, Once in imaging  piperacillin-tazobactam, 3.375 g, intravenous, q8h  pneumococcal conjugate, 0.5 mL, intramuscular, During hospitalization  polyethylene glycol, 17 g, oral, Daily  sennosides, 2 tablet, oral, BID  valsartan 320 mg, hydroCHLOROthiazide 25 mg for Diovan HCT, , oral, Daily      Continuous medications     PRN medications  PRN medications: HYDROcodone-acetaminophen, HYDROmorphone, ondansetron ODT **OR** ondansetron       Labs:  Results for orders placed or performed during the hospital encounter of 12/23/23 (from the past 24 hour(s))   CBC   Result Value Ref Range    WBC 7.4 4.4 - 11.3 x10*3/uL    nRBC 0.0 0.0 - 0.0 /100 WBCs    RBC 3.59 (L) 4.00 - 5.20 x10*6/uL    Hemoglobin 10.5 (L) 12.0 - 16.0 g/dL    Hematocrit 33.1 (L) 36.0 - 46.0 %    MCV 92 80 - 100 fL    MCH 29.2 26.0 - 34.0 pg    MCHC 31.7 (L) 32.0 - 36.0 g/dL    RDW 13.4 11.5 - 14.5 %    Platelets 402 150 - 450 x10*3/uL   Magnesium   Result Value Ref Range    Magnesium 1.67 1.60 - 2.40 mg/dL   Basic Metabolic Panel   Result Value Ref Range    Glucose 84 74 - 99 mg/dL    Sodium 138 136 - 145 mmol/L    Potassium 2.9 (LL) 3.5 - 5.3 mmol/L    Chloride 101 98 - 107 mmol/L    Bicarbonate 28 21 - 32 mmol/L    Anion Gap 12 10 - 20 mmol/L    Urea Nitrogen 17 6 - 23 mg/dL    Creatinine 1.48 (H) 0.50 - 1.05 mg/dL    eGFR 34 (L) >60 mL/min/1.73m*2    Calcium 7.5 (L) 8.6 - 10.3 mg/dL        Imaging:  ECG 12 lead    Result Date: 12/25/2023  Normal sinus rhythm with sinus arrhythmia Nonspecific ST and T wave abnormality Abnormal ECG When compared with ECG of 11-JUN-2014 09:07, Premature atrial complexes are no longer Present Nonspecific T wave abnormality now evident in Lateral leads    CT abdomen pelvis wo IV  contrast    Result Date: 12/23/2023  Interpreted By:  Anthony Eubanks, STUDY: CT ABDOMEN PELVIS WO IV CONTRAST;  12/23/2023 7:08 pm   INDICATION: Signs/Symptoms:pain, generalized.   COMPARISON: 12/18/2023   ACCESSION NUMBER(S): [id number]   ORDERING CLINICIAN: SANCHEZ KLINE   TECHNIQUE: Contiguous axial images of the abdomen and pelvis were obtained without intravenous contrast. Coronal and sagittal reformatted images were reconstructed from the axial data.   FINDINGS: LOWER CHEST: Heart is moderately enlarged with coronary artery calcifications. There slightly worsening pulmonary edema in the lung bases. There is slight enlargement of small (right > left) bilateral pleural effusions. There is a tiny hiatal hernia.   Note: The absence of intravenous contrast limits evaluation of the solid organs and vasculature.   ABDOMEN/PELVIS:   ABDOMINAL WALL: Mild diffuse anasarca. Tiny fat containing right inguinal hernia.   LIVER:  The liver demonstrates a normal noncontrast attenuation and is again enlarged (17.8 cm craniocaudal dimension).   BILE DUCTS: The common bile duct is unchanged measuring 1.1 cm within the pancreatic head without premature truncation or termination. There is also mild dilatation of the common hepatic duct and right/left hepatic ducts similar to prior study.   GALLBLADDER: There is significant worsening of gallbladder dilatation compared to prior study, now measuring 5.3 cm in transverse dimension, previously 3.8 cm. There is a gallstone in the fundus. The gallbladder wall appears slightly thickened. There is new trace pericholecystic fluid near the fundus.   PANCREAS: No significant abnormality.   SPLEEN: Calcified granulomas. The spleen is normal in size.   ADRENALS: No significant abnormality.   KIDNEYS, URETERS, BLADDER: There is simple exophytic right renal cyst measuring 2.8 cm. The right kidney is moderately atrophic. No nephroureterolithiasis or hydroureteronephrosis. The bladder wall is  normal thickness for degree of distention.   REPRODUCTIVE ORGANS: Surgically absent uterus.   VESSELS: There redemonstration tortuous aorta with an infrarenal aneurysm measuring 3.1 cm in transverse dimension.   RETROPERITONEUM/LYMPH NODES: No enlarged lymph nodes. No acute retroperitoneal abnormality.   BOWEL/MESENTERY/PERITONEUM: There is diffuse mild gastric fold thickening similar prior study. There is worsening of rectal prolapse compared to prior study, though the rectal wall thickening is significantly improved/resolved. There is worsening nonspecific presacral edema that likely relates to overall worsening of anasarca given worsening mesenteric edema and new small volume ascites throughout the upper quadrants. The appendix is not identified; however, there are no pericecal inflammatory changes.   No free air.     MUSCULOSKELETAL: No acute osseous abnormality.       1. Significant worsening of gallbladder dilatation with mild wall thickening concerning for acute cholecystitis.   2. Unchanged mild extrahepatic biliary ductal dilatation with the common bile duct measuring 1.1 cm, likely a unrelated to choledocholithiasis given normal liver function testing.   3. Improved rectal inflammation from 12/18/2023, however with worsening of rectal prolapse compared to prior study.   4. Worsening of pulmonary edema, worsening diffuse anasarca, and slight enlargement of small bilateral pleural effusions (right > left).   5. 3.1 cm infrarenal abdominal aortic aneurysm.   MACRO: None.   Signed by: Anthony Eubanks 12/23/2023 8:00 PM Dictation workstation:   [id number]    CT abdomen pelvis wo IV contrast    Result Date: 12/18/2023  Interpreted By:  Ada Schilling, STUDY: CT abdomen pelvis without contrast   INDICATION: Signs/Symptoms:rectal mass.   COMPARISON: CT 02/24/2020, ultrasound 02/26/2020.   ACCESSION NUMBER(S): [id number]   ORDERING CLINICIAN: VIVIANA REDDY   TECHNIQUE: Axial noncontrast CT images of the abdomen  and pelvis with coronal and sagittal reconstructed images.   FINDINGS: LOWER CHEST: Partially imaged small right pleural effusion and trace left pleural effusion. Bibasilar atelectasis. Mild interstitial prominence which may reflect pulmonary edema. Small hiatal hernia. Probable cardiomegaly with coronary artery calcifications. Aortic calcifications. BONES: Minimal anterolisthesis of L1 on L2 and L2 on L3. ABDOMINAL WALL: Mild anasarca   ABDOMEN: Lack of intravenous contrast limits evaluation of vessels and solid organs. LIVER: The liver measures up to 18 mm, mildly enlarged. BILE DUCTS: Mild biliary ductal dilatation measuring up to 9 mm. GALLBLADDER: Cholelithiasis with mild gallbladder distention up to 4.5 cm similar to prior imaging. PANCREAS: No peripancreatic inflammatory stranding or duct dilatation. SPLEEN: Punctate calcifications, likely sequela of remote granulomatous disease. ADRENALS: Within normal limits.   KIDNEYS, URETERS, URINARY BLADDER: No hydronephrosis or urinary tract calculus. Exophytic right renal cyst measures up to 2.7 cm. The urinary bladder is unremarkable.   VESSELS: Dense atherosclerotic calcifications and multifocal ectasia measuring up to 2.5 cm. Probable narrowing of the left common and external iliac artery. Evaluation partially limited due to lack of intravenous contrast. RETROPERITONEUM: No pathologically enlarged lymph nodes.   PELVIS:   REPRODUCTIVE ORGANS: No abnormality, given limitations of the noncontrast CT.  No significant free pelvic fluid.   BOWEL: No dilated bowel. Moderate stool burden. There is diffuse thickening of the rectum which is not well evaluated due to lack of intravenous contrast. PERITONEUM: No ascites or free air, no fluid collection. Mild mesenteric and presacral haziness.       Gallbladder distension and cholelithiasis with mild ductal dilatation similar to prior imaging.   Small pleural effusions and mild anasarca. Mild mesenteric haziness noted.  Clinical correlation suggested.   Diffuse thickening of the rectal wall again noted. Clinical correlation and direct visualization may be considered.   Extensive vascular calcifications with ectatic infrarenal aorta. Small hiatal hernia and additional findings as detailed.   MACRO: None   Signed by: Ada Schilling 12/18/2023 11:11 PM Dictation workstation:   [id number]    ECG 12 lead (Clinic Performed)    Result Date: 12/12/2023  Resting EKG independently interpreted by me at the time of reporting reveals SB, normal intervals and axis, no acute ischemic ST abnormalities.       Plan  #Acute Cholecystitis  #Nausea and vomiting  #Rectal prolapse  #Pulmonary edema, diffuse anasarca and bilateral pleural effusion R>L on CT scan    - NPO for tube placement  - Recommend low fat diet after tube placement   - Pain control  - Nausea: antiemetics PRN  - Waiting on Echo to be completed.    - Will consult IR for percutaneous cholecystostomy tube placement   - Plan for rectal prolapse surgery and cholecystectomy as outpatient    Plan of care discussed and patient seen with Dr. Ace Centeno, APRN-CNP    I spent 15 minutes in the professional and overall care of this patient.

## 2023-12-26 NOTE — PROGRESS NOTES
Physical Therapy                 Therapy Communication Note    Patient Name: Maggy Gore  MRN: [id number]  Today's Date: 12/26/2023     Discipline: Physical Therapy    Missed Time: Attempt    Comment:  Order received. Pt. out of the room at 1412 for a procedure.  Will see at next available opportunity.

## 2023-12-26 NOTE — PROGRESS NOTES
Occupational Therapy                 Therapy Communication Note    Patient Name: Maggy Gore  MRN: [id number]  Today's Date: 12/26/2023     Discipline: Occupational Therapy    Missed Time: Attempt    Comment: Received orders for OT eval 12/24. Completed chart review, and attempted OT eval. Pt was off the floor for a procedure. Will hold OT eval and complete as appropriate.

## 2023-12-27 LAB
ALBUMIN SERPL BCP-MCNC: 3.3 G/DL (ref 3.4–5)
ALP SERPL-CCNC: 56 U/L (ref 33–136)
ALT SERPL W P-5'-P-CCNC: 10 U/L (ref 7–45)
ANION GAP SERPL CALC-SCNC: 12 MMOL/L (ref 10–20)
AORTIC VALVE MEAN GRADIENT: 15
AORTIC VALVE PEAK VELOCITY: 2.52
AST SERPL W P-5'-P-CCNC: 16 U/L (ref 9–39)
AV PEAK GRADIENT: 25.4
AVA (PEAK VEL): 1.26
AVA (VTI): 1.37
BILIRUB SERPL-MCNC: 0.5 MG/DL (ref 0–1.2)
BUN SERPL-MCNC: 16 MG/DL (ref 6–23)
CALCIUM SERPL-MCNC: 8.3 MG/DL (ref 8.6–10.3)
CHLORIDE SERPL-SCNC: 97 MMOL/L (ref 98–107)
CO2 SERPL-SCNC: 30 MMOL/L (ref 21–32)
CREAT SERPL-MCNC: 1.42 MG/DL (ref 0.5–1.05)
EJECTION FRACTION APICAL 4 CHAMBER: 54.3
EJECTION FRACTION: 54
ERYTHROCYTE [DISTWIDTH] IN BLOOD BY AUTOMATED COUNT: 13.2 % (ref 11.5–14.5)
GFR SERPL CREATININE-BSD FRML MDRD: 36 ML/MIN/1.73M*2
GLUCOSE SERPL-MCNC: 102 MG/DL (ref 74–99)
HCT VFR BLD AUTO: 36.1 % (ref 36–46)
HGB BLD-MCNC: 11.4 G/DL (ref 12–16)
LEFT VENTRICLE INTERNAL DIMENSION DIASTOLE: 5.2 (ref 3.5–6)
LEFT VENTRICULAR OUTFLOW TRACT DIAMETER: 2
MAGNESIUM SERPL-MCNC: 1.94 MG/DL (ref 1.6–2.4)
MCH RBC QN AUTO: 28.4 PG (ref 26–34)
MCHC RBC AUTO-ENTMCNC: 31.6 G/DL (ref 32–36)
MCV RBC AUTO: 90 FL (ref 80–100)
MITRAL VALVE E/A RATIO: 0.71
MITRAL VALVE E/E' RATIO: 26.34
NRBC BLD-RTO: 0 /100 WBCS (ref 0–0)
PLATELET # BLD AUTO: 439 X10*3/UL (ref 150–450)
POTASSIUM SERPL-SCNC: 3.7 MMOL/L (ref 3.5–5.3)
PROT SERPL-MCNC: 6.4 G/DL (ref 6.4–8.2)
RBC # BLD AUTO: 4.01 X10*6/UL (ref 4–5.2)
RIGHT VENTRICLE FREE WALL PEAK S': 15.2
RIGHT VENTRICLE PEAK SYSTOLIC PRESSURE: 33
SODIUM SERPL-SCNC: 135 MMOL/L (ref 136–145)
TRICUSPID ANNULAR PLANE SYSTOLIC EXCURSION: 1.9
WBC # BLD AUTO: 10 X10*3/UL (ref 4.4–11.3)

## 2023-12-27 PROCEDURE — 96372 THER/PROPH/DIAG INJ SC/IM: CPT | Performed by: STUDENT IN AN ORGANIZED HEALTH CARE EDUCATION/TRAINING PROGRAM

## 2023-12-27 PROCEDURE — 99231 SBSQ HOSP IP/OBS SF/LOW 25: CPT | Performed by: NURSE PRACTITIONER

## 2023-12-27 PROCEDURE — 2500000004 HC RX 250 GENERAL PHARMACY W/ HCPCS (ALT 636 FOR OP/ED): Performed by: STUDENT IN AN ORGANIZED HEALTH CARE EDUCATION/TRAINING PROGRAM

## 2023-12-27 PROCEDURE — 83735 ASSAY OF MAGNESIUM: CPT | Performed by: STUDENT IN AN ORGANIZED HEALTH CARE EDUCATION/TRAINING PROGRAM

## 2023-12-27 PROCEDURE — 2500000001 HC RX 250 WO HCPCS SELF ADMINISTERED DRUGS (ALT 637 FOR MEDICARE OP): Performed by: STUDENT IN AN ORGANIZED HEALTH CARE EDUCATION/TRAINING PROGRAM

## 2023-12-27 PROCEDURE — 2500000005 HC RX 250 GENERAL PHARMACY W/O HCPCS: Performed by: STUDENT IN AN ORGANIZED HEALTH CARE EDUCATION/TRAINING PROGRAM

## 2023-12-27 PROCEDURE — 97112 NEUROMUSCULAR REEDUCATION: CPT | Mod: GO

## 2023-12-27 PROCEDURE — 97165 OT EVAL LOW COMPLEX 30 MIN: CPT | Mod: GO

## 2023-12-27 PROCEDURE — 99233 SBSQ HOSP IP/OBS HIGH 50: CPT | Performed by: STUDENT IN AN ORGANIZED HEALTH CARE EDUCATION/TRAINING PROGRAM

## 2023-12-27 PROCEDURE — 36415 COLL VENOUS BLD VENIPUNCTURE: CPT | Performed by: STUDENT IN AN ORGANIZED HEALTH CARE EDUCATION/TRAINING PROGRAM

## 2023-12-27 PROCEDURE — 1100000001 HC PRIVATE ROOM DAILY

## 2023-12-27 PROCEDURE — 80053 COMPREHEN METABOLIC PANEL: CPT | Performed by: STUDENT IN AN ORGANIZED HEALTH CARE EDUCATION/TRAINING PROGRAM

## 2023-12-27 PROCEDURE — 85027 COMPLETE CBC AUTOMATED: CPT | Performed by: STUDENT IN AN ORGANIZED HEALTH CARE EDUCATION/TRAINING PROGRAM

## 2023-12-27 RX ORDER — PROCHLORPERAZINE EDISYLATE 5 MG/ML
10 INJECTION INTRAMUSCULAR; INTRAVENOUS EVERY 6 HOURS PRN
Status: DISCONTINUED | OUTPATIENT
Start: 2023-12-27 | End: 2023-12-28 | Stop reason: HOSPADM

## 2023-12-27 RX ADMIN — ONDANSETRON 4 MG: 2 INJECTION INTRAMUSCULAR; INTRAVENOUS at 16:40

## 2023-12-27 RX ADMIN — POLYETHYLENE GLYCOL 3350 17 G: 17 POWDER, FOR SOLUTION ORAL at 08:10

## 2023-12-27 RX ADMIN — HYDROMORPHONE HYDROCHLORIDE 0.6 MG: 1 INJECTION, SOLUTION INTRAMUSCULAR; INTRAVENOUS; SUBCUTANEOUS at 04:20

## 2023-12-27 RX ADMIN — AMLODIPINE BESYLATE 10 MG: 10 TABLET ORAL at 08:10

## 2023-12-27 RX ADMIN — LIDOCAINE 1 PATCH: 4 PATCH TOPICAL at 08:13

## 2023-12-27 RX ADMIN — METHIMAZOLE 5 MG: 5 TABLET ORAL at 08:10

## 2023-12-27 RX ADMIN — FAMOTIDINE 20 MG: 20 TABLET, FILM COATED ORAL at 21:10

## 2023-12-27 RX ADMIN — ATORVASTATIN CALCIUM 20 MG: 20 TABLET, FILM COATED ORAL at 21:10

## 2023-12-27 RX ADMIN — SENNOSIDES 17.2 MG: 8.6 TABLET, FILM COATED ORAL at 21:10

## 2023-12-27 RX ADMIN — HYDROMORPHONE HYDROCHLORIDE 0.6 MG: 1 INJECTION, SOLUTION INTRAMUSCULAR; INTRAVENOUS; SUBCUTANEOUS at 09:43

## 2023-12-27 RX ADMIN — ONDANSETRON 4 MG: 2 INJECTION INTRAMUSCULAR; INTRAVENOUS at 08:10

## 2023-12-27 RX ADMIN — DAPAGLIFLOZIN 10 MG: 10 TABLET, FILM COATED ORAL at 08:10

## 2023-12-27 RX ADMIN — PANTOPRAZOLE SODIUM 40 MG: 40 TABLET, DELAYED RELEASE ORAL at 08:10

## 2023-12-27 RX ADMIN — HYDROCODONE BITARTRATE AND ACETAMINOPHEN 1 TABLET: 5; 325 TABLET ORAL at 08:11

## 2023-12-27 RX ADMIN — ASPIRIN 81 MG CHEWABLE TABLET 81 MG: 81 TABLET CHEWABLE at 08:10

## 2023-12-27 RX ADMIN — PROCHLORPERAZINE EDISYLATE 10 MG: 5 INJECTION INTRAMUSCULAR; INTRAVENOUS at 18:33

## 2023-12-27 RX ADMIN — SENNOSIDES 17.2 MG: 8.6 TABLET, FILM COATED ORAL at 08:11

## 2023-12-27 RX ADMIN — VALSARTAN: 160 TABLET, FILM COATED ORAL at 08:11

## 2023-12-27 RX ADMIN — HYDROMORPHONE HYDROCHLORIDE 0.6 MG: 1 INJECTION, SOLUTION INTRAMUSCULAR; INTRAVENOUS; SUBCUTANEOUS at 21:17

## 2023-12-27 RX ADMIN — ACEBUTOLOL HYDROCHLORIDE 200 MG: 200 CAPSULE ORAL at 21:09

## 2023-12-27 RX ADMIN — HYDROMORPHONE HYDROCHLORIDE 0.6 MG: 1 INJECTION, SOLUTION INTRAMUSCULAR; INTRAVENOUS; SUBCUTANEOUS at 00:06

## 2023-12-27 RX ADMIN — PIPERACILLIN SODIUM AND TAZOBACTAM SODIUM 3.38 G: 3; .375 INJECTION, SOLUTION INTRAVENOUS at 21:11

## 2023-12-27 RX ADMIN — ENOXAPARIN SODIUM 30 MG: 30 INJECTION SUBCUTANEOUS at 21:10

## 2023-12-27 RX ADMIN — PIPERACILLIN SODIUM AND TAZOBACTAM SODIUM 3.38 G: 3; .375 INJECTION, SOLUTION INTRAVENOUS at 12:04

## 2023-12-27 RX ADMIN — PIPERACILLIN SODIUM AND TAZOBACTAM SODIUM 3.38 G: 3; .375 INJECTION, SOLUTION INTRAVENOUS at 04:19

## 2023-12-27 RX ADMIN — GABAPENTIN 300 MG: 300 CAPSULE ORAL at 08:11

## 2023-12-27 RX ADMIN — ACEBUTOLOL HYDROCHLORIDE 200 MG: 200 CAPSULE ORAL at 08:10

## 2023-12-27 ASSESSMENT — COGNITIVE AND FUNCTIONAL STATUS - GENERAL
DRESSING REGULAR LOWER BODY CLOTHING: A LOT
TOILETING: A LOT
DRESSING REGULAR UPPER BODY CLOTHING: A LITTLE
MOBILITY SCORE: 17
DAILY ACTIVITIY SCORE: 15
STANDING UP FROM CHAIR USING ARMS: A LITTLE
CLIMB 3 TO 5 STEPS WITH RAILING: A LOT
PERSONAL GROOMING: A LITTLE
WALKING IN HOSPITAL ROOM: A LITTLE
HELP NEEDED FOR BATHING: A LOT
PERSONAL GROOMING: A LITTLE
DAILY ACTIVITIY SCORE: 16
TURNING FROM BACK TO SIDE WHILE IN FLAT BAD: A LITTLE
TOILETING: A LOT
HELP NEEDED FOR BATHING: TOTAL
DRESSING REGULAR LOWER BODY CLOTHING: A LOT
DRESSING REGULAR UPPER BODY CLOTHING: A LITTLE
MOVING FROM LYING ON BACK TO SITTING ON SIDE OF FLAT BED WITH BEDRAILS: A LITTLE
MOVING TO AND FROM BED TO CHAIR: A LITTLE

## 2023-12-27 ASSESSMENT — PAIN DESCRIPTION - LOCATION
LOCATION: ABDOMEN
LOCATION: ABDOMEN

## 2023-12-27 ASSESSMENT — PAIN - FUNCTIONAL ASSESSMENT
PAIN_FUNCTIONAL_ASSESSMENT: 0-10
PAIN_FUNCTIONAL_ASSESSMENT: UNABLE TO SELF-REPORT
PAIN_FUNCTIONAL_ASSESSMENT: 0-10

## 2023-12-27 ASSESSMENT — PAIN SCALES - GENERAL
PAINLEVEL_OUTOF10: 7
PAINLEVEL_OUTOF10: 6
PAINLEVEL_OUTOF10: 3

## 2023-12-27 ASSESSMENT — ACTIVITIES OF DAILY LIVING (ADL): BATHING_ASSISTANCE: MODIFIED INDEPENDENT (DEVICE)

## 2023-12-27 NOTE — PROGRESS NOTES
Maggy Gore 88 y.o. female    Subjective  Patient seen and examined this morning.  Reports some nausea without emesis.  RUQ drain in place with bilious output. Tolerating diet. Reports incontinence of stool.  No acute events overnight.       Objective  PHYSICAL EXAM:  Physical Exam  Vitals reviewed.   Constitutional:       General: She is awake.      Appearance: Normal appearance. She is ill-appearing.   Cardiovascular:      Rate and Rhythm: Normal rate and regular rhythm.      Pulses: Normal pulses.      Heart sounds: Normal heart sounds.   Pulmonary:      Effort: Pulmonary effort is normal.      Breath sounds: Normal breath sounds and air entry.   Abdominal:      General: Abdomen is flat. There is no distension.      Palpations: Abdomen is soft.      Tenderness: There is abdominal tenderness in the right upper quadrant. There is no guarding or rebound.      Comments: RUQ perc yobany tube with bilious drainage.    Musculoskeletal:         General: Normal range of motion.      Cervical back: Normal range of motion.   Skin:     General: Skin is warm and dry.      Coloration: Skin is not jaundiced.   Neurological:      General: No focal deficit present.      Mental Status: She is alert and oriented to person, place, and time.   Psychiatric:         Behavior: Behavior is cooperative.         Vital signs in last 24 hours:  Vitals:    12/27/23 0800   BP: 172/74   Pulse: 66   Resp: 16   Temp: 36.3 °C (97.3 °F)   SpO2: 91%         Intake/Output this shift:    Intake/Output Summary (Last 24 hours) at 12/27/2023 1021  Last data filed at 12/27/2023 0922  Gross per 24 hour   Intake 690 ml   Output 750 ml   Net -60 ml        Allergies:  Allergies   Allergen Reactions    Sulfa (Sulfonamide Antibiotics) Rash        Medications:  Scheduled medications  acebutolol, 200 mg, oral, BID  amLODIPine, 10 mg, oral, Daily  aspirin, 81 mg, oral, Daily  atorvastatin, 20 mg, oral, Nightly  dapagliflozin propanediol, 10 mg, oral,  Daily  enoxaparin, 30 mg, subcutaneous, q24h  famotidine, 20 mg, oral, Nightly  gabapentin, 300 mg, oral, Daily  lidocaine, 1 patch, transdermal, Daily  methIMAzole, 5 mg, oral, Daily  pantoprazole, 40 mg, oral, Daily  perflutren lipid microspheres, 0.5-10 mL of dilution, intravenous, Once in imaging  piperacillin-tazobactam, 3.375 g, intravenous, q8h  pneumococcal conjugate, 0.5 mL, intramuscular, During hospitalization  polyethylene glycol, 17 g, oral, Daily  sennosides, 2 tablet, oral, BID  valsartan 320 mg, hydroCHLOROthiazide 25 mg for Diovan HCT, , oral, Daily      Continuous medications     PRN medications  PRN medications: HYDROcodone-acetaminophen, HYDROmorphone, ondansetron ODT **OR** ondansetron       Labs:  Results for orders placed or performed during the hospital encounter of 12/23/23 (from the past 24 hour(s))   Sterile Fluid Culture/Smear    Specimen: Aspirate; Fluid   Result Value Ref Range    Sterile Fluid Culture/Smear No growth to date     Gram Stain No polymorphonuclear leukocytes seen (A)     Gram Stain (3+) Moderate Gram variable bacilli (A)    Transthoracic Echo (TTE) Complete   Result Value Ref Range    BSA 1.76 m2   CBC   Result Value Ref Range    WBC 10.0 4.4 - 11.3 x10*3/uL    nRBC 0.0 0.0 - 0.0 /100 WBCs    RBC 4.01 4.00 - 5.20 x10*6/uL    Hemoglobin 11.4 (L) 12.0 - 16.0 g/dL    Hematocrit 36.1 36.0 - 46.0 %    MCV 90 80 - 100 fL    MCH 28.4 26.0 - 34.0 pg    MCHC 31.6 (L) 32.0 - 36.0 g/dL    RDW 13.2 11.5 - 14.5 %    Platelets 439 150 - 450 x10*3/uL        Imaging:  IR biliary drain    Result Date: 12/26/2023  Interpreted By:  Ace Osorio, STUDY: IR BILIARY DRAIN;  12/26/2023 1:44 pm   INDICATION: 88-year-old female with acute cholecystitis. Interventional radiology consulted for percutaneous cholecystostomy tube placement.   COMPARISON: None.   ACCESSION NUMBER(S): [id number]   ORDERING CLINICIAN: MATTHEW FELICIANO   TECHNIQUE: INTERVENTIONALIST(S): Ace Osorio MD   CONSENT: The  patient/patient's POA/next of kin was informed of the nature of the proposed procedure. The purposes, alternatives, risks, and benefits were explained and discussed. All questions were answered and consent was obtained.     SEDATION: Moderate conscious IV sedation services (supervision of administration, induction, and maintenance) were provided by the physician performing the procedure with intravenous fentanyl 50 mcg and versed 1 mg for 15 minutes. The physician was assisted by an independent trained observer, an interventional radiology nurse, in the continuous monitoring of patient level of consciousness and physiologic status.   TIME OUT: A time out was performed immediately prior to procedure start with the interventional team, correctly identifying the patient name, date of birth, MRN, procedure, anatomy (including marking of site and side), patient position, procedure consent form, relevant laboratory and imaging test results, antibiotic administration, safety precautions, and procedure-specific equipment needs.   COMPLICATIONS: No immediate adverse events identified.   FINDINGS: The patient was brought to the angiography suite and positioned supine on the stretcher. Preliminary ultrasound of the right upper quadrant was performed. The area was prepped and draped utilizing standard sterile technique. 1% lidocaine was administered to the subcutaneous tissues and a small skin incision was made with an 11 blade scalpel. Under direct ultrasound visualization an 8 [place] all-purpose drainage catheter was advanced in a trocar transhepatic fashion into the gallbladder. The pigtail was then formed in the gallbladder under direct ultrasound visualization. Bowel was immediately aspirated and the tube was attached to bag drainage. The cholecystostomy tube was sutured to the skin utilizing a silk suture. A sterile dressing was applied.       Successful ultrasound-guided transhepatic cholecystostomy tube placement.    MACRO: None   Signed by: Ace Osorio 12/26/2023 2:05 PM Dictation workstation:   [id number]       Plan    #Acute Cholecystitis  #Nausea and vomiting  #Rectal prolapse  #Pulmonary edema, diffuse anasarca and bilateral pleural effusion R>L on CT scan     - Low fat diet    - Pain control  - Nausea: antiemetics PRN  - Waiting on Echo to be completed.    - Percutaneous cholecystostomy tube placement by IR on 12/26  - Plan for rectal prolapse surgery and cholecystectomy as outpatient    Plan of care discussed with Dr. Johnson and surgery will sign off.  Patient to follow up with Dr. Oliva for cholecystectomy and perc yobany tube as outpatient.  Follow up with Dr. Johnson for rectal prolapse. Please call with any concerns.     DEB Garcia-CNP    I spent 15 minutes in the professional and overall care of this patient.

## 2023-12-27 NOTE — PROGRESS NOTES
"Maggy Gore is a 88 y.o. female on day 4 of admission presenting with Cholecystitis.    Subjective   No overnight events. C/o feeling \"sick to my stomach. I was nauseated but it got better after I had breakfast. I'm not ready to go home though\". Denied chest pain, shortness of breath, edema.  Patient's daughter telephoned and updated on plan of care.       Objective     Physical Exam    Constitutional: Well developed, awake/alert/oriented x3, no distress, alert and cooperative, appears mildly uncomfortable  HEENT: anicteric sclera, eomi, no oral lesions noted  Cardiovascular: Regular, rate and rhythm, no murmurs, 2+ equal pulses of the extremities, normal S1 and S2  Respiratory/Thorax: Patent airways, CTAB, normal breath sounds with good chest expansion, thorax symmetric, no conversational dyspnea, RA  Gastrointestinal: +BS, Nondistended, soft, mild RUQ tenderness remains, no rebound tenderness or guarding, PTHC drain now in place  Musculoskeletal: ROM intact, no joint swelling, normal strength  Extremities: no edema  Skin: warm and dry. No rashes nor lesions noted  Neurological: alert and oriented x3, intact senses,normal strength, follows commands, clear speech, no facial droop    Last Recorded Vitals  Blood pressure 149/66, pulse 53, temperature 36.3 °C (97.3 °F), resp. rate 16, height 1.676 m (5' 5.98\"), weight 66.2 kg (145 lb 15.1 oz), SpO2 92 %.  Intake/Output last 3 Shifts:  I/O last 3 completed shifts:  In: 100 (1.5 mL/kg) [IV Piggyback:100]  Out: 500 (7.6 mL/kg) [Urine:400 (0.2 mL/kg/hr); Drains:100]  Weight: 66.2 kg     Relevant Results  Scheduled medications  acebutolol, 200 mg, oral, BID  amLODIPine, 10 mg, oral, Daily  aspirin, 81 mg, oral, Daily  atorvastatin, 20 mg, oral, Nightly  dapagliflozin propanediol, 10 mg, oral, Daily  enoxaparin, 30 mg, subcutaneous, q24h  famotidine, 20 mg, oral, Nightly  gabapentin, 300 mg, oral, Daily  lidocaine, 1 patch, transdermal, Daily  methIMAzole, 5 mg, oral, " Daily  pantoprazole, 40 mg, oral, Daily  perflutren lipid microspheres, 0.5-10 mL of dilution, intravenous, Once in imaging  piperacillin-tazobactam, 3.375 g, intravenous, q8h  pneumococcal conjugate, 0.5 mL, intramuscular, During hospitalization  polyethylene glycol, 17 g, oral, Daily  sennosides, 2 tablet, oral, BID  valsartan 320 mg, hydroCHLOROthiazide 25 mg for Diovan HCT, , oral, Daily      Continuous medications     PRN medications  PRN medications: HYDROcodone-acetaminophen, HYDROmorphone, ondansetron ODT **OR** ondansetron  Results from last 7 days   Lab Units 12/27/23 0913 12/26/23 0547 12/25/23 0522   WBC AUTO x10*3/uL 10.0 7.4 7.7   RBC AUTO x10*6/uL 4.01 3.59* 3.36*   HEMOGLOBIN g/dL 11.4* 10.5* 9.8*       Results from last 7 days   Lab Units 12/27/23  0913 12/26/23  0547 12/25/23  0522 12/23/23  1849   SODIUM mmol/L 135* 138 137 136   POTASSIUM mmol/L 3.7 2.9* 3.1* 4.2   CHLORIDE mmol/L 97* 101 100 101   CO2 mmol/L 30 28 27 24   BUN mg/dL 16 17 21 22   CREATININE mg/dL 1.42* 1.48* 1.64* 1.76*   CALCIUM mg/dL 8.3* 7.5* 7.7* 8.7   MAGNESIUM mg/dL 1.94 1.67 1.66  --    BILIRUBIN TOTAL mg/dL 0.5  --   --  0.5   ALT U/L 10  --   --  12   AST U/L 16  --   --  17         CT abdomen pelvis wo IV contrast    Result Date: 12/23/2023  Interpreted By:  Anthony Eubanks, STUDY: CT ABDOMEN PELVIS WO IV CONTRAST;  12/23/2023 7:08 pm   INDICATION: Signs/Symptoms:pain, generalized.   COMPARISON: 12/18/2023   ACCESSION NUMBER(S): [id number]   ORDERING CLINICIAN: SANCHEZ KLINE   TECHNIQUE: Contiguous axial images of the abdomen and pelvis were obtained without intravenous contrast. Coronal and sagittal reformatted images were reconstructed from the axial data.   FINDINGS: LOWER CHEST: Heart is moderately enlarged with coronary artery calcifications. There slightly worsening pulmonary edema in the lung bases. There is slight enlargement of small (right > left) bilateral pleural effusions. There is a tiny hiatal  hernia.   Note: The absence of intravenous contrast limits evaluation of the solid organs and vasculature.   ABDOMEN/PELVIS:   ABDOMINAL WALL: Mild diffuse anasarca. Tiny fat containing right inguinal hernia.   LIVER:  The liver demonstrates a normal noncontrast attenuation and is again enlarged (17.8 cm craniocaudal dimension).   BILE DUCTS: The common bile duct is unchanged measuring 1.1 cm within the pancreatic head without premature truncation or termination. There is also mild dilatation of the common hepatic duct and right/left hepatic ducts similar to prior study.   GALLBLADDER: There is significant worsening of gallbladder dilatation compared to prior study, now measuring 5.3 cm in transverse dimension, previously 3.8 cm. There is a gallstone in the fundus. The gallbladder wall appears slightly thickened. There is new trace pericholecystic fluid near the fundus.   PANCREAS: No significant abnormality.   SPLEEN: Calcified granulomas. The spleen is normal in size.   ADRENALS: No significant abnormality.   KIDNEYS, URETERS, BLADDER: There is simple exophytic right renal cyst measuring 2.8 cm. The right kidney is moderately atrophic. No nephroureterolithiasis or hydroureteronephrosis. The bladder wall is normal thickness for degree of distention.   REPRODUCTIVE ORGANS: Surgically absent uterus.   VESSELS: There redemonstration tortuous aorta with an infrarenal aneurysm measuring 3.1 cm in transverse dimension.   RETROPERITONEUM/LYMPH NODES: No enlarged lymph nodes. No acute retroperitoneal abnormality.   BOWEL/MESENTERY/PERITONEUM: There is diffuse mild gastric fold thickening similar prior study. There is worsening of rectal prolapse compared to prior study, though the rectal wall thickening is significantly improved/resolved. There is worsening nonspecific presacral edema that likely relates to overall worsening of anasarca given worsening mesenteric edema and new small volume ascites throughout the upper  quadrants. The appendix is not identified; however, there are no pericecal inflammatory changes.   No free air.     MUSCULOSKELETAL: No acute osseous abnormality.       1. Significant worsening of gallbladder dilatation with mild wall thickening concerning for acute cholecystitis.   2. Unchanged mild extrahepatic biliary ductal dilatation with the common bile duct measuring 1.1 cm, likely a unrelated to choledocholithiasis given normal liver function testing.   3. Improved rectal inflammation from 12/18/2023, however with worsening of rectal prolapse compared to prior study.   4. Worsening of pulmonary edema, worsening diffuse anasarca, and slight enlargement of small bilateral pleural effusions (right > left).   5. 3.1 cm infrarenal abdominal aortic aneurysm.   MACRO: None.   Signed by: Anthony Eubanks 12/23/2023 8:00 PM Dictation workstation:   [id number]       Assessment/Plan   Principal Problem:    Cholecystitis      Acute cholecystitis  N/V  Constipation  Surgery following-->plan for PTHC placement vs lap yobany pending echo results and pt's overall clinical course. No indication for emergent OR currently  Pt taken to IR for drain placement 12/26. Plan for outpatient lap yobany and outpatient correction of rectal prolapse. Discussed with pt and daughter  Pending stability in AM, tentative plan for dispo to SNF. Will need referral to outpatient gen surg and cardio on dc (for moderate aortic stenosis noted on echo)  Zosyn-->transition to augmentin on dc   Zofran as needed  Dilaudid for pain, added home prn norco 12/25  Added lidocaine patch for chronic back pain  Bowel regimen     Pulmonary edema with pleural effusions  HFpEF  Mild AS  Prior echocardiogram in December 2022 disclosing an ejection fraction of 55 to 60% with mild aortic stenosis and grade 1 DD  Saturating well on room air  Received Lasix 20 mg IV in the ED-->trend I/O  Trending renal function and electrolytes  Echocardiogram pending for cardiac  evaluation prior to surgery--reviewed     Rectal prolapse  Endorsing small amount of hematochezia, hemoglobin stable  Surgical evaluation completed     CKD3  Renal function stable  Trending     Primary HTN  Resumed home BP meds    Misc  Continue other home meds  Cardiac diet  Lovenox    Dispo:PT/OT rec SNF. Accepted for tentative dc on 12/28     Full code                      Leatha Walters MD

## 2023-12-27 NOTE — PROGRESS NOTES
Spoke to the patient at the bedside with her friend present. Provided a SNF choice list and they reviewed , first choice is [facility], and second choice is [facility]. Referrals sent to both facilities.     1645: Accepted at [facility]  no insurance auth needed. Will have a bed avilable on 12/28.

## 2023-12-27 NOTE — PROGRESS NOTES
Occupational Therapy    Occupational Therapy    Evaluation    Patient Name: Maggy Gore  MRN: [id number]  Today's Date: 12/27/2023  Time Calculation  Start Time: 1149  Stop Time: 1207  Time Calculation (min): 18 min  Rm: 3132    Assessment  IP OT Assessment  OT Assessment: Pt pleasant and cooperative. Pt with decreased strength and endurance. Pt unsteady on feet with increased risks for falls. Recommend SNF to increase safety and independence with ADL's.  Prognosis: Fair  End of Session Communication: Bedside nurse  End of Session Patient Position: Bed, 2 rail up, Alarm on    Plan:  Treatment Interventions: ADL retraining, Functional transfer training, UE strengthening/ROM, Endurance training, Neuromuscular reeducation  OT Frequency: 3 times per week  OT Discharge Recommendations: Moderate intensity level of continued care (SNF)  OT Recommended Transfer Status: Minimal assist  OT - OK to Discharge: Yes (to next level of care)    Subjective     Current Problem:  1. Cholecystitis  Drain/Tube Care      2. Nonrheumatic aortic valve stenosis  Transthoracic Echo (TTE) Complete    Transthoracic Echo (TTE) Complete        General:  General  Reason for Referral: ADL's; Safety Assessment  Referred By: Leatha Walters MD    Per EMR:   History Of Present Illness  Maggy Gore is a 88 y.o. female presenting with abdominal pain.  She has had around 1 week of increasing abdominal pain that localizes to the right upper quadrant that is exacerbated by food with nausea and vomiting.  Also complaining of rectal prolapse that was diagnosed on recent ED visit that is being exacerbated by her nausea and vomiting.  Endorses quite severe constipation over the same period of time.  Otherwise, states that she has been in her normal state of health, denies any fevers, chills, sweats, rashes.     In the ED she was hemodynamically stable and afebrile  CT abdomen performed showing acute cholecystitis with known rectal prolapse that  "was worse than prior imaging performed last week, bilateral pleural effusions with pulmonary edema and a 3 cm infrarenal aortic aneurysm  EKG showing normal sinus rhythm without ischemic changes, troponins negative  Creatinine 1.76 which is near her baseline  Urinalysis showing 3+ blood and 3+ protein however suspect this was contaminated by her hematochezia/prolapse  Baseline hemoglobin around 11, 10.6 on arrival, neutrophilia of 6.8     Received IV Lasix, morphine, Zofran and Zosyn in the ED.  Spoke with surgery from the ED who requested admission and will formally evaluate the patient tomorrow for possible cholecystectomy.     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     Social History  She reports that she quit smoking about 10 years ago. Her smoking use included cigarettes. She has never used smokeless tobacco. She reports current alcohol use. She reports that she does not use drugs.    Precautions:  Medical Precautions: Fall precautions  Precautions Comment: bed/chair alarm; percutaneous cholecystostomy tube (R flank)    Vital Signs:  Heart Rate: 64  SpO2: 94 % (Pt on 3L O2 BC; pt does not wear home O2)    Pain:  Pain Assessment  Pain Assessment: 0-10  Pain Score:  (Pt with complaints of \"slight\" abdominal pain)    Objective     Cognition:  Overall Cognitive Status: Within Functional Limits    Home Living:  Pt lives alone in a 2 floor house with 1st floor setup, and no basement. Pt has 1 small MITCH, and does not have to go up to the 2nd floor.     Prior Function:  Pt was independent with all ADL's. Pt has a tub/shower with a grab bar and shower chair as needed (was not using) (Pt was not using an AD with ambulation; Pt did fall at work 2/2023, and fractured her L patella.)    IADL History:  IADL Comments: Pt was independent " "with all household tasks. 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 \"[employer]\"    ADL:  Eating Assistance: Independent  Grooming Assistance: Stand by  Bathing Assistance: Modified independent (Device)  UE Dressing Assistance: Minimal  LE Dressing Assistance: Moderate  Toileting Assistance with Device: Moderate    Activity Tolerance:  Endurance: Tolerates 10 - 20 min exercise with multiple rests    Bed Mobility/Transfers:   Bed Mobility  Bed Mobility: Yes  Bed Mobility 1  Bed Mobility 1: Supine to sitting, Sitting to supine  Level of Assistance 1: Contact guard  Bed Mobility Comments 1: Pt did complain of feeling dizzy once sitting EOB  Transfers  Transfer: Yes  Transfer 1  Transfer From 1: Sit to, Stand to  Transfer to 1: Stand, Sit  Transfer Device 1: Walker  Transfer Level of Assistance 1: Minimum assistance    Ambulation/Gait Training:  Ambulation/Gait Training  Ambulation/Gait Training Performed: Yes  Ambulation/Gait Training 1  Device 1: Rolling walker  Assistance 1: Minimum assistance    Sitting Balance:  Static Sitting Balance  Static Sitting-Comment/Number of Minutes: good  Dynamic Sitting Balance  Dynamic Sitting-Comments: fair    Standing Balance:  Static Standing Balance  Static Standing-Comment/Number of Minutes: fair  Dynamic Standing Balance  Dynamic Standing-Comments: fair-    Sensation:  Light Touch: No apparent deficits    Strength:  Strength Comments: B UE's grossly 4/5    Extremities: RUE   RUE : Within Functional Limits and LUE   LUE: Within Functional Limits    Outcome Measures: [facility] Daily Activity  Putting on and taking off regular lower body clothing: A lot  Bathing (including washing, rinsing, drying): A lot  Putting on and taking off regular upper body clothing: A little  Toileting, which includes using toilet, bedpan or urinal: A lot  Taking care of personal grooming such as brushing teeth: A little  Eating Meals: None  Daily Activity - Total Score: " 16    EDUCATION:  Education  Individual(s) Educated: Patient  Education Provided:  (safety)  Patient Response to Education: Patient/Caregiver Verbalized Understanding of Information    Goals:   Encounter Problems       Encounter Problems (Active)       OT Goals       OT Goal 1       Start:  12/27/23    Expected End:  01/10/24       Pt will complete all bed mobility with modified independence safely            OT Goal 2       Start:  12/27/23    Expected End:  01/10/24       Pt will complete ADL's and mobility with good sit balance and fair+ stand balance           OT Goal 3       Start:  12/27/23    Expected End:  01/10/24       Pt with complete all transfers safely with modified independence            OT Goal 4       Start:  12/27/23    Expected End:  01/10/24       Pt will complete UB dressing ADL's with SBA using adaptive device(s) as needed           OT Goal 5       Start:  12/27/23    Expected End:  01/10/24       Pt with complete grooming ADL's with supervision and fair+ stand balance                  Treatment: Pt was able to complete bed mobility with SBA. Pt with good sit balance on EOB, but did have complaints of increased dizziness. Once dizziness resolved, pt completed sit-stand transfer with CGA. Pt again with complaints of dizziness, but was able to ambulate a few steps bed to chair with Mod A. Once in chair, pt also with complaints of nausea once up in chair. Pt did not want to remain in chair, and required Mod A to transfer back to bed. Pt remained in bed   with bed alarm on and call light within reach.

## 2023-12-28 ENCOUNTER — HOME HEALTH ADMISSION (OUTPATIENT)
Dept: HOME HEALTH SERVICES | Facility: HOME HEALTH | Age: 88
End: 2023-12-28
Payer: MEDICARE

## 2023-12-28 ENCOUNTER — DOCUMENTATION (OUTPATIENT)
Dept: HOME HEALTH SERVICES | Facility: HOME HEALTH | Age: 88
End: 2023-12-28
Payer: MEDICARE

## 2023-12-28 VITALS
DIASTOLIC BLOOD PRESSURE: 77 MMHG | WEIGHT: 145.94 LBS | BODY MASS INDEX: 23.46 KG/M2 | HEIGHT: 66 IN | OXYGEN SATURATION: 90 % | RESPIRATION RATE: 18 BRPM | SYSTOLIC BLOOD PRESSURE: 175 MMHG | HEART RATE: 71 BPM | TEMPERATURE: 99.7 F

## 2023-12-28 LAB
ALBUMIN SERPL BCP-MCNC: 2.9 G/DL (ref 3.4–5)
ALP SERPL-CCNC: 49 U/L (ref 33–136)
ALT SERPL W P-5'-P-CCNC: 9 U/L (ref 7–45)
ANION GAP SERPL CALC-SCNC: 12 MMOL/L (ref 10–20)
AST SERPL W P-5'-P-CCNC: 16 U/L (ref 9–39)
ATRIAL RATE: 80 BPM
BILIRUB SERPL-MCNC: 0.4 MG/DL (ref 0–1.2)
BUN SERPL-MCNC: 20 MG/DL (ref 6–23)
CALCIUM SERPL-MCNC: 8 MG/DL (ref 8.6–10.3)
CHLORIDE SERPL-SCNC: 99 MMOL/L (ref 98–107)
CO2 SERPL-SCNC: 27 MMOL/L (ref 21–32)
CREAT SERPL-MCNC: 1.52 MG/DL (ref 0.5–1.05)
ERYTHROCYTE [DISTWIDTH] IN BLOOD BY AUTOMATED COUNT: 13 % (ref 11.5–14.5)
GFR SERPL CREATININE-BSD FRML MDRD: 33 ML/MIN/1.73M*2
GLUCOSE SERPL-MCNC: 105 MG/DL (ref 74–99)
HCT VFR BLD AUTO: 34.9 % (ref 36–46)
HGB BLD-MCNC: 10.9 G/DL (ref 12–16)
MAGNESIUM SERPL-MCNC: 2.04 MG/DL (ref 1.6–2.4)
MCH RBC QN AUTO: 29.1 PG (ref 26–34)
MCHC RBC AUTO-ENTMCNC: 31.2 G/DL (ref 32–36)
MCV RBC AUTO: 93 FL (ref 80–100)
NRBC BLD-RTO: 0 /100 WBCS (ref 0–0)
P AXIS: 55 DEGREES
P OFFSET: 191 MS
P ONSET: 135 MS
PLATELET # BLD AUTO: 396 X10*3/UL (ref 150–450)
POTASSIUM SERPL-SCNC: 3.4 MMOL/L (ref 3.5–5.3)
PR INTERVAL: 160 MS
PROT SERPL-MCNC: 5.8 G/DL (ref 6.4–8.2)
Q ONSET: 215 MS
QRS COUNT: 13 BEATS
QRS DURATION: 108 MS
QT INTERVAL: 380 MS
QTC CALCULATION(BAZETT): 438 MS
QTC FREDERICIA: 418 MS
R AXIS: 55 DEGREES
RBC # BLD AUTO: 3.74 X10*6/UL (ref 4–5.2)
SODIUM SERPL-SCNC: 135 MMOL/L (ref 136–145)
T AXIS: 75 DEGREES
T OFFSET: 405 MS
VENTRICULAR RATE: 80 BPM
WBC # BLD AUTO: 10 X10*3/UL (ref 4.4–11.3)

## 2023-12-28 PROCEDURE — 2500000001 HC RX 250 WO HCPCS SELF ADMINISTERED DRUGS (ALT 637 FOR MEDICARE OP): Performed by: STUDENT IN AN ORGANIZED HEALTH CARE EDUCATION/TRAINING PROGRAM

## 2023-12-28 PROCEDURE — 97161 PT EVAL LOW COMPLEX 20 MIN: CPT | Mod: GP

## 2023-12-28 PROCEDURE — 83735 ASSAY OF MAGNESIUM: CPT | Performed by: STUDENT IN AN ORGANIZED HEALTH CARE EDUCATION/TRAINING PROGRAM

## 2023-12-28 PROCEDURE — 2500000005 HC RX 250 GENERAL PHARMACY W/O HCPCS: Performed by: STUDENT IN AN ORGANIZED HEALTH CARE EDUCATION/TRAINING PROGRAM

## 2023-12-28 PROCEDURE — 97530 THERAPEUTIC ACTIVITIES: CPT | Mod: GP

## 2023-12-28 PROCEDURE — 99239 HOSP IP/OBS DSCHRG MGMT >30: CPT | Performed by: STUDENT IN AN ORGANIZED HEALTH CARE EDUCATION/TRAINING PROGRAM

## 2023-12-28 PROCEDURE — 85027 COMPLETE CBC AUTOMATED: CPT | Performed by: STUDENT IN AN ORGANIZED HEALTH CARE EDUCATION/TRAINING PROGRAM

## 2023-12-28 PROCEDURE — 2500000004 HC RX 250 GENERAL PHARMACY W/ HCPCS (ALT 636 FOR OP/ED): Performed by: STUDENT IN AN ORGANIZED HEALTH CARE EDUCATION/TRAINING PROGRAM

## 2023-12-28 PROCEDURE — 80053 COMPREHEN METABOLIC PANEL: CPT | Performed by: STUDENT IN AN ORGANIZED HEALTH CARE EDUCATION/TRAINING PROGRAM

## 2023-12-28 PROCEDURE — 36415 COLL VENOUS BLD VENIPUNCTURE: CPT | Performed by: STUDENT IN AN ORGANIZED HEALTH CARE EDUCATION/TRAINING PROGRAM

## 2023-12-28 RX ORDER — AMOXICILLIN 250 MG
1 CAPSULE ORAL DAILY
Qty: 30 TABLET | Refills: 0 | Status: SHIPPED | OUTPATIENT
Start: 2023-12-28 | End: 2024-01-16 | Stop reason: SDUPTHER

## 2023-12-28 RX ORDER — POLYETHYLENE GLYCOL 3350 17 G/17G
17 POWDER, FOR SOLUTION ORAL DAILY
Qty: 30 PACKET | Refills: 0 | Status: SHIPPED | OUTPATIENT
Start: 2023-12-28 | End: 2024-01-27

## 2023-12-28 RX ORDER — AMOXICILLIN AND CLAVULANATE POTASSIUM 500; 125 MG/1; MG/1
1 TABLET, FILM COATED ORAL 2 TIMES DAILY
Qty: 8 TABLET | Refills: 0 | Status: SHIPPED | OUTPATIENT
Start: 2023-12-28 | End: 2024-01-02 | Stop reason: ALTCHOICE

## 2023-12-28 RX ADMIN — ACEBUTOLOL HYDROCHLORIDE 200 MG: 200 CAPSULE ORAL at 08:39

## 2023-12-28 RX ADMIN — AMLODIPINE BESYLATE 10 MG: 10 TABLET ORAL at 07:00

## 2023-12-28 RX ADMIN — GABAPENTIN 300 MG: 300 CAPSULE ORAL at 08:39

## 2023-12-28 RX ADMIN — POLYETHYLENE GLYCOL 3350 17 G: 17 POWDER, FOR SOLUTION ORAL at 08:39

## 2023-12-28 RX ADMIN — SENNOSIDES 17.2 MG: 8.6 TABLET, FILM COATED ORAL at 08:39

## 2023-12-28 RX ADMIN — PIPERACILLIN SODIUM AND TAZOBACTAM SODIUM 3.38 G: 3; .375 INJECTION, SOLUTION INTRAVENOUS at 11:50

## 2023-12-28 RX ADMIN — DAPAGLIFLOZIN 10 MG: 10 TABLET, FILM COATED ORAL at 08:39

## 2023-12-28 RX ADMIN — PANTOPRAZOLE SODIUM 40 MG: 40 TABLET, DELAYED RELEASE ORAL at 08:38

## 2023-12-28 RX ADMIN — VALSARTAN: 160 TABLET, FILM COATED ORAL at 08:39

## 2023-12-28 RX ADMIN — HYDROMORPHONE HYDROCHLORIDE 0.6 MG: 1 INJECTION, SOLUTION INTRAMUSCULAR; INTRAVENOUS; SUBCUTANEOUS at 11:20

## 2023-12-28 RX ADMIN — PIPERACILLIN SODIUM AND TAZOBACTAM SODIUM 3.38 G: 3; .375 INJECTION, SOLUTION INTRAVENOUS at 04:40

## 2023-12-28 RX ADMIN — ASPIRIN 81 MG CHEWABLE TABLET 81 MG: 81 TABLET CHEWABLE at 08:39

## 2023-12-28 RX ADMIN — HYDROCODONE BITARTRATE AND ACETAMINOPHEN 1 TABLET: 5; 325 TABLET ORAL at 16:18

## 2023-12-28 RX ADMIN — METHIMAZOLE 5 MG: 5 TABLET ORAL at 08:39

## 2023-12-28 RX ADMIN — LIDOCAINE 1 PATCH: 4 PATCH TOPICAL at 08:39

## 2023-12-28 RX ADMIN — HYDROMORPHONE HYDROCHLORIDE 0.6 MG: 1 INJECTION, SOLUTION INTRAMUSCULAR; INTRAVENOUS; SUBCUTANEOUS at 04:38

## 2023-12-28 ASSESSMENT — COGNITIVE AND FUNCTIONAL STATUS - GENERAL
STANDING UP FROM CHAIR USING ARMS: A LITTLE
CLIMB 3 TO 5 STEPS WITH RAILING: A LOT
TURNING FROM BACK TO SIDE WHILE IN FLAT BAD: A LITTLE
MOBILITY SCORE: 17
MOVING FROM LYING ON BACK TO SITTING ON SIDE OF FLAT BED WITH BEDRAILS: A LITTLE
WALKING IN HOSPITAL ROOM: A LITTLE
MOVING TO AND FROM BED TO CHAIR: A LITTLE

## 2023-12-28 ASSESSMENT — PAIN - FUNCTIONAL ASSESSMENT
PAIN_FUNCTIONAL_ASSESSMENT: UNABLE TO SELF-REPORT
PAIN_FUNCTIONAL_ASSESSMENT: 0-10
PAIN_FUNCTIONAL_ASSESSMENT: 0-10

## 2023-12-28 ASSESSMENT — PAIN SCALES - GENERAL
PAINLEVEL_OUTOF10: 0 - NO PAIN
PAINLEVEL_OUTOF10: 4
PAINLEVEL_OUTOF10: 8

## 2023-12-28 ASSESSMENT — ACTIVITIES OF DAILY LIVING (ADL): ADL_ASSISTANCE: INDEPENDENT

## 2023-12-28 NOTE — DISCHARGE INSTRUCTIONS
You have been treated for a gallbladder infection.  A PTHC drain was placed.  You will be sent home with home care services for a home nurse and home physical therapy.  Please keep your upcoming appointments with your primary doctor and general surgery.    Thank you for allowing the same time care team to participate in your care!

## 2023-12-28 NOTE — HH CARE COORDINATION
Home Care received a Referral for Nursing. PT OTWe have processed the referral for a Start of Care on 12.30.2023.     If you have any questions or concerns, please feel free to contact us at [phone]. Follow the prompts, enter your five digit zip code, and you will be directed to your care team on [facility] 2.

## 2023-12-28 NOTE — DISCHARGE SUMMARY
Discharge Diagnosis  Cholecystitis    Issues Requiring Follow-Up  Home care  Gen surg follow up  Establish care with cardio for follow up of moderate aortic stenosis    Test Results Pending At Discharge  Pending Labs       Order Current Status    Sterile Fluid Culture/Smear Preliminary result            Hospital Course  Patient was admitted to medicine.  Started on Zosyn for acute cholecystitis.  General surgery consulted. Given need for further cardiac workup (echo) and the fact that clinical exam was stable, gen surg recommended PTHC drain placement and outpatient lap yobany. Pt underwent PTHC placement on 12/26 without complications. Echo showed mild aortic stenosis had now progressed to moderate. Pt will need to establish outpatient cardio care.    In terms of rectal prolapse, gen surg recommends outpatient evaluation. PT rec SNF to which pt was initially agreeable. On day of discharge, pt changed her mind and elected for home with home care.    Pt has upcoming gen surg appt on 1/2/24 for follow up of PTHC and rectal prolapse. Discharged home with home care services.     Pertinent Physical Exam At Time of Discharge  Physical Exam  Constitutional: Well developed, awake/alert/oriented x3, no distress, alert and cooperative  HEENT: anicteric sclera, eomi, no oral lesions noted, moist orophraynx  Cardiovascular: Regular, rate and rhythm, no murmurs, 2+ equal pulses of the extremities, normal S1 and S2  Respiratory/Thorax: Patent airways, CTAB, normal breath sounds with good chest expansion, thorax symmetric, no conversational dyspnea  Gastrointestinal: +BS, Nondistended, soft, non-tender, no rebound tenderness or guarding, PTHC drain in place  Musculoskeletal: ROM intact, no joint swelling, normal strength  Extremities: no edema  Skin: warm and dry. No rashes nor lesions noted  Neurological: alert and oriented x3, intact senses, normal strength, follows commands, clear speech, no facial droop, 5/5 strength    Home  Medications     Medication List      START taking these medications     amoxicillin-pot clavulanate 500-125 mg tablet; Commonly known as:   Augmentin; Take 1 tablet (500 mg) by mouth 2 times a day for 4 days.   sennosides-docusate sodium 8.6-50 mg tablet; Commonly known as:   Cielo-Colace; Take 1 tablet by mouth once daily.     CONTINUE taking these medications     acebutolol 200 mg capsule; Commonly known as: Sectral   amLODIPine 10 mg tablet; Commonly known as: Norvasc; Take 1 tablet (10   mg) by mouth once daily.   aspirin 81 mg chewable tablet; Chew 1 tablet (81 mg) once daily.   atorvastatin 20 mg tablet; Commonly known as: Lipitor   cyclobenzaprine 10 mg tablet; Commonly known as: Flexeril   famotidine 20 mg tablet; Commonly known as: Pepcid   Farxiga 10 mg; Generic drug: dapagliflozin propanediol; TAKE ONE TABLET   BY MOUTH EVERY DAY   gabapentin 300 mg capsule; Commonly known as: Neurontin   HYDROcodone-acetaminophen 5-325 mg tablet; Commonly known as: Norco   methIMAzole 5 mg tablet; Commonly known as: Tapazole   ondansetron 8 mg tablet; Commonly known as: Zofran   pantoprazole 40 mg EC tablet; Commonly known as: ProtoNix; TAKE ONE   TABLET BY MOUTH EVERY DAY. DO NOT CRUSH CHEW OR SPLIT   polyethylene glycol 17 gram packet; Commonly known as: Glycolax,   Miralax; Take 17 g by mouth once daily.   valsartan-hydrochlorothiazide 320-25 mg tablet; Commonly known as:   Diovan-HCT; TAKE ONE TABLET BY MOUTH EVERY DAY     STOP taking these medications     methylPREDNISolone 4 mg tablets; Commonly known as: Medrol Dospak       Outpatient Follow-Up  Future Appointments   Date Time Provider Department Center   12/30/2023 To Be Determined Gladys Matthews RN [facility]   1/2/2024  8:30 AM Ace Dhillon MD [id number]   1/2/2024 To Be Determined Viola Lo, PT [facility]   1/2/2024 To Be Determined Chante Chen, LUCY [facility]   1/2/2024  2:00 PM Sj Johnson MD [id number]   12/17/2024  9:00  AM Jas Blanchard MD [id number]       Leatha Walters MD

## 2023-12-28 NOTE — CARE PLAN
Problem: Pain - Adult  Goal: Verbalizes/displays adequate comfort level or baseline comfort level  Outcome: Progressing     Problem: Safety - Adult  Goal: Free from fall injury  Outcome: Progressing     Problem: Discharge Planning  Goal: Discharge to home or other facility with appropriate resources  Outcome: Progressing     Problem: Chronic Conditions and Co-morbidities  Goal: Patient's chronic conditions and co-morbidity symptoms are monitored and maintained or improved  Outcome: Progressing     Problem: Pain  Goal: Takes deep breaths with improved pain control throughout the shift  Outcome: Progressing  Goal: Turns in bed with improved pain control throughout the shift  Outcome: Progressing  Goal: Walks with improved pain control throughout the shift  Outcome: Progressing  Goal: Performs ADL's with improved pain control throughout shift  Outcome: Progressing  Goal: Free from opioid side effects throughout the shift  Outcome: Progressing  Goal: Free from acute confusion related to pain meds throughout the shift  Outcome: Progressing     Problem: Fall/Injury  Goal: Not fall by end of shift  Outcome: Progressing  Goal: Be free from injury by end of the shift  Outcome: Progressing  Goal: Verbalize understanding of personal risk factors for fall in the hospital  Outcome: Progressing  Goal: Verbalize understanding of risk factor reduction measures to prevent injury from fall in the home  Outcome: Progressing  Goal: Use assistive devices by end of the shift  Outcome: Progressing  Goal: Pace activities to prevent fatigue by end of the shift  Outcome: Progressing   The patient's goals for the shift include      The clinical goals for the shift include remain free from emesis

## 2023-12-28 NOTE — PROGRESS NOTES
Daughter is with the patient and spoke with Dr. Walters. Daughter stated the patient was influenced by her friend who was here yesterday. Daughter states she is taking her mom home with home care.  Presented her a choice and her preference is stay within the  system. Dr. Walters to discharge with an internal referral to  Home Care . Patient is currently on oxygen and will need an ambulatory pulse ox evaluation. Daughter states she will assist with any care of the drain and also states she has many friends and the neighbor girl loves her and is willing to assist. Message to LILY Munoz to follow as well.    1000: Message sent to  [facility] intake nurse for a possible start of care.    1030: Confirmed start of care for Saturday at 1200.

## 2023-12-28 NOTE — CARE PLAN
The patient's goals for the shift include      The clinical goals for the shift include remain free from emesis      Problem: Pain - Adult  Goal: Verbalizes/displays adequate comfort level or baseline comfort level  Outcome: Progressing     Problem: Safety - Adult  Goal: Free from fall injury  Outcome: Progressing     Problem: Discharge Planning  Goal: Discharge to home or other facility with appropriate resources  Outcome: Progressing     Problem: Chronic Conditions and Co-morbidities  Goal: Patient's chronic conditions and co-morbidity symptoms are monitored and maintained or improved  Outcome: Progressing     Problem: Pain  Goal: Takes deep breaths with improved pain control throughout the shift  Outcome: Progressing  Goal: Turns in bed with improved pain control throughout the shift  Outcome: Progressing  Goal: Walks with improved pain control throughout the shift  Outcome: Progressing  Goal: Performs ADL's with improved pain control throughout shift  Outcome: Progressing  Goal: Free from opioid side effects throughout the shift  Outcome: Progressing  Goal: Free from acute confusion related to pain meds throughout the shift  Outcome: Progressing     Problem: Fall/Injury  Goal: Not fall by end of shift  Outcome: Progressing  Goal: Be free from injury by end of the shift  Outcome: Progressing  Goal: Verbalize understanding of personal risk factors for fall in the hospital  Outcome: Progressing  Goal: Verbalize understanding of risk factor reduction measures to prevent injury from fall in the home  Outcome: Progressing  Goal: Use assistive devices by end of the shift  Outcome: Progressing  Goal: Pace activities to prevent fatigue by end of the shift  Outcome: Progressing

## 2023-12-28 NOTE — PROGRESS NOTES
Physical Therapy    Physical Therapy Evaluation    Patient Name: Maggy Gore  MRN: [id number]  Today's Date: 12/28/2023   Time Calculation  Start Time: 1247  Stop Time: 1322  Time Calculation (min): 35 min    Assessment/Plan   PT Assessment  PT Assessment Results: Decreased strength, Decreased endurance, Impaired balance, Decreased mobility, Decreased safety awareness, Impaired judgement  Rehab Prognosis: Good  Evaluation/Treatment Tolerance: Patient limited by fatigue  Medical Staff Made Aware: Yes  End of Session Communication: Bedside nurse  Assessment Comment: Pt presents today below baseline level of function and requires continued PT during hospital stay. Pt requires 24 hour physical assist for all mobility to prevent falls. Pt is unsafe to navigate home environment at this time with available support and assist. Pt requires PT at a moderate intensity level at discharge to maximize functional mobility and safety. Recommend 24 hour supervision due to patient's decreased safety awareness.     End of Session Patient Position: Alarm on, Bed, 3 rail up  IP OR SWING BED PT PLAN  Inpatient or Swing Bed: Inpatient  PT Plan  Treatment/Interventions: Bed mobility, Transfer training, Gait training, Balance training, Neuromuscular re-education, Strengthening, Endurance training, Range of motion, Therapeutic exercise, Therapeutic activity, Home exercise program, Stair training  PT Plan: Skilled PT  PT Frequency: 4 times per week  PT Discharge Recommendations: Moderate intensity level of continued care  PT Recommended Transfer Status: Assist x1 (FWW)  PT - OK to Discharge: Yes (To next level of care when cleared by medical team   )    Subjective         General Visit Information:  General  Reason for Referral: impaired mobility  Referred By: Leatha Walters MD  Past Medical History Relevant to Rehab: Pt admitted 12/23 with abdominal pain. pt with rectal prolapse. CT abdomen positive for acute cholecystitis, bilateral  pleural effusions with pulmonary edema. PMH: HTN, CKD 3, mild aortic stenosis, CAD  Family/Caregiver Present: No  Prior to Session Communication: Bedside nurse  Patient Position Received: Bed, 3 rail up, Alarm on  Preferred Learning Style: verbal  General Comment: Pt agreeable to PT, nursing cleared for treatment. pt incontinent of bowels with mobility; assisted pt with pericare.    Home Living:  Home Living  Type of Home: House  Lives With: Alone  Home Adaptive Equipment:  (rollator)  Home Layout: Able to live on main level with bedroom/bathroom  Home Access: Stairs to enter without rails  Entrance Stairs-Number of Steps: one  Bathroom Shower/Tub: Tub/shower unit  Bathroom Equipment: Grab bars in shower  Home Living Comments: daughter able to assist PRN    Prior Level of Function:  Prior Function Per Pt/Caregiver Report  ADL Assistance: Independent  Homemaking Assistance: Independent  Ambulatory Assistance: Independent    Precautions:  Precautions  Medical Precautions: Fall precautions, Oxygen therapy device and L/min (right neph tube)    Vital Signs:     Objective     Pain:  Pain Assessment  Pain Assessment: 0-10  Pain Score: 0 - No pain    Cognition:  Cognition  Overall Cognitive Status: Within Functional Limits  Impulsive: Mildly    General Assessments:      Activity Tolerance  Endurance: Decreased tolerance for upright activites  Sensation  Sensation Comment: denies numbness and tingling              Static Sitting Balance  Static Sitting-Comment/Number of Minutes: good  Dynamic Sitting Balance  Dynamic Sitting-Comments: good  Static Standing Balance  Static Standing-Comment/Number of Minutes: fair  Dynamic Standing Balance  Dynamic Standing-Comments: fair    Functional Assessments:     Bed Mobility  Bed Mobility: Yes  Bed Mobility 1  Bed Mobility 1: Supine to sitting  Level of Assistance 1: Close supervision  Bed Mobility 2  Bed Mobility  2: Sitting to supine  Level of Assistance 2: Close  supervision  Transfers  Transfer: Yes  Transfer 1  Transfer From 1: Sit to  Transfer to 1: Stand  Transfer Device 1: Walker  Transfer Level of Assistance 1: Minimum assistance  Transfers 2  Transfer From 2: Stand to  Transfer to 2: Sit  Transfer Device 2: Walker  Transfer Level of Assistance 2: Minimum assistance  Ambulation/Gait Training  Ambulation/Gait Training Performed: Yes  Ambulation/Gait Training 1  Device 1: Rolling walker  Gait Support Devices: Gait belt  Assistance 1: Minimum assistance  Quality of Gait 1: Forward flexed posture, Narrow base of support, Shuffling gait  Comments/Distance (ft) 1: 3 feet x 4, pt is impulsive and moves before being given safety instructions        Treatment    Cues for safe hand placement with use of FWW for sit<>stand. Instruction in safe use of FWW for ambulation. Cues for safe approach to commode and bed. Recommended pt use walker at all times for increased safety and stability.     Extremity/Trunk Assessments:        RLE   RLE : Within Functional Limits  LLE   LLE : Within Functional Limits    Outcome Measures:  [facility] Basic Mobility  Turning from your back to your side while in a flat bed without using bedrails: A little  Moving from lying on your back to sitting on the side of a flat bed without using bedrails: A little  Moving to and from bed to chair (including a wheelchair): A little  Standing up from a chair using your arms (e.g. wheelchair or bedside chair): A little  To walk in hospital room: A little  Climbing 3-5 steps with railing: A lot  Basic Mobility - Total Score: 17                            Goals:  Encounter Problems       Encounter Problems (Active)       PT Problem       Pt will complete sit <> stand and bed <> chair transfers with SBA. (Progressing)       Start:  12/28/23    Expected End:  01/11/24            Pt will ambulate 150 feet mod I with no significant gait deviations.   (Progressing)       Start:  12/28/23    Expected End:  01/11/24             Pt will progress to completing 3 x 20 supine/seated exercises in order to increase strength and improve gait mechanics.   (Progressing)       Start:  12/28/23    Expected End:  01/11/24            Pt will ascend/descend at least one stair SBA in order to navigate home environment.   (Progressing)       Start:  12/28/23    Expected End:  01/11/24                 Education Documentation  Precautions, taught by Joana Ott, PT at 12/28/2023  2:48 PM.  Learner: Patient  Readiness: Acceptance  Method: Explanation, Demonstration  Response: Needs Reinforcement, Verbalizes Understanding    Body Mechanics, taught by Joana Ott PT at 12/28/2023  2:48 PM.  Learner: Patient  Readiness: Acceptance  Method: Explanation, Demonstration  Response: Needs Reinforcement, Verbalizes Understanding    Mobility Training, taught by Joana Ott PT at 12/28/2023  2:48 PM.  Learner: Patient  Readiness: Acceptance  Method: Explanation, Demonstration  Response: Needs Reinforcement, Verbalizes Understanding        Education Comments  No comments found.

## 2023-12-29 ENCOUNTER — PATIENT OUTREACH (OUTPATIENT)
Dept: PRIMARY CARE | Facility: CLINIC | Age: 88
End: 2023-12-29
Payer: MEDICARE

## 2023-12-29 DIAGNOSIS — K62.3 RECTAL PROLAPSE: ICD-10-CM

## 2023-12-29 DIAGNOSIS — K81.9 CHOLECYSTITIS: ICD-10-CM

## 2023-12-30 ENCOUNTER — HOME CARE VISIT (OUTPATIENT)
Dept: HOME HEALTH SERVICES | Facility: HOME HEALTH | Age: 88
End: 2023-12-30
Payer: MEDICARE

## 2023-12-30 VITALS
SYSTOLIC BLOOD PRESSURE: 140 MMHG | DIASTOLIC BLOOD PRESSURE: 83 MMHG | TEMPERATURE: 97.8 F | HEART RATE: 62 BPM | OXYGEN SATURATION: 98 % | RESPIRATION RATE: 18 BRPM

## 2023-12-30 LAB
BACTERIA FLD CULT: ABNORMAL
BACTERIA FLD CULT: ABNORMAL
GRAM STN SPEC: ABNORMAL
GRAM STN SPEC: ABNORMAL

## 2023-12-30 PROCEDURE — 0023 HH SOC

## 2023-12-30 PROCEDURE — G0299 HHS/HOSPICE OF RN EA 15 MIN: HCPCS | Mod: HHH

## 2023-12-30 PROCEDURE — 1090000002 HH PPS REVENUE DEBIT

## 2023-12-30 PROCEDURE — 1090000001 HH PPS REVENUE CREDIT

## 2023-12-30 PROCEDURE — 169592 NO-PAY CLAIM PROCEDURE

## 2023-12-30 ASSESSMENT — ENCOUNTER SYMPTOMS
PAIN SEVERITY GOAL: 2/10
NAUSEA: 1
SUBJECTIVE PAIN PROGRESSION: WAXING AND WANING
PAIN LOCATION - PAIN SEVERITY: 7/10
PAIN LOCATION: ABDOMEN
LAST BOWEL MOVEMENT: 66836
HIGHEST PAIN SEVERITY IN PAST 24 HOURS: 7/10
PAIN LOCATION - PAIN QUALITY: ACHING
STOOL FREQUENCY: LESS THAN DAILY
PAIN LOCATION - PAIN FREQUENCY: CONSTANT
CONSTIPATION: 1
PERSON REPORTING PAIN: PATIENT
PAIN: 1
APPETITE LEVEL: GOOD
MUSCLE WEAKNESS: 1
LOWEST PAIN SEVERITY IN PAST 24 HOURS: 6/10
CHANGE IN APPETITE: UNCHANGED

## 2023-12-30 ASSESSMENT — ACTIVITIES OF DAILY LIVING (ADL)
ENTERING_EXITING_HOME: INDEPENDENT
OASIS_M1830: 04

## 2023-12-30 ASSESSMENT — LIFESTYLE VARIABLES: SMOKING_STATUS: 0

## 2023-12-31 PROCEDURE — 1090000002 HH PPS REVENUE DEBIT

## 2023-12-31 PROCEDURE — 1090000001 HH PPS REVENUE CREDIT

## 2024-01-01 PROCEDURE — 1090000002 HH PPS REVENUE DEBIT

## 2024-01-01 PROCEDURE — 1090000001 HH PPS REVENUE CREDIT

## 2024-01-01 NOTE — PROGRESS NOTES
Subjective   Patient ID: Maggy Gore is a 88 y.o. 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    HPI  She was admitted with acute cholecystitis and had a perc yobany drain placed.    CT AP on 12/23/23  IMPRESSION:  1. Significant worsening of gallbladder dilatation with mild wall  thickening concerning for acute cholecystitis.      2. Unchanged mild extrahepatic biliary ductal dilatation with the  common bile duct measuring 1.1 cm, likely a unrelated to  choledocholithiasis given normal liver function testing.      3. Improved rectal inflammation from 12/18/2023, however with  worsening of rectal prolapse compared to prior study.      4. Worsening of pulmonary edema, worsening diffuse anasarca, and  slight enlargement of small bilateral pleural effusions (right >  left).      5. 3.1 cm infrarenal abdominal aortic aneurysm.    Colonoscopy 2021 and had 2 tubular adenomas removed.    She noticed the rectal prolapse about 3 weeks ago.  It is staying out now and is getting larger.  It is painful and sore, with a little bleeding.  She has been taking Miralax and will have a BM every other day.  Prior to the medication she will have a BM every 3-4 days.  She is unable to control her BM's now and wears a Depend diaper.      She is eating better now.  She still has the abdominal pain in the RUQ and epigastric region.  No issues with urination.    Pain management for her back and takes Norco TID.      Daughter with rectal cancer and grandson with Crohn's.    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    PAST MEDICAL HISTORY:  Past Medical History:  07/31/2023: Closed comminuted fracture of left patella with routine   healing  No date: HTN (hypertension)    PAST SURGICAL HISTORY:  Past Surgical History:  No date: APPENDECTOMY  No date: HYSTERECTOMY  12/26/2023: IR BILIARY DRAIN       Comment:  IR BILIARY DRAIN 2023 Ace Osorio MD [facility]    SOCIAL HISTORY:  Social History     Socioeconomic History    Marital status:      Spouse name: Not on file    Number of children: Not on file    Years of education: Not on file    Highest education level: Not on file   Occupational History    Not on file   Tobacco Use    Smoking status: Former     Types: Cigarettes     Quit date:      Years since quittin.0    Smokeless tobacco: Never   Substance and Sexual Activity    Alcohol use: Yes     Comment: Holidays    Drug use: Never    Sexual activity: Defer   Other Topics Concern    Not on file   Social History Narrative    Not on file     Social Determinants of Health     Financial Resource Strain: Patient Declined (2023)    Overall Financial Resource Strain (CARDIA)     Difficulty of Paying Living Expenses: Patient declined   Food Insecurity: Not on file   Transportation Needs: No Transportation Needs (2023)    OASIS : Transportation     Lack of Transportation (Medical): No     Lack of Transportation (Non-Medical): No     Patient Unable or Declines to Respond: No   Physical Activity: Not on file   Stress: Not on file   Social Connections: Feeling Socially Integrated (2023)    OASIS : Social Isolation     Frequency of experiencing loneliness or isolation: Rarely   Intimate Partner Violence: Not on file   Housing Stability: Unknown (2023)    Housing Stability Vital Sign     Unable to Pay for Housing in the Last Year: Patient declined     Number of Places Lived in the Last Year: 1     Unstable Housing in the Last Year: No        FAMILY HISTORY:  Family History   Problem Relation Name Age of Onset    Heart attack Mother      Other (enlarged heart.) Father      Heart defect Daughter         MEDICATIONS:  Current Outpatient Medications   Medication Sig Dispense Refill    acebutolol (Sectral) 200 mg capsule Take 1 capsule (200 mg) by mouth twice a day.       amLODIPine (Norvasc) 10 mg tablet Take 1 tablet (10 mg) by mouth once daily. 30 tablet 3    aspirin 81 mg chewable tablet Chew 1 tablet (81 mg) once daily. 90 tablet 3    atorvastatin (Lipitor) 20 mg tablet Take 1 tablet (20 mg) by mouth once daily.      cyclobenzaprine (Flexeril) 10 mg tablet Take 1 tablet (10 mg) by mouth 2 times a day as needed.      dapagliflozin propanediol (Farxiga) 10 mg Take 1 tablet (10 mg) by mouth once daily. 30 tablet 2    famotidine (Pepcid) 20 mg tablet Take 1 tablet (20 mg) by mouth once daily at bedtime. 90 tablet 1    Farxiga 10 mg TAKE ONE TABLET BY MOUTH EVERY DAY 90 tablet 1    gabapentin (Neurontin) 300 mg capsule Take 1 capsule (300 mg) by mouth once daily.      HYDROcodone-acetaminophen (Norco) 5-325 mg tablet Take 1 tablet by mouth every 6 hours if needed for severe pain (7 - 10).      methIMAzole (Tapazole) 5 mg tablet qd 90 tablet 1    ondansetron (Zofran) 8 mg tablet Take 1 tablet (8 mg) by mouth every 8 hours if needed for nausea.      pantoprazole (ProtoNix) 40 mg EC tablet TAKE ONE TABLET BY MOUTH EVERY DAY. DO NOT CRUSH CHEW OR SPLIT 30 tablet 0    polyethylene glycol (Glycolax, Miralax) 17 gram packet Take 17 g by mouth once daily. 30 packet 0    sennosides-docusate sodium (Cielo-Colace) 8.6-50 mg tablet Take 1 tablet by mouth once daily. 30 tablet 0    valsartan-hydrochlorothiazide (Diovan-HCT) 320-25 mg tablet TAKE ONE TABLET BY MOUTH EVERY DAY 90 tablet 1     No current facility-administered medications for this visit.           Review of Systems   Respiratory:  Positive for shortness of breath.    Gastrointestinal:  Positive for abdominal pain and rectal pain.   All other systems reviewed and are negative.      Objective   Physical Exam  Constitutional:       Appearance: Normal appearance.   HENT:      Head: Normocephalic.   Cardiovascular:      Rate and Rhythm: Normal rate and regular rhythm.   Pulmonary:      Effort: Pulmonary effort is normal.      Breath  sounds: Normal breath sounds.   Abdominal:      General: Abdomen is flat. Bowel sounds are normal.      Palpations: Abdomen is soft.      Comments: Drain in the RUQ   Musculoskeletal:         General: Normal range of motion.      Cervical back: Normal range of motion and neck supple.   Skin:     General: Skin is warm and dry.   Neurological:      General: No focal deficit present.      Mental Status: She is alert and oriented to person, place, and time.   Psychiatric:         Mood and Affect: Mood normal.         Behavior: Behavior normal.     Anorectal:  Approximately 7cm segment of rectal prolapse appreciable on external exam.  No areas of necrosis of gangrene.  Prolapse successfully reduced with gentle manual pressure.    Assessment/Plan   #Rectal prolapse; approximately 7cm segment  -  Rectal prolapse successfully reduced today in office  -  Recommended patient remain on bowel regimen and try to avoid straining, prolonged sitting on toilet  -  Recommended patient manually reduce prolapse when it occurs  -  Will need repair, trans-abdominal versus perineal approach    #Acute cholecystitis S/P percutaneous cholecystostomy tube placement 12/26/2023  -  Maintain drain at this time  -  If planning to repair rectal prolapse trans-abdominally, may entertain simultaneous cholecystectomy during same surgery    Sj Johnson MD   1/2/2024  10:25 PM              DEB Moore-CNP 01/01/24 12:20 PM

## 2024-01-02 ENCOUNTER — HOME CARE VISIT (OUTPATIENT)
Dept: HOME HEALTH SERVICES | Facility: HOME HEALTH | Age: 89
End: 2024-01-02
Payer: MEDICARE

## 2024-01-02 ENCOUNTER — APPOINTMENT (OUTPATIENT)
Dept: SURGERY | Facility: CLINIC | Age: 89
End: 2024-01-02
Payer: MEDICARE

## 2024-01-02 ENCOUNTER — LAB (OUTPATIENT)
Dept: LAB | Facility: LAB | Age: 89
End: 2024-01-02
Payer: MEDICARE

## 2024-01-02 ENCOUNTER — OFFICE VISIT (OUTPATIENT)
Dept: SURGERY | Facility: CLINIC | Age: 89
End: 2024-01-02
Payer: MEDICARE

## 2024-01-02 ENCOUNTER — APPOINTMENT (OUTPATIENT)
Dept: PRIMARY CARE | Facility: CLINIC | Age: 89
End: 2024-01-02
Payer: MEDICARE

## 2024-01-02 ENCOUNTER — OFFICE VISIT (OUTPATIENT)
Dept: PRIMARY CARE | Facility: CLINIC | Age: 89
End: 2024-01-02
Payer: MEDICARE

## 2024-01-02 VITALS
HEART RATE: 56 BPM | HEIGHT: 66 IN | OXYGEN SATURATION: 98 % | WEIGHT: 135 LBS | RESPIRATION RATE: 16 BRPM | SYSTOLIC BLOOD PRESSURE: 118 MMHG | DIASTOLIC BLOOD PRESSURE: 62 MMHG | BODY MASS INDEX: 21.69 KG/M2

## 2024-01-02 VITALS
BODY MASS INDEX: 21.69 KG/M2 | DIASTOLIC BLOOD PRESSURE: 74 MMHG | SYSTOLIC BLOOD PRESSURE: 133 MMHG | WEIGHT: 135 LBS | HEIGHT: 66 IN | HEART RATE: 76 BPM

## 2024-01-02 DIAGNOSIS — N18.32 STAGE 3B CHRONIC KIDNEY DISEASE (MULTI): ICD-10-CM

## 2024-01-02 DIAGNOSIS — I10 PRIMARY HYPERTENSION: ICD-10-CM

## 2024-01-02 DIAGNOSIS — E05.90 HYPERTHYROIDISM: ICD-10-CM

## 2024-01-02 DIAGNOSIS — Q21.12 PFO (PATENT FORAMEN OVALE) (HHS-HCC): ICD-10-CM

## 2024-01-02 DIAGNOSIS — K62.3 PARTIAL RECTAL PROLAPSE: ICD-10-CM

## 2024-01-02 DIAGNOSIS — K81.9 CHOLECYSTITIS: ICD-10-CM

## 2024-01-02 DIAGNOSIS — N18.4 CKD STAGE G4/A1, GFR 15-29 AND ALBUMIN CREATININE RATIO <30 MG/G (MULTI): ICD-10-CM

## 2024-01-02 DIAGNOSIS — I10 ESSENTIAL HYPERTENSION: ICD-10-CM

## 2024-01-02 DIAGNOSIS — I50.32 CHRONIC DIASTOLIC (CONGESTIVE) HEART FAILURE (MULTI): ICD-10-CM

## 2024-01-02 DIAGNOSIS — K59.00 CONSTIPATION, UNSPECIFIED CONSTIPATION TYPE: ICD-10-CM

## 2024-01-02 DIAGNOSIS — K62.3 RECTAL PROLAPSE: Primary | ICD-10-CM

## 2024-01-02 DIAGNOSIS — N18.4 CKD STAGE G4/A1, GFR 15-29 AND ALBUMIN CREATININE RATIO <30 MG/G (MULTI): Primary | ICD-10-CM

## 2024-01-02 DIAGNOSIS — R53.83 FATIGUE, UNSPECIFIED TYPE: ICD-10-CM

## 2024-01-02 DIAGNOSIS — J45.20 MILD INTERMITTENT REACTIVE AIRWAY DISEASE WITHOUT COMPLICATION (HHS-HCC): ICD-10-CM

## 2024-01-02 DIAGNOSIS — K21.9 GASTROESOPHAGEAL REFLUX DISEASE, UNSPECIFIED WHETHER ESOPHAGITIS PRESENT: ICD-10-CM

## 2024-01-02 LAB
ALBUMIN SERPL BCP-MCNC: 3.4 G/DL (ref 3.4–5)
ALP SERPL-CCNC: 64 U/L (ref 33–136)
ALT SERPL W P-5'-P-CCNC: 11 U/L (ref 7–45)
ANION GAP SERPL CALC-SCNC: 13 MMOL/L (ref 10–20)
AST SERPL W P-5'-P-CCNC: 15 U/L (ref 9–39)
BASOPHILS # BLD AUTO: 0.04 X10*3/UL (ref 0–0.1)
BASOPHILS NFR BLD AUTO: 0.5 %
BILIRUB SERPL-MCNC: 0.3 MG/DL (ref 0–1.2)
BUN SERPL-MCNC: 29 MG/DL (ref 6–23)
CALCIUM SERPL-MCNC: 8.4 MG/DL (ref 8.6–10.3)
CHLORIDE SERPL-SCNC: 101 MMOL/L (ref 98–107)
CO2 SERPL-SCNC: 27 MMOL/L (ref 21–32)
CREAT SERPL-MCNC: 1.93 MG/DL (ref 0.5–1.05)
EOSINOPHIL # BLD AUTO: 0.19 X10*3/UL (ref 0–0.4)
EOSINOPHIL NFR BLD AUTO: 2.2 %
ERYTHROCYTE [DISTWIDTH] IN BLOOD BY AUTOMATED COUNT: 13 % (ref 11.5–14.5)
GFR SERPL CREATININE-BSD FRML MDRD: 25 ML/MIN/1.73M*2
GLUCOSE SERPL-MCNC: 129 MG/DL (ref 74–99)
HCT VFR BLD AUTO: 34.4 % (ref 36–46)
HGB BLD-MCNC: 10.9 G/DL (ref 12–16)
IMM GRANULOCYTES # BLD AUTO: 0.04 X10*3/UL (ref 0–0.5)
IMM GRANULOCYTES NFR BLD AUTO: 0.5 % (ref 0–0.9)
LYMPHOCYTES # BLD AUTO: 1.83 X10*3/UL (ref 0.8–3)
LYMPHOCYTES NFR BLD AUTO: 21.4 %
MCH RBC QN AUTO: 28.8 PG (ref 26–34)
MCHC RBC AUTO-ENTMCNC: 31.7 G/DL (ref 32–36)
MCV RBC AUTO: 91 FL (ref 80–100)
MONOCYTES # BLD AUTO: 1.09 X10*3/UL (ref 0.05–0.8)
MONOCYTES NFR BLD AUTO: 12.8 %
NEUTROPHILS # BLD AUTO: 5.35 X10*3/UL (ref 1.6–5.5)
NEUTROPHILS NFR BLD AUTO: 62.6 %
NRBC BLD-RTO: 0 /100 WBCS (ref 0–0)
PLATELET # BLD AUTO: 538 X10*3/UL (ref 150–450)
POTASSIUM SERPL-SCNC: 4.2 MMOL/L (ref 3.5–5.3)
PROT SERPL-MCNC: 6.2 G/DL (ref 6.4–8.2)
RBC # BLD AUTO: 3.78 X10*6/UL (ref 4–5.2)
SODIUM SERPL-SCNC: 137 MMOL/L (ref 136–145)
WBC # BLD AUTO: 8.5 X10*3/UL (ref 4.4–11.3)

## 2024-01-02 PROCEDURE — 80053 COMPREHEN METABOLIC PANEL: CPT

## 2024-01-02 PROCEDURE — 1090000002 HH PPS REVENUE DEBIT

## 2024-01-02 PROCEDURE — 1159F MED LIST DOCD IN RCRD: CPT | Performed by: STUDENT IN AN ORGANIZED HEALTH CARE EDUCATION/TRAINING PROGRAM

## 2024-01-02 PROCEDURE — 36415 COLL VENOUS BLD VENIPUNCTURE: CPT

## 2024-01-02 PROCEDURE — 3075F SYST BP GE 130 - 139MM HG: CPT | Performed by: STUDENT IN AN ORGANIZED HEALTH CARE EDUCATION/TRAINING PROGRAM

## 2024-01-02 PROCEDURE — 1126F AMNT PAIN NOTED NONE PRSNT: CPT | Performed by: STUDENT IN AN ORGANIZED HEALTH CARE EDUCATION/TRAINING PROGRAM

## 2024-01-02 PROCEDURE — 99496 TRANSJ CARE MGMT HIGH F2F 7D: CPT | Performed by: FAMILY MEDICINE

## 2024-01-02 PROCEDURE — 1111F DSCHRG MED/CURRENT MED MERGE: CPT | Performed by: STUDENT IN AN ORGANIZED HEALTH CARE EDUCATION/TRAINING PROGRAM

## 2024-01-02 PROCEDURE — 85025 COMPLETE CBC W/AUTO DIFF WBC: CPT

## 2024-01-02 PROCEDURE — 3078F DIAST BP <80 MM HG: CPT | Performed by: STUDENT IN AN ORGANIZED HEALTH CARE EDUCATION/TRAINING PROGRAM

## 2024-01-02 PROCEDURE — 1036F TOBACCO NON-USER: CPT | Performed by: STUDENT IN AN ORGANIZED HEALTH CARE EDUCATION/TRAINING PROGRAM

## 2024-01-02 PROCEDURE — 99204 OFFICE O/P NEW MOD 45 MIN: CPT | Performed by: STUDENT IN AN ORGANIZED HEALTH CARE EDUCATION/TRAINING PROGRAM

## 2024-01-02 PROCEDURE — 1090000001 HH PPS REVENUE CREDIT

## 2024-01-02 RX ORDER — METHIMAZOLE 5 MG/1
TABLET ORAL
Qty: 90 TABLET | Refills: 1 | Status: SHIPPED | OUTPATIENT
Start: 2024-01-02 | End: 2024-01-16 | Stop reason: SDUPTHER

## 2024-01-02 RX ORDER — AMLODIPINE BESYLATE 10 MG/1
10 TABLET ORAL
Qty: 30 TABLET | Refills: 3 | Status: SHIPPED | OUTPATIENT
Start: 2024-01-02 | End: 2024-05-07

## 2024-01-02 RX ORDER — FAMOTIDINE 20 MG/1
20 TABLET, FILM COATED ORAL NIGHTLY
Qty: 90 TABLET | Refills: 1 | Status: SHIPPED | OUTPATIENT
Start: 2024-01-02

## 2024-01-02 RX ORDER — DAPAGLIFLOZIN 10 MG/1
10 TABLET, FILM COATED ORAL DAILY
Qty: 30 TABLET | Refills: 2 | Status: SHIPPED | OUTPATIENT
Start: 2024-01-02 | End: 2024-01-08

## 2024-01-02 ASSESSMENT — ENCOUNTER SYMPTOMS
ABDOMINAL PAIN: 1
RECTAL PAIN: 1
SHORTNESS OF BREATH: 1

## 2024-01-02 NOTE — PROGRESS NOTES
Subjective   Patient ID: Maggy Gore is a 88 y.o. female who presents for Hospital Follow-up.    HPI   She is feeling nauseous today.  She is having left hand 3rd finger 2nd knuckle swelling.     [place] [facility] [place]  [place]stitis on 12/23/23 and discharged on 12/28/23. She has  home care.  Physical therapy did come in to change the drain.  Occupational therapy is to come to house.     Blood work was done on 12/27/23 and 12/28/23  Patient is following up after Echo done on 12/26/23. She ;had IR biliary drain placed on 12/26/23 and CT abdomen pelvis done on 12/23/23.    She has appointment with Dr. Enoc Oliva in a week.   She has Dr. Sj Johnson for General surgery.    No samples of Farxiga is available in office. Patient was told pharmacy cost is $500.            Review of Systems  12 Systems have been reviewed as follows.  Constitutional: Fever, weight gain, weight loss, appetite change, night sweats, fatigue, chills.  Eyes : blurry, double vision, vision, loss, tearing, redness, pain, sensitivity to light, glaucoma.  Ears, nose, mouth, and throat: Hearing loss, ringing in the ears, ear pain, nasal congestion, nasal drainage, nosebleeds, mouth, throat, irritation tooth problem.  Cardiovascular :chest pain, pressure, heart racing, palpitations, sweating, leg swelling, high or low blood pressure  Pulmonary: Cough, yellow or green sputum, blood and sputum, shortness of breath, wheezing  Gastrointestinal: Nausea, vomiting, diarrhea, constipation, pain, blood in stool, or vomitus, heartburn, difficulty swallowing  Genitourinary: incontinence, abnormal bleeding, abnormal discharge, urinary frequency, urinary hesitancy, pain, impotence sexual problem, infection, urinary retention  Musculoskeletal: Pain, stiffness, joint, redness or warmth, arthritis, back pain, weakness, muscle wasting, sprain or fracture  Neuro: Weight weakness, dizziness, change in voice, change in taste change in  "vision, change in hearing, loss, or change of sensation, trouble walking, balance problems coordination problems, shaking, speech problem  Endocrine , cold or heat intolerance, blood sugar problem, weight gain or loss missed periods hot flashes, sweats, change in body hair, change in libido, increased thirst, increased urination  Heme/lymph: Swelling, bleeding, problem anemia, bruising, enlarged lymph nodes  Allergic/immunologic: H. plus nasal drip, watery itchy eyes, nasal drainage, immunosuppressed  The above were reviewed and noted negative except as noted in HPI and Problem List.      Objective   /62 (BP Location: Left arm, Patient Position: Sitting, BP Cuff Size: Adult)   Pulse 56   Resp 16   Ht 1.676 m (5' 5.98\")   Wt 61.2 kg (135 lb)   SpO2 98%   BMI 21.80 kg/m²     Physical Exam  Constitutional: Well developed, well nourished, alert and in no acute distress   Eyes: Normal external exam. Pupils equally round and reactive to light with normal accommodation and extraocular movements intact.  Neck: Supple, no lymphadenopathy or masses.   Cardiovascular: Regular rate and rhythm, normal S1 and S2, no murmurs, gallops, or rubs. Radial pulses normal. No peripheral edema.  Pulmonary: No respiratory distress, lungs clear to auscultation bilaterally. No wheezes, rhonchi, rales.  Abdomen: soft,non tender, non distended, without masses or HSM  Skin: Warm, well perfused, normal skin turgor and color.   Neurologic: Cranial nerves II-XII grossly intact.   Psychiatric: Mood calm and affect normal  Musculoskeletal: Moving all extremities without restriction      Assessment/Plan   Problem List Items Addressed This Visit             ICD-10-CM    Hyperthyroidism E05.90    Relevant Medications    methIMAzole (Tapazole) 5 mg tablet    Reactive airway disease without complication J45.909    Primary hypertension I10    Relevant Medications    amLODIPine (Norvasc) 10 mg tablet    Gastroesophageal reflux disease K21.9    " Relevant Medications    famotidine (Pepcid) 20 mg tablet    Constipation K59.00    Fatigue R53.83    Stage 3b chronic kidney disease (CMS/HCC) 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) - Primary N18.4    Relevant Medications    dapagliflozin propanediol (Farxiga) 10 mg    Other Relevant Orders    Comprehensive Metabolic Panel    Chronic diastolic (congestive) heart failure (CMS/[facility]) I50.32    Relevant Medications    amLODIPine (Norvasc) 10 mg tablet    Partial rectal prolapse K62.3     Sees Dr. Johnson & Pj    Continue current medications and therapy for chronic medical conditions    BW now    Entresto in future      Start farxiga 10 mg daily for CKD  Pharmacy referral     Fibercon qid     BP stable     BW next incl thyr index     Off work 12/23/23- 1/21/24  RTW 1/22/24

## 2024-01-03 ENCOUNTER — HOME CARE VISIT (OUTPATIENT)
Dept: HOME HEALTH SERVICES | Facility: HOME HEALTH | Age: 89
End: 2024-01-03
Payer: MEDICARE

## 2024-01-03 VITALS — DIASTOLIC BLOOD PRESSURE: 64 MMHG | SYSTOLIC BLOOD PRESSURE: 120 MMHG | HEART RATE: 60 BPM

## 2024-01-03 PROCEDURE — 1090000002 HH PPS REVENUE DEBIT

## 2024-01-03 PROCEDURE — 1090000001 HH PPS REVENUE CREDIT

## 2024-01-03 PROCEDURE — G0151 HHCP-SERV OF PT,EA 15 MIN: HCPCS | Mod: HHH

## 2024-01-03 SDOH — HEALTH STABILITY: PHYSICAL HEALTH

## 2024-01-03 SDOH — ECONOMIC STABILITY: HOUSING INSECURITY: HOME SAFETY: DINGY HOME

## 2024-01-03 ASSESSMENT — ENCOUNTER SYMPTOMS
OCCASIONAL FEELINGS OF UNSTEADINESS: 0
PAIN LOCATION - PAIN SEVERITY: 1/10
PAIN LOCATION: ABDOMEN
PAIN: 1

## 2024-01-03 ASSESSMENT — ACTIVITIES OF DAILY LIVING (ADL): AMBULATION_DISTANCE/DURATION_TOLERATED: 150 FT

## 2024-01-03 NOTE — CASE COMMUNICATION
Pt was evaluated by physical therapy today, at this time, no further skilled PT needs are identified.  Pt was instr on safety and fall prevention and mobility program.

## 2024-01-04 PROCEDURE — 1090000001 HH PPS REVENUE CREDIT

## 2024-01-04 PROCEDURE — 1090000002 HH PPS REVENUE DEBIT

## 2024-01-05 ENCOUNTER — HOME CARE VISIT (OUTPATIENT)
Dept: HOME HEALTH SERVICES | Facility: HOME HEALTH | Age: 89
End: 2024-01-05
Payer: MEDICARE

## 2024-01-05 VITALS
HEART RATE: 74 BPM | TEMPERATURE: 97.3 F | SYSTOLIC BLOOD PRESSURE: 128 MMHG | RESPIRATION RATE: 18 BRPM | DIASTOLIC BLOOD PRESSURE: 72 MMHG

## 2024-01-05 PROCEDURE — G0299 HHS/HOSPICE OF RN EA 15 MIN: HCPCS | Mod: HHH

## 2024-01-05 PROCEDURE — 1090000001 HH PPS REVENUE CREDIT

## 2024-01-05 PROCEDURE — G0152 HHCP-SERV OF OT,EA 15 MIN: HCPCS | Mod: HHH

## 2024-01-05 PROCEDURE — 1090000002 HH PPS REVENUE DEBIT

## 2024-01-05 ASSESSMENT — ACTIVITIES OF DAILY LIVING (ADL)
AMBULATION ASSISTANCE: 1
DRESSING_UB_CURRENT_FUNCTION: INDEPENDENT
DRESSING_LB_CURRENT_FUNCTION: INDEPENDENT
BATHING ASSESSED: 1
TOILETING: INDEPENDENT
PHYSICAL TRANSFERS ASSESSED: 1
BATHING_CURRENT_FUNCTION: INDEPENDENT
AMBULATION ASSISTANCE: INDEPENDENT
TOILETING: 1
PREPARING MEALS: NEEDS ASSISTANCE
CURRENT_FUNCTION: INDEPENDENT

## 2024-01-05 ASSESSMENT — ENCOUNTER SYMPTOMS
DENIES PAIN: 1
CHANGE IN APPETITE: UNCHANGED
STOOL FREQUENCY: LESS THAN DAILY
PAIN LOCATION - PAIN SEVERITY: 4/10
BOWEL PATTERN NORMAL: 1
APPETITE LEVEL: GOOD
PAIN: 1
PERSON REPORTING PAIN: PATIENT
PERSON REPORTING PAIN: PATIENT
NAUSEA: 1
PAIN LOCATION: ABDOMEN

## 2024-01-06 PROCEDURE — 1090000002 HH PPS REVENUE DEBIT

## 2024-01-06 PROCEDURE — 1090000001 HH PPS REVENUE CREDIT

## 2024-01-07 PROCEDURE — 1090000001 HH PPS REVENUE CREDIT

## 2024-01-07 PROCEDURE — 1090000002 HH PPS REVENUE DEBIT

## 2024-01-08 ENCOUNTER — TELEMEDICINE (OUTPATIENT)
Dept: PHARMACY | Facility: HOSPITAL | Age: 89
End: 2024-01-08
Payer: MEDICARE

## 2024-01-08 ENCOUNTER — HOME CARE VISIT (OUTPATIENT)
Dept: HOME HEALTH SERVICES | Facility: HOME HEALTH | Age: 89
End: 2024-01-08
Payer: MEDICARE

## 2024-01-08 DIAGNOSIS — N18.4 CKD STAGE G4/A1, GFR 15-29 AND ALBUMIN CREATININE RATIO <30 MG/G (MULTI): ICD-10-CM

## 2024-01-08 DIAGNOSIS — N18.32 STAGE 3B CHRONIC KIDNEY DISEASE (MULTI): ICD-10-CM

## 2024-01-08 PROCEDURE — 1090000001 HH PPS REVENUE CREDIT

## 2024-01-08 PROCEDURE — 1090000002 HH PPS REVENUE DEBIT

## 2024-01-08 PROCEDURE — RXMED WILLOW AMBULATORY MEDICATION CHARGE

## 2024-01-08 RX ORDER — CHOLECALCIFEROL (VITAMIN D3) 50 MCG
2000 TABLET ORAL DAILY
COMMUNITY
End: 2024-04-29 | Stop reason: SDUPTHER

## 2024-01-08 RX ORDER — DAPAGLIFLOZIN 10 MG/1
10 TABLET, FILM COATED ORAL DAILY
Qty: 90 TABLET | Refills: 3 | Status: SHIPPED | OUTPATIENT
Start: 2024-01-08 | End: 2025-01-02

## 2024-01-08 NOTE — ASSESSMENT & PLAN NOTE
Continue taking Farxiga 10mg. Patient has been out for a few weeks due to the cost going up at her pharmacy. The medication will be sent to [facility] for mail order. Austin understands the pharmacy will call her and she will need to put a credit card on file.

## 2024-01-08 NOTE — PROGRESS NOTES
"Clinical Pharmacy Appointment    Subjective   Patient ID: Maggy Gore is a 88 y.o. female who presents for Chronic Kidney Disease and Medication Cost.    Referring Provider: Ace Dhillon MD     HPI  Patient presents for her Clinical Pharmacy Appointment in regards to medication cost for Farxiga. Patient states that she went to go pick it up from her pharmacy, but there was a $200 copay. At [facility], the medication would only cost $11/month, and patient states this copay is much better. Patient would like to have her Farxiga mailed to her so she can get it at this cost.    Objective     Labs  Lab Results   Component Value Date    BILITOT 0.3 01/02/2024    CALCIUM 8.4 (L) 01/02/2024    CO2 27 01/02/2024     01/02/2024    CREATININE 1.93 (H) 01/02/2024    GLUCOSE 129 (H) 01/02/2024    ALKPHOS 64 01/02/2024    K 4.2 01/02/2024    PROT 6.2 (L) 01/02/2024     01/02/2024    AST 15 01/02/2024    ALT 11 01/02/2024    BUN 29 (H) 01/02/2024    ANIONGAP 13 01/02/2024    MG 2.04 12/28/2023    PHOS 4.7 06/16/2023    ALBUMIN 3.4 01/02/2024    LIPASE <3 (L) 12/23/2023    GFRF 32 (A) 08/07/2023     Lab Results   Component Value Date    TRIG 83 11/17/2023    CHOL 160 11/17/2023    LDLCALC 75 11/17/2023    HDL 68.5 11/17/2023     No results found for: \"HGBA1C\"    Current Outpatient Medications on File Prior to Visit   Medication Sig Dispense Refill    acebutolol (Sectral) 200 mg capsule Take 1 capsule (200 mg) by mouth twice a day.      amLODIPine (Norvasc) 10 mg tablet Take 1 tablet (10 mg) by mouth once daily. 30 tablet 3    aspirin 81 mg chewable tablet Chew 1 tablet (81 mg) once daily. 90 tablet 3    atorvastatin (Lipitor) 20 mg tablet Take 1 tablet (20 mg) by mouth once daily.      cyclobenzaprine (Flexeril) 10 mg tablet Take 1 tablet (10 mg) by mouth 2 times a day as needed.      dapagliflozin propanediol (Farxiga) 10 mg Take 1 tablet (10 mg) by mouth once daily. 30 tablet 2    famotidine " (Pepcid) 20 mg tablet Take 1 tablet (20 mg) by mouth once daily at bedtime. 90 tablet 1    gabapentin (Neurontin) 300 mg capsule Take 1 capsule (300 mg) by mouth once daily.      HYDROcodone-acetaminophen (Norco) 5-325 mg tablet Take 1 tablet by mouth every 6 hours if needed for severe pain (7 - 10).      methIMAzole (Tapazole) 5 mg tablet qd 90 tablet 1    ondansetron (Zofran) 8 mg tablet Take 1 tablet (8 mg) by mouth every 8 hours if needed for nausea.      pantoprazole (ProtoNix) 40 mg EC tablet TAKE ONE TABLET BY MOUTH EVERY DAY. DO NOT CRUSH CHEW OR SPLIT 30 tablet 0    polyethylene glycol (Glycolax, Miralax) 17 gram packet Take 17 g by mouth once daily. 30 packet 0    sennosides-docusate sodium (Cielo-Colace) 8.6-50 mg tablet Take 1 tablet by mouth once daily. 30 tablet 0    valsartan-hydrochlorothiazide (Diovan-HCT) 320-25 mg tablet TAKE ONE TABLET BY MOUTH EVERY DAY 90 tablet 1    [DISCONTINUED] amLODIPine (Norvasc) 10 mg tablet Take 1 tablet (10 mg) by mouth once daily. 30 tablet 3    [DISCONTINUED] amoxicillin-pot clavulanate (Augmentin) 500-125 mg tablet Take 1 tablet (500 mg) by mouth 2 times a day for 4 days. 8 tablet 0    [DISCONTINUED] famotidine (Pepcid) 20 mg tablet Take 1 tablet (20 mg) by mouth once daily at bedtime.      [DISCONTINUED] Farxiga 10 mg TAKE ONE TABLET BY MOUTH EVERY DAY 90 tablet 1    [DISCONTINUED] methIMAzole (Tapazole) 5 mg tablet        No current facility-administered medications on file prior to visit.      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 Patient has been out for a few weeks due to the cost going up at her pharmacy. The medication will be sent to [facility] for mail order. Austin understands the pharmacy will call her and she will need to put a credit card on file.         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    Continue all meds under the continuation of care with the referring provider and clinical pharmacy team.    Please reach out to the Clinical Pharmacy Team if there are any further questions.     Verbal consent to manage patient's drug therapy was obtained from patient. They were informed they may decline to participate or withdraw from participation in pharmacy services at any time.    Crissy Amezquita, PharmD  [phone]

## 2024-01-09 ENCOUNTER — PHARMACY VISIT (OUTPATIENT)
Dept: PHARMACY | Facility: CLINIC | Age: 89
End: 2024-01-09
Payer: COMMERCIAL

## 2024-01-09 PROCEDURE — 1090000001 HH PPS REVENUE CREDIT

## 2024-01-09 PROCEDURE — 1090000002 HH PPS REVENUE DEBIT

## 2024-01-10 ENCOUNTER — HOME CARE VISIT (OUTPATIENT)
Dept: HOME HEALTH SERVICES | Facility: HOME HEALTH | Age: 89
End: 2024-01-10
Payer: MEDICARE

## 2024-01-10 PROCEDURE — 1090000002 HH PPS REVENUE DEBIT

## 2024-01-10 PROCEDURE — 1090000001 HH PPS REVENUE CREDIT

## 2024-01-11 ENCOUNTER — PATIENT OUTREACH (OUTPATIENT)
Dept: PRIMARY CARE | Facility: CLINIC | Age: 89
End: 2024-01-11
Payer: MEDICARE

## 2024-01-11 DIAGNOSIS — I10 ESSENTIAL HYPERTENSION: ICD-10-CM

## 2024-01-11 DIAGNOSIS — K81.9 CHOLECYSTITIS: ICD-10-CM

## 2024-01-11 DIAGNOSIS — K62.3 RECTAL PROLAPSE: ICD-10-CM

## 2024-01-11 DIAGNOSIS — K21.9 GASTROESOPHAGEAL REFLUX DISEASE, UNSPECIFIED WHETHER ESOPHAGITIS PRESENT: ICD-10-CM

## 2024-01-11 DIAGNOSIS — N18.32 STAGE 3B CHRONIC KIDNEY DISEASE (MULTI): ICD-10-CM

## 2024-01-11 PROCEDURE — 99490 CHRNC CARE MGMT STAFF 1ST 20: CPT | Performed by: FAMILY MEDICINE

## 2024-01-11 PROCEDURE — 1090000002 HH PPS REVENUE DEBIT

## 2024-01-11 PROCEDURE — 1090000001 HH PPS REVENUE CREDIT

## 2024-01-11 NOTE — PROGRESS NOTES
Introduced patient to Chronic Care Management Program.    Explained that in my role as Care Manager I will:  -Be a point of contact with questions and concerns  -Focus on chronic conditions and achieving health goals  -Ensure patient is receiving all preventative care she is due for  Verbal consent given to enroll.    My contact info was provided for any needs that may arise.    Aware I will call to check in in the next 3-4 weeks.    Patient's main concerns at this time are:   Gall bladder- drain   This is bothering her  She empties the bag 2-3 times per day to keep it from pulling  Rectum is doing better  Scheduled for surgery end of Jan.  He will take care of both procedures at once she thinks.

## 2024-01-12 ENCOUNTER — HOME CARE VISIT (OUTPATIENT)
Dept: HOME HEALTH SERVICES | Facility: HOME HEALTH | Age: 89
End: 2024-01-12
Payer: MEDICARE

## 2024-01-12 VITALS
RESPIRATION RATE: 18 BRPM | TEMPERATURE: 97.9 F | HEART RATE: 66 BPM | DIASTOLIC BLOOD PRESSURE: 64 MMHG | SYSTOLIC BLOOD PRESSURE: 142 MMHG | OXYGEN SATURATION: 97 %

## 2024-01-12 PROBLEM — K62.3 RECTAL PROLAPSE: Status: ACTIVE | Noted: 2024-01-11

## 2024-01-12 PROCEDURE — 1090000001 HH PPS REVENUE CREDIT

## 2024-01-12 PROCEDURE — 1090000002 HH PPS REVENUE DEBIT

## 2024-01-12 PROCEDURE — G0299 HHS/HOSPICE OF RN EA 15 MIN: HCPCS | Mod: HHH

## 2024-01-12 ASSESSMENT — ENCOUNTER SYMPTOMS
APPETITE LEVEL: FAIR
NAUSEA: 1
DENIES PAIN: 1
PERSON REPORTING PAIN: PATIENT

## 2024-01-13 PROCEDURE — 1090000001 HH PPS REVENUE CREDIT

## 2024-01-13 PROCEDURE — 1090000002 HH PPS REVENUE DEBIT

## 2024-01-14 PROCEDURE — 1090000002 HH PPS REVENUE DEBIT

## 2024-01-14 PROCEDURE — 1090000001 HH PPS REVENUE CREDIT

## 2024-01-15 PROCEDURE — 1090000001 HH PPS REVENUE CREDIT

## 2024-01-15 PROCEDURE — 1090000002 HH PPS REVENUE DEBIT

## 2024-01-16 ENCOUNTER — OFFICE VISIT (OUTPATIENT)
Dept: PRIMARY CARE | Facility: CLINIC | Age: 89
End: 2024-01-16
Payer: MEDICARE

## 2024-01-16 VITALS
DIASTOLIC BLOOD PRESSURE: 58 MMHG | OXYGEN SATURATION: 95 % | WEIGHT: 130 LBS | BODY MASS INDEX: 20.98 KG/M2 | SYSTOLIC BLOOD PRESSURE: 118 MMHG | HEART RATE: 62 BPM

## 2024-01-16 DIAGNOSIS — I50.32 CHRONIC DIASTOLIC (CONGESTIVE) HEART FAILURE (MULTI): ICD-10-CM

## 2024-01-16 DIAGNOSIS — K62.3 PARTIAL RECTAL PROLAPSE: ICD-10-CM

## 2024-01-16 DIAGNOSIS — I71.43 INFRARENAL ABDOMINAL AORTIC ANEURYSM, WITHOUT RUPTURE (CMS-HCC): ICD-10-CM

## 2024-01-16 DIAGNOSIS — K81.9 CHOLECYSTITIS: ICD-10-CM

## 2024-01-16 DIAGNOSIS — K62.3 RECTAL PROLAPSE: ICD-10-CM

## 2024-01-16 DIAGNOSIS — E46 PROTEIN-CALORIE MALNUTRITION, UNSPECIFIED SEVERITY (MULTI): Primary | ICD-10-CM

## 2024-01-16 DIAGNOSIS — K21.9 GASTROESOPHAGEAL REFLUX DISEASE, UNSPECIFIED WHETHER ESOPHAGITIS PRESENT: ICD-10-CM

## 2024-01-16 DIAGNOSIS — E78.2 MIXED HYPERLIPIDEMIA: ICD-10-CM

## 2024-01-16 DIAGNOSIS — E05.90 HYPERTHYROIDISM: ICD-10-CM

## 2024-01-16 DIAGNOSIS — N18.4 CKD STAGE G4/A1, GFR 15-29 AND ALBUMIN CREATININE RATIO <30 MG/G (MULTI): ICD-10-CM

## 2024-01-16 PROCEDURE — 1090000001 HH PPS REVENUE CREDIT

## 2024-01-16 PROCEDURE — 99214 OFFICE O/P EST MOD 30 MIN: CPT | Performed by: FAMILY MEDICINE

## 2024-01-16 PROCEDURE — 1090000002 HH PPS REVENUE DEBIT

## 2024-01-16 RX ORDER — METHIMAZOLE 5 MG/1
5 TABLET ORAL DAILY
Qty: 90 TABLET | Refills: 1
Start: 2024-01-16 | End: 2024-04-29 | Stop reason: SDUPTHER

## 2024-01-16 RX ORDER — AMOXICILLIN 250 MG
1 CAPSULE ORAL DAILY PRN
Qty: 90 TABLET | Refills: 1
Start: 2024-01-16 | End: 2024-03-04 | Stop reason: WASHOUT

## 2024-01-16 RX ORDER — ATORVASTATIN CALCIUM 20 MG/1
20 TABLET, FILM COATED ORAL
Qty: 90 TABLET | Refills: 1 | Status: SHIPPED | OUTPATIENT
Start: 2024-01-16

## 2024-01-16 RX ORDER — PANTOPRAZOLE SODIUM 40 MG/1
40 TABLET, DELAYED RELEASE ORAL DAILY
Qty: 90 TABLET | Refills: 1 | Status: SHIPPED | OUTPATIENT
Start: 2024-01-16

## 2024-01-16 NOTE — PROGRESS NOTES
"Subjective   Patient ID: Maggy Gore is a 88 y.o. female who presents for Hospital Follow-up.    Pt was admitted to [place] due to abdominal pain. Pt endorses chronic constipation, states  \"can go for days at least without a bowel movement.\"  Pt states she had blood in her stool.   Admitted 12/18 discharged 12/28    Colorectal Surgery         Review of Systems  12 Systems have been reviewed as follows.  Constitutional: Fever, weight gain, weight loss, appetite change, night sweats, fatigue, chills.  Eyes : blurry, double vision, vision, loss, tearing, redness, pain, sensitivity to light, glaucoma.  Ears, nose, mouth, and throat: Hearing loss, ringing in the ears, ear pain, nasal congestion, nasal drainage, nosebleeds, mouth, throat, irritation tooth problem.  Cardiovascular :chest pain, pressure, heart racing, palpitations, sweating, leg swelling, high or low blood pressure  Pulmonary: Cough, yellow or green sputum, blood and sputum, shortness of breath, wheezing  Gastrointestinal: Nausea, vomiting, diarrhea, constipation, pain, blood in stool, or vomitus, heartburn, difficulty swallowing  Genitourinary: incontinence, abnormal bleeding, abnormal discharge, urinary frequency, urinary hesitancy, pain, impotence sexual problem, infection, urinary retention  Musculoskeletal: Pain, stiffness, joint, redness or warmth, arthritis, back pain, weakness, muscle wasting, sprain or fracture  Neuro: Weight weakness, dizziness, change in voice, change in taste change in vision, change in hearing, loss, or change of sensation, trouble walking, balance problems coordination problems, shaking, speech problem  Endocrine , cold or heat intolerance, blood sugar problem, weight gain or loss missed periods hot flashes, sweats, change in body hair, change in libido, increased thirst, increased urination  Heme/lymph: Swelling, bleeding, problem anemia, bruising, enlarged lymph nodes  Allergic/immunologic: H. plus nasal drip, watery " itchy eyes, nasal drainage, immunosuppressed  The above were reviewed and noted negative except as noted in HPI and Problem List.    Objective   /58   Pulse 62   Wt 59 kg (130 lb)   SpO2 95%   BMI 20.98 kg/m²     Physical Exam  Constitutional: Well developed, well nourished, alert and in no acute distress   Eyes: Normal external exam. Pupils equally round and reactive to light with normal accommodation and extraocular movements intact.  Neck: Supple, no lymphadenopathy or masses.   Cardiovascular: Regular rate and rhythm, normal S1 and S2, no murmurs, gallops, or rubs. Radial pulses normal. No peripheral edema.  Pulmonary: No respiratory distress, lungs clear to auscultation bilaterally. No wheezes, rhonchi, rales.  Abdomen: soft,non tender, non distended, without masses or HSM  Skin: Warm, well perfused, normal skin turgor and color.   Neurologic: Cranial nerves II-XII grossly intact.   Psychiatric: Mood calm and affect normal  Musculoskeletal: Moving all extremities without restriction    Assessment/Plan   Problem List Items Addressed This Visit             ICD-10-CM    Mixed hyperlipidemia E78.2    Relevant Medications    atorvastatin (Lipitor) 20 mg tablet    Other Relevant Orders    Follow Up In [facility] - PCP - Established    Hyperthyroidism E05.90    Relevant Medications    methIMAzole (Tapazole) 5 mg tablet    Other Relevant Orders    Follow Up In [facility] - PCP - Established    Gastroesophageal reflux disease K21.9    Relevant Medications    pantoprazole (ProtoNix) 40 mg EC tablet    Other Relevant Orders    Follow Up In [facility] - PCP - Established    Cholecystitis K81.9    Relevant Medications    sennosides-docusate sodium (Cielo-Colace) 8.6-50 mg tablet    Other Relevant Orders    Follow Up In [facility] - PCP - Established    CKD stage G4/A1, 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) I50.32    Relevant Orders    Follow Up In [facility] - PCP - Established    Partial rectal prolapse K62.3    Relevant Orders    Follow Up In [facility] - PCP - Established    Rectal prolapse K62.3    Relevant Orders    Follow Up In [facility] - PCP - Established    Protein-calorie malnutrition, unspecified severity (CMS/[facility]) - Primary E46    Relevant Orders    Follow Up In [facility] - PCP - Established    Infrarenal abdominal aortic aneurysm, without rupture (CMS/[facility]) I71.43    Relevant Orders    Follow Up In [facility] - PCP - Established     Sees Dr. Johnson & Pj     Continue current medications and therapy for chronic medical conditions     BW now     Entresto in future     Start farxiga 10 mg daily for CKD  Pharmacy referral      Fibercon qid     BP stable     BW next incl thyr index    Off work since 12/23

## 2024-01-17 ENCOUNTER — APPOINTMENT (OUTPATIENT)
Dept: PRIMARY CARE | Facility: CLINIC | Age: 89
End: 2024-01-17
Payer: MEDICARE

## 2024-01-17 PROCEDURE — 1090000002 HH PPS REVENUE DEBIT

## 2024-01-17 PROCEDURE — 1090000001 HH PPS REVENUE CREDIT

## 2024-01-18 ENCOUNTER — APPOINTMENT (OUTPATIENT)
Dept: PRIMARY CARE | Facility: CLINIC | Age: 89
End: 2024-01-18
Payer: MEDICARE

## 2024-01-18 PROCEDURE — 1090000002 HH PPS REVENUE DEBIT

## 2024-01-18 PROCEDURE — 1090000001 HH PPS REVENUE CREDIT

## 2024-01-19 ENCOUNTER — HOME CARE VISIT (OUTPATIENT)
Dept: HOME HEALTH SERVICES | Facility: HOME HEALTH | Age: 89
End: 2024-01-19
Payer: MEDICARE

## 2024-01-19 PROCEDURE — G0180 MD CERTIFICATION HHA PATIENT: HCPCS | Performed by: FAMILY MEDICINE

## 2024-01-19 PROCEDURE — 1090000002 HH PPS REVENUE DEBIT

## 2024-01-19 PROCEDURE — G0299 HHS/HOSPICE OF RN EA 15 MIN: HCPCS | Mod: HHH

## 2024-01-19 PROCEDURE — 1090000001 HH PPS REVENUE CREDIT

## 2024-01-19 ASSESSMENT — ENCOUNTER SYMPTOMS
DENIES PAIN: 1
NAUSEA: 1
PERSON REPORTING PAIN: PATIENT
APPETITE LEVEL: FAIR

## 2024-01-20 PROCEDURE — 1090000001 HH PPS REVENUE CREDIT

## 2024-01-20 PROCEDURE — 1090000002 HH PPS REVENUE DEBIT

## 2024-01-21 PROCEDURE — 1090000002 HH PPS REVENUE DEBIT

## 2024-01-21 PROCEDURE — 1090000001 HH PPS REVENUE CREDIT

## 2024-01-22 PROCEDURE — 1090000002 HH PPS REVENUE DEBIT

## 2024-01-22 PROCEDURE — 1090000001 HH PPS REVENUE CREDIT

## 2024-01-23 PROCEDURE — 1090000001 HH PPS REVENUE CREDIT

## 2024-01-23 PROCEDURE — 1090000002 HH PPS REVENUE DEBIT

## 2024-01-24 PROCEDURE — 1090000001 HH PPS REVENUE CREDIT

## 2024-01-24 PROCEDURE — 1090000002 HH PPS REVENUE DEBIT

## 2024-01-25 ENCOUNTER — HOME CARE VISIT (OUTPATIENT)
Dept: HOME HEALTH SERVICES | Facility: HOME HEALTH | Age: 89
End: 2024-01-25
Payer: MEDICARE

## 2024-01-25 VITALS
RESPIRATION RATE: 18 BRPM | SYSTOLIC BLOOD PRESSURE: 122 MMHG | DIASTOLIC BLOOD PRESSURE: 62 MMHG | HEART RATE: 70 BPM | TEMPERATURE: 97.4 F

## 2024-01-25 PROCEDURE — 1090000002 HH PPS REVENUE DEBIT

## 2024-01-25 PROCEDURE — G0299 HHS/HOSPICE OF RN EA 15 MIN: HCPCS | Mod: HHH

## 2024-01-25 PROCEDURE — 1090000001 HH PPS REVENUE CREDIT

## 2024-01-26 PROCEDURE — 1090000001 HH PPS REVENUE CREDIT

## 2024-01-26 PROCEDURE — 1090000002 HH PPS REVENUE DEBIT

## 2024-01-26 ASSESSMENT — ENCOUNTER SYMPTOMS
APPETITE LEVEL: FAIR
NAUSEA: 1
DENIES PAIN: 1
PERSON REPORTING PAIN: PATIENT
VOMITING: 1
BOWEL PATTERN NORMAL: 1

## 2024-01-27 PROCEDURE — 1090000001 HH PPS REVENUE CREDIT

## 2024-01-27 PROCEDURE — 1090000002 HH PPS REVENUE DEBIT

## 2024-01-28 PROCEDURE — 1090000002 HH PPS REVENUE DEBIT

## 2024-01-28 PROCEDURE — 1090000001 HH PPS REVENUE CREDIT

## 2024-01-29 PROCEDURE — 1090000001 HH PPS REVENUE CREDIT

## 2024-01-29 PROCEDURE — 1090000002 HH PPS REVENUE DEBIT

## 2024-01-30 PROCEDURE — 1090000001 HH PPS REVENUE CREDIT

## 2024-01-30 PROCEDURE — 1090000002 HH PPS REVENUE DEBIT

## 2024-01-31 PROCEDURE — 1090000001 HH PPS REVENUE CREDIT

## 2024-01-31 PROCEDURE — 1090000002 HH PPS REVENUE DEBIT

## 2024-02-01 ENCOUNTER — HOME CARE VISIT (OUTPATIENT)
Dept: HOME HEALTH SERVICES | Facility: HOME HEALTH | Age: 89
End: 2024-02-01
Payer: MEDICARE

## 2024-02-01 VITALS
DIASTOLIC BLOOD PRESSURE: 68 MMHG | SYSTOLIC BLOOD PRESSURE: 130 MMHG | RESPIRATION RATE: 18 BRPM | TEMPERATURE: 98.1 F | HEART RATE: 74 BPM | OXYGEN SATURATION: 96 %

## 2024-02-01 PROCEDURE — 1090000001 HH PPS REVENUE CREDIT

## 2024-02-01 PROCEDURE — G0299 HHS/HOSPICE OF RN EA 15 MIN: HCPCS | Mod: HHH

## 2024-02-01 PROCEDURE — 0023 HH SOC

## 2024-02-01 PROCEDURE — 1090000002 HH PPS REVENUE DEBIT

## 2024-02-02 PROCEDURE — 1090000002 HH PPS REVENUE DEBIT

## 2024-02-02 PROCEDURE — 1090000001 HH PPS REVENUE CREDIT

## 2024-02-02 ASSESSMENT — ENCOUNTER SYMPTOMS
DENIES PAIN: 1
NAUSEA: 1
PERSON REPORTING PAIN: PATIENT
LAST BOWEL MOVEMENT: 66871
APPETITE LEVEL: FAIR

## 2024-02-03 PROCEDURE — 1090000002 HH PPS REVENUE DEBIT

## 2024-02-03 PROCEDURE — 1090000001 HH PPS REVENUE CREDIT

## 2024-02-04 PROCEDURE — 1090000001 HH PPS REVENUE CREDIT

## 2024-02-04 PROCEDURE — 1090000002 HH PPS REVENUE DEBIT

## 2024-02-05 PROCEDURE — 1090000001 HH PPS REVENUE CREDIT

## 2024-02-05 PROCEDURE — 1090000002 HH PPS REVENUE DEBIT

## 2024-02-06 PROCEDURE — 1090000002 HH PPS REVENUE DEBIT

## 2024-02-06 PROCEDURE — 1090000001 HH PPS REVENUE CREDIT

## 2024-02-07 PROCEDURE — 1090000002 HH PPS REVENUE DEBIT

## 2024-02-07 PROCEDURE — 1090000001 HH PPS REVENUE CREDIT

## 2024-02-08 ENCOUNTER — HOME CARE VISIT (OUTPATIENT)
Dept: HOME HEALTH SERVICES | Facility: HOME HEALTH | Age: 89
End: 2024-02-08
Payer: MEDICARE

## 2024-02-08 VITALS
TEMPERATURE: 97.3 F | OXYGEN SATURATION: 93 % | DIASTOLIC BLOOD PRESSURE: 62 MMHG | RESPIRATION RATE: 18 BRPM | HEART RATE: 73 BPM | SYSTOLIC BLOOD PRESSURE: 128 MMHG

## 2024-02-08 PROCEDURE — 1090000001 HH PPS REVENUE CREDIT

## 2024-02-08 PROCEDURE — G0299 HHS/HOSPICE OF RN EA 15 MIN: HCPCS | Mod: HHH

## 2024-02-08 PROCEDURE — 1090000002 HH PPS REVENUE DEBIT

## 2024-02-09 PROCEDURE — 1090000001 HH PPS REVENUE CREDIT

## 2024-02-09 PROCEDURE — 1090000002 HH PPS REVENUE DEBIT

## 2024-02-09 ASSESSMENT — ENCOUNTER SYMPTOMS
PERSON REPORTING PAIN: PATIENT
VOMITING: 1
PAIN: 1
BOWEL PATTERN NORMAL: 1
APPETITE LEVEL: POOR
NAUSEA: 1
PAIN LOCATION - PAIN SEVERITY: 4/10
PAIN LOCATION: BACK

## 2024-02-10 PROCEDURE — 1090000002 HH PPS REVENUE DEBIT

## 2024-02-10 PROCEDURE — 1090000001 HH PPS REVENUE CREDIT

## 2024-02-11 PROCEDURE — 1090000001 HH PPS REVENUE CREDIT

## 2024-02-11 PROCEDURE — 1090000002 HH PPS REVENUE DEBIT

## 2024-02-12 PROCEDURE — 1090000002 HH PPS REVENUE DEBIT

## 2024-02-12 PROCEDURE — 1090000001 HH PPS REVENUE CREDIT

## 2024-02-12 ASSESSMENT — ENCOUNTER SYMPTOMS
ABDOMINAL PAIN: 1
RECTAL PAIN: 1

## 2024-02-12 NOTE — H&P (VIEW-ONLY)
Subjective   Patient ID: Maggy Gore is a 88 y.o. 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     She is taking miralax and when she takes it she has daily BM's. Having some nausea and vomiting. She has only been eating a piece of toast and then taking her medications. She is emptying her yobany drain 2-4 times per day. She has been able to reduce her prolapse. It has been in for the last 4 days. She has not had to reduce it very often in the last month.     HPI  She was recently admitted with acute cholecystitis and had a perc yobany drain placed.    CT AP on 12/23/23  IMPRESSION:  1. Significant worsening of gallbladder dilatation with mild wall  thickening concerning for acute cholecystitis.  2. Unchanged mild extrahepatic biliary ductal dilatation with the common bile duct measuring 1.1 cm, likely a unrelated to  choledocholithiasis given normal liver function testing.    3. Improved rectal inflammation from 12/18/2023, however with worsening of rectal prolapse compared to prior study.  4. Worsening of pulmonary edema, worsening diffuse anasarca, and slight enlargement of small bilateral pleural effusions (right >left).   5. 3.1 cm infrarenal abdominal aortic aneurysm.    Colonoscopy 2021 and had 2 tubular adenomas removed.    PAST MEDICAL HISTORY:  Past Medical History:  07/31/2023: Closed comminuted fracture of left patella with routine   healing  No date: HTN (hypertension)    PAST SURGICAL HISTORY:  Past Surgical History:  No date: APPENDECTOMY  No date: HYSTERECTOMY  12/26/2023: IR BILIARY DRAIN      Comment:  IR BILIARY DRAIN 12/26/2023 Ace Osorio MD [facility]    SOCIAL HISTORY:  Social History     Socioeconomic History    Marital status:      Spouse name: Not on file    Number of children: Not on file    Years of education: Not on file     Highest education level: Not on file   Occupational History    Not on file   Tobacco Use    Smoking status: Former     Types: Cigarettes     Quit date:      Years since quittin.1    Smokeless tobacco: Never   Substance and Sexual Activity    Alcohol use: Yes     Comment: Holidays    Drug use: Never    Sexual activity: Defer   Other Topics Concern    Not on file   Social History Narrative    Not on file     Social Determinants of Health     Financial Resource Strain: Patient Declined (2023)    Overall Financial Resource Strain (CARDIA)     Difficulty of Paying Living Expenses: Patient declined   Food Insecurity: Not on file   Transportation Needs: No Transportation Needs (2023)    OASIS : Transportation     Lack of Transportation (Medical): No     Lack of Transportation (Non-Medical): No     Patient Unable or Declines to Respond: No   Physical Activity: Not on file   Stress: Not on file   Social Connections: Feeling Socially Integrated (2023)    OASIS : Social Isolation     Frequency of experiencing loneliness or isolation: Rarely   Intimate Partner Violence: Not on file   Housing Stability: Unknown (2023)    Housing Stability Vital Sign     Unable to Pay for Housing in the Last Year: Patient declined     Number of Places Lived in the Last Year: 1     Unstable Housing in the Last Year: No        FAMILY HISTORY:  Family History   Problem Relation Name Age of Onset    Heart attack Mother      Other (enlarged heart.) Father      Heart defect Daughter         MEDICATIONS:  Current Outpatient Medications   Medication Sig Dispense Refill    acebutolol (Sectral) 200 mg capsule Take 1 capsule (200 mg) by mouth twice a day.      amLODIPine (Norvasc) 10 mg tablet Take 1 tablet (10 mg) by mouth once daily. 30 tablet 3    aspirin 81 mg chewable tablet Chew 1 tablet (81 mg) once daily. 90 tablet 3    atorvastatin (Lipitor) 20 mg tablet Take 1 tablet (20 mg) by mouth once daily. 90  tablet 1    cholecalciferol (Vitamin D3) 50 MCG (2000 UT) tablet Take 1 tablet (2,000 Units) by mouth once daily.      cyclobenzaprine (Flexeril) 10 mg tablet Take 1 tablet (10 mg) by mouth 2 times a day as needed.      dapagliflozin propanediol (Farxiga) 10 mg Take 1 tablet (10 mg) by mouth once daily. 90 tablet 3    famotidine (Pepcid) 20 mg tablet Take 1 tablet (20 mg) by mouth once daily at bedtime. 90 tablet 1    HYDROcodone-acetaminophen (Norco) 5-325 mg tablet Take 1 tablet by mouth every 6 hours if needed for severe pain (7 - 10).      methIMAzole (Tapazole) 5 mg tablet Take 1 tablet (5 mg) by mouth once daily. qd 90 tablet 1    ondansetron (Zofran) 8 mg tablet Take 1 tablet (8 mg) by mouth every 8 hours if needed for nausea.      pantoprazole (ProtoNix) 40 mg EC tablet Take 1 tablet (40 mg) by mouth once daily. do not crush chew or split 90 tablet 1    sennosides-docusate sodium (Cielo-Colace) 8.6-50 mg tablet Take 1 tablet by mouth once daily as needed for constipation. 90 tablet 1    valsartan-hydrochlorothiazide (Diovan-HCT) 320-25 mg tablet TAKE ONE TABLET BY MOUTH EVERY DAY 90 tablet 1     No current facility-administered medications for this visit.           Review of Systems   Constitutional:  Negative for activity change, appetite change, chills, diaphoresis, fatigue, fever and unexpected weight change.   Respiratory:  Negative for cough, chest tightness and shortness of breath.    Cardiovascular:  Negative for chest pain, palpitations and leg swelling.   Gastrointestinal:  Positive for abdominal pain, nausea and rectal pain. Negative for anal bleeding, constipation, diarrhea and vomiting.   Genitourinary:  Negative for difficulty urinating, dysuria and hematuria.   Neurological:  Positive for light-headedness. Negative for dizziness and headaches.   All other systems reviewed and are negative.      Objective   Physical Exam  Constitutional:       Appearance: Normal appearance. She is normal weight.    HENT:      Head: Normocephalic and atraumatic.   Cardiovascular:      Rate and Rhythm: Normal rate and regular rhythm.      Heart sounds: Murmur heard.   Pulmonary:      Effort: Pulmonary effort is normal.      Breath sounds: Normal breath sounds.   Abdominal:      General: Abdomen is flat. Bowel sounds are normal. There is no distension.      Palpations: Abdomen is soft. There is no mass.      Tenderness: There is no abdominal tenderness.      Hernia: No hernia is present.      Comments: Drain in the RUQ with bilious liquid in bag.  Well-healed vertical midline infra-umbilical incisional scar.   Musculoskeletal:         General: Normal range of motion.      Cervical back: Normal range of motion and neck supple.   Skin:     General: Skin is warm and dry.   Neurological:      General: No focal deficit present.      Mental Status: She is alert and oriented to person, place, and time.   Psychiatric:         Mood and Affect: Mood normal.         Behavior: Behavior normal.         Assessment/Plan   #Rectal prolapse; approximately 7cm segment  #Acute calculous cholecystitis S/P percutaneous cholecystostomy tube placement 12/26/2023  -  Plan for robotic-assisted laparoscopic rectopexy and cholecystectomy  -  R/B/A discussed with patient and accompanying family member today  -  Discussed possibility of too much scar tissue from prior hysterectomy which could result in aborting attempt at trans-abdominal approach for rectal prolapse repair  -  Questions answered  -  PAT  -  Bowel prep day before surgery  -  Lithotomy, arms tucked positioning in OR    Sj Johnson MD   2/13/2024  10:47 AM

## 2024-02-13 ENCOUNTER — PATIENT OUTREACH (OUTPATIENT)
Dept: PRIMARY CARE | Facility: CLINIC | Age: 89
End: 2024-02-13

## 2024-02-13 ENCOUNTER — OFFICE VISIT (OUTPATIENT)
Dept: SURGERY | Facility: CLINIC | Age: 89
End: 2024-02-13
Payer: MEDICARE

## 2024-02-13 VITALS
HEIGHT: 66 IN | SYSTOLIC BLOOD PRESSURE: 111 MMHG | BODY MASS INDEX: 20.89 KG/M2 | DIASTOLIC BLOOD PRESSURE: 70 MMHG | HEART RATE: 83 BPM | WEIGHT: 130 LBS

## 2024-02-13 DIAGNOSIS — K21.9 GASTROESOPHAGEAL REFLUX DISEASE, UNSPECIFIED WHETHER ESOPHAGITIS PRESENT: ICD-10-CM

## 2024-02-13 DIAGNOSIS — K81.9 CHOLECYSTITIS: ICD-10-CM

## 2024-02-13 DIAGNOSIS — K80.00 ACUTE CALCULOUS CHOLECYSTITIS: Primary | ICD-10-CM

## 2024-02-13 DIAGNOSIS — K62.3 PARTIAL RECTAL PROLAPSE: ICD-10-CM

## 2024-02-13 DIAGNOSIS — I71.43 INFRARENAL ABDOMINAL AORTIC ANEURYSM, WITHOUT RUPTURE (CMS-HCC): ICD-10-CM

## 2024-02-13 DIAGNOSIS — K62.3 RECTAL PROLAPSE: ICD-10-CM

## 2024-02-13 DIAGNOSIS — N18.4 CKD STAGE G4/A1, GFR 15-29 AND ALBUMIN CREATININE RATIO <30 MG/G (MULTI): ICD-10-CM

## 2024-02-13 PROCEDURE — 99490 CHRNC CARE MGMT STAFF 1ST 20: CPT | Performed by: FAMILY MEDICINE

## 2024-02-13 PROCEDURE — 1036F TOBACCO NON-USER: CPT | Performed by: STUDENT IN AN ORGANIZED HEALTH CARE EDUCATION/TRAINING PROGRAM

## 2024-02-13 PROCEDURE — 1126F AMNT PAIN NOTED NONE PRSNT: CPT | Performed by: STUDENT IN AN ORGANIZED HEALTH CARE EDUCATION/TRAINING PROGRAM

## 2024-02-13 PROCEDURE — 1090000001 HH PPS REVENUE CREDIT

## 2024-02-13 PROCEDURE — 99215 OFFICE O/P EST HI 40 MIN: CPT | Performed by: STUDENT IN AN ORGANIZED HEALTH CARE EDUCATION/TRAINING PROGRAM

## 2024-02-13 PROCEDURE — 1159F MED LIST DOCD IN RCRD: CPT | Performed by: STUDENT IN AN ORGANIZED HEALTH CARE EDUCATION/TRAINING PROGRAM

## 2024-02-13 PROCEDURE — 3074F SYST BP LT 130 MM HG: CPT | Performed by: STUDENT IN AN ORGANIZED HEALTH CARE EDUCATION/TRAINING PROGRAM

## 2024-02-13 PROCEDURE — 1090000002 HH PPS REVENUE DEBIT

## 2024-02-13 PROCEDURE — 3078F DIAST BP <80 MM HG: CPT | Performed by: STUDENT IN AN ORGANIZED HEALTH CARE EDUCATION/TRAINING PROGRAM

## 2024-02-13 RX ORDER — METRONIDAZOLE 250 MG/1
TABLET ORAL
Qty: 3 TABLET | Refills: 0 | Status: ON HOLD | OUTPATIENT
Start: 2024-02-13 | End: 2024-02-23 | Stop reason: ALTCHOICE

## 2024-02-13 RX ORDER — NEOMYCIN SULFATE 500 MG/1
TABLET ORAL
Qty: 6 TABLET | Refills: 0 | Status: ON HOLD | OUTPATIENT
Start: 2024-02-13 | End: 2024-02-23 | Stop reason: ALTCHOICE

## 2024-02-13 ASSESSMENT — ENCOUNTER SYMPTOMS
HEADACHES: 0
ACTIVITY CHANGE: 0
FEVER: 0
NAUSEA: 1
LIGHT-HEADEDNESS: 1
HEMATURIA: 0
DIFFICULTY URINATING: 0
DYSURIA: 0
FATIGUE: 0
COUGH: 0
DIARRHEA: 0
DIAPHORESIS: 0
ANAL BLEEDING: 0
CHEST TIGHTNESS: 0
PALPITATIONS: 0
DIZZINESS: 0
CHILLS: 0
SHORTNESS OF BREATH: 0
CONSTIPATION: 0
VOMITING: 0
APPETITE CHANGE: 0
UNEXPECTED WEIGHT CHANGE: 0

## 2024-02-13 NOTE — PROGRESS NOTES
Not feeling great  Intermittently vomiting-   Oftentimes after eat  She is hopeful this improves when gall bladder is removed    Was able to manually re-insert prolapsed bowel  Dr Johnson will assess this during the surgery on 2/23 as well    Back to working at '[employer]   Typically works 3 days per week

## 2024-02-14 PROCEDURE — 1090000002 HH PPS REVENUE DEBIT

## 2024-02-14 PROCEDURE — 1090000001 HH PPS REVENUE CREDIT

## 2024-02-15 ENCOUNTER — HOME CARE VISIT (OUTPATIENT)
Dept: HOME HEALTH SERVICES | Facility: HOME HEALTH | Age: 89
End: 2024-02-15
Payer: MEDICARE

## 2024-02-15 ENCOUNTER — PRE-ADMISSION TESTING (OUTPATIENT)
Dept: PREADMISSION TESTING | Facility: HOSPITAL | Age: 89
End: 2024-02-15
Payer: MEDICARE

## 2024-02-15 VITALS
OXYGEN SATURATION: 91 % | DIASTOLIC BLOOD PRESSURE: 66 MMHG | RESPIRATION RATE: 18 BRPM | HEART RATE: 68 BPM | TEMPERATURE: 97.3 F | SYSTOLIC BLOOD PRESSURE: 122 MMHG

## 2024-02-15 PROCEDURE — G0299 HHS/HOSPICE OF RN EA 15 MIN: HCPCS | Mod: HHH

## 2024-02-15 PROCEDURE — 1090000002 HH PPS REVENUE DEBIT

## 2024-02-15 PROCEDURE — 1090000001 HH PPS REVENUE CREDIT

## 2024-02-15 ASSESSMENT — ENCOUNTER SYMPTOMS
ENDOCRINE NEGATIVE: 1
EYES NEGATIVE: 1
NAUSEA: 1
APPETITE LEVEL: FAIR
RESPIRATORY NEGATIVE: 1
PERSON REPORTING PAIN: PATIENT
MUSCULOSKELETAL NEGATIVE: 1
CONSTITUTIONAL NEGATIVE: 1
PAIN LOCATION: ABDOMEN
LAST BOWEL MOVEMENT: 66885
PAIN: 1
NECK NEGATIVE: 1
CARDIOVASCULAR NEGATIVE: 1
PAIN LOCATION - PAIN SEVERITY: 4/10
NEUROLOGICAL NEGATIVE: 1

## 2024-02-15 NOTE — CPM/PAT H&P
CPM/PAT Evaluation       Name: Maggy Gore (Maggy Gore)  /Age: 1935/88 y.o.     In-Person       Chief Complaint: ***    HPI    Past Medical History:   Diagnosis Date   • Closed comminuted fracture of left patella with routine healing 2023   • HTN (hypertension)        Past Surgical History:   Procedure Laterality Date   • APPENDECTOMY     • HYSTERECTOMY     • IR BILIARY DRAIN  2023    IR BILIARY DRAIN 2023 Ace Osorio MD [facility]       Patient Sexual activity questions deferred to the physician.    Family History   Problem Relation Name Age of Onset   • Heart attack Mother     • Other (enlarged heart.) Father     • Heart defect Daughter         Allergies   Allergen Reactions   • Sulfa (Sulfonamide Antibiotics) Rash       Prior to Admission medications    Medication Sig Start Date End Date Taking? Authorizing Provider   acebutolol (Sectral) 200 mg capsule Take 1 capsule (200 mg) by mouth twice a day. 8/15/22   Historical Provider, MD   amLODIPine (Norvasc) 10 mg tablet Take 1 tablet (10 mg) by mouth once daily. 24   Ace Dhillon MD   aspirin 81 mg chewable tablet Chew 1 tablet (81 mg) once daily. 23  Jas Blanchard MD   atorvastatin (Lipitor) 20 mg tablet Take 1 tablet (20 mg) by mouth once daily. 24   Ace Dhillon MD   cholecalciferol (Vitamin D3) 50 MCG (2000 UT) tablet Take 1 tablet (2,000 Units) by mouth once daily.    Historical Provider, MD   cyclobenzaprine (Flexeril) 10 mg tablet Take 1 tablet (10 mg) by mouth 2 times a day as needed. 10/17/22   Historical Provider, MD   dapagliflozin propanediol (Farxiga) 10 mg Take 1 tablet (10 mg) by mouth once daily. 24  Ace Dhillon MD   famotidine (Pepcid) 20 mg tablet Take 1 tablet (20 mg) by mouth once daily at bedtime. 24   Ace Dhillon MD   HYDROcodone-acetaminophen (Norco) 5-325 mg tablet Take 1 tablet by mouth every 6 hours if needed for severe pain (7 - 10).  10/10/22   Historical Provider, MD   methIMAzole (Tapazole) 5 mg tablet Take 1 tablet (5 mg) by mouth once daily. qd 1/16/24   Ace Dhillon MD   metroNIDAZOLE (FlagyL) 250 mg tablet Take one tablet by mouth at 6:00pm, 7:00pm and 11:00 on the day prior to surgery 2/13/24   Sj Johnson MD   neomycin (Mycifradin) 500 mg tablet Take two tablets (1000 mg) by mouth at 6:00pm, 7:00pm and 11:00 on the day prior to surgery 2/13/24   Sj Johnson MD   ondansetron (Zofran) 8 mg tablet Take 1 tablet (8 mg) by mouth every 8 hours if needed for nausea.    Historical Provider, MD   pantoprazole (ProtoNix) 40 mg EC tablet Take 1 tablet (40 mg) by mouth once daily. do not crush chew or split 1/16/24   Ace Dhillon MD   sennosides-docusate sodium (Cielo-Colace) 8.6-50 mg tablet Take 1 tablet by mouth once daily as needed for constipation. 1/16/24 7/14/24  Ace Dhillon MD   valsartan-hydrochlorothiazide (Diovan-HCT) 320-25 mg tablet TAKE ONE TABLET BY MOUTH EVERY DAY 10/1/23   Ace Dhillon MD        PAT ROS:   Constitutional:   neg    Neuro/Psych:   neg    Eyes:   neg    Ears:   neg    Nose:   neg    Mouth:   neg    Throat:   neg    Neck:   neg    Cardio:   neg    Respiratory:   neg    Endocrine:   neg    GI:   :   neg    Musculoskeletal:   neg    Hematologic:   neg    Skin:  neg        Physical Exam  Constitutional:       Appearance: Normal appearance.   HENT:      Head: Normocephalic and atraumatic.      Nose: Nose normal.      Mouth/Throat:      Mouth: Mucous membranes are moist.   Eyes:      Pupils: Pupils are equal, round, and reactive to light.   Cardiovascular:      Rate and Rhythm: Normal rate and regular rhythm.   Pulmonary:      Effort: Pulmonary effort is normal.      Breath sounds: Normal breath sounds.   Abdominal:      General: Bowel sounds are normal.      Palpations: Abdomen is soft.   Musculoskeletal:         General: Normal range of motion.      Cervical back: Normal range of motion.    Skin:     General: Skin is warm and dry.   Neurological:      General: No focal deficit present.      Mental Status: She is alert and oriented to person, place, and time.   Psychiatric:         Mood and Affect: Mood normal.         Behavior: Behavior normal.        Airway  Teeth:  Anesthesia:  Patient denies any anesthesia complications.       There were no vitals taken for this visit.    DASI Risk Score    No data to display       Caprini DVT Assessment    No data to display       Modified Frailty Index    No data to display       CHADS2 Stroke Risk  Current as of about an hour ago        N/A 3 - 100%: High Risk   2 - 3%: Medium Risk   0 - 2%: Low Risk     Last Change: N/A          This score determines the patient's risk of having a stroke if the patient has atrial fibrillation.        This score is not applicable to this patient. Components are not calculated.          Revised Cardiac Risk Index    No data to display       Apfel Simplified Score    No data to display       Risk Analysis Index Results This Encounter    No data found in the last 1 encounters.         Assessment and Plan:     Plan for OR on 2/23 for   ROBOTIC RECTOPEXY, Cholecystectomy Laparoscopy [99122 (CPT®)] N/A General   Proctopexy Transabdominal [78721 (CPT®)] N/A General   Please change to robotic rectopexy

## 2024-02-16 ENCOUNTER — PRE-ADMISSION TESTING (OUTPATIENT)
Dept: PREADMISSION TESTING | Facility: HOSPITAL | Age: 89
End: 2024-02-16
Payer: MEDICARE

## 2024-02-16 ENCOUNTER — LAB (OUTPATIENT)
Dept: LAB | Facility: LAB | Age: 89
End: 2024-02-16
Payer: MEDICARE

## 2024-02-16 VITALS
OXYGEN SATURATION: 98 % | HEIGHT: 62 IN | HEART RATE: 67 BPM | RESPIRATION RATE: 18 BRPM | WEIGHT: 128.09 LBS | BODY MASS INDEX: 23.57 KG/M2 | SYSTOLIC BLOOD PRESSURE: 171 MMHG | DIASTOLIC BLOOD PRESSURE: 76 MMHG | TEMPERATURE: 97.2 F

## 2024-02-16 DIAGNOSIS — Z01.818 PREOP EXAMINATION: ICD-10-CM

## 2024-02-16 DIAGNOSIS — K62.3 RECTAL PROLAPSE: Primary | ICD-10-CM

## 2024-02-16 DIAGNOSIS — K81.0 ACUTE CHOLECYSTITIS: ICD-10-CM

## 2024-02-16 LAB
ANION GAP SERPL CALC-SCNC: 16 MMOL/L (ref 10–20)
ATRIAL RATE: 65 BPM
BUN SERPL-MCNC: 58 MG/DL (ref 6–23)
CALCIUM SERPL-MCNC: 9.2 MG/DL (ref 8.6–10.3)
CHLORIDE SERPL-SCNC: 103 MMOL/L (ref 98–107)
CO2 SERPL-SCNC: 21 MMOL/L (ref 21–32)
CREAT SERPL-MCNC: 2.67 MG/DL (ref 0.5–1.05)
EGFRCR SERPLBLD CKD-EPI 2021: 17 ML/MIN/1.73M*2
GLUCOSE SERPL-MCNC: 117 MG/DL (ref 74–99)
P AXIS: 49 DEGREES
P OFFSET: 171 MS
P ONSET: 107 MS
POTASSIUM SERPL-SCNC: 5.2 MMOL/L (ref 3.5–5.3)
PR INTERVAL: 226 MS
Q ONSET: 220 MS
QRS COUNT: 11 BEATS
QRS DURATION: 108 MS
QT INTERVAL: 420 MS
QTC CALCULATION(BAZETT): 436 MS
QTC FREDERICIA: 431 MS
R AXIS: 24 DEGREES
SODIUM SERPL-SCNC: 135 MMOL/L (ref 136–145)
T AXIS: 77 DEGREES
T OFFSET: 430 MS
VENTRICULAR RATE: 65 BPM

## 2024-02-16 PROCEDURE — 1090000001 HH PPS REVENUE CREDIT

## 2024-02-16 PROCEDURE — 99203 OFFICE O/P NEW LOW 30 MIN: CPT | Performed by: NURSE PRACTITIONER

## 2024-02-16 PROCEDURE — 93010 ELECTROCARDIOGRAM REPORT: CPT | Performed by: INTERNAL MEDICINE

## 2024-02-16 PROCEDURE — 80048 BASIC METABOLIC PNL TOTAL CA: CPT

## 2024-02-16 PROCEDURE — 36415 COLL VENOUS BLD VENIPUNCTURE: CPT

## 2024-02-16 PROCEDURE — 1090000002 HH PPS REVENUE DEBIT

## 2024-02-16 PROCEDURE — 93005 ELECTROCARDIOGRAM TRACING: CPT

## 2024-02-16 ASSESSMENT — DUKE ACTIVITY SCORE INDEX (DASI)
CAN YOU HAVE SEXUAL RELATIONS: NO
CAN YOU RUN A SHORT DISTANCE: NO
CAN YOU PARTICIPATE IN STRENOUS SPORTS LIKE SWIMMING, SINGLES TENNIS, FOOTBALL, BASKETBALL, OR SKIING: NO
CAN YOU DO HEAVY WORK AROUND THE HOUSE LIKE SCRUBBING FLOORS OR LIFTING AND MOVING HEAVY FURNITURE: YES
CAN YOU WALK A BLOCK OR TWO ON LEVEL GROUND: YES
DASI METS SCORE: 6.6
TOTAL_SCORE: 31.45
CAN YOU WALK INDOORS, SUCH AS AROUND YOUR HOUSE: YES
CAN YOU DO YARD WORK LIKE RAKING LEAVES, WEEDING OR PUSHING A MOWER: YES
CAN YOU PARTICIPATE IN MODERATE RECREATIONAL ACTIVITIES LIKE GOLF, BOWLING, DANCING, DOUBLES TENNIS OR THROWING A BASEBALL OR FOOTBALL: NO
CAN YOU DO LIGHT WORK AROUND THE HOUSE LIKE DUSTING OR WASHING DISHES: YES
CAN YOU DO MODERATE WORK AROUND THE HOUSE LIKE VACUUMING, SWEEPING FLOORS OR CARRYING GROCERIES: YES
CAN YOU CLIMB A FLIGHT OF STAIRS OR WALK UP A HILL: YES
CAN YOU TAKE CARE OF YOURSELF (EAT, DRESS, BATHE, OR USE TOILET): YES

## 2024-02-16 ASSESSMENT — CHADS2 SCORE
DIABETES: NO
HYPERTENSION: YES
AGE GREATER THAN OR EQUAL TO 75: YES
CHF: NO
CHADS2 SCORE: 2
PRIOR STROKE OR TIA OR THROMBOEMBOLISM: NO

## 2024-02-16 ASSESSMENT — LIFESTYLE VARIABLES: SMOKING_STATUS: NONSMOKER

## 2024-02-16 ASSESSMENT — ACTIVITIES OF DAILY LIVING (ADL): ADL_SCORE: 0

## 2024-02-16 NOTE — PREPROCEDURE INSTRUCTIONS
Medication List            Accurate as of February 16, 2024 10:20 AM. Always use your most recent med list.                acebutolol 200 mg capsule  Commonly known as: Sectral  Medication Adjustments for Surgery: Take morning of surgery with sip of water, no other fluids     amLODIPine 10 mg tablet  Commonly known as: Norvasc  Take 1 tablet (10 mg) by mouth once daily.  Medication Adjustments for Surgery: Take morning of surgery with sip of water, no other fluids     aspirin 81 mg chewable tablet  Chew 1 tablet (81 mg) once daily.  Medication Adjustments for Surgery: Stop 7 days before surgery     atorvastatin 20 mg tablet  Commonly known as: Lipitor  Take 1 tablet (20 mg) by mouth once daily.  Medication Adjustments for Surgery: Take morning of surgery with sip of water, no other fluids     cyclobenzaprine 10 mg tablet  Commonly known as: Flexeril  Medication Adjustments for Surgery: Take morning of surgery with sip of water, no other fluids     famotidine 20 mg tablet  Commonly known as: Pepcid  Take 1 tablet (20 mg) by mouth once daily at bedtime.  Medication Adjustments for Surgery: Take morning of surgery with sip of water, no other fluids     Farxiga 10 mg  Generic drug: dapagliflozin propanediol  Take 1 tablet (10 mg) by mouth once daily.  Medication Adjustments for Surgery: Stop 3 days before surgery     HYDROcodone-acetaminophen 5-325 mg tablet  Commonly known as: Norco  Medication Adjustments for Surgery: Take morning of surgery with sip of water, no other fluids     methIMAzole 5 mg tablet  Commonly known as: Tapazole  Take 1 tablet (5 mg) by mouth once daily. qd  Medication Adjustments for Surgery: Take morning of surgery with sip of water, no other fluids     metroNIDAZOLE 250 mg tablet  Commonly known as: FlagyL  Take one tablet by mouth at 6:00pm, 7:00pm and 11:00 on the day prior to surgery     neomycin 500 mg tablet  Commonly known as: Mycifradin  Take two tablets (1000 mg) by mouth at 6:00pm,  7:00pm and 11:00 on the day prior to surgery  Medication Adjustments for Surgery: Other (Comment)  Notes to patient: Per provider recommendations     pantoprazole 40 mg EC tablet  Commonly known as: ProtoNix  Take 1 tablet (40 mg) by mouth once daily. do not crush chew or split  Medication Adjustments for Surgery: Take morning of surgery with sip of water, no other fluids     sennosides-docusate sodium 8.6-50 mg tablet  Commonly known as: Cielo-Colace  Take 1 tablet by mouth once daily as needed for constipation.  Medication Adjustments for Surgery: Continue until night before surgery     valsartan-hydrochlorothiazide 320-25 mg tablet  Commonly known as: Diovan-HCT  TAKE ONE TABLET BY MOUTH EVERY DAY  Medication Adjustments for Surgery: Continue until night before surgery     Vitamin D3 50 MCG (2000 UT) tablet  Generic drug: cholecalciferol  Medication Adjustments for Surgery: Stop 7 days before surgery                 PRE-OPERATIVE INSTRUCTIONS    You will receive notification one business day prior to your surgery to confirm your arrival time and additional information. It is important that you answer your phone and/or check your messages during this time.    Please enter the building through the Outpatient entrance. Take the elevator off the lobby to the 2nd floor and check in at the Outpatient Surgery desk    INSTRUCTIONS:  Talk to your surgeon for instructions if you should stop your aspirin, blood thinner, or diabetes medicines.  DO NOT take any multivitamins or over the counter supplements for 7-10 days before surgery.  If not being admitted, you must have an adult immediately available to drive you home after surgery. We also highly recommend you have someone stay with you for the entire day and night of your surgery.  For children having surgery, a parent or legal guardian must accompany them to the surgery center. If this is not possible, please call [phone] to make additional arrangements.  For adults  who are unable to consent or make medical decisions for themselves, a legal guardian or Power of  must accompany them to the surgery center. If this is not possible, please call [phone] to make additional arrangements.  Wear comfortable, loose fitting clothing.  All jewelry and piercings must be removed. If you are unable to remove an item or have a dermal piercing, please be sure to tell the nurse when you arrive for surgery.  Nail polish and make-up must be removed.  Avoid smoking or consuming alcohol for 24 hours before surgery.  To help prevent infection, please take a shower/bath and wash your hair the night before and/or morning of surgery.    Additional instructions about eating and drinking before surgery:  Do not eat any solid foods after midnight. Milk, nutritional drinks/supplements, and infant formula are considered solid foods.  You may drink up to 12 oz. of clear liquids up to 2 hours before your arrival time for surgery, unless directed otherwise by your surgeon. Clear liquids include water, non-carbonated sports drinks (Gatorade), black tea or coffee (no creamers) and breast milk.    If you received a blue folder, please review additional information provided inside the folder regarding additional preparation.     If you have any questions or concerns, please call Pre-Admission Testing at [phone].         Preoperative Bathing instructions    Follow these instructions the evening before and morning of surgery:  Do not shave the day before or day of surgery.  Remove all jewelry until after surgery. Take off rings and take out all body-piercing jewelry.  Use a clean wash cloth and towel.  Wash your face and hair with your normal soap and shampoo before you use the CHG soap.  Use a wash cloth to clean your skin with the CHG soap. Use enough CHG soap to cover the skin on your whole body. Use the same amount as you would with your normal soap.  Do not use the CHG soap on your face,  eyes, ears, or head.  Do not scrub your skin too hard.  Be sure to clean the area well where surgery will be done.  Do not wash with your normal soap after the CHG soap.  Keep away from the water stream when you put CHG soap on. This keeps it from rinsing off too soon.  Rinse your body well.  Pat yourself dry with a clean, soft towel.  Put on clean clothing.  Do not put on any deodorants, lotions, or oils after showering. These might block how the CHG soap works.

## 2024-02-16 NOTE — CPM/PAT H&P
CPM/PAT Evaluation       Name: Maggy Gore (Maggy Gore)  /Age: 1935/88 y.o.     In-Person     HPI 87 yo female here for preop evaluation for rectal prolapse and   acute calculous cholecystitis S/P percutaneous cholecystostomy tube placement 2023. Has some N/V and eating small amounts since procedure. Lost 8 lbs since hospitalization. Takes Zofran with relief. Denies fever, chills or sweats. Emptying her drainage tube 2-4 x day.    Past Medical History:   Diagnosis Date    AAA (abdominal aortic aneurysm) without rupture (CMS/HCC)     Aortic stenosis     CKD (chronic kidney disease)     Closed comminuted fracture of left patella with routine healing 2023    Colon polyp     GERD (gastroesophageal reflux disease)     Gout     HTN (hypertension)     Hyperlipidemia     Hyperthyroidism        Past Surgical History:   Procedure Laterality Date    APPENDECTOMY      HYSTERECTOMY      IR BILIARY DRAIN  2023    IR BILIARY DRAIN 2023 Ace Osorio MD [facility]       Patient Sexual activity questions deferred to the physician.    Family History   Problem Relation Name Age of Onset    Heart attack Mother      Other (enlarged heart.) Father      Heart defect Daughter         Allergies   Allergen Reactions    Sulfa (Sulfonamide Antibiotics) Rash       Prior to Admission medications    Medication Sig Start Date End Date Taking? Authorizing Provider   acebutolol (Sectral) 200 mg capsule Take 1 capsule (200 mg) by mouth twice a day. 8/15/22   Historical Provider, MD   amLODIPine (Norvasc) 10 mg tablet Take 1 tablet (10 mg) by mouth once daily. 24   Ace Dhillon MD   aspirin 81 mg chewable tablet Chew 1 tablet (81 mg) once daily. 23  Jas Blanchard MD   atorvastatin (Lipitor) 20 mg tablet Take 1 tablet (20 mg) by mouth once daily. 24   Ace Dhillon MD   cholecalciferol (Vitamin D3) 50 MCG (2000 UT) tablet Take 1 tablet (2,000 Units) by mouth once daily.     Historical Provider, MD   cyclobenzaprine (Flexeril) 10 mg tablet Take 1 tablet (10 mg) by mouth 2 times a day as needed. 10/17/22   Historical Provider, MD   dapagliflozin propanediol (Farxiga) 10 mg Take 1 tablet (10 mg) by mouth once daily. 1/8/24 1/2/25  Ace Dhillon MD   famotidine (Pepcid) 20 mg tablet Take 1 tablet (20 mg) by mouth once daily at bedtime. 1/2/24   Ace Dhillon MD   HYDROcodone-acetaminophen (Norco) 5-325 mg tablet Take 1 tablet by mouth every 6 hours if needed for severe pain (7 - 10).    Historical Provider, MD   methIMAzole (Tapazole) 5 mg tablet Take 1 tablet (5 mg) by mouth once daily. qd 1/16/24   Ace Dhillon MD   metroNIDAZOLE (FlagyL) 250 mg tablet Take one tablet by mouth at 6:00pm, 7:00pm and 11:00 on the day prior to surgery 2/13/24   Sj Johnson MD   neomycin (Mycifradin) 500 mg tablet Take two tablets (1000 mg) by mouth at 6:00pm, 7:00pm and 11:00 on the day prior to surgery 2/13/24   Sj Johnson MD   pantoprazole (ProtoNix) 40 mg EC tablet Take 1 tablet (40 mg) by mouth once daily. do not crush chew or split 1/16/24   Ace Dhillon MD   sennosides-docusate sodium (Cielo-Colace) 8.6-50 mg tablet Take 1 tablet by mouth once daily as needed for constipation. 1/16/24 7/14/24  Ace Dhillon MD   valsartan-hydrochlorothiazide (Diovan-HCT) 320-25 mg tablet TAKE ONE TABLET BY MOUTH EVERY DAY 10/1/23   Ace Dhillon MD   ondansetron (Zofran) 8 mg tablet Take 1 tablet (8 mg) by mouth every 8 hours if needed for nausea.  2/16/24  Historical Provider, MD      Constitutional: Negative for fever, chills, or sweats   ENMT: Negative for nasal discharge, congestion, ear pain, mouth pain, throat pain   Respiratory: Negative for cough, wheezing, shortness of breath   Cardiac: Negative for chest pain, dyspnea on exertion, palpitations   Gastrointestinal: see hpi  Genitourinary: Negative for dysuria, flank pain, frequency, hematuria   Musculoskeletal: Negative for  decreased ROM, pain, swelling, weakness   Neurological: Negative for dizziness, confusion, headache  Psychiatric: Negative for mood changes   Skin: Negative for itching, rash, ulcer    Hematologic/Lymph: Negative for bruising, easy bleeding  Allergic/Immunologic: Negative itching, sneezing, swelling     CONSTITUTIONAL - well nourished, well developed, looks like stated age  SKIN - normal skin color and pigmentation, no rash or lesions  HEAD - no trauma, normocephalic  EYES - pupils are equal and reactive to light, extraocular muscles are intact  ENT - uvula midline, normal tongue movement and throat normal, no exudate, nasal passage without discharge and patent  NECK - supple without rigidity, full ROM  CHEST - clear to auscultation, no wheezing, no crackles and no rales, good effort  CARDIAC -  III/VI systolic murmur RSB radiates across chest. regular rate and regular rhythm, no skipped beats, no murmur  ABDOMEN - RUQ drainage tube with brownish fluid. no organomegaly, soft, nontender, nondistended, normal bowel sounds, no guarding/rebound/rigidity   EXTREMITIES - no edema, no deformities  NEUROLOGICAL - normal gait, normal balance, normal motor; alert, oriented and no focal signs  PSYCHIATRIC - alert, pleasant and cordial, age-appropriate  IMMUNOLOGIC - no cervical lymphadenopathy     PAT AIRWAY:   Airway:     Mallampati::  II    Neck ROM::  Full   Upper endentulous      Visit Vitals  /76   Pulse 67   Temp 36.2 °C (97.2 °F) (Temporal)   Resp 18     Echo 12/24/2023:  1. Left ventricular systolic function is low normal.   2. LVEF 50-55%.   3. Spectral Doppler shows an impaired relaxation pattern of left ventricular diastolic filling.   4. RVSP within normal limits.   5. TODD 1.4cm2, LVOT 2.0cm, V1TVi 24cm, V2TVi 55.2cm (PPG 25mmHg, MPG 15mmHg).   6. Moderate aortic valve stenosis.   7. Mild aortic valve regurgitation.    EKG: sinus rhythm 1st degree AVB HR 65 bpm. Incomplete LBBB    DASI Risk Score       Flowsheet Row Most Recent Value   DASI SCORE 31.45   METS Score (Will be calculated only when all the questions are answered) 6.6          Caprini DVT Assessment      Flowsheet Row Most Recent Value   DVT Score 6   Current Status Major surgery planned, including arthroscopic and laproscopic (1-2 hours)   Age Over 75 years   BMI 30 or less          Modified Frailty Index      Flowsheet Row Most Recent Value   Modified Frailty Index Calculator .1818          CHADS2 Stroke Risk  Current as of 17 minutes ago        N/A 3 - 100%: High Risk   2 - 3%: Medium Risk   0 - 2%: Low Risk     Last Change: N/A          This score determines the patient's risk of having a stroke if the patient has atrial fibrillation.        This score is not applicable to this patient. Components are not calculated.          Revised Cardiac Risk Index    No data to display       Apfel Simplified Score    No data to display       Risk Analysis Index Results This Encounter         2/16/2024  0956             SHAH Cancer History: Patient does not indicate history of cancer    Total Risk Analysis Index Score Without Cancer: 36    Total Risk Analysis Index Score: 36          Stop Bang Score      Flowsheet Row Most Recent Value   Do you snore loudly? 0   Do you often feel tired or fatigued after your sleep? 0   Has anyone ever observed you stop breathing in your sleep? 0   Recent BMI (Calculated) 23.4   Is BMI greater than 35 kg/m2? 0=No   Age older than 50 years old? 1=Yes   Gender - Male 0=No            Assessment and Plan:   89 yo female scheduled for robtic rectopexy and lap cholecystectomy with Dr. Johnson 2/23/2024. Labs/EKG ordered. Anesthesia to review.    See risk scores as previously documented.

## 2024-02-17 PROCEDURE — 1090000001 HH PPS REVENUE CREDIT

## 2024-02-17 PROCEDURE — 1090000002 HH PPS REVENUE DEBIT

## 2024-02-18 PROCEDURE — 1090000002 HH PPS REVENUE DEBIT

## 2024-02-18 PROCEDURE — 1090000001 HH PPS REVENUE CREDIT

## 2024-02-19 ENCOUNTER — OFFICE VISIT (OUTPATIENT)
Dept: PRIMARY CARE | Facility: CLINIC | Age: 89
End: 2024-02-19
Payer: MEDICARE

## 2024-02-19 ENCOUNTER — HOSPITAL ENCOUNTER (OUTPATIENT)
Dept: RADIOLOGY | Facility: CLINIC | Age: 89
Discharge: HOME | End: 2024-02-19
Payer: MEDICARE

## 2024-02-19 ENCOUNTER — LAB (OUTPATIENT)
Dept: LAB | Facility: LAB | Age: 89
End: 2024-02-19
Payer: MEDICARE

## 2024-02-19 VITALS
SYSTOLIC BLOOD PRESSURE: 122 MMHG | OXYGEN SATURATION: 99 % | HEART RATE: 83 BPM | BODY MASS INDEX: 23.37 KG/M2 | WEIGHT: 127 LBS | DIASTOLIC BLOOD PRESSURE: 60 MMHG | RESPIRATION RATE: 16 BRPM | HEIGHT: 62 IN

## 2024-02-19 DIAGNOSIS — R58 ECCHYMOSIS: ICD-10-CM

## 2024-02-19 DIAGNOSIS — N18.32 STAGE 3B CHRONIC KIDNEY DISEASE (MULTI): ICD-10-CM

## 2024-02-19 DIAGNOSIS — T14.8XXA BRUISING: ICD-10-CM

## 2024-02-19 DIAGNOSIS — I50.32 CHRONIC DIASTOLIC (CONGESTIVE) HEART FAILURE (MULTI): ICD-10-CM

## 2024-02-19 DIAGNOSIS — Z01.818 PRE-OP EXAM: ICD-10-CM

## 2024-02-19 DIAGNOSIS — I71.43 INFRARENAL ABDOMINAL AORTIC ANEURYSM, WITHOUT RUPTURE (CMS-HCC): ICD-10-CM

## 2024-02-19 DIAGNOSIS — K81.9 CHOLECYSTITIS: ICD-10-CM

## 2024-02-19 DIAGNOSIS — N18.4 CKD STAGE G4/A1, GFR 15-29 AND ALBUMIN CREATININE RATIO <30 MG/G (MULTI): ICD-10-CM

## 2024-02-19 DIAGNOSIS — E78.2 MIXED HYPERLIPIDEMIA: ICD-10-CM

## 2024-02-19 DIAGNOSIS — E05.90 HYPERTHYROIDISM: ICD-10-CM

## 2024-02-19 DIAGNOSIS — E46 PROTEIN-CALORIE MALNUTRITION, UNSPECIFIED SEVERITY (MULTI): ICD-10-CM

## 2024-02-19 DIAGNOSIS — I10 PRIMARY HYPERTENSION: ICD-10-CM

## 2024-02-19 DIAGNOSIS — J45.20 MILD INTERMITTENT REACTIVE AIRWAY DISEASE WITHOUT COMPLICATION (HHS-HCC): ICD-10-CM

## 2024-02-19 DIAGNOSIS — K62.3 RECTAL PROLAPSE: ICD-10-CM

## 2024-02-19 DIAGNOSIS — K21.9 GASTROESOPHAGEAL REFLUX DISEASE, UNSPECIFIED WHETHER ESOPHAGITIS PRESENT: ICD-10-CM

## 2024-02-19 DIAGNOSIS — I25.10 CORONARY ARTERY DISEASE INVOLVING NATIVE CORONARY ARTERY OF NATIVE HEART WITHOUT ANGINA PECTORIS: ICD-10-CM

## 2024-02-19 DIAGNOSIS — K62.3 PARTIAL RECTAL PROLAPSE: ICD-10-CM

## 2024-02-19 LAB
ALBUMIN SERPL BCP-MCNC: 3.9 G/DL (ref 3.4–5)
ALP SERPL-CCNC: 67 U/L (ref 33–136)
ALT SERPL W P-5'-P-CCNC: 9 U/L (ref 7–45)
ANION GAP SERPL CALC-SCNC: 16 MMOL/L (ref 10–20)
APTT PPP: 43 SECONDS (ref 27–38)
AST SERPL W P-5'-P-CCNC: 13 U/L (ref 9–39)
BASOPHILS # BLD AUTO: 0.06 X10*3/UL (ref 0–0.1)
BASOPHILS NFR BLD AUTO: 0.9 %
BILIRUB SERPL-MCNC: 0.5 MG/DL (ref 0–1.2)
BUN SERPL-MCNC: 51 MG/DL (ref 6–23)
CALCIUM SERPL-MCNC: 9.2 MG/DL (ref 8.6–10.3)
CHLORIDE SERPL-SCNC: 105 MMOL/L (ref 98–107)
CO2 SERPL-SCNC: 22 MMOL/L (ref 21–32)
CREAT SERPL-MCNC: 2.73 MG/DL (ref 0.5–1.05)
EGFRCR SERPLBLD CKD-EPI 2021: 16 ML/MIN/1.73M*2
EOSINOPHIL # BLD AUTO: 0.35 X10*3/UL (ref 0–0.4)
EOSINOPHIL NFR BLD AUTO: 5.1 %
ERYTHROCYTE [DISTWIDTH] IN BLOOD BY AUTOMATED COUNT: 14.4 % (ref 11.5–14.5)
GLUCOSE SERPL-MCNC: 123 MG/DL (ref 74–99)
HCT VFR BLD AUTO: 33.3 % (ref 36–46)
HGB BLD-MCNC: 10.8 G/DL (ref 12–16)
IMM GRANULOCYTES # BLD AUTO: 0.01 X10*3/UL (ref 0–0.5)
IMM GRANULOCYTES NFR BLD AUTO: 0.1 % (ref 0–0.9)
INR PPP: 1 (ref 0.9–1.1)
LYMPHOCYTES # BLD AUTO: 2.11 X10*3/UL (ref 0.8–3)
LYMPHOCYTES NFR BLD AUTO: 30.7 %
MCH RBC QN AUTO: 29.4 PG (ref 26–34)
MCHC RBC AUTO-ENTMCNC: 32.4 G/DL (ref 32–36)
MCV RBC AUTO: 91 FL (ref 80–100)
MONOCYTES # BLD AUTO: 0.52 X10*3/UL (ref 0.05–0.8)
MONOCYTES NFR BLD AUTO: 7.6 %
NEUTROPHILS # BLD AUTO: 3.82 X10*3/UL (ref 1.6–5.5)
NEUTROPHILS NFR BLD AUTO: 55.6 %
NRBC BLD-RTO: 0 /100 WBCS (ref 0–0)
PLATELET # BLD AUTO: 425 X10*3/UL (ref 150–450)
POC APPEARANCE, URINE: ABNORMAL
POC BILIRUBIN, URINE: NEGATIVE
POC BLOOD, URINE: NEGATIVE
POC COLOR, URINE: YELLOW
POC GLUCOSE, URINE: ABNORMAL MG/DL
POC KETONES, URINE: NEGATIVE MG/DL
POC LEUKOCYTES, URINE: NEGATIVE
POC NITRITE,URINE: NEGATIVE
POC PH, URINE: 5 PH
POC PROTEIN, URINE: ABNORMAL MG/DL
POC SPECIFIC GRAVITY, URINE: 1.02
POC UROBILINOGEN, URINE: 0.2 EU/DL
POTASSIUM SERPL-SCNC: 5.3 MMOL/L (ref 3.5–5.3)
PROT SERPL-MCNC: 7 G/DL (ref 6.4–8.2)
PROTHROMBIN TIME: 11 SECONDS (ref 9.8–12.8)
RBC # BLD AUTO: 3.67 X10*6/UL (ref 4–5.2)
SODIUM SERPL-SCNC: 138 MMOL/L (ref 136–145)
WBC # BLD AUTO: 6.9 X10*3/UL (ref 4.4–11.3)

## 2024-02-19 PROCEDURE — 1126F AMNT PAIN NOTED NONE PRSNT: CPT | Performed by: FAMILY MEDICINE

## 2024-02-19 PROCEDURE — 3078F DIAST BP <80 MM HG: CPT | Performed by: FAMILY MEDICINE

## 2024-02-19 PROCEDURE — 3074F SYST BP LT 130 MM HG: CPT | Performed by: FAMILY MEDICINE

## 2024-02-19 PROCEDURE — 85730 THROMBOPLASTIN TIME PARTIAL: CPT

## 2024-02-19 PROCEDURE — 71046 X-RAY EXAM CHEST 2 VIEWS: CPT | Performed by: RADIOLOGY

## 2024-02-19 PROCEDURE — 1090000001 HH PPS REVENUE CREDIT

## 2024-02-19 PROCEDURE — 81003 URINALYSIS AUTO W/O SCOPE: CPT | Performed by: FAMILY MEDICINE

## 2024-02-19 PROCEDURE — 1036F TOBACCO NON-USER: CPT | Performed by: FAMILY MEDICINE

## 2024-02-19 PROCEDURE — 71046 X-RAY EXAM CHEST 2 VIEWS: CPT

## 2024-02-19 PROCEDURE — 1159F MED LIST DOCD IN RCRD: CPT | Performed by: FAMILY MEDICINE

## 2024-02-19 PROCEDURE — 80053 COMPREHEN METABOLIC PANEL: CPT

## 2024-02-19 PROCEDURE — 1090000002 HH PPS REVENUE DEBIT

## 2024-02-19 PROCEDURE — 36415 COLL VENOUS BLD VENIPUNCTURE: CPT

## 2024-02-19 PROCEDURE — 99214 OFFICE O/P EST MOD 30 MIN: CPT | Performed by: FAMILY MEDICINE

## 2024-02-19 PROCEDURE — 85610 PROTHROMBIN TIME: CPT

## 2024-02-19 PROCEDURE — 85025 COMPLETE CBC W/AUTO DIFF WBC: CPT

## 2024-02-19 NOTE — PROGRESS NOTES
Subjective   Patient ID: Maggy Gore is a 88 y.o. female who presents for Pre-op Exam.    HPI   Patient is following up on pre-op admission for her rectoplex on 2/23/24. She had pre-admissions testing on 2/16/24 with EKG done and BMP lab.  She has medication for prior to surgery.      Review of Systems  12 Systems have been reviewed as follows.  Constitutional: Fever, weight gain, weight loss, appetite change, night sweats, fatigue, chills.  Eyes : blurry, double vision, vision, loss, tearing, redness, pain, sensitivity to light, glaucoma.  Ears, nose, mouth, and throat: Hearing loss, ringing in the ears, ear pain, nasal congestion, nasal drainage, nosebleeds, mouth, throat, irritation tooth problem.  Cardiovascular :chest pain, pressure, heart racing, palpitations, sweating, leg swelling, high or low blood pressure  Pulmonary: Cough, yellow or green sputum, blood and sputum, shortness of breath, wheezing  Gastrointestinal: Nausea, vomiting, diarrhea, constipation, pain, blood in stool, or vomitus, heartburn, difficulty swallowing  Genitourinary: incontinence, abnormal bleeding, abnormal discharge, urinary frequency, urinary hesitancy, pain, impotence sexual problem, infection, urinary retention  Musculoskeletal: Pain, stiffness, joint, redness or warmth, arthritis, back pain, weakness, muscle wasting, sprain or fracture  Neuro: Weight weakness, dizziness, change in voice, change in taste change in vision, change in hearing, loss, or change of sensation, trouble walking, balance problems coordination problems, shaking, speech problem  Endocrine , cold or heat intolerance, blood sugar problem, weight gain or loss missed periods hot flashes, sweats, change in body hair, change in libido, increased thirst, increased urination  Heme/lymph: Swelling, bleeding, problem anemia, bruising, enlarged lymph nodes  Allergic/immunologic: H. plus nasal drip, watery itchy eyes, nasal drainage, immunosuppressed  The above were  "reviewed and noted negative except as noted in HPI and Problem List.       Objective   /60 (BP Location: Left arm, Patient Position: Sitting, BP Cuff Size: Adult)   Pulse 83   Resp 16   Ht 1.575 m (5' 2\")   Wt 57.6 kg (127 lb)   SpO2 99%   BMI 23.23 kg/m²     Physical Exam  Constitutional: Well developed, well nourished, alert and in no acute distress   Eyes: Normal external exam. Pupils equally round and reactive to light with normal accommodation and extraocular movements intact.  Neck: Supple, no lymphadenopathy or masses.   Cardiovascular: Regular rate and rhythm, normal S1 and S2, no murmurs, gallops, or rubs. Radial pulses normal. No peripheral edema.  Pulmonary: No respiratory distress, lungs clear to auscultation bilaterally. No wheezes, rhonchi, rales.  Abdomen: soft,non tender, non distended, without masses or HSM  Skin: Warm, well perfused, normal skin turgor and color.   Neurologic: Cranial nerves II-XII grossly intact.   Psychiatric: Mood calm and affect normal  Musculoskeletal: Moving all extremities without restriction      Assessment/Plan   Problem List Items Addressed This Visit             ICD-10-CM    Mixed hyperlipidemia E78.2    Relevant Orders    Follow Up In [facility] - PCP - Established    Hyperthyroidism E05.90    Relevant Orders    Follow [facility] - PCP - Established    Reactive airway disease without complication J45.909    Relevant Orders    Follow [facility] - PCP - Established    Primary hypertension I10    Relevant Orders    Follow [facility] - PCP - Established    Gastroesophageal reflux disease K21.9    Relevant Orders    Follow Up [facility] - PCP - Established    Stage 3b chronic kidney disease (CMS/HCC) 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.32    Relevant Orders    Comprehensive metabolic panel    CBC and Auto Differential    Follow Up [facility] - PCP - Established    Coronary artery disease involving native " coronary artery without angina pectoris I25.10    Relevant Orders    Follow Up [facility] - PCP - Established    Cholecystitis K81.9    Relevant Orders    Follow Up [facility] - PCP - Established    CKD stage G4/A1, 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 (CMS/[facility]) N18.4    Relevant Orders    Comprehensive metabolic panel    CBC and Auto Differential    Follow Up [facility] - PCP - Established    Chronic diastolic (congestive) heart failure (CMS/[facility]) I50.32    Relevant Orders    Follow Up [facility] - PCP - Established    Partial rectal prolapse K62.3    Relevant Orders    Follow [facility] - PCP - Established    Rectal prolapse K62.3    Relevant Orders    Coagulation Screen    Follow Up [facility] - PCP - Established    Protein-calorie malnutrition, unspecified severity (CMS/[facility]) E46    Relevant Orders    Follow Up [facility] - PCP - Established    Infrarenal abdominal aortic aneurysm, without rupture (CMS/[facility]) I71.43    Relevant Orders    Follow Up [facility] - PCP - Established     Other Visit Diagnoses         Codes    Pre-op exam     Z01.818    Relevant Orders    POCT UA Automated manually resulted (Completed)    XR chest 2 views    Coagulation Screen    Comprehensive metabolic panel    CBC and Auto Differential    Follow Up [facility] - PCP - Established    Ecchymosis     R58    Relevant Orders    Coagulation Screen    Follow Up [facility] - PCP - Established    Bruising     T14.8XXA    Relevant Orders    Coagulation Screen    Follow [facility] - PCP - Established         Medically cleared     Pt needs low volume IV fluids & CMP run in pre-op & post-op setting       Sees Dr. Johnson & Pj     Continue current medications and therapy for chronic medical conditions     BW now     Entresto in future     Start farxiga 10 mg daily for CKD  Pharmacy  referral      Fibercon qid     BP stable     BW next incl thyr index     Off work since 12/23    rectoplex on 2/23/24 with Dr. Johnson at  [facility]    Hold aspirin      Provider Attestation - Scribe documentation    All medical record entries made by the Scribe were at my direction and personally dictated by me. I have reviewed the chart and agree that the record accurately reflects my personal performance of the history, physical exam, discussion and plan.    Scribe Attestation  By signing my name below, I, Ace Dhillon MD   , Scribe   attest that this documentation has been prepared under the direction and in the presence of Ace Dhillon MD.

## 2024-02-20 PROCEDURE — 1090000001 HH PPS REVENUE CREDIT

## 2024-02-20 PROCEDURE — 1090000002 HH PPS REVENUE DEBIT

## 2024-02-21 ENCOUNTER — PREP FOR PROCEDURE (OUTPATIENT)
Dept: SURGERY | Facility: HOSPITAL | Age: 89
End: 2024-02-21
Payer: MEDICARE

## 2024-02-21 DIAGNOSIS — K62.3 RECTAL PROLAPSE: Primary | ICD-10-CM

## 2024-02-21 PROCEDURE — 1090000001 HH PPS REVENUE CREDIT

## 2024-02-21 PROCEDURE — 1090000002 HH PPS REVENUE DEBIT

## 2024-02-21 RX ORDER — SODIUM CHLORIDE, SODIUM LACTATE, POTASSIUM CHLORIDE, CALCIUM CHLORIDE 600; 310; 30; 20 MG/100ML; MG/100ML; MG/100ML; MG/100ML
100 INJECTION, SOLUTION INTRAVENOUS CONTINUOUS
Status: CANCELLED | OUTPATIENT
Start: 2024-02-21

## 2024-02-21 RX ORDER — METRONIDAZOLE 500 MG/100ML
500 INJECTION, SOLUTION INTRAVENOUS ONCE
Status: CANCELLED | OUTPATIENT
Start: 2024-02-21 | End: 2024-02-21

## 2024-02-21 RX ORDER — CIPROFLOXACIN 2 MG/ML
400 INJECTION, SOLUTION INTRAVENOUS ONCE
Status: CANCELLED | OUTPATIENT
Start: 2024-02-21 | End: 2024-02-21

## 2024-02-22 ENCOUNTER — HOME CARE VISIT (OUTPATIENT)
Dept: HOME HEALTH SERVICES | Facility: HOME HEALTH | Age: 89
End: 2024-02-22
Payer: MEDICARE

## 2024-02-22 VITALS
TEMPERATURE: 97.4 F | DIASTOLIC BLOOD PRESSURE: 76 MMHG | HEART RATE: 74 BPM | SYSTOLIC BLOOD PRESSURE: 142 MMHG | RESPIRATION RATE: 18 BRPM

## 2024-02-22 PROCEDURE — 1090000001 HH PPS REVENUE CREDIT

## 2024-02-22 PROCEDURE — 1090000002 HH PPS REVENUE DEBIT

## 2024-02-22 PROCEDURE — G0299 HHS/HOSPICE OF RN EA 15 MIN: HCPCS | Mod: HHH

## 2024-02-23 ENCOUNTER — ANESTHESIA (OUTPATIENT)
Dept: OPERATING ROOM | Facility: HOSPITAL | Age: 89
DRG: 330 | End: 2024-02-23
Payer: MEDICARE

## 2024-02-23 ENCOUNTER — HOSPITAL ENCOUNTER (INPATIENT)
Facility: HOSPITAL | Age: 89
LOS: 1 days | Discharge: HOME | DRG: 330 | End: 2024-02-24
Attending: STUDENT IN AN ORGANIZED HEALTH CARE EDUCATION/TRAINING PROGRAM | Admitting: STUDENT IN AN ORGANIZED HEALTH CARE EDUCATION/TRAINING PROGRAM
Payer: MEDICARE

## 2024-02-23 ENCOUNTER — ANESTHESIA EVENT (OUTPATIENT)
Dept: OPERATING ROOM | Facility: HOSPITAL | Age: 89
DRG: 330 | End: 2024-02-23
Payer: MEDICARE

## 2024-02-23 DIAGNOSIS — K81.9 CHOLECYSTITIS: ICD-10-CM

## 2024-02-23 DIAGNOSIS — K62.3 RECTAL PROLAPSE: Primary | ICD-10-CM

## 2024-02-23 LAB
ABO GROUP (TYPE) IN BLOOD: NORMAL
ANTIBODY SCREEN: NORMAL
GLUCOSE BLD MANUAL STRIP-MCNC: 117 MG/DL (ref 74–99)
GLUCOSE BLD MANUAL STRIP-MCNC: 123 MG/DL (ref 74–99)
INR PPP: 1.1 (ref 0.9–1.1)
PROTHROMBIN TIME: 11.9 SECONDS (ref 9.8–12.8)
RH FACTOR (ANTIGEN D): NORMAL

## 2024-02-23 PROCEDURE — 36415 COLL VENOUS BLD VENIPUNCTURE: CPT | Performed by: STUDENT IN AN ORGANIZED HEALTH CARE EDUCATION/TRAINING PROGRAM

## 2024-02-23 PROCEDURE — A45540 PR FIX RECTAL PROLAPSE,ABD APPRCH: Performed by: ANESTHESIOLOGY

## 2024-02-23 PROCEDURE — 88304 TISSUE EXAM BY PATHOLOGIST: CPT | Mod: TC,STJLAB | Performed by: STUDENT IN AN ORGANIZED HEALTH CARE EDUCATION/TRAINING PROGRAM

## 2024-02-23 PROCEDURE — 7100000001 HC RECOVERY ROOM TIME - INITIAL BASE CHARGE: Performed by: STUDENT IN AN ORGANIZED HEALTH CARE EDUCATION/TRAINING PROGRAM

## 2024-02-23 PROCEDURE — 2720000007 HC OR 272 NO HCPCS: Performed by: STUDENT IN AN ORGANIZED HEALTH CARE EDUCATION/TRAINING PROGRAM

## 2024-02-23 PROCEDURE — 88304 TISSUE EXAM BY PATHOLOGIST: CPT | Performed by: PATHOLOGY

## 2024-02-23 PROCEDURE — 86901 BLOOD TYPING SEROLOGIC RH(D): CPT | Performed by: STUDENT IN AN ORGANIZED HEALTH CARE EDUCATION/TRAINING PROGRAM

## 2024-02-23 PROCEDURE — 45540 CORRECT RECTAL PROLAPSE: CPT | Performed by: STUDENT IN AN ORGANIZED HEALTH CARE EDUCATION/TRAINING PROGRAM

## 2024-02-23 PROCEDURE — 2500000004 HC RX 250 GENERAL PHARMACY W/ HCPCS (ALT 636 FOR OP/ED): Performed by: SURGERY

## 2024-02-23 PROCEDURE — 3600000003 HC OR TIME - INITIAL BASE CHARGE - PROCEDURE LEVEL THREE: Performed by: STUDENT IN AN ORGANIZED HEALTH CARE EDUCATION/TRAINING PROGRAM

## 2024-02-23 PROCEDURE — 2500000004 HC RX 250 GENERAL PHARMACY W/ HCPCS (ALT 636 FOR OP/ED): Performed by: ANESTHESIOLOGIST ASSISTANT

## 2024-02-23 PROCEDURE — 3600000008 HC OR TIME - EACH INCREMENTAL 1 MINUTE - PROCEDURE LEVEL THREE: Performed by: STUDENT IN AN ORGANIZED HEALTH CARE EDUCATION/TRAINING PROGRAM

## 2024-02-23 PROCEDURE — 82947 ASSAY GLUCOSE BLOOD QUANT: CPT

## 2024-02-23 PROCEDURE — 76937 US GUIDE VASCULAR ACCESS: CPT | Performed by: ANESTHESIOLOGY

## 2024-02-23 PROCEDURE — 2500000004 HC RX 250 GENERAL PHARMACY W/ HCPCS (ALT 636 FOR OP/ED): Performed by: ANESTHESIOLOGY

## 2024-02-23 PROCEDURE — 2500000004 HC RX 250 GENERAL PHARMACY W/ HCPCS (ALT 636 FOR OP/ED): Performed by: STUDENT IN AN ORGANIZED HEALTH CARE EDUCATION/TRAINING PROGRAM

## 2024-02-23 PROCEDURE — 87086 URINE CULTURE/COLONY COUNT: CPT | Mod: STJLAB | Performed by: STUDENT IN AN ORGANIZED HEALTH CARE EDUCATION/TRAINING PROGRAM

## 2024-02-23 PROCEDURE — 2500000005 HC RX 250 GENERAL PHARMACY W/O HCPCS: Performed by: ANESTHESIOLOGIST ASSISTANT

## 2024-02-23 PROCEDURE — 2780000003 HC OR 278 NO HCPCS: Performed by: STUDENT IN AN ORGANIZED HEALTH CARE EDUCATION/TRAINING PROGRAM

## 2024-02-23 PROCEDURE — A45540 PR FIX RECTAL PROLAPSE,ABD APPRCH: Performed by: ANESTHESIOLOGIST ASSISTANT

## 2024-02-23 PROCEDURE — 0DSP4ZZ REPOSITION RECTUM, PERCUTANEOUS ENDOSCOPIC APPROACH: ICD-10-PCS | Performed by: STUDENT IN AN ORGANIZED HEALTH CARE EDUCATION/TRAINING PROGRAM

## 2024-02-23 PROCEDURE — 99100 ANES PT EXTEME AGE<1 YR&>70: CPT | Performed by: ANESTHESIOLOGY

## 2024-02-23 PROCEDURE — 1090000002 HH PPS REVENUE DEBIT

## 2024-02-23 PROCEDURE — 7100000002 HC RECOVERY ROOM TIME - EACH INCREMENTAL 1 MINUTE: Performed by: STUDENT IN AN ORGANIZED HEALTH CARE EDUCATION/TRAINING PROGRAM

## 2024-02-23 PROCEDURE — 47562 LAPAROSCOPIC CHOLECYSTECTOMY: CPT | Performed by: STUDENT IN AN ORGANIZED HEALTH CARE EDUCATION/TRAINING PROGRAM

## 2024-02-23 PROCEDURE — 2500000001 HC RX 250 WO HCPCS SELF ADMINISTERED DRUGS (ALT 637 FOR MEDICARE OP): Performed by: SURGERY

## 2024-02-23 PROCEDURE — 0FT44ZZ RESECTION OF GALLBLADDER, PERCUTANEOUS ENDOSCOPIC APPROACH: ICD-10-PCS | Performed by: STUDENT IN AN ORGANIZED HEALTH CARE EDUCATION/TRAINING PROGRAM

## 2024-02-23 PROCEDURE — 85610 PROTHROMBIN TIME: CPT | Performed by: STUDENT IN AN ORGANIZED HEALTH CARE EDUCATION/TRAINING PROGRAM

## 2024-02-23 PROCEDURE — 1090000001 HH PPS REVENUE CREDIT

## 2024-02-23 PROCEDURE — 36620 INSERTION CATHETER ARTERY: CPT | Performed by: ANESTHESIOLOGY

## 2024-02-23 PROCEDURE — 3700000002 HC GENERAL ANESTHESIA TIME - EACH INCREMENTAL 1 MINUTE: Performed by: STUDENT IN AN ORGANIZED HEALTH CARE EDUCATION/TRAINING PROGRAM

## 2024-02-23 PROCEDURE — 8E0W4CZ ROBOTIC ASSISTED PROCEDURE OF TRUNK REGION, PERCUTANEOUS ENDOSCOPIC APPROACH: ICD-10-PCS | Performed by: STUDENT IN AN ORGANIZED HEALTH CARE EDUCATION/TRAINING PROGRAM

## 2024-02-23 PROCEDURE — 3700000001 HC GENERAL ANESTHESIA TIME - INITIAL BASE CHARGE: Performed by: STUDENT IN AN ORGANIZED HEALTH CARE EDUCATION/TRAINING PROGRAM

## 2024-02-23 PROCEDURE — 1200000002 HC GENERAL ROOM WITH TELEMETRY DAILY

## 2024-02-23 RX ORDER — CIPROFLOXACIN 2 MG/ML
400 INJECTION, SOLUTION INTRAVENOUS ONCE
Status: COMPLETED | OUTPATIENT
Start: 2024-02-23 | End: 2024-02-23

## 2024-02-23 RX ORDER — SODIUM CHLORIDE, SODIUM LACTATE, POTASSIUM CHLORIDE, CALCIUM CHLORIDE 600; 310; 30; 20 MG/100ML; MG/100ML; MG/100ML; MG/100ML
100 INJECTION, SOLUTION INTRAVENOUS CONTINUOUS
Status: CANCELLED | OUTPATIENT
Start: 2024-02-23

## 2024-02-23 RX ORDER — LIDOCAINE HYDROCHLORIDE 10 MG/ML
0.1 INJECTION, SOLUTION EPIDURAL; INFILTRATION; INTRACAUDAL; PERINEURAL ONCE
Status: CANCELLED | OUTPATIENT
Start: 2024-02-23 | End: 2024-02-23

## 2024-02-23 RX ORDER — DIPHENHYDRAMINE HYDROCHLORIDE 50 MG/ML
12.5 INJECTION INTRAMUSCULAR; INTRAVENOUS ONCE AS NEEDED
Status: DISCONTINUED | OUTPATIENT
Start: 2024-02-23 | End: 2024-02-23 | Stop reason: HOSPADM

## 2024-02-23 RX ORDER — OXYCODONE HYDROCHLORIDE 5 MG/1
5 TABLET ORAL EVERY 4 HOURS PRN
Status: CANCELLED | OUTPATIENT
Start: 2024-02-23

## 2024-02-23 RX ORDER — HYDROMORPHONE HYDROCHLORIDE 1 MG/ML
0.2 INJECTION, SOLUTION INTRAMUSCULAR; INTRAVENOUS; SUBCUTANEOUS EVERY 5 MIN PRN
Status: DISCONTINUED | OUTPATIENT
Start: 2024-02-23 | End: 2024-02-23 | Stop reason: HOSPADM

## 2024-02-23 RX ORDER — ACETAMINOPHEN 500 MG
1000 TABLET ORAL EVERY 6 HOURS PRN
COMMUNITY

## 2024-02-23 RX ORDER — FAMOTIDINE 10 MG/ML
20 INJECTION INTRAVENOUS ONCE
Status: DISCONTINUED | OUTPATIENT
Start: 2024-02-23 | End: 2024-02-23 | Stop reason: HOSPADM

## 2024-02-23 RX ORDER — OXYCODONE AND ACETAMINOPHEN 5; 325 MG/1; MG/1
1 TABLET ORAL EVERY 6 HOURS PRN
Qty: 5 TABLET | Refills: 0 | Status: SHIPPED | OUTPATIENT
Start: 2024-02-23 | End: 2024-02-24 | Stop reason: HOSPADM

## 2024-02-23 RX ORDER — ATORVASTATIN CALCIUM 20 MG/1
20 TABLET, FILM COATED ORAL NIGHTLY
Status: DISCONTINUED | OUTPATIENT
Start: 2024-02-24 | End: 2024-02-24 | Stop reason: HOSPADM

## 2024-02-23 RX ORDER — FENTANYL CITRATE 50 UG/ML
INJECTION, SOLUTION INTRAMUSCULAR; INTRAVENOUS AS NEEDED
Status: DISCONTINUED | OUTPATIENT
Start: 2024-02-23 | End: 2024-02-23

## 2024-02-23 RX ORDER — HYDRALAZINE HYDROCHLORIDE 20 MG/ML
5 INJECTION INTRAMUSCULAR; INTRAVENOUS EVERY 30 MIN PRN
Status: DISCONTINUED | OUTPATIENT
Start: 2024-02-23 | End: 2024-02-23 | Stop reason: HOSPADM

## 2024-02-23 RX ORDER — ALBUTEROL SULFATE 0.83 MG/ML
2.5 SOLUTION RESPIRATORY (INHALATION) ONCE AS NEEDED
Status: CANCELLED | OUTPATIENT
Start: 2024-02-23

## 2024-02-23 RX ORDER — OXYCODONE HYDROCHLORIDE 5 MG/1
5 TABLET ORAL EVERY 4 HOURS PRN
Status: DISCONTINUED | OUTPATIENT
Start: 2024-02-23 | End: 2024-02-23 | Stop reason: HOSPADM

## 2024-02-23 RX ORDER — DEXTROSE MONOHYDRATE 100 MG/ML
0.3 INJECTION, SOLUTION INTRAVENOUS ONCE AS NEEDED
Status: DISCONTINUED | OUTPATIENT
Start: 2024-02-23 | End: 2024-02-23 | Stop reason: SDUPTHER

## 2024-02-23 RX ORDER — LABETALOL HYDROCHLORIDE 5 MG/ML
5 INJECTION, SOLUTION INTRAVENOUS ONCE AS NEEDED
Status: DISCONTINUED | OUTPATIENT
Start: 2024-02-23 | End: 2024-02-23 | Stop reason: HOSPADM

## 2024-02-23 RX ORDER — LIDOCAINE HYDROCHLORIDE 10 MG/ML
0.1 INJECTION, SOLUTION EPIDURAL; INFILTRATION; INTRACAUDAL; PERINEURAL ONCE
Status: DISCONTINUED | OUTPATIENT
Start: 2024-02-23 | End: 2024-02-23 | Stop reason: HOSPADM

## 2024-02-23 RX ORDER — MEPERIDINE HYDROCHLORIDE 50 MG/ML
12.5 INJECTION INTRAMUSCULAR; INTRAVENOUS; SUBCUTANEOUS EVERY 10 MIN PRN
Status: DISCONTINUED | OUTPATIENT
Start: 2024-02-23 | End: 2024-02-23 | Stop reason: HOSPADM

## 2024-02-23 RX ORDER — HYDROMORPHONE HYDROCHLORIDE 1 MG/ML
0.5 INJECTION, SOLUTION INTRAMUSCULAR; INTRAVENOUS; SUBCUTANEOUS EVERY 5 MIN PRN
Status: CANCELLED | OUTPATIENT
Start: 2024-02-23

## 2024-02-23 RX ORDER — GLYCOPYRROLATE 0.2 MG/ML
INJECTION INTRAMUSCULAR; INTRAVENOUS AS NEEDED
Status: DISCONTINUED | OUTPATIENT
Start: 2024-02-23 | End: 2024-02-23

## 2024-02-23 RX ORDER — METHIMAZOLE 5 MG/1
5 TABLET ORAL DAILY
Status: DISCONTINUED | OUTPATIENT
Start: 2024-02-24 | End: 2024-02-24 | Stop reason: HOSPADM

## 2024-02-23 RX ORDER — METOCLOPRAMIDE HYDROCHLORIDE 5 MG/ML
10 INJECTION INTRAMUSCULAR; INTRAVENOUS ONCE AS NEEDED
Status: DISCONTINUED | OUTPATIENT
Start: 2024-02-23 | End: 2024-02-23 | Stop reason: HOSPADM

## 2024-02-23 RX ORDER — ROCURONIUM BROMIDE 50 MG/5 ML
SYRINGE (ML) INTRAVENOUS AS NEEDED
Status: DISCONTINUED | OUTPATIENT
Start: 2024-02-23 | End: 2024-02-23

## 2024-02-23 RX ORDER — SODIUM CHLORIDE, SODIUM LACTATE, POTASSIUM CHLORIDE, CALCIUM CHLORIDE 600; 310; 30; 20 MG/100ML; MG/100ML; MG/100ML; MG/100ML
40 INJECTION, SOLUTION INTRAVENOUS CONTINUOUS
Status: DISCONTINUED | OUTPATIENT
Start: 2024-02-23 | End: 2024-02-24

## 2024-02-23 RX ORDER — OXYCODONE HYDROCHLORIDE 10 MG/1
10 TABLET ORAL EVERY 4 HOURS PRN
Status: DISCONTINUED | OUTPATIENT
Start: 2024-02-23 | End: 2024-02-23 | Stop reason: HOSPADM

## 2024-02-23 RX ORDER — HYDROMORPHONE HYDROCHLORIDE 1 MG/ML
0.5 INJECTION, SOLUTION INTRAMUSCULAR; INTRAVENOUS; SUBCUTANEOUS EVERY 5 MIN PRN
Status: DISCONTINUED | OUTPATIENT
Start: 2024-02-23 | End: 2024-02-23 | Stop reason: HOSPADM

## 2024-02-23 RX ORDER — SODIUM CHLORIDE, SODIUM LACTATE, POTASSIUM CHLORIDE, CALCIUM CHLORIDE 600; 310; 30; 20 MG/100ML; MG/100ML; MG/100ML; MG/100ML
100 INJECTION, SOLUTION INTRAVENOUS CONTINUOUS
Status: DISCONTINUED | OUTPATIENT
Start: 2024-02-23 | End: 2024-02-23 | Stop reason: HOSPADM

## 2024-02-23 RX ORDER — ONDANSETRON HYDROCHLORIDE 2 MG/ML
INJECTION, SOLUTION INTRAVENOUS AS NEEDED
Status: DISCONTINUED | OUTPATIENT
Start: 2024-02-23 | End: 2024-02-23

## 2024-02-23 RX ORDER — MIDAZOLAM HYDROCHLORIDE 1 MG/ML
1 INJECTION, SOLUTION INTRAMUSCULAR; INTRAVENOUS ONCE AS NEEDED
Status: CANCELLED | OUTPATIENT
Start: 2024-02-23

## 2024-02-23 RX ORDER — SODIUM CHLORIDE, SODIUM LACTATE, POTASSIUM CHLORIDE, CALCIUM CHLORIDE 600; 310; 30; 20 MG/100ML; MG/100ML; MG/100ML; MG/100ML
100 INJECTION, SOLUTION INTRAVENOUS CONTINUOUS
Status: DISCONTINUED | OUTPATIENT
Start: 2024-02-23 | End: 2024-02-23

## 2024-02-23 RX ORDER — ONDANSETRON HYDROCHLORIDE 2 MG/ML
4 INJECTION, SOLUTION INTRAVENOUS ONCE AS NEEDED
Status: CANCELLED | OUTPATIENT
Start: 2024-02-23

## 2024-02-23 RX ORDER — INSULIN LISPRO 100 [IU]/ML
0-5 INJECTION, SOLUTION INTRAVENOUS; SUBCUTANEOUS
Status: DISCONTINUED | OUTPATIENT
Start: 2024-02-23 | End: 2024-02-24 | Stop reason: HOSPADM

## 2024-02-23 RX ORDER — OXYCODONE HYDROCHLORIDE 10 MG/1
10 TABLET ORAL EVERY 4 HOURS PRN
Status: DISCONTINUED | OUTPATIENT
Start: 2024-02-23 | End: 2024-02-24 | Stop reason: HOSPADM

## 2024-02-23 RX ORDER — DEXAMETHASONE SODIUM PHOSPHATE 10 MG/ML
INJECTION INTRAMUSCULAR; INTRAVENOUS AS NEEDED
Status: DISCONTINUED | OUTPATIENT
Start: 2024-02-23 | End: 2024-02-23

## 2024-02-23 RX ORDER — AMLODIPINE BESYLATE 10 MG/1
10 TABLET ORAL
Status: DISCONTINUED | OUTPATIENT
Start: 2024-02-24 | End: 2024-02-24 | Stop reason: HOSPADM

## 2024-02-23 RX ORDER — ONDANSETRON HYDROCHLORIDE 2 MG/ML
4 INJECTION, SOLUTION INTRAVENOUS EVERY 8 HOURS PRN
Status: DISCONTINUED | OUTPATIENT
Start: 2024-02-23 | End: 2024-02-24 | Stop reason: HOSPADM

## 2024-02-23 RX ORDER — ALBUTEROL SULFATE 0.83 MG/ML
2.5 SOLUTION RESPIRATORY (INHALATION) ONCE AS NEEDED
Status: DISCONTINUED | OUTPATIENT
Start: 2024-02-23 | End: 2024-02-23 | Stop reason: HOSPADM

## 2024-02-23 RX ORDER — LIDOCAINE HYDROCHLORIDE 20 MG/ML
INJECTION, SOLUTION INFILTRATION; PERINEURAL AS NEEDED
Status: DISCONTINUED | OUTPATIENT
Start: 2024-02-23 | End: 2024-02-23

## 2024-02-23 RX ORDER — HEPARIN SODIUM 5000 [USP'U]/ML
5000 INJECTION, SOLUTION INTRAVENOUS; SUBCUTANEOUS EVERY 12 HOURS
Status: DISCONTINUED | OUTPATIENT
Start: 2024-02-23 | End: 2024-02-24 | Stop reason: HOSPADM

## 2024-02-23 RX ORDER — FENTANYL CITRATE 50 UG/ML
25 INJECTION, SOLUTION INTRAMUSCULAR; INTRAVENOUS EVERY 5 MIN PRN
Status: DISCONTINUED | OUTPATIENT
Start: 2024-02-23 | End: 2024-02-23 | Stop reason: HOSPADM

## 2024-02-23 RX ORDER — ONDANSETRON 4 MG/1
4 TABLET, ORALLY DISINTEGRATING ORAL EVERY 8 HOURS PRN
Status: DISCONTINUED | OUTPATIENT
Start: 2024-02-23 | End: 2024-02-24 | Stop reason: HOSPADM

## 2024-02-23 RX ORDER — PROPOFOL 10 MG/ML
INJECTION, EMULSION INTRAVENOUS CONTINUOUS PRN
Status: DISCONTINUED | OUTPATIENT
Start: 2024-02-23 | End: 2024-02-23

## 2024-02-23 RX ORDER — ONDANSETRON HYDROCHLORIDE 2 MG/ML
4 INJECTION, SOLUTION INTRAVENOUS ONCE AS NEEDED
Status: COMPLETED | OUTPATIENT
Start: 2024-02-23 | End: 2024-02-23

## 2024-02-23 RX ORDER — LABETALOL HYDROCHLORIDE 5 MG/ML
5 INJECTION, SOLUTION INTRAVENOUS ONCE AS NEEDED
Status: CANCELLED | OUTPATIENT
Start: 2024-02-23

## 2024-02-23 RX ORDER — PHENYLEPHRINE 10 MG/250 ML(40 MCG/ML)IN 0.9 % SOD.CHLORIDE INTRAVENOUS
CONTINUOUS PRN
Status: DISCONTINUED | OUTPATIENT
Start: 2024-02-23 | End: 2024-02-23

## 2024-02-23 RX ORDER — ACEBUTOLOL HYDROCHLORIDE 200 MG/1
200 CAPSULE ORAL 2 TIMES DAILY
Status: DISCONTINUED | OUTPATIENT
Start: 2024-02-23 | End: 2024-02-24 | Stop reason: HOSPADM

## 2024-02-23 RX ORDER — PANTOPRAZOLE SODIUM 40 MG/1
40 TABLET, DELAYED RELEASE ORAL DAILY
Status: DISCONTINUED | OUTPATIENT
Start: 2024-02-24 | End: 2024-02-24 | Stop reason: HOSPADM

## 2024-02-23 RX ORDER — VALSARTAN AND HYDROCHLOROTHIAZIDE 320; 25 MG/1; MG/1
1 TABLET, FILM COATED ORAL DAILY
Status: DISCONTINUED | OUTPATIENT
Start: 2024-02-24 | End: 2024-02-23 | Stop reason: ALTCHOICE

## 2024-02-23 RX ORDER — HYDROMORPHONE HYDROCHLORIDE 1 MG/ML
INJECTION, SOLUTION INTRAMUSCULAR; INTRAVENOUS; SUBCUTANEOUS AS NEEDED
Status: DISCONTINUED | OUTPATIENT
Start: 2024-02-23 | End: 2024-02-23

## 2024-02-23 RX ORDER — DEXTROSE 50 % IN WATER (D50W) INTRAVENOUS SYRINGE
25
Status: DISCONTINUED | OUTPATIENT
Start: 2024-02-23 | End: 2024-02-23 | Stop reason: SDUPTHER

## 2024-02-23 RX ORDER — PROPOFOL 10 MG/ML
INJECTION, EMULSION INTRAVENOUS AS NEEDED
Status: DISCONTINUED | OUTPATIENT
Start: 2024-02-23 | End: 2024-02-23

## 2024-02-23 RX ORDER — NALOXONE HYDROCHLORIDE 0.4 MG/ML
0.2 INJECTION, SOLUTION INTRAMUSCULAR; INTRAVENOUS; SUBCUTANEOUS EVERY 5 MIN PRN
Status: DISCONTINUED | OUTPATIENT
Start: 2024-02-23 | End: 2024-02-24 | Stop reason: HOSPADM

## 2024-02-23 RX ORDER — FAMOTIDINE 20 MG/1
20 TABLET, FILM COATED ORAL NIGHTLY
Status: DISCONTINUED | OUTPATIENT
Start: 2024-02-23 | End: 2024-02-24 | Stop reason: HOSPADM

## 2024-02-23 RX ORDER — BUPIVACAINE HYDROCHLORIDE 5 MG/ML
INJECTION, SOLUTION EPIDURAL; INTRACAUDAL AS NEEDED
Status: DISCONTINUED | OUTPATIENT
Start: 2024-02-23 | End: 2024-02-23 | Stop reason: HOSPADM

## 2024-02-23 RX ORDER — ACETAMINOPHEN 325 MG/1
650 TABLET ORAL EVERY 6 HOURS
Status: DISCONTINUED | OUTPATIENT
Start: 2024-02-23 | End: 2024-02-24 | Stop reason: HOSPADM

## 2024-02-23 RX ORDER — METRONIDAZOLE 500 MG/100ML
500 INJECTION, SOLUTION INTRAVENOUS ONCE
Status: COMPLETED | OUTPATIENT
Start: 2024-02-23 | End: 2024-02-23

## 2024-02-23 RX ORDER — SODIUM CHLORIDE, SODIUM GLUCONATE, SODIUM ACETATE, POTASSIUM CHLORIDE AND MAGNESIUM CHLORIDE 30; 37; 368; 526; 502 MG/100ML; MG/100ML; MG/100ML; MG/100ML; MG/100ML
INJECTION, SOLUTION INTRAVENOUS CONTINUOUS PRN
Status: DISCONTINUED | OUTPATIENT
Start: 2024-02-23 | End: 2024-02-23

## 2024-02-23 RX ORDER — DEXTROSE MONOHYDRATE 100 MG/ML
0.3 INJECTION, SOLUTION INTRAVENOUS ONCE AS NEEDED
Status: DISCONTINUED | OUTPATIENT
Start: 2024-02-23 | End: 2024-02-24 | Stop reason: HOSPADM

## 2024-02-23 RX ORDER — MEPERIDINE HYDROCHLORIDE 50 MG/ML
12.5 INJECTION INTRAMUSCULAR; INTRAVENOUS; SUBCUTANEOUS EVERY 10 MIN PRN
Status: CANCELLED | OUTPATIENT
Start: 2024-02-23

## 2024-02-23 RX ORDER — DEXTROSE 50 % IN WATER (D50W) INTRAVENOUS SYRINGE
25
Status: DISCONTINUED | OUTPATIENT
Start: 2024-02-23 | End: 2024-02-24 | Stop reason: HOSPADM

## 2024-02-23 RX ORDER — PHENYLEPHRINE HCL IN 0.9% NACL 1 MG/10 ML
SYRINGE (ML) INTRAVENOUS AS NEEDED
Status: DISCONTINUED | OUTPATIENT
Start: 2024-02-23 | End: 2024-02-23

## 2024-02-23 RX ORDER — OXYCODONE HYDROCHLORIDE 5 MG/1
5 TABLET ORAL EVERY 4 HOURS PRN
Status: DISCONTINUED | OUTPATIENT
Start: 2024-02-23 | End: 2024-02-24 | Stop reason: HOSPADM

## 2024-02-23 RX ORDER — NORETHINDRONE AND ETHINYL ESTRADIOL 0.5-0.035
KIT ORAL AS NEEDED
Status: DISCONTINUED | OUTPATIENT
Start: 2024-02-23 | End: 2024-02-23

## 2024-02-23 RX ORDER — ACETAMINOPHEN 325 MG/1
975 TABLET ORAL ONCE
Status: DISCONTINUED | OUTPATIENT
Start: 2024-02-23 | End: 2024-02-23 | Stop reason: HOSPADM

## 2024-02-23 RX ORDER — DOCUSATE SODIUM 100 MG/1
100 CAPSULE, LIQUID FILLED ORAL 2 TIMES DAILY
Qty: 28 CAPSULE | Refills: 0 | Status: SHIPPED | OUTPATIENT
Start: 2024-02-23 | End: 2024-03-08

## 2024-02-23 RX ADMIN — FAMOTIDINE 20 MG: 20 TABLET, FILM COATED ORAL at 20:06

## 2024-02-23 RX ADMIN — Medication 100 MCG: at 08:22

## 2024-02-23 RX ADMIN — Medication 0.53 MCG/KG/MIN: at 08:07

## 2024-02-23 RX ADMIN — SODIUM CHLORIDE, POTASSIUM CHLORIDE, SODIUM LACTATE AND CALCIUM CHLORIDE 40 ML/HR: 600; 310; 30; 20 INJECTION, SOLUTION INTRAVENOUS at 16:06

## 2024-02-23 RX ADMIN — PROPOFOL 100 MG: 10 INJECTION, EMULSION INTRAVENOUS at 07:42

## 2024-02-23 RX ADMIN — HYDROMORPHONE HYDROCHLORIDE 0.25 MG: 1 INJECTION, SOLUTION INTRAMUSCULAR; INTRAVENOUS; SUBCUTANEOUS at 12:31

## 2024-02-23 RX ADMIN — FENTANYL CITRATE 25 MCG: 50 INJECTION, SOLUTION INTRAMUSCULAR; INTRAVENOUS at 09:46

## 2024-02-23 RX ADMIN — SODIUM CHLORIDE, SODIUM GLUCONATE, SODIUM ACETATE, POTASSIUM CHLORIDE AND MAGNESIUM CHLORIDE: 526; 502; 368; 37; 30 INJECTION, SOLUTION INTRAVENOUS at 11:37

## 2024-02-23 RX ADMIN — HYDROMORPHONE HYDROCHLORIDE 0.25 MG: 1 INJECTION, SOLUTION INTRAMUSCULAR; INTRAVENOUS; SUBCUTANEOUS at 13:03

## 2024-02-23 RX ADMIN — OXYCODONE HYDROCHLORIDE 10 MG: 10 TABLET ORAL at 16:04

## 2024-02-23 RX ADMIN — SODIUM CHLORIDE, POTASSIUM CHLORIDE, SODIUM LACTATE AND CALCIUM CHLORIDE: 600; 310; 30; 20 INJECTION, SOLUTION INTRAVENOUS at 07:35

## 2024-02-23 RX ADMIN — SUGAMMADEX 200 MG: 100 INJECTION, SOLUTION INTRAVENOUS at 12:50

## 2024-02-23 RX ADMIN — FENTANYL CITRATE 50 MCG: 50 INJECTION, SOLUTION INTRAMUSCULAR; INTRAVENOUS at 07:42

## 2024-02-23 RX ADMIN — HYDROMORPHONE HYDROCHLORIDE 0.25 MG: 1 INJECTION, SOLUTION INTRAMUSCULAR; INTRAVENOUS; SUBCUTANEOUS at 11:31

## 2024-02-23 RX ADMIN — SODIUM CHLORIDE, SODIUM GLUCONATE, SODIUM ACETATE, POTASSIUM CHLORIDE AND MAGNESIUM CHLORIDE: 526; 502; 368; 37; 30 INJECTION, SOLUTION INTRAVENOUS at 07:55

## 2024-02-23 RX ADMIN — FENTANYL CITRATE 25 MCG: 50 INJECTION, SOLUTION INTRAMUSCULAR; INTRAVENOUS at 14:39

## 2024-02-23 RX ADMIN — Medication 200 MCG: at 08:23

## 2024-02-23 RX ADMIN — SODIUM CHLORIDE, POTASSIUM CHLORIDE, SODIUM LACTATE AND CALCIUM CHLORIDE: 600; 310; 30; 20 INJECTION, SOLUTION INTRAVENOUS at 10:53

## 2024-02-23 RX ADMIN — Medication 100 MCG: at 11:32

## 2024-02-23 RX ADMIN — HYDROMORPHONE HYDROCHLORIDE 0.2 MG: 1 INJECTION, SOLUTION INTRAMUSCULAR; INTRAVENOUS; SUBCUTANEOUS at 13:39

## 2024-02-23 RX ADMIN — DEXAMETHASONE SODIUM PHOSPHATE 10 MG: 10 INJECTION, SOLUTION INTRAMUSCULAR; INTRAVENOUS at 08:15

## 2024-02-23 RX ADMIN — LIDOCAINE HYDROCHLORIDE 60 MG: 20 INJECTION, SOLUTION INFILTRATION; PERINEURAL at 07:42

## 2024-02-23 RX ADMIN — HEPARIN SODIUM 5000 UNITS: 5000 INJECTION INTRAVENOUS; SUBCUTANEOUS at 20:06

## 2024-02-23 RX ADMIN — Medication 100 MCG: at 11:35

## 2024-02-23 RX ADMIN — ACEBUTOLOL HYDROCHLORIDE 200 MG: 200 CAPSULE ORAL at 20:06

## 2024-02-23 RX ADMIN — CIPROFLOXACIN: 400 INJECTION, SOLUTION INTRAVENOUS at 07:45

## 2024-02-23 RX ADMIN — ONDANSETRON 4 MG: 2 INJECTION INTRAMUSCULAR; INTRAVENOUS at 13:38

## 2024-02-23 RX ADMIN — ACETAMINOPHEN 650 MG: 325 TABLET ORAL at 16:05

## 2024-02-23 RX ADMIN — GLYCOPYRROLATE 0.2 MG: 0.2 INJECTION, SOLUTION INTRAMUSCULAR; INTRAVENOUS at 08:15

## 2024-02-23 RX ADMIN — HYDROMORPHONE HYDROCHLORIDE 0.2 MG: 1 INJECTION, SOLUTION INTRAMUSCULAR; INTRAVENOUS; SUBCUTANEOUS at 13:57

## 2024-02-23 RX ADMIN — Medication 20 MG: at 10:14

## 2024-02-23 RX ADMIN — PROPOFOL 50 MCG/KG/MIN: 10 INJECTION, EMULSION INTRAVENOUS at 08:00

## 2024-02-23 RX ADMIN — Medication 30 MG: at 08:15

## 2024-02-23 RX ADMIN — OXYCODONE HYDROCHLORIDE 10 MG: 10 TABLET ORAL at 20:05

## 2024-02-23 RX ADMIN — Medication 20 MG: at 08:57

## 2024-02-23 RX ADMIN — METRONIDAZOLE 500 MG: 500 INJECTION, SOLUTION INTRAVENOUS at 08:06

## 2024-02-23 RX ADMIN — EPHEDRINE SULFATE 5 MG: 50 INJECTION, SOLUTION INTRAVENOUS at 11:33

## 2024-02-23 RX ADMIN — SODIUM CHLORIDE, POTASSIUM CHLORIDE, SODIUM LACTATE AND CALCIUM CHLORIDE 100 ML/HR: 600; 310; 30; 20 INJECTION, SOLUTION INTRAVENOUS at 07:10

## 2024-02-23 RX ADMIN — ACETAMINOPHEN 650 MG: 325 TABLET ORAL at 22:00

## 2024-02-23 RX ADMIN — Medication 20 MG: at 11:13

## 2024-02-23 RX ADMIN — Medication 50 MG: at 07:42

## 2024-02-23 RX ADMIN — FENTANYL CITRATE 25 MCG: 50 INJECTION, SOLUTION INTRAMUSCULAR; INTRAVENOUS at 08:19

## 2024-02-23 RX ADMIN — ONDANSETRON 4 MG: 2 INJECTION INTRAMUSCULAR; INTRAVENOUS at 12:38

## 2024-02-23 RX ADMIN — Medication 10 MG: at 12:20

## 2024-02-23 RX ADMIN — HYDROMORPHONE HYDROCHLORIDE 0.25 MG: 1 INJECTION, SOLUTION INTRAMUSCULAR; INTRAVENOUS; SUBCUTANEOUS at 11:04

## 2024-02-23 SDOH — SOCIAL STABILITY: SOCIAL INSECURITY: DO YOU FEEL UNSAFE GOING BACK TO THE PLACE WHERE YOU ARE LIVING?: NO

## 2024-02-23 SDOH — SOCIAL STABILITY: SOCIAL INSECURITY: DOES ANYONE TRY TO KEEP YOU FROM HAVING/CONTACTING OTHER FRIENDS OR DOING THINGS OUTSIDE YOUR HOME?: NO

## 2024-02-23 SDOH — SOCIAL STABILITY: SOCIAL INSECURITY: WERE YOU ABLE TO COMPLETE ALL THE BEHAVIORAL HEALTH SCREENINGS?: YES

## 2024-02-23 SDOH — SOCIAL STABILITY: SOCIAL INSECURITY: HAS ANYONE EVER THREATENED TO HURT YOUR FAMILY OR YOUR PETS?: NO

## 2024-02-23 SDOH — SOCIAL STABILITY: SOCIAL INSECURITY: DO YOU FEEL ANYONE HAS EXPLOITED OR TAKEN ADVANTAGE OF YOU FINANCIALLY OR OF YOUR PERSONAL PROPERTY?: NO

## 2024-02-23 SDOH — SOCIAL STABILITY: SOCIAL INSECURITY: ABUSE: ADULT

## 2024-02-23 SDOH — SOCIAL STABILITY: SOCIAL INSECURITY: ARE YOU OR HAVE YOU BEEN THREATENED OR ABUSED PHYSICALLY, EMOTIONALLY, OR SEXUALLY BY ANYONE?: NO

## 2024-02-23 SDOH — SOCIAL STABILITY: SOCIAL INSECURITY: HAVE YOU HAD THOUGHTS OF HARMING ANYONE ELSE?: NO

## 2024-02-23 SDOH — SOCIAL STABILITY: SOCIAL INSECURITY: ARE THERE ANY APPARENT SIGNS OF INJURIES/BEHAVIORS THAT COULD BE RELATED TO ABUSE/NEGLECT?: NO

## 2024-02-23 ASSESSMENT — ENCOUNTER SYMPTOMS
NAUSEA: 1
PERSON REPORTING PAIN: PATIENT
PAIN LOCATION: ABDOMEN
PAIN: 1
APPETITE LEVEL: POOR
PAIN LOCATION - PAIN SEVERITY: 6/10
VOMITING: 1

## 2024-02-23 ASSESSMENT — PAIN - FUNCTIONAL ASSESSMENT
PAIN_FUNCTIONAL_ASSESSMENT: 0-10

## 2024-02-23 ASSESSMENT — COGNITIVE AND FUNCTIONAL STATUS - GENERAL
DAILY ACTIVITIY SCORE: 22
TOILETING: A LITTLE
DAILY ACTIVITIY SCORE: 22
MOBILITY SCORE: 20
CLIMB 3 TO 5 STEPS WITH RAILING: A LITTLE
PATIENT BASELINE BEDBOUND: NO
MOVING TO AND FROM BED TO CHAIR: A LITTLE
STANDING UP FROM CHAIR USING ARMS: A LITTLE
WALKING IN HOSPITAL ROOM: A LITTLE
HELP NEEDED FOR BATHING: A LITTLE
HELP NEEDED FOR BATHING: A LITTLE
TOILETING: A LITTLE
STANDING UP FROM CHAIR USING ARMS: A LITTLE
MOVING TO AND FROM BED TO CHAIR: A LITTLE
MOBILITY SCORE: 20
CLIMB 3 TO 5 STEPS WITH RAILING: A LITTLE
WALKING IN HOSPITAL ROOM: A LITTLE

## 2024-02-23 ASSESSMENT — LIFESTYLE VARIABLES
SKIP TO QUESTIONS 9-10: 1
HOW OFTEN DO YOU HAVE 6 OR MORE DRINKS ON ONE OCCASION: NEVER
AUDIT-C TOTAL SCORE: 1
HOW MANY STANDARD DRINKS CONTAINING ALCOHOL DO YOU HAVE ON A TYPICAL DAY: 1 OR 2
AUDIT-C TOTAL SCORE: 1
HOW OFTEN DO YOU HAVE A DRINK CONTAINING ALCOHOL: MONTHLY OR LESS

## 2024-02-23 ASSESSMENT — ACTIVITIES OF DAILY LIVING (ADL)
TOILETING: INDEPENDENT
HEARING - RIGHT EAR: FUNCTIONAL
DRESSING YOURSELF: INDEPENDENT
BATHING: INDEPENDENT
WALKS IN HOME: NEEDS ASSISTANCE
LACK_OF_TRANSPORTATION: NO
ADEQUATE_TO_COMPLETE_ADL: YES
FEEDING YOURSELF: INDEPENDENT
JUDGMENT_ADEQUATE_SAFELY_COMPLETE_DAILY_ACTIVITIES: YES
GROOMING: INDEPENDENT
PATIENT'S MEMORY ADEQUATE TO SAFELY COMPLETE DAILY ACTIVITIES?: YES
HEARING - LEFT EAR: FUNCTIONAL

## 2024-02-23 ASSESSMENT — PAIN SCALES - GENERAL
PAINLEVEL_OUTOF10: 6
PAINLEVEL_OUTOF10: 4
PAINLEVEL_OUTOF10: 10 - WORST POSSIBLE PAIN
PAINLEVEL_OUTOF10: 8
PAINLEVEL_OUTOF10: 6
PAINLEVEL_OUTOF10: 7
PAINLEVEL_OUTOF10: 3
PAINLEVEL_OUTOF10: 5 - MODERATE PAIN
PAINLEVEL_OUTOF10: 3
PAIN_LEVEL: 0
PAINLEVEL_OUTOF10: 4
PAINLEVEL_OUTOF10: 6

## 2024-02-23 ASSESSMENT — PATIENT HEALTH QUESTIONNAIRE - PHQ9
1. LITTLE INTEREST OR PLEASURE IN DOING THINGS: NOT AT ALL
2. FEELING DOWN, DEPRESSED OR HOPELESS: NOT AT ALL
SUM OF ALL RESPONSES TO PHQ9 QUESTIONS 1 & 2: 0

## 2024-02-23 ASSESSMENT — PAIN DESCRIPTION - DESCRIPTORS: DESCRIPTORS: ACHING

## 2024-02-23 ASSESSMENT — PAIN DESCRIPTION - LOCATION: LOCATION: ABDOMEN

## 2024-02-23 NOTE — ANESTHESIA PROCEDURE NOTES
Arterial Line:    Date/Time: 2/23/2024 7:53 AM    Staffing  Performed: MISSAEL and CAA   Authorized by: Enoc Killian DO    Performed by: EMMA Mendez    An arterial line was placed. Procedure performed using surface landmarks.in the OR for the following indication(s): continuous blood pressure monitoring.    A 20 gauge (size), 1 and 3/4 inch (length), Arrow (type) catheter was placed into the Left radial artery, secured by Tegadesophia   Seldinger technique used.

## 2024-02-23 NOTE — OP NOTE
ROBOTIC RECTOPEXY, Cholecystectomy Laparoscopy Operative Note     Date: 2024  OR Location: [facility] OR    Name: Maggy Gore YOB: 1935, Age: 88 y.o., MRN: [id number], Sex: female    Diagnosis  Pre-op Diagnosis     * Rectal prolapse [K62.3]     * Cholecystitis [K81.9] Post-op Diagnosis     * Rectal prolapse [K62.3]     * Cholecystitis [K81.9]     Procedures  Robotic-assisted laparoscopic suture rectopexy  Laparoscopic cholecystectomy    Surgeons      * Sj Johnson - Primary    Resident/Fellow/Other Assistant:  Surgeon(s) and Role:  Nandini Duckworth MD - Fellow    Procedure Summary  Anesthesia: General  ASA: III  Anesthesia Staff: Anesthesiologist: Enoc Killian DO; Wicho Barry DO  C-AA: EMMA Mendez  Estimated Blood Loss: 15mL  Intra-op Medications:   Administrations occurring from 0730 to 1300 on 24:   Medication Name Total Dose   bupivacaine PF (Marcaine) 0.5 % (5 mg/mL) injection 11 mL   lactated Ringer's infusion Cannot be calculated              Anesthesia Record               Intraprocedure I/O Totals          Intake    electrolyte-A (Plasmalyte-A) 1400.00 mL    Propofol Drip 0.00 mL    The total shown is the total volume documented since Anesthesia Start was filed.    Phenylephrine Drip 0.00 mL    The total shown is the total volume documented since Anesthesia Start was filed.    lactated Ringer's infusion 1500.00 mL    ciprofloxacin (Cipro) IVPB 400 mg 200.00 mL    metroNIDAZOLE (Flagyl) 500 mg in NaCl (iso-os) 100 mL 100.00 mL    Total Intake 3200 mL          Specimen:   ID Type Source Tests Collected by Time   1 : GALLBLADDER Tissue GALLBLADDER CHOLECYSTECTOMY SURGICAL PATHOLOGY EXAM Sj Johnson MD 2024 1232   A : URINE CULTURE Fluid URINE CATHETERIZED URINE CULTURE Sj Johnson MD 2024 0844        Staff:   Circulator: Kahlil Bob RN; Darby Meneses RN  Relief Circulator: Juliette Willams RN  Relief Scrub: Darby Meneses RN  Scrub Person: Radha Pollock  RN         Drains and/or Catheters:   [REMOVED] Urethral Catheter 16 Fr. (Removed)   Site Assessment Clean;Skin intact 02/23/24 1302       Tourniquet Times:         Implants:     Findings: Redundant rectum.  Chronic cholecystitis, percutaneous cholecystostomy tube removed at time of cholecsytectomy.    Indications: Maggy Gore is an 88 y.o. female who is having surgery for Rectal prolapse [K62.3]  Cholecystitis [K81.9].     The patient was seen in the preoperative area. The risks, benefits, complications, treatment options, non-operative alternatives, expected recovery and outcomes were discussed with the patient. The possibilities of reaction to medication, pulmonary aspiration, injury to surrounding structures, bleeding, recurrent infection, the need for additional procedures, failure to diagnose a condition, and creating a complication requiring transfusion or operation were discussed with the patient. The patient concurred with the proposed plan, giving informed consent.  The site of surgery was properly noted/marked if necessary per policy. The patient has been actively warmed in preoperative area. Preoperative antibiotics have been ordered and given within 1 hours of incision. Venous thrombosis prophylaxis have been ordered including bilateral sequential compression devices    Procedure Details: Safety checklist was performed in preoperative area confirming correct patient and correct procedure.  Patient was brought to the operating room.  Sequential compression devices were applied to bilateral lower extremities.  Following induction of general anesthesia, the patient was placed in lithotomy position using the yellowfin stirrups.  Careful attention was paid to padding of pressure points.  Bilateral arms were gently tucked.  A 16 Mongolian Mabry catheter was inserted under sterile technique.  The patient's abdomen was prepped with ChloraPrep solution.  Of note, the patient's right upper quadrant percutaneous  cholecystostomy drain was prepped into the field.  The patient was then draped in the usual sterile fashion.  Timeout was performed, once again confirming correct patient and correct seizure.  Perioperative antibiotics were administered.    A small stab incision was created in the left upper quadrant using a #15 scalpel blade.  Using the 0 degree 5 mm laparoscope loaded within a 5 mm optical trocar, the left upper quadrant of the abdominal cavity was carefully entered using Optiview technique.  Carbon dioxide pneumoperitoneum was established.  Evaluation of the viscera at the fascial entry site demonstrated no evidence of injuries.  The rest of the abdomen was examined laparoscopically.  Despite the patient's abdominal surgical history, there were minimal adhesions throughout the abdomen including the pelvis.  The gallbladder was found to be decompressed with some adhesions to the surrounding viscera.  Decision was made to proceed with robotic assisted laparoscopic suture rectopexy.  Under direct laparoscopic vision, 4 8 mm robotic ports were inserted into the abdomen with 1 in the far left upper quadrant, a second in the left hemiabdomen, a third in the midline immediately below the umbilicus, and a fourth in the far right lower quadrant.  An 8 mm air seal port was inserted into the right hemiabdomen.    The patient was now placed in Trendelenburg position with right side down.  The patient's omentum was retracted superiorly.  The small bowel was retracted out of the pelvis.  Some light adhesions between the cecum and the right pelvic brim were taken down sharply using cold laparoscopic scissors.  The robot was now docked to the patient.    Adhesions between the sigmoid colon and the left lateral pelvic sidewall were taken down using the robotic dorota to facilitate proximal retraction of the bowel.  An incision was then created on the medial aspect of the sigmoid colon mesentery at the level of the sacral  promontory.  This opening was then carried distally down into the pelvis towards the rectum.  A medial to lateral mobilization of the sigmoid colon and upper rectum was now performed successfully mobilizing the bowel in its mesentery off of the presacral fascia.  This plane was further dissected distally posterior to the mesorectum all the way down to the pelvic floor muscles.  The right lateral mobilization of the rectum was then performed off the pelvic sidewall.  The right ureter was identified and carefully kept away from injury.  Anterior mobilization was now performed to separate the rectum from the posterior wall of the vagina.  The rectovaginal septum was noted to be quite thin and care was taken to avoid injury particularly to the rectum.  The left ureter was identified and carefully kept away from injury.  The dissection between the anterior wall of the rectum and the posterior wall of the vagina was carried all the way to the perineal body.  Once this was complete, the rectum was now fully mobilized posteriorly as well as along the right and anterior sides.  Hemostasis of the deep pelvis was confirmed.  The rectum was retracted proximally and placed on gentle tension.  Suture rectopexy was now performed, affixing the rectal mesentery to the sacral promontory.  This was performed using 2 simple interrupted 2-0 silk sutures.  Clear bites of the bony prominence at the sacral promontory were taken.  There was no bleeding at the rectopexy sites.  The peritoneal lining was now closed in continuous fashion using a 3-0 PDS V-Loc stitch.  Care was taken to avoid injury to the right ureter in particular with this peritoneal closure.  At this point, the robot was undocked to proceed with the laparoscopic cholecystectomy portion of the case.    The patient was now placed in reverse Trendelenburg position with left side down.  An additional 5 mm port was inserted into the far left upper quadrant of the abdomen under  direct laparoscopic vision.  The gallbladder was now grasped and retracted anterosuperiorly.  Adhesions between the gallbladder and the surrounding viscera were taken down carefully using combination of blunt and hook electrocautery dissection.  The cystic artery and cystic duct were then dissected out and skeletonized.  The critical view was developed.  Of note, the patient's percutaneous cholecystostomy tube had been inserted in a transhepatic fashion.  The cystic duct was transected first between laparoscopic clips with 2 on the stay side and 1 on the specimen side.  The structure was divided using the laparoscopic scissors.  The cystic artery was then transected in identical fashion.  The gallbladder was then dissected away from the gallbladder fossa using hook electrocautery.  Near the posterior dome of the gallbladder, the patient's percutaneous cholecystostomy tube insertion site into the gallbladder was identified.  The patient's percutaneous cholecystostomy tube was cut externally and the tube completely removed from the abdomen.  The insertion site was now divided intra-abdominal he  the gallbladder from the rest of the fossa using hook electrocautery.  Once the gallbladder was completely freed from the fossa, the cystic artery and duct stumps were evaluated.  There was good hemostasis and no evidence of bile leak.  At this time, the infraumbilical midline 8 mm robotic port was upsized to a 12 mm laparoscopic port. The gallbladder was then placed within a specimen bag and successfully removed from the abdomen along with the port.  The 12 mm laparoscopic port was then reinserted.  The port site was closed using an 0 Vicryl tie with the Luis Harden device.    The patient was now leveled out.  The remaining laparoscopic and robotic ports were removed and carbon dioxide pneumoperitoneum completely released.  Surgical count of instruments, needles, sponges was performed and confirmed to be  correct.  The skin at the port insertion sites was now reapproximated using the skin stapler device.  The abdomen was cleansed and dried.  Dry dressings were applied to the incisional sites.  Sign out was performed.  Patient was awakened and taken to recovery area in stable condition.    Complications:  None; patient tolerated the procedure well.    Disposition: PACU - hemodynamically stable.  Condition: stable         Additional Details: N/A    Attending Attestation: I was present and scrubbed for the entire procedure.    Sj Johnson  Phone Number: [phone]

## 2024-02-23 NOTE — BRIEF OP NOTE
Date: 2024  OR Location: [facility] OR    Name: Maggy Gore YOB: 1935, Age: 88 y.o., MRN: [id number], Sex: female    Diagnosis  Pre-op Diagnosis     * Rectal prolapse [K62.3]     * Cholecystitis [K81.9] Post-op Diagnosis     * Rectal prolapse [K62.3]     * Cholecystitis [K81.9]     Procedures  ROBOTIC RECTOPEXY, Cholecystectomy Laparoscopy  [id number] - MA LAPAROSCOPY SURG CHOLECYSTECTOMY      Surgeons      * Sj Johnson - Primary    Resident/Fellow/Other Assistant:  Surgeon(s) and Role:  Nandini Duckworth MD - Fellow - Assisting    Procedure Summary  Anesthesia: General  ASA: III  Anesthesia Staff: Anesthesiologist: Enoc Killian DO; Wicho Barry DO  C-AA: EMMA Mendez  Estimated Blood Loss: 15mL  Intra-op Medications:   Administrations occurring from 0730 to 1300 on 24:   Medication Name Total Dose   bupivacaine PF (Marcaine) 0.5 % (5 mg/mL) injection 11 mL   lactated Ringer's infusion Cannot be calculated              Anesthesia Record               Intraprocedure I/O Totals          Intake    electrolyte-A (Plasmalyte-A) 1400.00 mL    Propofol Drip 0.00 mL    The total shown is the total volume documented since Anesthesia Start was filed.    Phenylephrine Drip 0.00 mL    The total shown is the total volume documented since Anesthesia Start was filed.    lactated Ringer's infusion 1500.00 mL    ciprofloxacin (Cipro) IVPB 400 mg 200.00 mL    metroNIDAZOLE (Flagyl) 500 mg in NaCl (iso-os) 100 mL 100.00 mL    Total Intake 3200 mL          Specimen:   ID Type Source Tests Collected by Time   1 : GALLBLADDER Tissue GALLBLADDER CHOLECYSTECTOMY SURGICAL PATHOLOGY EXAM Sj Johnson MD 2024 1232   A : URINE CULTURE Fluid URINE CATHETERIZED URINE CULTURE Sj Johnson MD 2024 0844        Staff:   Circulator: Kahlil Bob RN; Darby Meneses RN  Relief Circulator: Juliette Willams RN  Relief Scrub: Darby Meneses RN  Scrub Person: Radha Pollock RN    Findings: Redundant rectum.   Decompressed gallbladder with adhesions.    Complications:  None; patient tolerated the procedure well.     Disposition: PACU - hemodynamically stable.  Condition: stable  Specimens Collected:   ID Type Source Tests Collected by Time   1 : GALLBLADDER Tissue GALLBLADDER CHOLECYSTECTOMY SURGICAL PATHOLOGY EXAM Sj Johnson MD 2/23/2024 1232   A : URINE CULTURE Fluid URINE CATHETERIZED URINE CULTURE Sj Johnson MD 2/23/2024 0915     Attending Attestation: I was present and scrubbed for the entire procedure.    Sj Johnson  Phone Number: [phone]

## 2024-02-23 NOTE — ANESTHESIA PREPROCEDURE EVALUATION
Patient: Maggy Gore    Procedure Information       Date/Time: 02/23/24 0730    Procedures:       ROBOTIC RECTOPEXY, Cholecystectomy Laparoscopy      Proctopexy Transabdominal - Please change to robotic rectopexy    Location: [facility] OR  / [facility] OR    Surgeons: Sj Johnson MD            Relevant Problems   Cardiovascular   (+) Aortic stenosis   (+) Chronic diastolic (congestive) heart failure (CMS/HCC)   (+) Coronary artery 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   (+) Infrarenal abdominal aortic aneurysm, without rupture (CMS/HCC)   (+) Mixed hyperlipidemia   (+) PFO (patent foramen ovale)   (+) Primary hypertension      Endocrine   (+) Hyperthyroidism      GI   (+) Gastroesophageal reflux disease      /Renal   (+) CKD stage G4/A1, 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 (CMS/HCC)   (+) Stage 3b chronic kidney disease (CMS/HCC)       Clinical information reviewed:   Tobacco  Allergies  Meds   Med Hx  Surg Hx   Fam Hx  Soc Hx        NPO Detail:  NPO/Void Status  Date of Last Liquid: 02/23/24  Time of Last Liquid: 0640  Date of Last Solid: 02/21/24  Last Intake Type: Clear fluids  Time of Last Void: 0500         Physical Exam    Airway  Mallampati: II  TM distance: >3 FB     Cardiovascular   Rhythm: regular  Rate: normal     Dental - normal exam     Pulmonary   Breath sounds clear to auscultation     Abdominal        Anesthesia Plan    History of general anesthesia?: yes  History of complications of general anesthesia?: no    ASA 3     general   (A line)  intravenous induction   Anesthetic plan and risks discussed with patient.    Plan discussed with CAA.

## 2024-02-23 NOTE — ANESTHESIA PROCEDURE NOTES
Airway  Date/Time: 2/23/2024 7:44 AM  Urgency: elective    Airway not difficult    Staffing  Performed: CAA and MISSAEL   Authorized by: Enoc Killian DO    Performed by: EMMA Mendez  Patient location during procedure: OR    Indications and Patient Condition  Indications for airway management: anesthesia  Spontaneous ventilation: present  Preoxygenated: yes  Patient position: sniffing  Mask difficulty assessment: 1 - vent by mask    Final Airway Details  Final airway type: endotracheal airway      Successful airway: ETT     Successful intubation technique: direct laryngoscopy  Facilitating devices/methods: intubating stylet  Endotracheal tube insertion site: oral  Blade: Dion  Blade size: #3  ETT size (mm): 7.0  Cormack-Lehane Classification: grade I - full view of glottis  Placement verified by: chest auscultation and capnometry   Measured from: gums  ETT to gums (cm): 22  Number of attempts at approach: 1

## 2024-02-23 NOTE — ANESTHESIA POSTPROCEDURE EVALUATION
Patient: Maggy Gore    Procedure Summary       Date: 02/23/24 Room / Location: [facility] OR 08 / [facility] OR    Anesthesia Start: 0735 Anesthesia Stop: 1306    Procedure: ROBOTIC RECTOPEXY, Cholecystectomy Laparoscopy Diagnosis:       Rectal prolapse      Cholecystitis      (Rectal prolapse [K62.3])      (Cholecystitis [K81.9])    Surgeons: Sj Johnson MD Responsible Provider: EMMA Mendez    Anesthesia Type: general ASA Status: 3            Anesthesia Type: general    Vitals Value Taken Time   /60 02/23/24 1304   Temp 36 °C (96.8 °F) 02/23/24 1304   Pulse 66 02/23/24 1305   Resp 10 02/23/24 1305   SpO2 100 % 02/23/24 1304   Vitals shown include unvalidated device data.    Anesthesia Post Evaluation    Patient location during evaluation: PACU  Patient participation: complete - patient participated  Level of consciousness: sleepy but conscious  Pain score: 0  Pain management: adequate  Airway patency: patent  Cardiovascular status: acceptable  Respiratory status: acceptable  Hydration status: acceptable  Postoperative Nausea and Vomiting: none    No notable events documented.

## 2024-02-24 VITALS
SYSTOLIC BLOOD PRESSURE: 138 MMHG | RESPIRATION RATE: 17 BRPM | OXYGEN SATURATION: 96 % | WEIGHT: 126 LBS | HEART RATE: 81 BPM | TEMPERATURE: 98.2 F | HEIGHT: 62 IN | BODY MASS INDEX: 23.19 KG/M2 | DIASTOLIC BLOOD PRESSURE: 70 MMHG

## 2024-02-24 LAB
ALBUMIN SERPL BCP-MCNC: 3.1 G/DL (ref 3.4–5)
ALP SERPL-CCNC: 48 U/L (ref 33–136)
ALT SERPL W P-5'-P-CCNC: 12 U/L (ref 7–45)
ANION GAP SERPL CALC-SCNC: 14 MMOL/L (ref 10–20)
AST SERPL W P-5'-P-CCNC: 20 U/L (ref 9–39)
BACTERIA UR CULT: NO GROWTH
BILIRUB SERPL-MCNC: 0.3 MG/DL (ref 0–1.2)
BUN SERPL-MCNC: 44 MG/DL (ref 6–23)
CALCIUM SERPL-MCNC: 8.2 MG/DL (ref 8.6–10.3)
CHLORIDE SERPL-SCNC: 104 MMOL/L (ref 98–107)
CO2 SERPL-SCNC: 19 MMOL/L (ref 21–32)
CREAT SERPL-MCNC: 2.46 MG/DL (ref 0.5–1.05)
EGFRCR SERPLBLD CKD-EPI 2021: 19 ML/MIN/1.73M*2
GLUCOSE BLD MANUAL STRIP-MCNC: 107 MG/DL (ref 74–99)
GLUCOSE BLD MANUAL STRIP-MCNC: 92 MG/DL (ref 74–99)
GLUCOSE SERPL-MCNC: 101 MG/DL (ref 74–99)
HOLD SPECIMEN: NORMAL
POTASSIUM SERPL-SCNC: 4.6 MMOL/L (ref 3.5–5.3)
PROT SERPL-MCNC: 5.7 G/DL (ref 6.4–8.2)
SODIUM SERPL-SCNC: 132 MMOL/L (ref 136–145)

## 2024-02-24 PROCEDURE — 36415 COLL VENOUS BLD VENIPUNCTURE: CPT | Performed by: SURGERY

## 2024-02-24 PROCEDURE — 80053 COMPREHEN METABOLIC PANEL: CPT | Performed by: SURGERY

## 2024-02-24 PROCEDURE — 82947 ASSAY GLUCOSE BLOOD QUANT: CPT

## 2024-02-24 PROCEDURE — 2500000002 HC RX 250 W HCPCS SELF ADMINISTERED DRUGS (ALT 637 FOR MEDICARE OP, ALT 636 FOR OP/ED): Performed by: SURGERY

## 2024-02-24 PROCEDURE — 99024 POSTOP FOLLOW-UP VISIT: CPT | Performed by: STUDENT IN AN ORGANIZED HEALTH CARE EDUCATION/TRAINING PROGRAM

## 2024-02-24 PROCEDURE — 1090000002 HH PPS REVENUE DEBIT

## 2024-02-24 PROCEDURE — 1090000001 HH PPS REVENUE CREDIT

## 2024-02-24 PROCEDURE — 2500000004 HC RX 250 GENERAL PHARMACY W/ HCPCS (ALT 636 FOR OP/ED): Performed by: SURGERY

## 2024-02-24 PROCEDURE — 2500000001 HC RX 250 WO HCPCS SELF ADMINISTERED DRUGS (ALT 637 FOR MEDICARE OP): Performed by: SURGERY

## 2024-02-24 RX ADMIN — OXYCODONE HYDROCHLORIDE 10 MG: 10 TABLET ORAL at 00:05

## 2024-02-24 RX ADMIN — HYDROCHLOROTHIAZIDE: 25 TABLET ORAL at 08:47

## 2024-02-24 RX ADMIN — ACETAMINOPHEN 650 MG: 325 TABLET ORAL at 10:18

## 2024-02-24 RX ADMIN — PANTOPRAZOLE SODIUM 40 MG: 40 TABLET, DELAYED RELEASE ORAL at 08:48

## 2024-02-24 RX ADMIN — AMLODIPINE BESYLATE 10 MG: 10 TABLET ORAL at 06:26

## 2024-02-24 RX ADMIN — METHIMAZOLE 5 MG: 5 TABLET ORAL at 08:48

## 2024-02-24 RX ADMIN — OXYCODONE HYDROCHLORIDE 10 MG: 10 TABLET ORAL at 12:49

## 2024-02-24 RX ADMIN — ACEBUTOLOL HYDROCHLORIDE 200 MG: 200 CAPSULE ORAL at 08:48

## 2024-02-24 RX ADMIN — OXYCODONE HYDROCHLORIDE 10 MG: 10 TABLET ORAL at 04:07

## 2024-02-24 RX ADMIN — HEPARIN SODIUM 5000 UNITS: 5000 INJECTION INTRAVENOUS; SUBCUTANEOUS at 08:48

## 2024-02-24 RX ADMIN — ACETAMINOPHEN 650 MG: 325 TABLET ORAL at 04:08

## 2024-02-24 RX ADMIN — PSYLLIUM HUSK 1 PACKET: 3.4 POWDER ORAL at 09:00

## 2024-02-24 ASSESSMENT — PAIN DESCRIPTION - ORIENTATION: ORIENTATION: LOWER

## 2024-02-24 ASSESSMENT — PAIN SCALES - GENERAL
PAINLEVEL_OUTOF10: 7
PAINLEVEL_OUTOF10: 3
PAINLEVEL_OUTOF10: 3
PAINLEVEL_OUTOF10: 9
PAINLEVEL_OUTOF10: 8
PAINLEVEL_OUTOF10: 8
PAINLEVEL_OUTOF10: 7

## 2024-02-24 ASSESSMENT — PAIN DESCRIPTION - LOCATION
LOCATION: ABDOMEN

## 2024-02-24 ASSESSMENT — PAIN - FUNCTIONAL ASSESSMENT
PAIN_FUNCTIONAL_ASSESSMENT: 0-10

## 2024-02-24 NOTE — DISCHARGE SUMMARY
Discharge Diagnosis  Rectal prolapse  Calculous cholecystitis    Issues Requiring Follow-Up  Post-operative follow-up    Test Results Pending At Discharge  Pending Labs       Order Current Status    Extra Tubes In process    Lavender Top In process    Surgical Pathology Exam In process    Urine Culture In process            Hospital Course   Patient was taken to the operating room on day of admission, 2/23/2024, for planned robotic assisted laparoscopic suture rectopexy with laparoscopic cholecystectomy.  Patient tolerated the surgery well and postoperatively was taken to the regular nursing floor.  Patient was initiated on ERS protocol.  She was given intravenous fluids at low rate, multimodal pain management regimen, DVT chemoprophylaxis, and initiated on diet.  On postoperative day 1, patient had achieved adequate pain control and tolerated diet.  Patient was able to ambulate and void independently.  She was deemed appropriate for discharge and thus discharged to home with instructions for wound care, pain control, diet, activity, and follow-up.  Patient was discharged to home on 2-week course of Colace.  Due to patient's home use of Vicodin, no additional pain medications were provided at discharge.    Pertinent Physical Exam At Time of Discharge  Physical Exam  See progress note from day of discharge.    Home Medications     Medication List      START taking these medications     docusate sodium 100 mg capsule; Commonly known as: Colace; Take 1   capsule (100 mg) by mouth 2 times a day for 14 days.     CONTINUE taking these medications     acebutolol 200 mg capsule; Commonly known as: Sectral   acetaminophen 500 mg tablet; Commonly known as: Tylenol   amLODIPine 10 mg tablet; Commonly known as: Norvasc; Take 1 tablet (10   mg) by mouth once daily.   atorvastatin 20 mg tablet; Commonly known as: Lipitor; Take 1 tablet (20   mg) by mouth once daily.   cyclobenzaprine 10 mg tablet; Commonly known as: Flexeril    famotidine 20 mg tablet; Commonly known as: Pepcid; Take 1 tablet (20   mg) by mouth once daily at bedtime.   Farxiga 10 mg; Generic drug: dapagliflozin propanediol; Take 1 tablet   (10 mg) by mouth once daily.   HYDROcodone-acetaminophen 5-325 mg tablet; Commonly known as: Norco   methIMAzole 5 mg tablet; Commonly known as: Tapazole; Take 1 tablet (5   mg) by mouth once daily. qd   pantoprazole 40 mg EC tablet; Commonly known as: ProtoNix; Take 1 tablet   (40 mg) by mouth once daily. do not crush chew or split   sennosides-docusate sodium 8.6-50 mg tablet; Commonly known as:   Cielo-Colace; Take 1 tablet by mouth once daily as needed for constipation.   valsartan-hydrochlorothiazide 320-25 mg tablet; Commonly known as:   Diovan-HCT; TAKE ONE TABLET BY MOUTH EVERY DAY   Vitamin D3 50 MCG (2000 UT) tablet; Generic drug: cholecalciferol       Outpatient Follow-Up  Future Appointments   Date Time Provider Department Center   2/27/2024 To Be Determined Christine Kwan RN [facility]   3/5/2024  9:30 AM Ace Dhillon MD [id number] [place]   12/17/2024  9:00 AM Jas Blanchard MD [id number] [place]       Sj Johnson MD

## 2024-02-24 NOTE — CARE PLAN
The patient's goals for the shift include pain control    The clinical goals for the shift include pt will demostrate comfort aeb sleeping in long intervals of 3 hours or greater by end of this shift    Over the shift, the patient did make progress in goals this shift has been up to restroom x3 ambulating with 1 person assist, voiding quantity suff. Pt has demostrated splinting of abd, exercises and I.s pt has been rating pain as 7-8 receiving oxy 10 inaddition to scheduled tylenol with fair result pt has slept in long intervals and remained safe this shift

## 2024-02-24 NOTE — CARE PLAN
The patient's goals for the shift include pain control    The clinical goals for the shift include Pt will remain HDS this shift.      Problem: Pain  Goal: My pain/discomfort is manageable  Outcome: Adequate for Discharge     Problem: Safety  Goal: Patient will be injury free during hospitalization  Outcome: Adequate for Discharge  Goal: I will remain free of falls  Outcome: Adequate for Discharge     Problem: Daily Care  Goal: Daily care needs are met  Outcome: Adequate for Discharge     Problem: Psychosocial Needs  Goal: Demonstrates ability to cope with hospitalization/illness  Outcome: Adequate for Discharge  Goal: Collaborate with me, my family, and caregiver to identify my specific goals  Outcome: Adequate for Discharge     Problem: Discharge Barriers  Goal: My discharge needs are met  Outcome: Adequate for Discharge     Problem: Pain - Adult  Goal: Verbalizes/displays adequate comfort level or baseline comfort level  Outcome: Adequate for Discharge     Problem: Safety - Adult  Goal: Free from fall injury  Outcome: Adequate for Discharge     Problem: Discharge Planning  Goal: Discharge to home or other facility with appropriate resources  Outcome: Adequate for Discharge     Problem: Chronic Conditions and Co-morbidities  Goal: Patient's chronic conditions and co-morbidity symptoms are monitored and maintained or improved  Outcome: Adequate for Discharge     Problem: Pain  Goal: Takes deep breaths with improved pain control throughout the shift  Outcome: Adequate for Discharge  Goal: Turns in bed with improved pain control throughout the shift  Outcome: Adequate for Discharge  Goal: Walks with improved pain control throughout the shift  Outcome: Adequate for Discharge  Goal: Performs ADL's with improved pain control throughout shift  Outcome: Adequate for Discharge  Goal: Participates in PT with improved pain control throughout the shift  Outcome: Adequate for Discharge  Goal: Free from opioid side effects  throughout the shift  Outcome: Adequate for Discharge  Goal: Free from acute confusion related to pain meds throughout the shift  Outcome: Adequate for Discharge     Pt was safely discharged to home at this time.

## 2024-02-24 NOTE — DISCHARGE INSTRUCTIONS
WOUND CARE:  OK to shower 24 hours after surgery.  No tub bath/soaking/swimming until cleared at outpatient appointment.  Do not remove any staples or stitches; if these are present they will be removed at outpatient appointment.  Any glue will peel away on its own.    PAIN CONTROL:  Can utilize your home vicodin as needed.  Do not combine vicodin with tylenol/acetaminophen to avoid exceeding safe daily recommended intake of tylenol/acetaminophen.    ACTIVITY:  No heavy lifting (>10-15lbs) or strenuous activity for 6-8 weeks.  OK to walk and take stairs at slow pace.  Do not drive or operative heavy machinery for at least first 24 hours after surgery; if you do not feel able to safely operate after first 24 hours or are taking narcotics (ie percocet or vicodin) then do not operative heavy machinery or drive.    DIET:  Soft low-fiber diet for 2 weeks.    FOLLOW-UP:   Please call office at [phone] to schedule follow-up appointment for approximately two weeks from date of surgery.

## 2024-02-24 NOTE — NURSING NOTE
Discharge teaching completed, voiced understanding, daughter in room at time.  Taken via w/c at this time to private vehicle.

## 2024-02-24 NOTE — PROGRESS NOTES
"Maggy Gore is a 88 y.o. female on day 1 of admission presenting with Rectal prolapse.    Subjective   Afebrile O/N.  No acute events O/N.  Some abdominal soreness.  No nausea or emesis.  Has been out of bed to bathroom.  Voiding independently.  No flatus or BM.  Tolerated some diet yesterday evening.       Objective     Physical Exam  General:  A&Ox3, NAD.  Neuro:  No focal deficits.  Respiratory:  No respiratory distress.  Breathing comfortably.  CV:  Extremities warm and well-perfused, no JESSICA.  GI:  Soft, non-distended, appropriately TTP, incision(s) C/D/I w/ staples.    :  Mabry catheter absent        Last Recorded Vitals  Blood pressure 121/67, pulse 73, temperature 37.2 °C (99 °F), temperature source Temporal, resp. rate 16, height 1.575 m (5' 2\"), weight 57.2 kg (126 lb), SpO2 96 %.  Intake/Output last 3 Shifts:  I/O last 3 completed shifts:  In: 4662.7 (81.6 mL/kg) [P.O.:710; I.V.:2252.7 (39.4 mL/kg); IV Piggyback:1700]  Out: 500 (8.7 mL/kg) [Urine:500 (0.2 mL/kg/hr)]  Dosing Weight: 57.2 kg     Relevant Results                Results for orders placed or performed during the hospital encounter of 02/23/24 (from the past 24 hour(s))   POCT GLUCOSE   Result Value Ref Range    POCT Glucose 117 (H) 74 - 99 mg/dL   POCT GLUCOSE   Result Value Ref Range    POCT Glucose 123 (H) 74 - 99 mg/dL   Comprehensive Metabolic Panel   Result Value Ref Range    Glucose 101 (H) 74 - 99 mg/dL    Sodium 132 (L) 136 - 145 mmol/L    Potassium 4.6 3.5 - 5.3 mmol/L    Chloride 104 98 - 107 mmol/L    Bicarbonate 19 (L) 21 - 32 mmol/L    Anion Gap 14 10 - 20 mmol/L    Urea Nitrogen 44 (H) 6 - 23 mg/dL    Creatinine 2.46 (H) 0.50 - 1.05 mg/dL    eGFR 19 (L) >60 mL/min/1.73m*2    Calcium 8.2 (L) 8.6 - 10.3 mg/dL    Albumin 3.1 (L) 3.4 - 5.0 g/dL    Alkaline Phosphatase 48 33 - 136 U/L    Total Protein 5.7 (L) 6.4 - 8.2 g/dL    AST 20 9 - 39 U/L    Bilirubin, Total 0.3 0.0 - 1.2 mg/dL    ALT 12 7 - 45 U/L   POCT GLUCOSE   Result " Value Ref Range    POCT Glucose 92 74 - 99 mg/dL                  Assessment/Plan   Principal Problem:    Rectal prolapse  Active Problems:    Cholecystitis    POD#1 S/P robotic-assisted laparoscopic rectopexy and laparoscopic cholecystectomy  -  Low fiber diet  -  Stop IVF  -  Multi-modal pain management regimen  -  DVT chemoprophylaxis  -  SSI  -  DC to home today  -  Discharge instructions reviewed with patient  -  Home on 2 week course of colace 100mg BID  -  No pain medications on discharge; she is on vicodin at home         I spent 15 minutes in the professional and overall care of this patient.      Sj Johnson MD

## 2024-02-25 PROCEDURE — 1090000001 HH PPS REVENUE CREDIT

## 2024-02-25 PROCEDURE — 1090000002 HH PPS REVENUE DEBIT

## 2024-02-26 ENCOUNTER — DOCUMENTATION (OUTPATIENT)
Dept: PRIMARY CARE | Facility: CLINIC | Age: 89
End: 2024-02-26
Payer: MEDICARE

## 2024-02-26 ENCOUNTER — HOME CARE VISIT (OUTPATIENT)
Dept: HOME HEALTH SERVICES | Facility: HOME HEALTH | Age: 89
End: 2024-02-26
Payer: MEDICARE

## 2024-02-26 PROCEDURE — 1090000002 HH PPS REVENUE DEBIT

## 2024-02-26 PROCEDURE — 1090000001 HH PPS REVENUE CREDIT

## 2024-02-26 NOTE — PROGRESS NOTES
Discharge facility:  [facility]  Discharge diagnosis: Rectal Prolapse and Cholecystitis s/p rectoplexy and laprascopic cholecystectomy  Admission date: 2/23/2024  Discharge date: 2/23/2024    PCP Appointment Date: 3/5/2024  Specialist Appointment Date: NEEDS SCHEDULED  Hospital Encounter and Summary: not available at this time (pick one)  See Discharge assessment below for further details     Engagement  Call Start Time: 0928 (2/26/2024  9:45 AM)    Medications  Medications reviewed with patient/caregiver?: Yes (2/26/2024  9:45 AM)  Is the patient having any side effects they believe may be caused by any medication additions or changes?: No (2/26/2024  9:45 AM)  Does the patient have all medications ordered at discharge?: Yes (2/26/2024  9:45 AM)  Care Management Interventions: No intervention needed (2/26/2024  9:45 AM)  Is the patient taking all medications as directed (includes completed medication regime)?: Yes (2/26/2024  9:45 AM)  Care Management Interventions: Provided patient education (2/26/2024  9:45 AM)    Appointments  Does the patient have a primary care provider?: Yes (2/26/2024  9:45 AM)  Care Management Interventions: Verified appointment date/time/provider (2/26/2024  9:45 AM)  Has the patient kept scheduled appointments due by today?: Yes (2/26/2024  9:45 AM)  Care Management Interventions: Advised patient to keep appointment; Advised to schedule with specialist (2/26/2024  9:45 AM)  Follow Up Tasks: Appointments (needs to schedule surgical follow up) (2/26/2024  9:45 AM)    Self Management  What is the home health agency?: [facility] (2/26/2024  9:45 AM)  Has home health visited the patient within 72 hours of discharge?: Yes (2/26/2024  9:45 AM)    Patient Teaching  Does the patient have access to their discharge instructions?: Yes (2/26/2024  9:45 AM)  Care Management Interventions: Reviewed instructions with patient (2/26/2024  9:45 AM)  What is the patient's perception of their health  status since discharge?: Improving (2/26/2024  9:45 AM)  Is the patient/caregiver able to teach back the hierarchy of who to call/visit for symptoms/problems? PCP, Specialist, Home Health nurse, Urgent Care, ED, 911: Yes (2/26/2024  9:45 AM)    Wrap Up  Is the patient/caregiver familiar with Advance Care Planning?: Yes (2/26/2024  9:45 AM)  Would the patient like more information on Advance Care Planning?: No (2/26/2024  9:45 AM)  Wrap Up Additional Comments: staying with granddaughter for 1 week who is [facility] RN (2/26/2024  9:45 AM)  Call End Time: 0929 (2/26/2024  9:45 AM)

## 2024-02-27 PROCEDURE — 1090000002 HH PPS REVENUE DEBIT

## 2024-02-27 PROCEDURE — 1090000001 HH PPS REVENUE CREDIT

## 2024-02-28 ASSESSMENT — ACTIVITIES OF DAILY LIVING (ADL)
HOME_HEALTH_OASIS: 00
OASIS_M1830: 00

## 2024-03-04 ENCOUNTER — OFFICE VISIT (OUTPATIENT)
Dept: PRIMARY CARE | Facility: CLINIC | Age: 89
End: 2024-03-04
Payer: MEDICARE

## 2024-03-04 ENCOUNTER — APPOINTMENT (OUTPATIENT)
Dept: PRIMARY CARE | Facility: CLINIC | Age: 89
End: 2024-03-04
Payer: MEDICARE

## 2024-03-04 VITALS
SYSTOLIC BLOOD PRESSURE: 118 MMHG | WEIGHT: 125 LBS | HEART RATE: 64 BPM | BODY MASS INDEX: 23 KG/M2 | OXYGEN SATURATION: 98 % | RESPIRATION RATE: 16 BRPM | DIASTOLIC BLOOD PRESSURE: 58 MMHG | HEIGHT: 62 IN

## 2024-03-04 DIAGNOSIS — I10 PRIMARY HYPERTENSION: ICD-10-CM

## 2024-03-04 DIAGNOSIS — E78.2 MIXED HYPERLIPIDEMIA: ICD-10-CM

## 2024-03-04 DIAGNOSIS — K62.3 RECTAL PROLAPSE: Primary | ICD-10-CM

## 2024-03-04 DIAGNOSIS — R11.0 NAUSEA: ICD-10-CM

## 2024-03-04 DIAGNOSIS — N18.4 CKD STAGE G4/A1, GFR 15-29 AND ALBUMIN CREATININE RATIO <30 MG/G (MULTI): ICD-10-CM

## 2024-03-04 DIAGNOSIS — M79.10 MUSCLE PAIN: ICD-10-CM

## 2024-03-04 DIAGNOSIS — K21.9 GASTROESOPHAGEAL REFLUX DISEASE WITHOUT ESOPHAGITIS: ICD-10-CM

## 2024-03-04 DIAGNOSIS — K58.1 IRRITABLE BOWEL SYNDROME WITH CONSTIPATION: ICD-10-CM

## 2024-03-04 LAB
LABORATORY COMMENT REPORT: NORMAL
PATH REPORT.FINAL DX SPEC: NORMAL
PATH REPORT.GROSS SPEC: NORMAL
PATH REPORT.RELEVANT HX SPEC: NORMAL
PATH REPORT.TOTAL CANCER: NORMAL

## 2024-03-04 PROCEDURE — 1111F DSCHRG MED/CURRENT MED MERGE: CPT | Performed by: FAMILY MEDICINE

## 2024-03-04 PROCEDURE — 1125F AMNT PAIN NOTED PAIN PRSNT: CPT | Performed by: FAMILY MEDICINE

## 2024-03-04 PROCEDURE — 1159F MED LIST DOCD IN RCRD: CPT | Performed by: FAMILY MEDICINE

## 2024-03-04 PROCEDURE — 3074F SYST BP LT 130 MM HG: CPT | Performed by: FAMILY MEDICINE

## 2024-03-04 PROCEDURE — 99214 OFFICE O/P EST MOD 30 MIN: CPT | Performed by: FAMILY MEDICINE

## 2024-03-04 PROCEDURE — 1036F TOBACCO NON-USER: CPT | Performed by: FAMILY MEDICINE

## 2024-03-04 PROCEDURE — 3078F DIAST BP <80 MM HG: CPT | Performed by: FAMILY MEDICINE

## 2024-03-04 RX ORDER — POLYETHYLENE GLYCOL 3350 17 G/17G
17 POWDER, FOR SOLUTION ORAL DAILY
COMMUNITY
End: 2024-03-04 | Stop reason: SDUPTHER

## 2024-03-04 RX ORDER — POLYETHYLENE GLYCOL 3350 17 G/17G
17 POWDER, FOR SOLUTION ORAL DAILY
Qty: 90 PACKET | Refills: 1 | Status: SHIPPED | OUTPATIENT
Start: 2024-03-04

## 2024-03-04 RX ORDER — ONDANSETRON HYDROCHLORIDE 8 MG/1
8 TABLET, FILM COATED ORAL EVERY 8 HOURS PRN
COMMUNITY
End: 2024-03-04 | Stop reason: SDUPTHER

## 2024-03-04 RX ORDER — ONDANSETRON HYDROCHLORIDE 8 MG/1
8 TABLET, FILM COATED ORAL EVERY 8 HOURS PRN
Qty: 30 TABLET | Refills: 1 | Status: SHIPPED | OUTPATIENT
Start: 2024-03-04

## 2024-03-04 RX ORDER — PROMETHAZINE HYDROCHLORIDE 25 MG/1
25 TABLET ORAL EVERY 12 HOURS PRN
COMMUNITY

## 2024-03-04 RX ORDER — CYCLOBENZAPRINE HCL 10 MG
10 TABLET ORAL 2 TIMES DAILY PRN
Qty: 90 TABLET | Refills: 0 | Status: SHIPPED | OUTPATIENT
Start: 2024-03-04

## 2024-03-04 RX ORDER — PROMETHAZINE HYDROCHLORIDE 25 MG/1
25 TABLET ORAL EVERY 12 HOURS PRN
Qty: 30 TABLET | Refills: 1 | Status: CANCELLED | OUTPATIENT
Start: 2024-03-04

## 2024-03-04 RX ORDER — VALSARTAN AND HYDROCHLOROTHIAZIDE 320; 25 MG/1; MG/1
1 TABLET, FILM COATED ORAL DAILY
Qty: 90 TABLET | Refills: 1 | Status: SHIPPED | OUTPATIENT
Start: 2024-03-04

## 2024-03-04 RX ORDER — NALOXONE HYDROCHLORIDE 4 MG/.1ML
4 SPRAY NASAL AS NEEDED
Qty: 2 EACH | Refills: 0 | Status: SHIPPED | OUTPATIENT
Start: 2024-03-04

## 2024-03-04 RX ORDER — ACEBUTOLOL HYDROCHLORIDE 200 MG/1
200 CAPSULE ORAL 2 TIMES DAILY
Qty: 180 CAPSULE | Refills: 1 | Status: SHIPPED | OUTPATIENT
Start: 2024-03-04

## 2024-03-04 ASSESSMENT — ENCOUNTER SYMPTOMS
VOMITING: 0
DIZZINESS: 0
ACTIVITY CHANGE: 0
DIAPHORESIS: 0
PALPITATIONS: 0
CHILLS: 0
APPETITE CHANGE: 0
DYSURIA: 0
COUGH: 0
DIFFICULTY URINATING: 0
FATIGUE: 0
CHEST TIGHTNESS: 0
SHORTNESS OF BREATH: 0
DIARRHEA: 0
HEMATURIA: 0
CONSTIPATION: 0
UNEXPECTED WEIGHT CHANGE: 0
FEVER: 0
ANAL BLEEDING: 0

## 2024-03-04 NOTE — PROGRESS NOTES
Subjective   Patient ID: Maggy Gore is a 88 y.o. 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    HPI  Maggy is s/p robotic suture rectopexy with lap cholecystectomy on 2/23/24. Path demonstrating gallbladder with cholelithiasis and acute chronic cholecystitis. Some intermitted lower abdominal pain. No concerns with appetite. Some nausea that she took her nausea medication but it is improved compared to before surgery. No concerns with urination. No hematochezia or melena. No f/c. No drainage from her incision.  Denies recurrent rectal prolapse.    CT AP on 12/23/23  IMPRESSION:  1. Significant worsening of gallbladder dilatation with mild wall  thickening concerning for acute cholecystitis.  2. Unchanged mild extrahepatic biliary ductal dilatation with the common bile duct measuring 1.1 cm, likely a unrelated to  choledocholithiasis given normal liver function testing.    3. Improved rectal inflammation from 12/18/2023, however with worsening of rectal prolapse compared to prior study.  4. Worsening of pulmonary edema, worsening diffuse anasarca, and slight enlargement of small bilateral pleural effusions (right >left).   5. 3.1 cm infrarenal abdominal aortic aneurysm.    Colonoscopy 2021 and had 2 tubular adenomas removed.    PAST MEDICAL HISTORY:  Past Medical History:  No date: AAA (abdominal aortic aneurysm) without rupture (CMS/HCC)  No date: Aortic stenosis  No date: CKD (chronic kidney disease)  07/31/2023: Closed comminuted fracture of left patella with routine   healing  No date: Colon polyp  No date: GERD (gastroesophageal reflux disease)  No date: Gout  No date: HTN (hypertension)  No date: Hyperlipidemia  No date: Hyperthyroidism    PAST SURGICAL HISTORY:  Past Surgical History:  No date: APPENDECTOMY  No date: HYSTERECTOMY  12/26/2023: IR BILIARY DRAIN      Comment:  IR BILIARY DRAIN 12/26/2023 Ace Osorio,  MD MALVIN MARTINES    SOCIAL HISTORY:  Social History     Socioeconomic History    Marital status:      Spouse name: Not on file    Number of children: Not on file    Years of education: Not on file    Highest education level: Not on file   Occupational History    Not on file   Tobacco Use    Smoking status: Former     Types: Cigarettes     Quit date:      Years since quittin.1    Smokeless tobacco: Never   Substance and Sexual Activity    Alcohol use: Yes     Comment: Holidays    Drug use: Never    Sexual activity: Defer   Other Topics Concern    Not on file   Social History Narrative    Not on file     Social Determinants of Health     Financial Resource Strain: Patient Declined (2024)    Overall Financial Resource Strain (CARDIA)     Difficulty of Paying Living Expenses: Patient declined   Food Insecurity: Not on file   Transportation Needs: No Transportation Needs (2024)    OASIS : Transportation     Lack of Transportation (Medical): No     Lack of Transportation (Non-Medical): No     Patient Unable or Declines to Respond: No   Physical Activity: Not on file   Stress: Not on file   Social Connections: Feeling Socially Integrated (2024)    OASIS : Social Isolation     Frequency of experiencing loneliness or isolation: Never   Intimate Partner Violence: Not on file   Housing Stability: Unknown (2024)    Housing Stability Vital Sign     Unable to Pay for Housing in the Last Year: Patient declined     Number of Places Lived in the Last Year: 1     Unstable Housing in the Last Year: No        FAMILY HISTORY:  Family History   Problem Relation Name Age of Onset    Heart attack Mother      Other (enlarged heart.) Father      Heart defect Daughter         MEDICATIONS:  Current Outpatient Medications   Medication Sig Dispense Refill    acebutolol (Sectral) 200 mg capsule Take 1 capsule (200 mg) by mouth twice a day.      acetaminophen (Tylenol) 500 mg tablet Take 2 tablets (1,000  mg) by mouth every 6 hours if needed for mild pain (1 - 3).      amLODIPine (Norvasc) 10 mg tablet Take 1 tablet (10 mg) by mouth once daily. 30 tablet 3    atorvastatin (Lipitor) 20 mg tablet Take 1 tablet (20 mg) by mouth once daily. 90 tablet 1    cholecalciferol (Vitamin D3) 50 MCG (2000 UT) tablet Take 1 tablet (2,000 Units) by mouth once daily.      cyclobenzaprine (Flexeril) 10 mg tablet Take 1 tablet (10 mg) by mouth 2 times a day as needed.      dapagliflozin propanediol (Farxiga) 10 mg Take 1 tablet (10 mg) by mouth once daily. 90 tablet 3    docusate sodium (Colace) 100 mg capsule Take 1 capsule (100 mg) by mouth 2 times a day for 14 days. 28 capsule 0    famotidine (Pepcid) 20 mg tablet Take 1 tablet (20 mg) by mouth once daily at bedtime. 90 tablet 1    HYDROcodone-acetaminophen (Norco) 5-325 mg tablet Take 1 tablet by mouth every 6 hours if needed for severe pain (7 - 10).      methIMAzole (Tapazole) 5 mg tablet Take 1 tablet (5 mg) by mouth once daily. qd 90 tablet 1    pantoprazole (ProtoNix) 40 mg EC tablet Take 1 tablet (40 mg) by mouth once daily. do not crush chew or split 90 tablet 1    sennosides-docusate sodium (Cielo-Colace) 8.6-50 mg tablet Take 1 tablet by mouth once daily as needed for constipation. (Patient not taking: Reported on 2/26/2024) 90 tablet 1    valsartan-hydrochlorothiazide (Diovan-HCT) 320-25 mg tablet TAKE ONE TABLET BY MOUTH EVERY DAY 90 tablet 1     No current facility-administered medications for this visit.           Review of Systems   Constitutional:  Negative for activity change, appetite change, chills, diaphoresis, fatigue, fever and unexpected weight change.   Respiratory:  Negative for cough, chest tightness and shortness of breath.    Cardiovascular:  Negative for chest pain, palpitations and leg swelling.   Gastrointestinal:  Negative for abdominal distention, abdominal pain, anal bleeding, blood in stool, constipation, diarrhea, nausea, rectal pain and vomiting.    Genitourinary:  Negative for difficulty urinating, dysuria and hematuria.   Neurological:  Positive for light-headedness. Negative for dizziness.   All other systems reviewed and are negative.      Objective   Physical Exam  Constitutional:       Appearance: Normal appearance. She is normal weight.   HENT:      Head: Normocephalic and atraumatic.   Pulmonary:      Effort: Pulmonary effort is normal.   Abdominal:      General: Abdomen is flat. Bowel sounds are normal. There is no distension.      Palpations: Abdomen is soft. There is no mass.      Tenderness: There is no abdominal tenderness.      Hernia: No hernia is present.      Comments: Staples removed today in office.   Musculoskeletal:         General: Normal range of motion.   Skin:     General: Skin is warm and dry.   Neurological:      General: No focal deficit present.      Mental Status: She is alert and oriented to person, place, and time.   Psychiatric:         Mood and Affect: Mood normal.         Behavior: Behavior normal.         Assessment/Plan   #Rectal prolapse; approximately 7cm segment S/P robotic-assisted laparoscopic suture rectopexy 2/23/2024  #Acute calculous cholecystitis S/P percutaneous cholecystostomy tube placement 12/26/2023 S/P laparoscopic cholecystectomy at time of rectopexy  -  Expected post-operative course  -  Staples removed today in office  -  Pathology reviewed with patient  -  OK to start liberalizing diet  -  No strenuous activity or heavy lifting until 6 weeks post-op  -  OK to return to work 5/1/2024  -  F/U PRN    Sj Johnson MD   3/12/2024  9:00 AM

## 2024-03-04 NOTE — PROGRESS NOTES
Subjective   Patient ID: Maggy Gore is a 88 y.o. female who presents for Hospital Follow-up.    HPI     Patient is here for hospital follow up from [facility] visit  2/23/24 for rectal prolapse and cholecystitis. She had robotic assisted laparoscopic suture rectopexy and cholecystectomy. She was discharged with 2 week course of colace and home use of vicodin.  Pain level is a 8/10.    She returns to the surgeon next week for the staples.  She is feeling weak since this is the first time going out.    She will need refills today.    Review of Systems  12 Systems have been reviewed as follows.  Constitutional: Fever, weight gain, weight loss, appetite change, night sweats, fatigue, chills.  Eyes : blurry, double vision, vision, loss, tearing, redness, pain, sensitivity to light, glaucoma.  Ears, nose, mouth, and throat: Hearing loss, ringing in the ears, ear pain, nasal congestion, nasal drainage, nosebleeds, mouth, throat, irritation tooth problem.  Cardiovascular :chest pain, pressure, heart racing, palpitations, sweating, leg swelling, high or low blood pressure  Pulmonary: Cough, yellow or green sputum, blood and sputum, shortness of breath, wheezing  Gastrointestinal: Nausea, vomiting, diarrhea, constipation, pain, blood in stool, or vomitus, heartburn, difficulty swallowing  Genitourinary: incontinence, abnormal bleeding, abnormal discharge, urinary frequency, urinary hesitancy, pain, impotence sexual problem, infection, urinary retention  Musculoskeletal: Pain, stiffness, joint, redness or warmth, arthritis, back pain, weakness, muscle wasting, sprain or fracture  Neuro: Weight weakness, dizziness, change in voice, change in taste change in vision, change in hearing, loss, or change of sensation, trouble walking, balance problems coordination problems, shaking, speech problem  Endocrine , cold or heat intolerance, blood sugar problem, weight gain or loss missed periods hot flashes, sweats, change in body hair,  "change in libido, increased thirst, increased urination  Heme/lymph: Swelling, bleeding, problem anemia, bruising, enlarged lymph nodes  Allergic/immunologic: H. plus nasal drip, watery itchy eyes, nasal drainage, immunosuppressed  The above were reviewed and noted negative except as noted in HPI and Problem List.      Objective   /58 (BP Location: Left arm, Patient Position: Sitting, BP Cuff Size: Adult)   Pulse 64   Resp 16   Ht 1.575 m (5' 2\")   Wt 56.7 kg (125 lb)   SpO2 98%   BMI 22.86 kg/m²     Physical Exam  Constitutional: Well developed, well nourished, alert and in no acute distress  Eyes: Normal external exam. Pupils equally round and reactive to light with normal accommodation and extraocular movements intact.  Neck: Supple, no lymphadenopathy or masses.  Cardiovascular: Regular rate and rhythm, normal S1 and S2, no murmurs, gallops, or rubs. Radial pulses normal. No peripheral edema.  Abdomen: soft, non tender, distended, no masses or HSM.  Pulmonary: No respiratory distress, lungs clear to auscultation bilaterally. No wheezes, rhonchi, rales.  Skin: Warm, well perfused, normal skin turgor and color.  Neurologic: Cranial nerves II-XII grossly intact.  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    Rectal prolapse - Primary K62.3     Other Visit Diagnoses         Codes    Muscle pain     M79.10    Relevant " Medications    cyclobenzaprine (Flexeril) 10 mg tablet    naloxone (Narcan) 4 mg/0.1 mL nasal spray    Other Relevant Orders    Follow Up In [facility] - PCP - Established    Nausea     R11.0    Relevant Medications    ondansetron (Zofran) 8 mg tablet    Other Relevant Orders    Follow Up In [facility] - PCP - Established    Irritable bowel syndrome with constipation     K58.1    Relevant Medications    polyethylene glycol (Glycolax, Miralax) 17 gram packet          Scribe Attestation  By signing my name below, I, Ace Dhillon MD , Scribe   attest that this documentation has been prepared under the direction and in the presence of Ace Dhillon MD.    Provider Attestation - Scribe documentation    All medical record entries made by the Scribe were at my direction and personally dictated by me. I have reviewed the chart and agree that the record accurately reflects my personal performance of the history, physical exam, discussion and plan.     Entresto in future     Start farxiga 10 mg daily for CKD  Pharmacy referral      Fibercon qid     BP stable     BW next incl thyr index     Off work since 12/23    Continue terri lax and colace     Obtain blood work

## 2024-03-05 ENCOUNTER — APPOINTMENT (OUTPATIENT)
Dept: PRIMARY CARE | Facility: CLINIC | Age: 89
End: 2024-03-05
Payer: MEDICARE

## 2024-03-08 ENCOUNTER — APPOINTMENT (OUTPATIENT)
Dept: SURGERY | Facility: CLINIC | Age: 89
End: 2024-03-08
Payer: MEDICARE

## 2024-03-08 ENCOUNTER — PATIENT OUTREACH (OUTPATIENT)
Dept: PRIMARY CARE | Facility: CLINIC | Age: 89
End: 2024-03-08
Payer: MEDICARE

## 2024-03-08 DIAGNOSIS — E46 PROTEIN-CALORIE MALNUTRITION, UNSPECIFIED SEVERITY (MULTI): ICD-10-CM

## 2024-03-08 DIAGNOSIS — N18.4 CKD STAGE G4/A1, GFR 15-29 AND ALBUMIN CREATININE RATIO <30 MG/G (MULTI): ICD-10-CM

## 2024-03-08 DIAGNOSIS — I10 PRIMARY HYPERTENSION: ICD-10-CM

## 2024-03-08 PROCEDURE — 99490 CHRNC CARE MGMT STAFF 1ST 20: CPT | Performed by: FAMILY MEDICINE

## 2024-03-12 ENCOUNTER — OFFICE VISIT (OUTPATIENT)
Dept: SURGERY | Facility: CLINIC | Age: 89
End: 2024-03-12
Payer: MEDICARE

## 2024-03-12 VITALS
HEART RATE: 70 BPM | HEIGHT: 62 IN | DIASTOLIC BLOOD PRESSURE: 76 MMHG | SYSTOLIC BLOOD PRESSURE: 161 MMHG | BODY MASS INDEX: 23 KG/M2 | WEIGHT: 125 LBS

## 2024-03-12 DIAGNOSIS — K62.3 RECTAL PROLAPSE: ICD-10-CM

## 2024-03-12 DIAGNOSIS — K81.9 CHOLECYSTITIS: Primary | ICD-10-CM

## 2024-03-12 PROCEDURE — 1159F MED LIST DOCD IN RCRD: CPT | Performed by: STUDENT IN AN ORGANIZED HEALTH CARE EDUCATION/TRAINING PROGRAM

## 2024-03-12 PROCEDURE — 1036F TOBACCO NON-USER: CPT | Performed by: STUDENT IN AN ORGANIZED HEALTH CARE EDUCATION/TRAINING PROGRAM

## 2024-03-12 PROCEDURE — 3077F SYST BP >= 140 MM HG: CPT | Performed by: STUDENT IN AN ORGANIZED HEALTH CARE EDUCATION/TRAINING PROGRAM

## 2024-03-12 PROCEDURE — 3078F DIAST BP <80 MM HG: CPT | Performed by: STUDENT IN AN ORGANIZED HEALTH CARE EDUCATION/TRAINING PROGRAM

## 2024-03-12 PROCEDURE — 1111F DSCHRG MED/CURRENT MED MERGE: CPT | Performed by: STUDENT IN AN ORGANIZED HEALTH CARE EDUCATION/TRAINING PROGRAM

## 2024-03-12 PROCEDURE — 99024 POSTOP FOLLOW-UP VISIT: CPT | Performed by: STUDENT IN AN ORGANIZED HEALTH CARE EDUCATION/TRAINING PROGRAM

## 2024-03-12 PROCEDURE — 1125F AMNT PAIN NOTED PAIN PRSNT: CPT | Performed by: STUDENT IN AN ORGANIZED HEALTH CARE EDUCATION/TRAINING PROGRAM

## 2024-03-12 ASSESSMENT — ENCOUNTER SYMPTOMS
LIGHT-HEADEDNESS: 1
ABDOMINAL DISTENTION: 0
RECTAL PAIN: 0
BLOOD IN STOOL: 0
NAUSEA: 0
ABDOMINAL PAIN: 0

## 2024-03-13 ENCOUNTER — LAB (OUTPATIENT)
Dept: LAB | Facility: LAB | Age: 89
End: 2024-03-13
Payer: MEDICARE

## 2024-03-13 DIAGNOSIS — I10 PRIMARY HYPERTENSION: ICD-10-CM

## 2024-03-13 LAB
ALBUMIN SERPL BCP-MCNC: 3.9 G/DL (ref 3.4–5)
ALP SERPL-CCNC: 91 U/L (ref 33–136)
ALT SERPL W P-5'-P-CCNC: 8 U/L (ref 7–45)
ANION GAP SERPL CALC-SCNC: 12 MMOL/L (ref 10–20)
AST SERPL W P-5'-P-CCNC: 12 U/L (ref 9–39)
BASOPHILS # BLD AUTO: 0.08 X10*3/UL (ref 0–0.1)
BASOPHILS NFR BLD AUTO: 1.3 %
BILIRUB SERPL-MCNC: 0.4 MG/DL (ref 0–1.2)
BUN SERPL-MCNC: 28 MG/DL (ref 6–23)
CALCIUM SERPL-MCNC: 9.2 MG/DL (ref 8.6–10.3)
CHLORIDE SERPL-SCNC: 103 MMOL/L (ref 98–107)
CO2 SERPL-SCNC: 29 MMOL/L (ref 21–32)
CREAT SERPL-MCNC: 1.64 MG/DL (ref 0.5–1.05)
EGFRCR SERPLBLD CKD-EPI 2021: 30 ML/MIN/1.73M*2
EOSINOPHIL # BLD AUTO: 0.6 X10*3/UL (ref 0–0.4)
EOSINOPHIL NFR BLD AUTO: 9.5 %
ERYTHROCYTE [DISTWIDTH] IN BLOOD BY AUTOMATED COUNT: 16 % (ref 11.5–14.5)
GLUCOSE SERPL-MCNC: 94 MG/DL (ref 74–99)
HCT VFR BLD AUTO: 33.2 % (ref 36–46)
HGB BLD-MCNC: 10.5 G/DL (ref 12–16)
IMM GRANULOCYTES # BLD AUTO: 0.01 X10*3/UL (ref 0–0.5)
IMM GRANULOCYTES NFR BLD AUTO: 0.2 % (ref 0–0.9)
LYMPHOCYTES # BLD AUTO: 2.31 X10*3/UL (ref 0.8–3)
LYMPHOCYTES NFR BLD AUTO: 36.7 %
MCH RBC QN AUTO: 29.3 PG (ref 26–34)
MCHC RBC AUTO-ENTMCNC: 31.6 G/DL (ref 32–36)
MCV RBC AUTO: 93 FL (ref 80–100)
MONOCYTES # BLD AUTO: 0.63 X10*3/UL (ref 0.05–0.8)
MONOCYTES NFR BLD AUTO: 10 %
NEUTROPHILS # BLD AUTO: 2.67 X10*3/UL (ref 1.6–5.5)
NEUTROPHILS NFR BLD AUTO: 42.3 %
NRBC BLD-RTO: 0 /100 WBCS (ref 0–0)
PLATELET # BLD AUTO: 481 X10*3/UL (ref 150–450)
POTASSIUM SERPL-SCNC: 5 MMOL/L (ref 3.5–5.3)
PROT SERPL-MCNC: 6.6 G/DL (ref 6.4–8.2)
RBC # BLD AUTO: 3.58 X10*6/UL (ref 4–5.2)
SODIUM SERPL-SCNC: 139 MMOL/L (ref 136–145)
WBC # BLD AUTO: 6.3 X10*3/UL (ref 4.4–11.3)

## 2024-03-13 PROCEDURE — 80053 COMPREHEN METABOLIC PANEL: CPT

## 2024-03-13 PROCEDURE — 85025 COMPLETE CBC W/AUTO DIFF WBC: CPT

## 2024-03-13 PROCEDURE — 36415 COLL VENOUS BLD VENIPUNCTURE: CPT

## 2024-04-04 ENCOUNTER — OFFICE VISIT (OUTPATIENT)
Dept: PRIMARY CARE | Facility: CLINIC | Age: 89
End: 2024-04-04
Payer: MEDICARE

## 2024-04-04 VITALS
OXYGEN SATURATION: 98 % | HEART RATE: 66 BPM | DIASTOLIC BLOOD PRESSURE: 64 MMHG | WEIGHT: 125 LBS | SYSTOLIC BLOOD PRESSURE: 124 MMHG | BODY MASS INDEX: 22.86 KG/M2

## 2024-04-04 DIAGNOSIS — I10 PRIMARY HYPERTENSION: ICD-10-CM

## 2024-04-04 DIAGNOSIS — Z90.49 HISTORY OF CHOLECYSTECTOMY: ICD-10-CM

## 2024-04-04 DIAGNOSIS — M79.10 MUSCLE PAIN: ICD-10-CM

## 2024-04-04 DIAGNOSIS — R11.0 NAUSEA: ICD-10-CM

## 2024-04-04 DIAGNOSIS — E78.5 DYSLIPIDEMIA: ICD-10-CM

## 2024-04-04 DIAGNOSIS — R53.83 FATIGUE, UNSPECIFIED TYPE: ICD-10-CM

## 2024-04-04 DIAGNOSIS — E05.90 HYPERTHYROIDISM: ICD-10-CM

## 2024-04-04 DIAGNOSIS — Z98.890 HISTORY OF RECTOPEXY: Primary | ICD-10-CM

## 2024-04-04 DIAGNOSIS — E55.9 VITAMIN D DEFICIENCY: ICD-10-CM

## 2024-04-04 PROCEDURE — 1160F RVW MEDS BY RX/DR IN RCRD: CPT | Performed by: FAMILY MEDICINE

## 2024-04-04 PROCEDURE — 3078F DIAST BP <80 MM HG: CPT | Performed by: FAMILY MEDICINE

## 2024-04-04 PROCEDURE — 1159F MED LIST DOCD IN RCRD: CPT | Performed by: FAMILY MEDICINE

## 2024-04-04 PROCEDURE — 3074F SYST BP LT 130 MM HG: CPT | Performed by: FAMILY MEDICINE

## 2024-04-04 PROCEDURE — 99214 OFFICE O/P EST MOD 30 MIN: CPT | Performed by: FAMILY MEDICINE

## 2024-04-04 PROCEDURE — 1036F TOBACCO NON-USER: CPT | Performed by: FAMILY MEDICINE

## 2024-04-04 ASSESSMENT — ENCOUNTER SYMPTOMS
HYPERTENSION: 1
LOSS OF SENSATION IN FEET: 0
OCCASIONAL FEELINGS OF UNSTEADINESS: 0
DEPRESSION: 0

## 2024-04-04 NOTE — PROGRESS NOTES
Subjective   Patient ID: Maggy Gore is a 88 y.o. female who presents for Hypertension.    This patient is here today to follow up on HTN. This patient is currently taking valsartan. This patient states that their current medication treatment for this issue is working well for them. This patient does take their blood pressure at home and states that it is usually within normal ranges. This patient denies any symptoms of headache, dizziness, blurry vision, or lightheadedness.          Hypertension         Review of Systems  12 Systems have been reviewed as follows.  Constitutional: Fever, weight gain, weight loss, appetite change, night sweats, fatigue, chills.  Eyes : blurry, double vision, vision, loss, tearing, redness, pain, sensitivity to light, glaucoma.  Ears, nose, mouth, and throat: Hearing loss, ringing in the ears, ear pain, nasal congestion, nasal drainage, nosebleeds, mouth, throat, irritation tooth problem.  Cardiovascular :chest pain, pressure, heart racing, palpitations, sweating, leg swelling, high or low blood pressure  Pulmonary: Cough, yellow or green sputum, blood and sputum, shortness of breath, wheezing  Gastrointestinal: Nausea, vomiting, diarrhea, constipation, pain, blood in stool, or vomitus, heartburn, difficulty swallowing  Genitourinary: incontinence, abnormal bleeding, abnormal discharge, urinary frequency, urinary hesitancy, pain, impotence sexual problem, infection, urinary retention  Musculoskeletal: Pain, stiffness, joint, redness or warmth, arthritis, back pain, weakness, muscle wasting, sprain or fracture  Neuro: Weight weakness, dizziness, change in voice, change in taste change in vision, change in hearing, loss, or change of sensation, trouble walking, balance problems coordination problems, shaking, speech problem  Endocrine , cold or heat intolerance, blood sugar problem, weight gain or loss missed periods hot flashes, sweats, change in body hair, change in libido,  increased thirst, increased urination  Heme/lymph: Swelling, bleeding, problem anemia, bruising, enlarged lymph nodes  Allergic/immunologic: H. plus nasal drip, watery itchy eyes, nasal drainage, immunosuppressed  The above were reviewed and noted negative except as noted in HPI and Problem List.      Objective   /64 (BP Location: Right arm, Patient Position: Sitting, BP Cuff Size: Adult)   Pulse 66   Wt 56.7 kg (125 lb)   SpO2 98%   BMI 22.86 kg/m²     Physical Exam  Constitutional: Well developed, well nourished, alert and in no acute distress   Eyes: Normal external exam. Pupils equally round and reactive to light with normal accommodation and extraocular movements intact.  Neck: Supple, no lymphadenopathy or masses.   Cardiovascular: Regular rate and rhythm, normal S1 and S2, no murmurs, gallops, or rubs. Radial pulses normal. No peripheral edema.  Pulmonary: No respiratory distress, lungs clear to auscultation bilaterally. No wheezes, rhonchi, rales.  Abdomen: soft,non tender, non distended, without masses or HSM  Skin: Warm, well perfused, normal skin turgor and color.   Neurologic: Cranial nerves II-XII grossly intact.   Psychiatric: Mood calm and affect normal  Musculoskeletal: Moving all extremities without restriction    Assessment/Plan   Problem List Items Addressed This Visit             ICD-10-CM    Hyperthyroidism E05.90    Relevant Orders    Thyroid Stimulating Hormone    Free T4 Index    Vitamin D deficiency E55.9    Relevant Orders    Vitamin D 25-Hydroxy,Total (for eval of Vitamin D levels)    Primary hypertension I10    Relevant Orders    Follow Up In [facility] - PCP - Established    CBC and Auto Differential    Comprehensive Metabolic Panel    Fatigue R53.83    Relevant Orders    Thyroid Stimulating Hormone    Free T4 Index    History of rectopexy - Primary Z98.890     Other Visit Diagnoses         Codes    Muscle pain     M79.10    Relevant Orders    Follow Up In [facility] - PCP - Established    Nausea     R11.0    Relevant Orders    Follow Up In [facility] - PCP - Established    Dyslipidemia     E78.5    Relevant Orders    Lipid Panel    History of cholecystectomy     Z90.49           Notify us if back & flank pain not resolved 2 weks      Continue current medications and therapy for chronic medical conditions.    Patient was advised importance of proper diet/nutrition in addition adequate hydration. Patient was encouraged moderate exercise program to include 30 minutes daily for 5 days of the week or 150 minutes weekly. Patient will follow-up with us as scheduled.      Reviewed blood work from 3/13/24    continue farxiga 10 mg daily for CKD  Pharmacy referral      Fibercon qid     BP stable     BW by next visit.     Last surgery was 2/23/24.  Off work since 12/23 and RTW 5/1/24 per Dr Johnson  No strenuous activity or heavy lifting until 6 weeks post-op      Continue terri lax and colace      Obtain blood work     Scribe Attestation  By signing my name below, I, Ace Dhillon MD, Scribe   attest that this documentation has been prepared under the direction and in the presence of Ace Dhillon MD.      Provider Attestation - Scribe documentation    All medical record entries made by the Scribe were at my direction and personally dictated by me. I have reviewed the chart and agree that the record accurately reflects my personal performance of the history, physical exam, discussion and plan.

## 2024-04-05 ENCOUNTER — PATIENT OUTREACH (OUTPATIENT)
Dept: PRIMARY CARE | Facility: CLINIC | Age: 89
End: 2024-04-05
Payer: COMMERCIAL

## 2024-04-05 DIAGNOSIS — N18.4 CKD STAGE G4/A1, GFR 15-29 AND ALBUMIN CREATININE RATIO <30 MG/G (MULTI): ICD-10-CM

## 2024-04-05 DIAGNOSIS — I71.43 INFRARENAL ABDOMINAL AORTIC ANEURYSM, WITHOUT RUPTURE (CMS-HCC): ICD-10-CM

## 2024-04-05 DIAGNOSIS — I10 PRIMARY HYPERTENSION: ICD-10-CM

## 2024-04-05 PROCEDURE — 99490 CHRNC CARE MGMT STAFF 1ST 20: CPT | Performed by: FAMILY MEDICINE

## 2024-04-05 NOTE — PROGRESS NOTES
Recovering well  Will return to work May 1    Having regular bowel movements    Takes norco for pain  On average takes 3-4 tabs  Depending on back pain  No issues with constipation  Taking capful of miralax    Has follow up with Dr. Dhillon on 4/30  She will do lab work beforehand

## 2024-04-18 ENCOUNTER — PHARMACY VISIT (OUTPATIENT)
Dept: PHARMACY | Facility: CLINIC | Age: 89
End: 2024-04-18
Payer: COMMERCIAL

## 2024-04-18 PROCEDURE — RXMED WILLOW AMBULATORY MEDICATION CHARGE

## 2024-04-18 NOTE — PROGRESS NOTES
Patient reached out to me today because she is out of her farxiga and needs assistance to refill.  Reminded her to call [facility] pharmacy and submit credit card info and they will mail 90 day refills to her home.  She does remember she was supposed to do this.  She will call them [phone]  She is aware to call me back with any problems

## 2024-04-22 ENCOUNTER — LAB (OUTPATIENT)
Dept: LAB | Facility: LAB | Age: 89
End: 2024-04-22
Payer: MEDICARE

## 2024-04-22 DIAGNOSIS — E55.9 VITAMIN D DEFICIENCY: ICD-10-CM

## 2024-04-22 DIAGNOSIS — I10 PRIMARY HYPERTENSION: ICD-10-CM

## 2024-04-22 DIAGNOSIS — E05.90 HYPERTHYROIDISM: ICD-10-CM

## 2024-04-22 DIAGNOSIS — E78.5 DYSLIPIDEMIA: ICD-10-CM

## 2024-04-22 DIAGNOSIS — R53.83 FATIGUE, UNSPECIFIED TYPE: ICD-10-CM

## 2024-04-22 LAB
25(OH)D3 SERPL-MCNC: 40 NG/ML (ref 30–100)
ALBUMIN SERPL BCP-MCNC: 4.3 G/DL (ref 3.4–5)
ALP SERPL-CCNC: 91 U/L (ref 33–136)
ALT SERPL W P-5'-P-CCNC: 11 U/L (ref 7–45)
ANION GAP SERPL CALC-SCNC: 11 MMOL/L (ref 10–20)
AST SERPL W P-5'-P-CCNC: 14 U/L (ref 9–39)
BASOPHILS # BLD AUTO: 0.06 X10*3/UL (ref 0–0.1)
BASOPHILS NFR BLD AUTO: 1.1 %
BILIRUB SERPL-MCNC: 0.4 MG/DL (ref 0–1.2)
BUN SERPL-MCNC: 37 MG/DL (ref 6–23)
CALCIUM SERPL-MCNC: 9.2 MG/DL (ref 8.6–10.3)
CHLORIDE SERPL-SCNC: 105 MMOL/L (ref 98–107)
CHOLEST SERPL-MCNC: 174 MG/DL (ref 0–199)
CHOLESTEROL/HDL RATIO: 2.4
CO2 SERPL-SCNC: 30 MMOL/L (ref 21–32)
CREAT SERPL-MCNC: 1.6 MG/DL (ref 0.5–1.05)
EGFRCR SERPLBLD CKD-EPI 2021: 31 ML/MIN/1.73M*2
EOSINOPHIL # BLD AUTO: 0.4 X10*3/UL (ref 0–0.4)
EOSINOPHIL NFR BLD AUTO: 7 %
ERYTHROCYTE [DISTWIDTH] IN BLOOD BY AUTOMATED COUNT: 15.1 % (ref 11.5–14.5)
GLUCOSE SERPL-MCNC: 98 MG/DL (ref 74–99)
HCT VFR BLD AUTO: 35.4 % (ref 36–46)
HDLC SERPL-MCNC: 73.4 MG/DL
HGB BLD-MCNC: 11.3 G/DL (ref 12–16)
IMM GRANULOCYTES # BLD AUTO: 0.02 X10*3/UL (ref 0–0.5)
IMM GRANULOCYTES NFR BLD AUTO: 0.4 % (ref 0–0.9)
LDLC SERPL CALC-MCNC: 81 MG/DL
LYMPHOCYTES # BLD AUTO: 1.88 X10*3/UL (ref 0.8–3)
LYMPHOCYTES NFR BLD AUTO: 33 %
MCH RBC QN AUTO: 30.1 PG (ref 26–34)
MCHC RBC AUTO-ENTMCNC: 31.9 G/DL (ref 32–36)
MCV RBC AUTO: 94 FL (ref 80–100)
MONOCYTES # BLD AUTO: 0.58 X10*3/UL (ref 0.05–0.8)
MONOCYTES NFR BLD AUTO: 10.2 %
NEUTROPHILS # BLD AUTO: 2.76 X10*3/UL (ref 1.6–5.5)
NEUTROPHILS NFR BLD AUTO: 48.3 %
NON HDL CHOLESTEROL: 101 MG/DL (ref 0–149)
NRBC BLD-RTO: 0 /100 WBCS (ref 0–0)
PLATELET # BLD AUTO: 418 X10*3/UL (ref 150–450)
POTASSIUM SERPL-SCNC: 5.3 MMOL/L (ref 3.5–5.3)
PROT SERPL-MCNC: 6.8 G/DL (ref 6.4–8.2)
RBC # BLD AUTO: 3.76 X10*6/UL (ref 4–5.2)
SODIUM SERPL-SCNC: 141 MMOL/L (ref 136–145)
TRIGL SERPL-MCNC: 98 MG/DL (ref 0–149)
TSH SERPL-ACNC: 1.52 MIU/L (ref 0.44–3.98)
VLDL: 20 MG/DL (ref 0–40)
WBC # BLD AUTO: 5.7 X10*3/UL (ref 4.4–11.3)

## 2024-04-22 PROCEDURE — 80061 LIPID PANEL: CPT

## 2024-04-22 PROCEDURE — 82306 VITAMIN D 25 HYDROXY: CPT

## 2024-04-22 PROCEDURE — 36415 COLL VENOUS BLD VENIPUNCTURE: CPT

## 2024-04-22 PROCEDURE — 80053 COMPREHEN METABOLIC PANEL: CPT

## 2024-04-22 PROCEDURE — 84443 ASSAY THYROID STIM HORMONE: CPT

## 2024-04-22 PROCEDURE — 85025 COMPLETE CBC W/AUTO DIFF WBC: CPT

## 2024-04-22 PROCEDURE — 84436 ASSAY OF TOTAL THYROXINE: CPT

## 2024-04-22 PROCEDURE — 84479 ASSAY OF THYROID (T3 OR T4): CPT

## 2024-04-23 LAB
FT4I SERPL CALC-MCNC: 2.6 (ref 1.6–4.7)
T3RU NFR SERPL: 32 % (ref 24–41)
T4 SERPL-MCNC: 8 UG/DL (ref 4.5–11.1)

## 2024-04-29 ENCOUNTER — TELEPHONE (OUTPATIENT)
Dept: PRIMARY CARE | Facility: CLINIC | Age: 89
End: 2024-04-29

## 2024-04-29 ENCOUNTER — OFFICE VISIT (OUTPATIENT)
Dept: PRIMARY CARE | Facility: CLINIC | Age: 89
End: 2024-04-29
Payer: MEDICARE

## 2024-04-29 VITALS
HEART RATE: 67 BPM | BODY MASS INDEX: 23.92 KG/M2 | HEIGHT: 62 IN | RESPIRATION RATE: 16 BRPM | OXYGEN SATURATION: 98 % | WEIGHT: 130 LBS | TEMPERATURE: 96.5 F | SYSTOLIC BLOOD PRESSURE: 118 MMHG | DIASTOLIC BLOOD PRESSURE: 78 MMHG

## 2024-04-29 DIAGNOSIS — E05.90 HYPERTHYROIDISM: ICD-10-CM

## 2024-04-29 DIAGNOSIS — N18.32 STAGE 3B CHRONIC KIDNEY DISEASE (MULTI): Primary | ICD-10-CM

## 2024-04-29 DIAGNOSIS — E55.9 VITAMIN D DEFICIENCY: ICD-10-CM

## 2024-04-29 DIAGNOSIS — R11.0 NAUSEA: ICD-10-CM

## 2024-04-29 DIAGNOSIS — Z90.49 HISTORY OF CHOLECYSTECTOMY: ICD-10-CM

## 2024-04-29 DIAGNOSIS — I10 PRIMARY HYPERTENSION: ICD-10-CM

## 2024-04-29 DIAGNOSIS — E78.2 MIXED HYPERLIPIDEMIA: ICD-10-CM

## 2024-04-29 DIAGNOSIS — M79.10 MUSCLE PAIN: ICD-10-CM

## 2024-04-29 DIAGNOSIS — J45.20 MILD INTERMITTENT REACTIVE AIRWAY DISEASE WITHOUT COMPLICATION (HHS-HCC): ICD-10-CM

## 2024-04-29 DIAGNOSIS — K21.9 GASTROESOPHAGEAL REFLUX DISEASE WITHOUT ESOPHAGITIS: ICD-10-CM

## 2024-04-29 PROCEDURE — 99214 OFFICE O/P EST MOD 30 MIN: CPT | Performed by: FAMILY MEDICINE

## 2024-04-29 RX ORDER — CHOLECALCIFEROL (VITAMIN D3) 50 MCG
2000 TABLET ORAL DAILY
Qty: 90 TABLET | Refills: 1 | Status: SHIPPED | OUTPATIENT
Start: 2024-04-29

## 2024-04-29 RX ORDER — METHIMAZOLE 5 MG/1
5 TABLET ORAL DAILY
Qty: 90 TABLET | Refills: 1 | Status: SHIPPED | OUTPATIENT
Start: 2024-04-29

## 2024-04-29 NOTE — PROGRESS NOTES
Subjective   Patient ID: Maggy Gore is a 88 y.o. female who presents for Hypertension.    HPI   Patient at the office for follow up on Blood work 04/22/24.     Medication Refill for Vitamin D3 and Tapazole.    Review of Systems  12 Systems have been reviewed as follows.  Constitutional: Fever, weight gain, weight loss, appetite change, night sweats, fatigue, chills.  Eyes : blurry, double vision, vision, loss, tearing, redness, pain, sensitivity to light, glaucoma.  Ears, nose, mouth, and throat: Hearing loss, ringing in the ears, ear pain, nasal congestion, nasal drainage, nosebleeds, mouth, throat, irritation tooth problem.  Cardiovascular :chest pain, pressure, heart racing, palpitations, sweating, leg swelling, high or low blood pressure  Pulmonary: Cough, yellow or green sputum, blood and sputum, shortness of breath, wheezing  Gastrointestinal: Nausea, vomiting, diarrhea, constipation, pain, blood in stool, or vomitus, heartburn, difficulty swallowing  Genitourinary: incontinence, abnormal bleeding, abnormal discharge, urinary frequency, urinary hesitancy, pain, impotence sexual problem, infection, urinary retention  Musculoskeletal: Pain, stiffness, joint, redness or warmth, arthritis, back pain, weakness, muscle wasting, sprain or fracture  Neuro: Weight weakness, dizziness, change in voice, change in taste change in vision, change in hearing, loss, or change of sensation, trouble walking, balance problems coordination problems, shaking, speech problem  Endocrine , cold or heat intolerance, blood sugar problem, weight gain or loss missed periods hot flashes, sweats, change in body hair, change in libido, increased thirst, increased urination  Heme/lymph: Swelling, bleeding, problem anemia, bruising, enlarged lymph nodes  Allergic/immunologic: H. plus nasal drip, watery itchy eyes, nasal drainage, immunosuppressed  The above were reviewed and noted negative except as noted in HPI and Problem  "List.      Objective   /78 (BP Location: Right arm, Patient Position: Sitting, BP Cuff Size: Adult)   Pulse 67   Temp 35.8 °C (96.5 °F) (Temporal)   Resp 16   Ht 1.575 m (5' 2\")   Wt 59 kg (130 lb)   SpO2 98%   BMI 23.78 kg/m²     Physical Exam  Constitutional: Well developed, well nourished, alert and in no acute distress  Eyes: Normal external exam. Pupils equally round and reactive to light with normal accommodation and extraocular movements intact.  Neck: Supple, no lymphadenopathy or masses.  Cardiovascular: Regular rate and rhythm, normal S1 and S2, no murmurs, gallops, or rubs. Radial pulses normal. No peripheral edema.  Abdomen: soft, non tender, distended, no masses or HSM.  Pulmonary: No respiratory distress, lungs clear to auscultation bilaterally. No wheezes, rhonchi, rales.  Skin: Warm, well perfused, normal skin turgor and color.  Neurologic: Cranial nerves II-XII grossly intact.  Psychiatric: Mood calm and affect normal  Musculoskeletal: Moving all extremities without restriction    Assessment/Plan   Problem List Items Addressed This Visit             ICD-10-CM    Mixed hyperlipidemia E78.2    Relevant Orders    Comprehensive Metabolic Panel    Lipid Panel    Follow Up In [facility] - PCP - Established    Hyperthyroidism E05.90    Relevant Medications    methIMAzole (Tapazole) 5 mg tablet    Other Relevant Orders    Thyroid Stimulating Hormone    Free T4 Index    Follow Up In [facility] - PCP - Established    Vitamin D deficiency E55.9    Relevant Medications    cholecalciferol (Vitamin D3) 50 MCG (2000 UT) tablet    Other Relevant Orders    Vitamin D 25-Hydroxy,Total (for eval of Vitamin D levels)    Follow Up In [facility] - PCP - Established    Reactive airway disease without complication ([facility]-HCC) J45.909    Relevant Orders    Follow Up In [facility] - PCP - Established    Primary hypertension I10    Relevant Orders    CBC and Auto " Differential    Follow Up In [facility] - PCP - Established    Gastroesophageal reflux disease K21.9    Relevant Orders    Follow Up In [facility] - PCP - Established    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    Relevant Orders    Follow Up In [facility] - PCP - Established    History of cholecystectomy Z90.49    Relevant Orders    Follow Up In [facility] - PCP - Established     Other Visit Diagnoses         Codes    Muscle pain     M79.10    Relevant Orders    Follow Up In [facility] - PCP - Established    Nausea     R11.0    Relevant Orders    Follow Up In [facility] - PCP - Established        Scribe Attestation  By signing my name below, I, Ace Dhillon MD , Scribe   attest that this documentation has been prepared under the direction and in the presence of Ace Dhillon MD.    Provider Attestation - Scribe documentation    All medical record entries made by the Scribe were at my direction and personally dictated by me. I have reviewed the chart and agree that the record accurately reflects my personal performance of the history, physical exam, discussion and plan.     continue farxiga 10 mg daily for CKD  Pharmacy referral done     Fibercon qid     BP stable      Last surgery was 2/23/24.  Off work since 12/23 and RTW 5/1/24 per Dr Johnson  No strenuous activity or heavy lifting until 6 weeks post-op      Continue terri lax and colace      Blood work stable

## 2024-04-30 ENCOUNTER — APPOINTMENT (OUTPATIENT)
Dept: PRIMARY CARE | Facility: CLINIC | Age: 89
End: 2024-04-30
Payer: COMMERCIAL

## 2024-05-07 DIAGNOSIS — I10 PRIMARY HYPERTENSION: ICD-10-CM

## 2024-05-07 RX ORDER — AMLODIPINE BESYLATE 10 MG/1
10 TABLET ORAL DAILY
Qty: 30 TABLET | Refills: 0 | Status: SHIPPED | OUTPATIENT
Start: 2024-05-07

## 2024-05-08 ENCOUNTER — PATIENT OUTREACH (OUTPATIENT)
Dept: PRIMARY CARE | Facility: CLINIC | Age: 89
End: 2024-05-08
Payer: COMMERCIAL

## 2024-05-08 DIAGNOSIS — N18.32 STAGE 3B CHRONIC KIDNEY DISEASE (MULTI): ICD-10-CM

## 2024-05-08 DIAGNOSIS — J45.20 MILD INTERMITTENT REACTIVE AIRWAY DISEASE WITHOUT COMPLICATION (HHS-HCC): ICD-10-CM

## 2024-05-08 DIAGNOSIS — I10 PRIMARY HYPERTENSION: ICD-10-CM

## 2024-05-08 PROCEDURE — 99490 CHRNC CARE MGMT STAFF 1ST 20: CPT | Performed by: FAMILY MEDICINE

## 2024-05-08 NOTE — PROGRESS NOTES
Spoke with Maggy  She is needing assistance with her [place] paperwork  She is not yet cleared to return to work  Confirmed Dr. Dhillon received paperwork  He will complete and fax back    No difficulty having bowel movements  Taking miralax daily  Still low on energy compared to before her surgery  Sleeping well at night    No other concerns

## 2024-06-13 ENCOUNTER — PATIENT OUTREACH (OUTPATIENT)
Dept: PRIMARY CARE | Facility: CLINIC | Age: 89
End: 2024-06-13
Payer: COMMERCIAL

## 2024-06-13 DIAGNOSIS — J45.20 MILD INTERMITTENT REACTIVE AIRWAY DISEASE WITHOUT COMPLICATION (HHS-HCC): ICD-10-CM

## 2024-06-13 DIAGNOSIS — I10 PRIMARY HYPERTENSION: ICD-10-CM

## 2024-06-13 NOTE — PROGRESS NOTES
Doing well  Has returned to work  Managing this well  Overall feeling well    Confirmed appointment with Dr. Dhillon 6/24

## 2024-06-17 ENCOUNTER — LAB (OUTPATIENT)
Dept: LAB | Facility: LAB | Age: 89
End: 2024-06-17
Payer: COMMERCIAL

## 2024-06-17 DIAGNOSIS — E55.9 VITAMIN D DEFICIENCY: ICD-10-CM

## 2024-06-17 DIAGNOSIS — I10 PRIMARY HYPERTENSION: ICD-10-CM

## 2024-06-17 DIAGNOSIS — E78.2 MIXED HYPERLIPIDEMIA: ICD-10-CM

## 2024-06-17 DIAGNOSIS — E05.90 HYPERTHYROIDISM: ICD-10-CM

## 2024-06-17 LAB
25(OH)D3 SERPL-MCNC: 62 NG/ML (ref 30–100)
ALBUMIN SERPL BCP-MCNC: 4.2 G/DL (ref 3.4–5)
ALP SERPL-CCNC: 94 U/L (ref 33–136)
ALT SERPL W P-5'-P-CCNC: 11 U/L (ref 7–45)
ANION GAP SERPL CALC-SCNC: 13 MMOL/L (ref 10–20)
AST SERPL W P-5'-P-CCNC: 14 U/L (ref 9–39)
BASOPHILS # BLD AUTO: 0.07 X10*3/UL (ref 0–0.1)
BASOPHILS NFR BLD AUTO: 1 %
BILIRUB SERPL-MCNC: 0.4 MG/DL (ref 0–1.2)
BUN SERPL-MCNC: 52 MG/DL (ref 6–23)
CALCIUM SERPL-MCNC: 9 MG/DL (ref 8.6–10.3)
CHLORIDE SERPL-SCNC: 103 MMOL/L (ref 98–107)
CHOLEST SERPL-MCNC: 163 MG/DL (ref 0–199)
CHOLESTEROL/HDL RATIO: 2.3
CO2 SERPL-SCNC: 28 MMOL/L (ref 21–32)
CREAT SERPL-MCNC: 2.06 MG/DL (ref 0.5–1.05)
EGFRCR SERPLBLD CKD-EPI 2021: 23 ML/MIN/1.73M*2
EOSINOPHIL # BLD AUTO: 0.37 X10*3/UL (ref 0–0.4)
EOSINOPHIL NFR BLD AUTO: 5.3 %
ERYTHROCYTE [DISTWIDTH] IN BLOOD BY AUTOMATED COUNT: 12.6 % (ref 11.5–14.5)
FT4I SERPL CALC-MCNC: 2.1 (ref 1.6–4.7)
GLUCOSE SERPL-MCNC: 105 MG/DL (ref 74–99)
HCT VFR BLD AUTO: 38 % (ref 36–46)
HDLC SERPL-MCNC: 71.3 MG/DL
HGB BLD-MCNC: 11.9 G/DL (ref 12–16)
IMM GRANULOCYTES # BLD AUTO: 0.01 X10*3/UL (ref 0–0.5)
IMM GRANULOCYTES NFR BLD AUTO: 0.1 % (ref 0–0.9)
LDLC SERPL CALC-MCNC: 76 MG/DL
LYMPHOCYTES # BLD AUTO: 1.91 X10*3/UL (ref 0.8–3)
LYMPHOCYTES NFR BLD AUTO: 27.3 %
MCH RBC QN AUTO: 30.2 PG (ref 26–34)
MCHC RBC AUTO-ENTMCNC: 31.3 G/DL (ref 32–36)
MCV RBC AUTO: 96 FL (ref 80–100)
MONOCYTES # BLD AUTO: 0.73 X10*3/UL (ref 0.05–0.8)
MONOCYTES NFR BLD AUTO: 10.4 %
NEUTROPHILS # BLD AUTO: 3.9 X10*3/UL (ref 1.6–5.5)
NEUTROPHILS NFR BLD AUTO: 55.9 %
NON HDL CHOLESTEROL: 92 MG/DL (ref 0–149)
NRBC BLD-RTO: 0 /100 WBCS (ref 0–0)
PLATELET # BLD AUTO: 378 X10*3/UL (ref 150–450)
POTASSIUM SERPL-SCNC: 4.8 MMOL/L (ref 3.5–5.3)
PROT SERPL-MCNC: 6.8 G/DL (ref 6.4–8.2)
RBC # BLD AUTO: 3.94 X10*6/UL (ref 4–5.2)
SODIUM SERPL-SCNC: 139 MMOL/L (ref 136–145)
T3RU NFR SERPL: 32 % (ref 24–41)
T4 SERPL-MCNC: 6.7 UG/DL (ref 4.5–11.1)
TRIGL SERPL-MCNC: 81 MG/DL (ref 0–149)
TSH SERPL-ACNC: 1.41 MIU/L (ref 0.44–3.98)
VLDL: 16 MG/DL (ref 0–40)
WBC # BLD AUTO: 7 X10*3/UL (ref 4.4–11.3)

## 2024-06-17 PROCEDURE — 85025 COMPLETE CBC W/AUTO DIFF WBC: CPT

## 2024-06-17 PROCEDURE — 84479 ASSAY OF THYROID (T3 OR T4): CPT

## 2024-06-17 PROCEDURE — 84436 ASSAY OF TOTAL THYROXINE: CPT

## 2024-06-17 PROCEDURE — 84443 ASSAY THYROID STIM HORMONE: CPT

## 2024-06-17 PROCEDURE — 80061 LIPID PANEL: CPT

## 2024-06-17 PROCEDURE — 80053 COMPREHEN METABOLIC PANEL: CPT

## 2024-06-17 PROCEDURE — 82306 VITAMIN D 25 HYDROXY: CPT

## 2024-06-24 ENCOUNTER — APPOINTMENT (OUTPATIENT)
Dept: PRIMARY CARE | Facility: CLINIC | Age: 89
End: 2024-06-24
Payer: MEDICARE

## 2024-06-24 ENCOUNTER — LAB (OUTPATIENT)
Dept: LAB | Facility: LAB | Age: 89
End: 2024-06-24
Payer: MEDICARE

## 2024-06-24 VITALS
SYSTOLIC BLOOD PRESSURE: 118 MMHG | WEIGHT: 127.6 LBS | HEIGHT: 62 IN | BODY MASS INDEX: 23.48 KG/M2 | HEART RATE: 67 BPM | RESPIRATION RATE: 16 BRPM | OXYGEN SATURATION: 97 % | DIASTOLIC BLOOD PRESSURE: 76 MMHG | TEMPERATURE: 97.8 F

## 2024-06-24 DIAGNOSIS — K21.9 GASTROESOPHAGEAL REFLUX DISEASE, UNSPECIFIED WHETHER ESOPHAGITIS PRESENT: ICD-10-CM

## 2024-06-24 DIAGNOSIS — Z90.49 HISTORY OF CHOLECYSTECTOMY: ICD-10-CM

## 2024-06-24 DIAGNOSIS — E55.9 VITAMIN D DEFICIENCY: ICD-10-CM

## 2024-06-24 DIAGNOSIS — K21.9 GASTROESOPHAGEAL REFLUX DISEASE WITHOUT ESOPHAGITIS: ICD-10-CM

## 2024-06-24 DIAGNOSIS — N18.32 STAGE 3B CHRONIC KIDNEY DISEASE (MULTI): ICD-10-CM

## 2024-06-24 DIAGNOSIS — E78.2 MIXED HYPERLIPIDEMIA: ICD-10-CM

## 2024-06-24 DIAGNOSIS — E05.90 HYPERTHYROIDISM: ICD-10-CM

## 2024-06-24 DIAGNOSIS — M79.10 MUSCLE PAIN: ICD-10-CM

## 2024-06-24 DIAGNOSIS — I10 PRIMARY HYPERTENSION: ICD-10-CM

## 2024-06-24 DIAGNOSIS — R11.0 NAUSEA: ICD-10-CM

## 2024-06-24 DIAGNOSIS — J45.20 MILD INTERMITTENT REACTIVE AIRWAY DISEASE WITHOUT COMPLICATION (HHS-HCC): ICD-10-CM

## 2024-06-24 LAB
ALBUMIN SERPL BCP-MCNC: 4.3 G/DL (ref 3.4–5)
ALP SERPL-CCNC: 92 U/L (ref 33–136)
ALT SERPL W P-5'-P-CCNC: 12 U/L (ref 7–45)
ANION GAP SERPL CALC-SCNC: 12 MMOL/L (ref 10–20)
AST SERPL W P-5'-P-CCNC: 17 U/L (ref 9–39)
BASOPHILS # BLD AUTO: 0.07 X10*3/UL (ref 0–0.1)
BASOPHILS NFR BLD AUTO: 0.8 %
BILIRUB SERPL-MCNC: 0.6 MG/DL (ref 0–1.2)
BUN SERPL-MCNC: 45 MG/DL (ref 6–23)
CALCIUM SERPL-MCNC: 9.2 MG/DL (ref 8.6–10.3)
CHLORIDE SERPL-SCNC: 102 MMOL/L (ref 98–107)
CO2 SERPL-SCNC: 29 MMOL/L (ref 21–32)
CREAT SERPL-MCNC: 1.55 MG/DL (ref 0.5–1.05)
EGFRCR SERPLBLD CKD-EPI 2021: 32 ML/MIN/1.73M*2
EOSINOPHIL # BLD AUTO: 0.41 X10*3/UL (ref 0–0.4)
EOSINOPHIL NFR BLD AUTO: 4.9 %
ERYTHROCYTE [DISTWIDTH] IN BLOOD BY AUTOMATED COUNT: 12.4 % (ref 11.5–14.5)
GLUCOSE SERPL-MCNC: 96 MG/DL (ref 74–99)
HCT VFR BLD AUTO: 39 % (ref 36–46)
HGB BLD-MCNC: 12.3 G/DL (ref 12–16)
IMM GRANULOCYTES # BLD AUTO: 0.04 X10*3/UL (ref 0–0.5)
IMM GRANULOCYTES NFR BLD AUTO: 0.5 % (ref 0–0.9)
LYMPHOCYTES # BLD AUTO: 2.72 X10*3/UL (ref 0.8–3)
LYMPHOCYTES NFR BLD AUTO: 32.8 %
MCH RBC QN AUTO: 30.1 PG (ref 26–34)
MCHC RBC AUTO-ENTMCNC: 31.5 G/DL (ref 32–36)
MCV RBC AUTO: 96 FL (ref 80–100)
MONOCYTES # BLD AUTO: 0.8 X10*3/UL (ref 0.05–0.8)
MONOCYTES NFR BLD AUTO: 9.7 %
NEUTROPHILS # BLD AUTO: 4.25 X10*3/UL (ref 1.6–5.5)
NEUTROPHILS NFR BLD AUTO: 51.3 %
NRBC BLD-RTO: 0 /100 WBCS (ref 0–0)
PLATELET # BLD AUTO: 435 X10*3/UL (ref 150–450)
POTASSIUM SERPL-SCNC: 4.2 MMOL/L (ref 3.5–5.3)
PROT SERPL-MCNC: 7.3 G/DL (ref 6.4–8.2)
RBC # BLD AUTO: 4.08 X10*6/UL (ref 4–5.2)
SODIUM SERPL-SCNC: 139 MMOL/L (ref 136–145)
WBC # BLD AUTO: 8.3 X10*3/UL (ref 4.4–11.3)

## 2024-06-24 PROCEDURE — 85025 COMPLETE CBC W/AUTO DIFF WBC: CPT

## 2024-06-24 PROCEDURE — 99214 OFFICE O/P EST MOD 30 MIN: CPT | Performed by: FAMILY MEDICINE

## 2024-06-24 PROCEDURE — 80053 COMPREHEN METABOLIC PANEL: CPT

## 2024-06-24 PROCEDURE — 36415 COLL VENOUS BLD VENIPUNCTURE: CPT

## 2024-06-24 RX ORDER — ACEBUTOLOL HYDROCHLORIDE 200 MG/1
200 CAPSULE ORAL 2 TIMES DAILY
Qty: 180 CAPSULE | Refills: 1 | Status: SHIPPED | OUTPATIENT
Start: 2024-06-24

## 2024-06-24 RX ORDER — VALSARTAN AND HYDROCHLOROTHIAZIDE 320; 25 MG/1; MG/1
1 TABLET, FILM COATED ORAL DAILY
Qty: 90 TABLET | Refills: 1 | Status: SHIPPED | OUTPATIENT
Start: 2024-06-24

## 2024-06-24 RX ORDER — AMLODIPINE BESYLATE 10 MG/1
10 TABLET ORAL DAILY
Qty: 30 TABLET | Refills: 0 | Status: SHIPPED | OUTPATIENT
Start: 2024-06-24 | End: 2024-06-26

## 2024-06-24 RX ORDER — ONDANSETRON HYDROCHLORIDE 8 MG/1
8 TABLET, FILM COATED ORAL EVERY 8 HOURS PRN
Qty: 30 TABLET | Refills: 1 | Status: SHIPPED | OUTPATIENT
Start: 2024-06-24

## 2024-06-24 RX ORDER — PANTOPRAZOLE SODIUM 40 MG/1
40 TABLET, DELAYED RELEASE ORAL DAILY
Qty: 90 TABLET | Refills: 1 | Status: SHIPPED | OUTPATIENT
Start: 2024-06-24

## 2024-06-24 RX ORDER — PROMETHAZINE HYDROCHLORIDE 25 MG/1
25 TABLET ORAL EVERY 12 HOURS PRN
Qty: 30 TABLET | Refills: 1 | Status: SHIPPED | OUTPATIENT
Start: 2024-06-24

## 2024-06-24 ASSESSMENT — ENCOUNTER SYMPTOMS
OCCASIONAL FEELINGS OF UNSTEADINESS: 0
LOSS OF SENSATION IN FEET: 0
ARTHRALGIAS: 1

## 2024-06-24 ASSESSMENT — PATIENT HEALTH QUESTIONNAIRE - PHQ9
1. LITTLE INTEREST OR PLEASURE IN DOING THINGS: NOT AT ALL
SUM OF ALL RESPONSES TO PHQ9 QUESTIONS 1 AND 2: 0
2. FEELING DOWN, DEPRESSED OR HOPELESS: NOT AT ALL

## 2024-06-24 NOTE — PROGRESS NOTES
Subjective   Patient ID: Maggy Gore is a 88 y.o. female who presents for Knee Pain and Med Refill.    HPI   Patient is at the office today to discuss blood work performed on 06/17/24.    Patient is concerned of left knee pain when she is doing different movements. Pain scale 5/10 at the moment. 8/10 when she is doing different movements.  Patient sees Ortho for workman's comp for knee.      Review of Systems   Musculoskeletal:  Positive for arthralgias.     12 Systems have been reviewed as follows.  Constitutional: Fever, weight gain, weight loss, appetite change, night sweats, fatigue, chills.  Eyes : blurry, double vision, vision, loss, tearing, redness, pain, sensitivity to light, glaucoma.  Ears, nose, mouth, and throat: Hearing loss, ringing in the ears, ear pain, nasal congestion, nasal drainage, nosebleeds, mouth, throat, irritation tooth problem.  Cardiovascular :chest pain, pressure, heart racing, palpitations, sweating, leg swelling, high or low blood pressure  Pulmonary: Cough, yellow or green sputum, blood and sputum, shortness of breath, wheezing  Gastrointestinal: Nausea, vomiting, diarrhea, constipation, pain, blood in stool, or vomitus, heartburn, difficulty swallowing  Genitourinary: incontinence, abnormal bleeding, abnormal discharge, urinary frequency, urinary hesitancy, pain, impotence sexual problem, infection, urinary retention  Musculoskeletal: Pain, stiffness, joint, redness or warmth, arthritis, back pain, weakness, muscle wasting, sprain or fracture  Neuro: Weight weakness, dizziness, change in voice, change in taste change in vision, change in hearing, loss, or change of sensation, trouble walking, balance problems coordination problems, shaking, speech problem  Endocrine , cold or heat intolerance, blood sugar problem, weight gain or loss missed periods hot flashes, sweats, change in body hair, change in libido, increased thirst, increased urination  Heme/lymph: Swelling, bleeding,  "problem anemia, bruising, enlarged lymph nodes  Allergic/immunologic: H. plus nasal drip, watery itchy eyes, nasal drainage, immunosuppressed  The above were reviewed and noted negative except as noted in HPI and Problem List.      Objective   /76 (BP Location: Right arm, Patient Position: Sitting, BP Cuff Size: Adult)   Pulse 67   Temp 36.6 °C (97.8 °F) (Temporal)   Resp 16   Ht 1.575 m (5' 2\")   Wt 57.9 kg (127 lb 9.6 oz)   SpO2 97%   BMI 23.34 kg/m²     Physical Exam  Constitutional: Well developed, well nourished, alert and in no acute distress   Eyes: Normal external exam. Pupils equally round and reactive to light with normal accommodation and extraocular movements intact.  Neck: Supple, no lymphadenopathy or masses.   Cardiovascular: Regular rate and rhythm, normal S1 and S2, no murmurs, gallops, or rubs. Radial pulses normal. No peripheral edema.  Pulmonary: No respiratory distress, lungs clear to auscultation bilaterally. No wheezes, rhonchi, rales.  Abdomen: soft,non tender, non distended, without masses or HSM  Skin: Warm, well perfused, normal skin turgor and color.   Neurologic: Cranial nerves II-XII grossly intact.   Psychiatric: Mood calm and affect normal  Musculoskeletal: Moving all extremities without restriction    Assessment/Plan   Problem List Items Addressed This Visit             ICD-10-CM    Mixed hyperlipidemia E78.2    Relevant Orders    Follow Up In [facility] - PCP - Established    Hyperthyroidism E05.90    Relevant Orders    Follow Up In [facility] - PCP - Established    Vitamin D deficiency E55.9    Relevant Orders    Follow Up In [facility] - PCP - Established    Reactive airway disease without complication ([facility]-HCC) J45.909    Relevant Orders    Follow Up In [facility] - PCP - Established    Primary hypertension I10    Relevant Medications    valsartan-hydrochlorothiazide (Diovan-HCT) 320-25 mg tablet    amLODIPine (Norvasc) 10 " mg tablet    acebutolol (Sectral) 200 mg capsule    Other Relevant Orders    Follow Up In [facility] - PCP - Established    CBC and Auto Differential    Comprehensive Metabolic Panel    Gastroesophageal reflux disease K21.9    Relevant Medications    pantoprazole (ProtoNix) 40 mg EC tablet    Other Relevant Orders    Follow Up In [facility] - PCP - Established    Follow Up In [facility] - PCP - Established    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    Relevant Orders    Follow Up In [facility] - PCP - Established    CBC and Auto Differential    Comprehensive Metabolic Panel    History of cholecystectomy Z90.49    Relevant Orders    Follow Up In [facility] - PCP - Established     Other Visit Diagnoses         Codes    Muscle pain     M79.10    Relevant Orders    Follow Up In [facility] - PCP - Established    Nausea     R11.0    Relevant Medications    promethazine (Phenergan) 25 mg tablet    ondansetron (Zofran) 8 mg tablet    Other Relevant Orders    Follow Up In [facility] - PCP - Established          BW now      Continue current medications and therapy for chronic medical conditions.    Patient was advised importance of proper diet/nutrition in addition adequate hydration. Patient was encouraged moderate exercise program to include 30 minutes daily for 5 days of the week or 150 minutes weekly. Patient will follow-up with us as scheduled.    I personally reviewed the [facility] report for this patient. I have considered the risks of abuse, dependence, addiction, and diversion.    UDS/CSA: due opiate    Do knee raises and ice area.    continue farxiga 10 mg daily for CKD  Pharmacy referral done    Metamucil every day    Last surgery was 2/23/24.  Off work since 12/23 and RTW 5/1/24 per Dr Johnson  No strenuous activity or heavy lifting until 6 weeks post-op     Get Blood work today for kidney function.     Scribe Attestation  By signing my  name below, I, Ace Dhillon MD, Scribe   attest that this documentation has been prepared under the direction and in the presence of Ace Dhillon MD.      Provider Attestation - Scribe documentation    All medical record entries made by the Scribe were at my direction and personally dictated by me. I have reviewed the chart and agree that the record accurately reflects my personal performance of the history, physical exam, discussion and plan.

## 2024-06-26 DIAGNOSIS — I10 PRIMARY HYPERTENSION: ICD-10-CM

## 2024-06-26 RX ORDER — AMLODIPINE BESYLATE 10 MG/1
10 TABLET ORAL DAILY
Qty: 30 TABLET | Refills: 0 | Status: SHIPPED | OUTPATIENT
Start: 2024-06-26

## 2024-07-05 DIAGNOSIS — K21.9 GASTROESOPHAGEAL REFLUX DISEASE, UNSPECIFIED WHETHER ESOPHAGITIS PRESENT: ICD-10-CM

## 2024-07-05 RX ORDER — FAMOTIDINE 20 MG/1
20 TABLET, FILM COATED ORAL NIGHTLY
Qty: 90 TABLET | Refills: 0 | Status: SHIPPED | OUTPATIENT
Start: 2024-07-05

## 2024-07-19 DIAGNOSIS — E78.2 MIXED HYPERLIPIDEMIA: ICD-10-CM

## 2024-07-19 RX ORDER — ATORVASTATIN CALCIUM 20 MG/1
20 TABLET, FILM COATED ORAL DAILY
Qty: 90 TABLET | Refills: 0 | Status: SHIPPED | OUTPATIENT
Start: 2024-07-19

## 2024-07-24 ENCOUNTER — PATIENT OUTREACH (OUTPATIENT)
Dept: PRIMARY CARE | Facility: CLINIC | Age: 89
End: 2024-07-24
Payer: COMMERCIAL

## 2024-07-24 NOTE — PROGRESS NOTES
Attempted to contact regarding CCM monthly outreach. Voicemail requesting call back. Number given.

## 2024-07-29 ENCOUNTER — PATIENT OUTREACH (OUTPATIENT)
Dept: PRIMARY CARE | Facility: CLINIC | Age: 89
End: 2024-07-29
Payer: COMMERCIAL

## 2024-08-06 ENCOUNTER — LAB (OUTPATIENT)
Dept: LAB | Facility: LAB | Age: 89
End: 2024-08-06
Payer: MEDICARE

## 2024-08-06 ENCOUNTER — APPOINTMENT (OUTPATIENT)
Dept: PRIMARY CARE | Facility: CLINIC | Age: 89
End: 2024-08-06
Payer: MEDICARE

## 2024-08-06 VITALS
TEMPERATURE: 97 F | DIASTOLIC BLOOD PRESSURE: 78 MMHG | WEIGHT: 130.4 LBS | HEIGHT: 62 IN | RESPIRATION RATE: 16 BRPM | BODY MASS INDEX: 24 KG/M2 | OXYGEN SATURATION: 99 % | HEART RATE: 73 BPM | SYSTOLIC BLOOD PRESSURE: 138 MMHG

## 2024-08-06 DIAGNOSIS — R53.83 FATIGUE, UNSPECIFIED TYPE: ICD-10-CM

## 2024-08-06 DIAGNOSIS — E55.9 VITAMIN D DEFICIENCY: ICD-10-CM

## 2024-08-06 DIAGNOSIS — E05.90 HYPERTHYROIDISM: ICD-10-CM

## 2024-08-06 DIAGNOSIS — R11.0 NAUSEA: ICD-10-CM

## 2024-08-06 DIAGNOSIS — K21.9 GASTROESOPHAGEAL REFLUX DISEASE WITHOUT ESOPHAGITIS: ICD-10-CM

## 2024-08-06 DIAGNOSIS — N18.4 CKD STAGE G4/A1, GFR 15-29 AND ALBUMIN CREATININE RATIO <30 MG/G (MULTI): ICD-10-CM

## 2024-08-06 DIAGNOSIS — M79.10 MUSCLE PAIN: ICD-10-CM

## 2024-08-06 DIAGNOSIS — E78.2 MIXED HYPERLIPIDEMIA: ICD-10-CM

## 2024-08-06 DIAGNOSIS — H52.4 HYPEROPIA WITH PRESBYOPIA OF BOTH EYES: ICD-10-CM

## 2024-08-06 DIAGNOSIS — N18.32 STAGE 3B CHRONIC KIDNEY DISEASE (MULTI): ICD-10-CM

## 2024-08-06 DIAGNOSIS — H52.03 HYPEROPIA WITH PRESBYOPIA OF BOTH EYES: ICD-10-CM

## 2024-08-06 DIAGNOSIS — J45.20 MILD INTERMITTENT REACTIVE AIRWAY DISEASE WITHOUT COMPLICATION (HHS-HCC): ICD-10-CM

## 2024-08-06 DIAGNOSIS — I10 PRIMARY HYPERTENSION: ICD-10-CM

## 2024-08-06 DIAGNOSIS — Z90.49 HISTORY OF CHOLECYSTECTOMY: ICD-10-CM

## 2024-08-06 DIAGNOSIS — K21.9 GASTROESOPHAGEAL REFLUX DISEASE, UNSPECIFIED WHETHER ESOPHAGITIS PRESENT: ICD-10-CM

## 2024-08-06 LAB
25(OH)D3 SERPL-MCNC: 72 NG/ML (ref 30–100)
ALBUMIN SERPL BCP-MCNC: 4.2 G/DL (ref 3.4–5)
ALP SERPL-CCNC: 87 U/L (ref 33–136)
ALT SERPL W P-5'-P-CCNC: 14 U/L (ref 7–45)
ANION GAP SERPL CALC-SCNC: 15 MMOL/L (ref 10–20)
AST SERPL W P-5'-P-CCNC: 19 U/L (ref 9–39)
BASOPHILS # BLD AUTO: 0.09 X10*3/UL (ref 0–0.1)
BASOPHILS NFR BLD AUTO: 1.2 %
BILIRUB SERPL-MCNC: 0.7 MG/DL (ref 0–1.2)
BUN SERPL-MCNC: 44 MG/DL (ref 6–23)
CALCIUM SERPL-MCNC: 10 MG/DL (ref 8.6–10.3)
CHLORIDE SERPL-SCNC: 97 MMOL/L (ref 98–107)
CHOLEST SERPL-MCNC: 176 MG/DL (ref 0–199)
CHOLESTEROL/HDL RATIO: 2.6
CO2 SERPL-SCNC: 31 MMOL/L (ref 21–32)
CREAT SERPL-MCNC: 1.94 MG/DL (ref 0.5–1.05)
EGFRCR SERPLBLD CKD-EPI 2021: 25 ML/MIN/1.73M*2
EOSINOPHIL # BLD AUTO: 0.31 X10*3/UL (ref 0–0.4)
EOSINOPHIL NFR BLD AUTO: 4.1 %
ERYTHROCYTE [DISTWIDTH] IN BLOOD BY AUTOMATED COUNT: 13.2 % (ref 11.5–14.5)
FT4I SERPL CALC-MCNC: 2.6 (ref 1.6–4.7)
GLUCOSE SERPL-MCNC: 97 MG/DL (ref 74–99)
HCT VFR BLD AUTO: 38.9 % (ref 36–46)
HDLC SERPL-MCNC: 68.4 MG/DL
HGB BLD-MCNC: 12.7 G/DL (ref 12–16)
IMM GRANULOCYTES # BLD AUTO: 0.04 X10*3/UL (ref 0–0.5)
IMM GRANULOCYTES NFR BLD AUTO: 0.5 % (ref 0–0.9)
LDLC SERPL CALC-MCNC: 90 MG/DL
LYMPHOCYTES # BLD AUTO: 2.01 X10*3/UL (ref 0.8–3)
LYMPHOCYTES NFR BLD AUTO: 26.4 %
MCH RBC QN AUTO: 30.2 PG (ref 26–34)
MCHC RBC AUTO-ENTMCNC: 32.6 G/DL (ref 32–36)
MCV RBC AUTO: 92 FL (ref 80–100)
MONOCYTES # BLD AUTO: 0.65 X10*3/UL (ref 0.05–0.8)
MONOCYTES NFR BLD AUTO: 8.6 %
NEUTROPHILS # BLD AUTO: 4.5 X10*3/UL (ref 1.6–5.5)
NEUTROPHILS NFR BLD AUTO: 59.2 %
NON HDL CHOLESTEROL: 108 MG/DL (ref 0–149)
NRBC BLD-RTO: 0 /100 WBCS (ref 0–0)
PLATELET # BLD AUTO: 420 X10*3/UL (ref 150–450)
POTASSIUM SERPL-SCNC: 5.3 MMOL/L (ref 3.5–5.3)
PROT SERPL-MCNC: 7.4 G/DL (ref 6.4–8.2)
RBC # BLD AUTO: 4.21 X10*6/UL (ref 4–5.2)
SODIUM SERPL-SCNC: 138 MMOL/L (ref 136–145)
T3RU NFR SERPL: 29 % (ref 24–41)
T4 SERPL-MCNC: 8.9 UG/DL (ref 4.5–11.1)
TRIGL SERPL-MCNC: 87 MG/DL (ref 0–149)
TSH SERPL-ACNC: 1.37 MIU/L (ref 0.44–3.98)
VLDL: 17 MG/DL (ref 0–40)
WBC # BLD AUTO: 7.6 X10*3/UL (ref 4.4–11.3)

## 2024-08-06 PROCEDURE — RXMED WILLOW AMBULATORY MEDICATION CHARGE

## 2024-08-06 PROCEDURE — 1123F ACP DISCUSS/DSCN MKR DOCD: CPT | Performed by: FAMILY MEDICINE

## 2024-08-06 PROCEDURE — 80061 LIPID PANEL: CPT

## 2024-08-06 PROCEDURE — 82306 VITAMIN D 25 HYDROXY: CPT

## 2024-08-06 PROCEDURE — 3078F DIAST BP <80 MM HG: CPT | Performed by: FAMILY MEDICINE

## 2024-08-06 PROCEDURE — 1158F ADVNC CARE PLAN TLK DOCD: CPT | Performed by: FAMILY MEDICINE

## 2024-08-06 PROCEDURE — 3075F SYST BP GE 130 - 139MM HG: CPT | Performed by: FAMILY MEDICINE

## 2024-08-06 PROCEDURE — 36415 COLL VENOUS BLD VENIPUNCTURE: CPT

## 2024-08-06 PROCEDURE — 1159F MED LIST DOCD IN RCRD: CPT | Performed by: FAMILY MEDICINE

## 2024-08-06 PROCEDURE — 84436 ASSAY OF TOTAL THYROXINE: CPT

## 2024-08-06 PROCEDURE — 84443 ASSAY THYROID STIM HORMONE: CPT

## 2024-08-06 PROCEDURE — 99214 OFFICE O/P EST MOD 30 MIN: CPT | Performed by: FAMILY MEDICINE

## 2024-08-06 PROCEDURE — 84479 ASSAY OF THYROID (T3 OR T4): CPT

## 2024-08-06 PROCEDURE — 85025 COMPLETE CBC W/AUTO DIFF WBC: CPT

## 2024-08-06 PROCEDURE — 80053 COMPREHEN METABOLIC PANEL: CPT

## 2024-08-06 PROCEDURE — 1036F TOBACCO NON-USER: CPT | Performed by: FAMILY MEDICINE

## 2024-08-06 RX ORDER — DAPAGLIFLOZIN 10 MG/1
10 TABLET, FILM COATED ORAL DAILY
Qty: 90 TABLET | Refills: 3 | Status: SHIPPED | OUTPATIENT
Start: 2024-08-06 | End: 2025-08-01

## 2024-08-06 RX ORDER — DAPAGLIFLOZIN 10 MG/1
10 TABLET, FILM COATED ORAL DAILY
Qty: 90 TABLET | Refills: 3 | Status: SHIPPED | OUTPATIENT
Start: 2024-08-06 | End: 2024-08-06 | Stop reason: SDUPTHER

## 2024-08-06 RX ORDER — AMLODIPINE BESYLATE 10 MG/1
10 TABLET ORAL DAILY
Qty: 30 TABLET | Refills: 0 | Status: SHIPPED | OUTPATIENT
Start: 2024-08-06

## 2024-08-06 RX ORDER — FAMOTIDINE 20 MG/1
20 TABLET, FILM COATED ORAL NIGHTLY
Qty: 90 TABLET | Refills: 0 | Status: SHIPPED | OUTPATIENT
Start: 2024-08-06

## 2024-08-06 RX ORDER — CYCLOBENZAPRINE HCL 10 MG
10 TABLET ORAL 2 TIMES DAILY PRN
Qty: 90 TABLET | Refills: 0 | Status: SHIPPED | OUTPATIENT
Start: 2024-08-06

## 2024-08-06 RX ORDER — ATORVASTATIN CALCIUM 20 MG/1
20 TABLET, FILM COATED ORAL DAILY
Qty: 90 TABLET | Refills: 0 | Status: SHIPPED | OUTPATIENT
Start: 2024-08-06

## 2024-08-06 ASSESSMENT — ENCOUNTER SYMPTOMS
GASTROINTESTINAL NEGATIVE: 1
POLYPHAGIA: 0
WEAKNESS: 0
APPETITE CHANGE: 0
EYE ITCHING: 0
DIARRHEA: 0
STRIDOR: 0
DYSURIA: 0
AGITATION: 0
ARTHRALGIAS: 0
HYPERACTIVE: 0
POLYDIPSIA: 0
ABDOMINAL DISTENTION: 0
FATIGUE: 0
SINUS PAIN: 0
RHINORRHEA: 0
DECREASED CONCENTRATION: 0
SHORTNESS OF BREATH: 0
HALLUCINATIONS: 0
DYSPHORIC MOOD: 0
VOMITING: 0
DIAPHORESIS: 0
ACTIVITY CHANGE: 0
TROUBLE SWALLOWING: 0
ABDOMINAL PAIN: 0
NECK STIFFNESS: 0
DIZZINESS: 1
HEADACHES: 0
EYE DISCHARGE: 0
CHOKING: 0
NAUSEA: 0
UNEXPECTED WEIGHT CHANGE: 0
ANAL BLEEDING: 0
BACK PAIN: 0
TREMORS: 0
FEVER: 0
SPEECH DIFFICULTY: 0
PALPITATIONS: 0
SLEEP DISTURBANCE: 0
SORE THROAT: 0
NERVOUS/ANXIOUS: 0
CHILLS: 0
FREQUENCY: 0
FACIAL SWELLING: 0
WOUND: 0
HEMATURIA: 0
SEIZURES: 0
FACIAL ASYMMETRY: 0
FLANK PAIN: 0
NUMBNESS: 0
CHEST TIGHTNESS: 0
VOICE CHANGE: 0
MYALGIAS: 0
JOINT SWELLING: 0
CONSTITUTIONAL NEGATIVE: 1
EYE PAIN: 0
CONSTIPATION: 0
COLOR CHANGE: 0
LIGHT-HEADEDNESS: 0
NECK PAIN: 0
PHOTOPHOBIA: 0
BRUISES/BLEEDS EASILY: 0
ADENOPATHY: 0
EYE REDNESS: 0
RECTAL PAIN: 0
COUGH: 0
SINUS PRESSURE: 0
CARDIOVASCULAR NEGATIVE: 1
APNEA: 0
CONFUSION: 0
WHEEZING: 0
BLOOD IN STOOL: 0
DIFFICULTY URINATING: 0

## 2024-08-06 NOTE — PROGRESS NOTES
Subjective   Patient ID: Maggy Gore is a 88 y.o. female who presents for Headache.    HPI   Patient is at the office today with concerns of dizziness. Patient reports she is getting episodes where she feels blurry eye (right eye)  dizzy and head aches. Patient manage symptoms with medications for the head ache.    Patient last visit to ophthalmology was about 1 year ago.   Review of Systems   Constitutional: Negative.  Negative for activity change, appetite change, chills, diaphoresis, fatigue, fever and unexpected weight change.   HENT: Negative.  Negative for congestion, dental problem, ear discharge, facial swelling, hearing loss, mouth sores, nosebleeds, postnasal drip, rhinorrhea, sinus pressure, sinus pain, sneezing, sore throat, tinnitus, trouble swallowing and voice change.    Eyes:  Positive for visual disturbance. Negative for photophobia, pain, discharge, redness and itching.   Respiratory:  Negative for apnea, cough, choking, chest tightness, shortness of breath, wheezing and stridor.    Cardiovascular: Negative.  Negative for chest pain, palpitations and leg swelling.   Gastrointestinal: Negative.  Negative for abdominal distention, abdominal pain, anal bleeding, blood in stool, constipation, diarrhea, nausea, rectal pain and vomiting.   Endocrine: Negative for cold intolerance, heat intolerance, polydipsia, polyphagia and polyuria.   Genitourinary:  Negative for decreased urine volume, difficulty urinating, dysuria, enuresis, flank pain, frequency, hematuria and urgency.   Musculoskeletal:  Negative for arthralgias, back pain, gait problem, joint swelling, myalgias, neck pain and neck stiffness.   Skin:  Negative for color change, pallor, rash and wound.   Allergic/Immunologic: Negative for environmental allergies, food allergies and immunocompromised state.   Neurological:  Positive for dizziness. Negative for tremors, seizures, syncope, facial asymmetry, speech difficulty, weakness,  "light-headedness, numbness and headaches.   Hematological:  Negative for adenopathy. Does not bruise/bleed easily.   Psychiatric/Behavioral:  Negative for agitation, behavioral problems, confusion, decreased concentration, dysphoric mood, hallucinations, self-injury, sleep disturbance and suicidal ideas. The patient is not nervous/anxious and is not hyperactive.    All other systems reviewed and are negative.      Objective   /78 (BP Location: Left arm, Patient Position: Sitting, BP Cuff Size: Adult)   Pulse 73   Temp 36.1 °C (97 °F)   Resp 16   Ht 1.575 m (5' 2\")   Wt 59.1 kg (130 lb 6.4 oz)   SpO2 99%   BMI 23.85 kg/m²     Physical Exam  Vitals reviewed.   Constitutional:       General: She is not in acute distress.     Appearance: Normal appearance. She is normal weight. She is not ill-appearing or diaphoretic.   HENT:      Head: Normocephalic.      Right Ear: Tympanic membrane and external ear normal.      Left Ear: Tympanic membrane and external ear normal.      Nose: Nose normal. No congestion.      Mouth/Throat:      Pharynx: No posterior oropharyngeal erythema.   Eyes:      General:         Right eye: No discharge.         Left eye: No discharge.      Extraocular Movements: Extraocular movements intact.      Conjunctiva/sclera: Conjunctivae normal.      Pupils: Pupils are equal, round, and reactive to light.   Cardiovascular:      Rate and Rhythm: Normal rate and regular rhythm.      Pulses: Normal pulses.      Heart sounds: Normal heart sounds. No murmur heard.  Pulmonary:      Effort: Pulmonary effort is normal. No respiratory distress.      Breath sounds: Normal breath sounds. No wheezing or rales.   Chest:      Chest wall: No tenderness.   Abdominal:      General: Abdomen is flat. Bowel sounds are normal. There is no distension.      Palpations: There is no mass.      Tenderness: There is no abdominal tenderness. There is no guarding.   Musculoskeletal:         General: No tenderness. Normal " range of motion.      Cervical back: Normal range of motion and neck supple. No tenderness.      Right lower leg: No edema.      Left lower leg: No edema.   Skin:     General: Skin is dry.      Coloration: Skin is not jaundiced.      Findings: No bruising, erythema or rash.   Neurological:      General: No focal deficit present.      Mental Status: She is alert and oriented to person, place, and time. Mental status is at baseline.      Cranial Nerves: No cranial nerve deficit.      Sensory: No sensory deficit.      Coordination: Coordination normal.      Gait: Gait normal.   Psychiatric:         Mood and Affect: Mood normal.         Thought Content: Thought content normal.         Judgment: Judgment normal.         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    Relevant Orders    Follow Up In [facility] - PCP - Established    Mixed hyperlipidemia E78.2    Relevant Medications    atorvastatin (Lipitor) 20 mg tablet    Other Relevant Orders    Follow Up [facility] - PCP - Established    Comprehensive Metabolic Panel    Lipid Panel    Hyperthyroidism E05.90    Relevant Orders    Follow Up [facility] - PCP - Established    Thyroid Stimulating Hormone    Free T4 Index    Vitamin D deficiency E55.9    Relevant Orders    Follow Up [facility] - PCP - Established    Vitamin D 25-Hydroxy,Total (for eval of Vitamin D levels)    Reactive airway disease without complication ([facility]) J45.909    Relevant Orders    Follow Up [facility] - PCP - Established    Primary hypertension I10    Relevant Medications    amLODIPine (Norvasc) 10 mg tablet    Other Relevant Orders    Follow Up [facility] - PCP - Established    Gastroesophageal reflux disease K21.9    Relevant Medications    famotidine (Pepcid) 20 mg tablet    Other Relevant Orders    Follow Up [facility] - PCP - Established    Follow Up [facility] - PCP - Established    Fatigue R53.83    Relevant Orders    CBC and Auto Differential    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    Relevant Orders    Follow Up [facility] - PCP - Established    CKD stage G4/A1, 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) N18.4    Relevant Medications    dapagliflozin propanediol (Farxiga) 10 mg    Other Relevant Orders    Follow Up [facility] - PCP - Established    Follow Up [facility] - Pharmacy    History of cholecystectomy Z90.49    Relevant Orders    Follow Up [facility] - PCP - Established     Other Visit Diagnoses         Codes    Muscle pain     M79.10    Relevant Medications    cyclobenzaprine (Flexeril) 10 mg tablet    Other Relevant Orders    Follow Up [facility] - PCP - Established    Nausea     R11.0    Relevant Orders    Follow Up [facility] - PCP - Established        Do knee raises and ice area.     continue farxiga 10 mg daily for CKD  Pharmacy referral done     Metamucil every day    Last surgery was 2/23/24.  Off work since 12/23 and RTW 5/1/24 per Dr Johnson  No strenuous activity or heavy lifting until 6 weeks post-op      Get Blood work today for kidney function.     Scribe Attestation  By signing my name below, I, Mariah Sanchez MA, Scribe   attest that this documentation has been prepared under the direction and in the presence of Ace Dhillon MD.    Provider Attestation - Scribe documentation    All medical record entries made by the Scribe were at my direction and personally dictated by me. I have reviewed the chart and agree that the record accurately reflects my personal performance of the history, physical exam, discussion and plan.    Continue current medications and therapy for chronic medical conditions

## 2024-08-07 ENCOUNTER — PHARMACY VISIT (OUTPATIENT)
Dept: PHARMACY | Facility: CLINIC | Age: 89
End: 2024-08-07
Payer: COMMERCIAL

## 2024-08-13 ENCOUNTER — PATIENT OUTREACH (OUTPATIENT)
Dept: PRIMARY CARE | Facility: CLINIC | Age: 89
End: 2024-08-13
Payer: COMMERCIAL

## 2024-08-13 DIAGNOSIS — N18.32 STAGE 3B CHRONIC KIDNEY DISEASE (MULTI): ICD-10-CM

## 2024-08-13 DIAGNOSIS — I10 PRIMARY HYPERTENSION: ICD-10-CM

## 2024-08-13 NOTE — PROGRESS NOTES
Spoke with patient for monthly CCM outreach. Chart reviewed. Introduced self. Concerns: none  Doing well.  Recent visit with primary provider. Eye blurriness is better. Has increased her water intake as recommended.  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  Has a bad back from injury in 1979. Right foot occasionally goes numb.   Lives alone with turtle  ([place]) and cat (Sony). Oldest daughter lives 2 streets away. Has another daughter in [place].  Will look through medications for needed refills. Takes all medications as prescribed.  Encouraged to call with questions, concerns or needs.

## 2024-09-03 ENCOUNTER — APPOINTMENT (OUTPATIENT)
Dept: PHARMACY | Facility: HOSPITAL | Age: 89
End: 2024-09-03
Payer: COMMERCIAL

## 2024-09-03 DIAGNOSIS — N18.4 CKD STAGE G4/A1, GFR 15-29 AND ALBUMIN CREATININE RATIO <30 MG/G (MULTI): ICD-10-CM

## 2024-09-03 NOTE — PROGRESS NOTES
Clinical Pharmacy Appointment    Patient ID: Maggy Gore is a 88 y.o. female who presents for Chronic Kidney Disease.    Pt is here for First appointment.     Referring Provider: Ace Dhillon MD  PCP: Ace Dhillon MD   Last visit with PCP: 2024   Next visit with PCP: 10/7/2024      Subjective     Interval History  Patient was paying $33/90 days of Farxiga, but has now hit the donut hole and medication is expensive.    HPI  CHRONIC KIDNEY DISEASE ASSESSMENT  Does patient see nephrology? No    Lab Results   Component Value Date    EGFR 25 (L) 2024     Stage: 4 (eGFR 15-29)  Albuminuria: no recent lab  ACE/ARB: Yes, Valsartan/hydrochlorothiazide 320/25mg daily    Hypertension History:  HTN diagnosis: yes  Current Regimen  Amlodipine 10mg daily  Valsartan/hydrochlorothiazide 320/25mg daily  HTN at goal? yes    Hyperlipidemia History:  Diagnosis? yes  Current Regimen  Atorvastatin 20mg daily  At goal? yes  Current LDL: 90mg/dL  Current Tmg/dL    Diabetes History:  Diagnosis? no    Medication Review  The following medications were identified as inappropriate per current kidney function: patients CrCl calculated as 19 mL/min (9/3/2024)  Famotidine 20mg; recommend 10mg daily or 20mg every other day for CrCl <30 mL/min    Drug Interactions  No relevant drug interactions were noted.    Medication System Management  Patient's preferred pharmacy: [facility]  Adherence/Organization: No issues reported  Affordability/Accessibility: Will assess for  Patient Assistance for Farxiga     Patient Assistance Screening (VAF)  Patient verbally reports monthly or yearly income which is less than 400% federal poverty level  Application for program has been submitted for the following medications:   Farxiga 10mg daily  Patient aware this process may take up to 2 weeks once income documents have been sent to the team.  If approved, medication must be filled through [facility] pharmacy and may be  "picked up or mailed to patient.   If approved, medication will be billed through insurance, and patient assistance team will pay the copay. This will result in a $0 copay for the patient.  Counseled patient on mechanism of action, side effects, contraindications, and what to do if the patient misses a dose. All patients questions were answered.       Objective   Allergies   Allergen Reactions    Sulfa (Sulfonamide Antibiotics) Rash     Social History     Social History Narrative    Not on file      Medication Review  Current Outpatient Medications   Medication Instructions    acebutolol (SECTRAL) 200 mg, oral, 2 times daily    acetaminophen (TYLENOL) 1,000 mg, oral, Every 6 hours PRN    amLODIPine (NORVASC) 10 mg, oral, Daily    atorvastatin (LIPITOR) 20 mg, oral, Daily    cholecalciferol (VITAMIN D3) 2,000 Units, oral, Daily    cyclobenzaprine (FLEXERIL) 10 mg, oral, 2 times daily PRN    famotidine (PEPCID) 20 mg, oral, Nightly    Farxiga 10 mg, oral, Daily    HYDROcodone-acetaminophen (Norco) 5-325 mg tablet 1 tablet, oral, Every 6 hours PRN    methIMAzole (TAPAZOLE) 5 mg, oral, Daily, qd    naloxone (NARCAN) 4 mg, nasal, As needed, May repeat every 2-3 minutes if needed, alternating nostrils, until medical assistance becomes available.    ondansetron (ZOFRAN) 8 mg, oral, Every 8 hours PRN    pantoprazole (PROTONIX) 40 mg, oral, Daily, do not crush chew or split    polyethylene glycol (GLYCOLAX, MIRALAX) 17 g, oral, Daily    promethazine (PHENERGAN) 25 mg, oral, Every 12 hours PRN    valsartan-hydrochlorothiazide (Diovan-HCT) 320-25 mg tablet 1 tablet, oral, Daily      Vitals  BP Readings from Last 2 Encounters:   08/06/24 138/78   06/24/24 118/76     BMI Readings from Last 1 Encounters:   08/06/24 23.85 kg/m²      Labs  A1C  No results found for: \"HGBA1C\"  [facility]  Lab Results   Component Value Date    CALCIUM 10.0 08/06/2024     08/06/2024    K 5.3 08/06/2024    CO2 31 08/06/2024    CL 97 (L) 08/06/2024    BUN " 44 (H) 08/06/2024    CREATININE 1.94 (H) 08/06/2024    EGFR 25 (L) 08/06/2024     LFTs  Lab Results   Component Value Date    ALT 14 08/06/2024    AST 19 08/06/2024    ALKPHOS 87 08/06/2024    BILITOT 0.7 08/06/2024     FLP  Lab Results   Component Value Date    TRIG 87 08/06/2024    CHOL 176 08/06/2024    LDLF 71 08/01/2023    LDLCALC 90 08/06/2024    HDL 68.4 08/06/2024     Urine Microalbumin  Lab Results   Component Value Date    MICROALBCREA 1,061.1 (H) 06/16/2023     Weight Management  Wt Readings from Last 3 Encounters:   08/06/24 59.1 kg (130 lb 6.4 oz)   06/24/24 57.9 kg (127 lb 9.6 oz)   04/29/24 59 kg (130 lb)      There is no height or weight on file to calculate BMI.     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 Will continue on Farxiga and look into patient assistance as patient states is now expensive    Medication Changes:  Continue  Farxiga 10mg daily    Monitoring and Education Discussed:  Reviewed CKD lab results and benefits of good hydration  Discussed avoidance of NSAIDs, and use of Tylenol for headaches/pain  Recommended need for good control of blood pressure and blood glucose  Advised to report any changes in fluid retention, weight or decreased urinary output  Counseled patient on MOA, expectations, duration of therapy, contraindications, administration, and monitoring parameters   Answered all patient questions and concerns    Farxiga Education:  Counseled patient on Farxiga MOA, expectations, side effects, duration of therapy, administration, and monitoring parameters.  Reviewed the benefits of SGLT-2i therapy, such as glycemic control and kidney and CV protection.  Advised patient to practice proper  hygiene to reduce risk of UTIs or yeast infections.  Advised patient to maintain  adequate fluid intake to remain hydrated while on SGLT2i therapy.  Answered all patient questions and concerns.              Clinical Pharmacist follow-up: once there has been a determination on  Patient Assistance, Telehealth visit    Continue all meds under the continuation of care with the referring provider and clinical pharmacy team.    Thank you,  Crissy Amezquita, PharmD  Clinical Pharmacist  [phone]    Verbal consent to manage patient's drug therapy was obtained from the patient. They were informed they may decline to participate or withdraw from participation in pharmacy services at any time.

## 2024-09-03 NOTE — ASSESSMENT & PLAN NOTE
ASSESSMENT OF CHRONIC KIDNEY DISEASE  Patient's CKD is stable  Rationale for plan: Patient followed by PCP. Will continue on Farxiga and look into patient assistance as patient states is now expensive    Medication Changes:  Continue  Farxiga 10mg daily    Monitoring and Education Discussed:  Reviewed CKD lab results and benefits of good hydration  Discussed avoidance of NSAIDs, and use of Tylenol for headaches/pain  Recommended need for good control of blood pressure and blood glucose  Advised to report any changes in fluid retention, weight or decreased urinary output  Counseled patient on MOA, expectations, duration of therapy, contraindications, administration, and monitoring parameters   Answered all patient questions and concerns    Farxiga Education:  Counseled patient on Farxiga MOA, expectations, side effects, duration of therapy, administration, and monitoring parameters.  Reviewed the benefits of SGLT-2i therapy, such as glycemic control and kidney and CV protection.  Advised patient to practice proper  hygiene to reduce risk of UTIs or yeast infections.  Advised patient to maintain adequate fluid intake to remain hydrated while on SGLT2i therapy.  Answered all patient questions and concerns.

## 2024-09-06 DIAGNOSIS — I10 PRIMARY HYPERTENSION: ICD-10-CM

## 2024-09-06 RX ORDER — AMLODIPINE BESYLATE 10 MG/1
10 TABLET ORAL DAILY
Qty: 30 TABLET | Refills: 0 | Status: SHIPPED | OUTPATIENT
Start: 2024-09-06

## 2024-09-16 ENCOUNTER — PATIENT OUTREACH (OUTPATIENT)
Dept: PRIMARY CARE | Facility: CLINIC | Age: 89
End: 2024-09-16
Payer: COMMERCIAL

## 2024-10-07 ENCOUNTER — APPOINTMENT (OUTPATIENT)
Dept: PRIMARY CARE | Facility: CLINIC | Age: 89
End: 2024-10-07
Payer: COMMERCIAL

## 2024-10-07 VITALS
HEART RATE: 61 BPM | RESPIRATION RATE: 16 BRPM | DIASTOLIC BLOOD PRESSURE: 70 MMHG | BODY MASS INDEX: 24.55 KG/M2 | SYSTOLIC BLOOD PRESSURE: 106 MMHG | WEIGHT: 133.4 LBS | OXYGEN SATURATION: 97 % | HEIGHT: 62 IN

## 2024-10-07 DIAGNOSIS — E55.9 VITAMIN D DEFICIENCY: ICD-10-CM

## 2024-10-07 DIAGNOSIS — K21.9 GASTROESOPHAGEAL REFLUX DISEASE WITHOUT ESOPHAGITIS: ICD-10-CM

## 2024-10-07 DIAGNOSIS — I10 PRIMARY HYPERTENSION: ICD-10-CM

## 2024-10-07 DIAGNOSIS — H52.03 HYPEROPIA WITH PRESBYOPIA OF BOTH EYES: ICD-10-CM

## 2024-10-07 DIAGNOSIS — K21.9 GASTROESOPHAGEAL REFLUX DISEASE, UNSPECIFIED WHETHER ESOPHAGITIS PRESENT: ICD-10-CM

## 2024-10-07 DIAGNOSIS — H52.4 HYPEROPIA WITH PRESBYOPIA OF BOTH EYES: ICD-10-CM

## 2024-10-07 DIAGNOSIS — M79.10 MUSCLE PAIN: ICD-10-CM

## 2024-10-07 DIAGNOSIS — Z00.00 ROUTINE GENERAL MEDICAL EXAMINATION AT HEALTH CARE FACILITY: Primary | ICD-10-CM

## 2024-10-07 DIAGNOSIS — E78.2 MIXED HYPERLIPIDEMIA: ICD-10-CM

## 2024-10-07 DIAGNOSIS — N18.32 STAGE 3B CHRONIC KIDNEY DISEASE (MULTI): ICD-10-CM

## 2024-10-07 DIAGNOSIS — R11.0 NAUSEA: ICD-10-CM

## 2024-10-07 DIAGNOSIS — N18.4 CKD STAGE G4/A1, GFR 15-29 AND ALBUMIN CREATININE RATIO <30 MG/G (MULTI): ICD-10-CM

## 2024-10-07 DIAGNOSIS — E05.90 HYPERTHYROIDISM: ICD-10-CM

## 2024-10-07 DIAGNOSIS — J45.20 MILD INTERMITTENT REACTIVE AIRWAY DISEASE WITHOUT COMPLICATION (HHS-HCC): ICD-10-CM

## 2024-10-07 DIAGNOSIS — Z90.49 HISTORY OF CHOLECYSTECTOMY: ICD-10-CM

## 2024-10-07 DIAGNOSIS — M62.838 MUSCLE SPASMS OF LOWER EXTREMITY, UNSPECIFIED LATERALITY: ICD-10-CM

## 2024-10-07 PROCEDURE — 3074F SYST BP LT 130 MM HG: CPT | Performed by: FAMILY MEDICINE

## 2024-10-07 PROCEDURE — G0439 PPPS, SUBSEQ VISIT: HCPCS | Performed by: FAMILY MEDICINE

## 2024-10-07 PROCEDURE — 1159F MED LIST DOCD IN RCRD: CPT | Performed by: FAMILY MEDICINE

## 2024-10-07 PROCEDURE — 99214 OFFICE O/P EST MOD 30 MIN: CPT | Performed by: FAMILY MEDICINE

## 2024-10-07 PROCEDURE — 1170F FXNL STATUS ASSESSED: CPT | Performed by: FAMILY MEDICINE

## 2024-10-07 PROCEDURE — 1123F ACP DISCUSS/DSCN MKR DOCD: CPT | Performed by: FAMILY MEDICINE

## 2024-10-07 PROCEDURE — 3078F DIAST BP <80 MM HG: CPT | Performed by: FAMILY MEDICINE

## 2024-10-07 PROCEDURE — 1158F ADVNC CARE PLAN TLK DOCD: CPT | Performed by: FAMILY MEDICINE

## 2024-10-07 PROCEDURE — 1036F TOBACCO NON-USER: CPT | Performed by: FAMILY MEDICINE

## 2024-10-07 RX ORDER — AMLODIPINE BESYLATE 10 MG/1
10 TABLET ORAL DAILY
Qty: 30 TABLET | Refills: 0 | Status: SHIPPED | OUTPATIENT
Start: 2024-10-07

## 2024-10-07 RX ORDER — CYCLOBENZAPRINE HCL 10 MG
10 TABLET ORAL 2 TIMES DAILY PRN
Qty: 90 TABLET | Refills: 0 | Status: SHIPPED | OUTPATIENT
Start: 2024-10-07

## 2024-10-07 RX ORDER — ATORVASTATIN CALCIUM 20 MG/1
20 TABLET, FILM COATED ORAL DAILY
Qty: 90 TABLET | Refills: 0 | Status: SHIPPED | OUTPATIENT
Start: 2024-10-07

## 2024-10-07 RX ORDER — FAMOTIDINE 20 MG/1
20 TABLET, FILM COATED ORAL NIGHTLY
Qty: 90 TABLET | Refills: 0 | Status: SHIPPED | OUTPATIENT
Start: 2024-10-07

## 2024-10-07 ASSESSMENT — ENCOUNTER SYMPTOMS
WEAKNESS: 0
NECK STIFFNESS: 0
STRIDOR: 0
DIARRHEA: 0
ABDOMINAL PAIN: 0
FATIGUE: 0
HEMATURIA: 0
HALLUCINATIONS: 0
CHILLS: 0
HYPERACTIVE: 0
SINUS PAIN: 0
ADENOPATHY: 0
LIGHT-HEADEDNESS: 0
DYSURIA: 0
CONFUSION: 0
CARDIOVASCULAR NEGATIVE: 1
DECREASED CONCENTRATION: 0
ANAL BLEEDING: 0
SPEECH DIFFICULTY: 0
VOMITING: 0
FLANK PAIN: 0
SLEEP DISTURBANCE: 0
ACTIVITY CHANGE: 0
DIZZINESS: 0
NERVOUS/ANXIOUS: 0
DIFFICULTY URINATING: 0
OCCASIONAL FEELINGS OF UNSTEADINESS: 0
RHINORRHEA: 0
APPETITE CHANGE: 0
CHOKING: 0
LOSS OF SENSATION IN FEET: 1
GASTROINTESTINAL NEGATIVE: 1
TREMORS: 0
APNEA: 0
PALPITATIONS: 0
POLYPHAGIA: 0
SEIZURES: 0
EYE ITCHING: 0
COUGH: 0
COLOR CHANGE: 0
WOUND: 0
EYE DISCHARGE: 0
DIAPHORESIS: 0
BLOOD IN STOOL: 0
RECTAL PAIN: 0
DYSPHORIC MOOD: 0
SINUS PRESSURE: 0
BACK PAIN: 0
ARTHRALGIAS: 0
AGITATION: 0
JOINT SWELLING: 0
MYALGIAS: 0
WHEEZING: 0
CHEST TIGHTNESS: 0
SORE THROAT: 0
CONSTITUTIONAL NEGATIVE: 1
SHORTNESS OF BREATH: 0
EYE PAIN: 0
NECK PAIN: 0
POLYDIPSIA: 0
BRUISES/BLEEDS EASILY: 0
VOICE CHANGE: 0
ABDOMINAL DISTENTION: 0
FEVER: 0
HEADACHES: 0
UNEXPECTED WEIGHT CHANGE: 0
FACIAL SWELLING: 0
CONSTIPATION: 0
NAUSEA: 0
TROUBLE SWALLOWING: 0
NUMBNESS: 0
FACIAL ASYMMETRY: 0
FREQUENCY: 0
EYE REDNESS: 0
PHOTOPHOBIA: 0

## 2024-10-07 ASSESSMENT — PATIENT HEALTH QUESTIONNAIRE - PHQ9
SUM OF ALL RESPONSES TO PHQ9 QUESTIONS 1 AND 2: 0
1. LITTLE INTEREST OR PLEASURE IN DOING THINGS: NOT AT ALL
2. FEELING DOWN, DEPRESSED OR HOPELESS: NOT AT ALL

## 2024-10-07 ASSESSMENT — ACTIVITIES OF DAILY LIVING (ADL)
MANAGING_FINANCES: INDEPENDENT
DRESSING: INDEPENDENT
GROCERY_SHOPPING: INDEPENDENT
BATHING: INDEPENDENT
DOING_HOUSEWORK: INDEPENDENT
TAKING_MEDICATION: INDEPENDENT

## 2024-10-07 NOTE — ASSESSMENT & PLAN NOTE
Orders:    Follow Up In [facility] - PCP - Established    amLODIPine (Norvasc) 10 mg tablet; Take 1 tablet (10 mg) by mouth once daily.    Follow Up In [facility] - PCP - Established; Future

## 2024-10-07 NOTE — PROGRESS NOTES
Subjective   Reason for Visit: Maggy Gore is an 88 y.o. female here for a Medicare Wellness visit.     Past Medical, Surgical, and Family History reviewed and updated in chart.    Reviewed all medications by prescribing practitioner or clinical pharmacist (such as prescriptions, OTCs, herbal therapies and supplements) and documented in the medical record.    HPI  Patient is at the office today for AWV.    Patient reports that she does  not have any concerns today.    Patient requested medication refill today  Patient Care Team:  Ace Dhillon MD as PCP - General (Family Medicine)  Ace Dhillon MD as PCP - [facility] ACO Attributed Provider  Jas Blanchard MD as Consulting Physician (Cardiology)  Arabella Rojas as Care Manager (Case Management)     Review of Systems   Constitutional: Negative.  Negative for activity change, appetite change, chills, diaphoresis, fatigue, fever and unexpected weight change.   HENT: Negative.  Negative for congestion, dental problem, ear discharge, facial swelling, hearing loss, mouth sores, nosebleeds, postnasal drip, rhinorrhea, sinus pressure, sinus pain, sneezing, sore throat, tinnitus, trouble swallowing and voice change.    Eyes:  Negative for photophobia, pain, discharge, redness, itching and visual disturbance.   Respiratory:  Negative for apnea, cough, choking, chest tightness, shortness of breath, wheezing and stridor.    Cardiovascular: Negative.  Negative for chest pain, palpitations and leg swelling.   Gastrointestinal: Negative.  Negative for abdominal distention, abdominal pain, anal bleeding, blood in stool, constipation, diarrhea, nausea, rectal pain and vomiting.   Endocrine: Negative for cold intolerance, heat intolerance, polydipsia, polyphagia and polyuria.   Genitourinary:  Negative for decreased urine volume, difficulty urinating, dysuria, enuresis, flank pain, frequency, hematuria and urgency.   Musculoskeletal:  Negative for arthralgias, back pain, gait  "problem, joint swelling, myalgias, neck pain and neck stiffness.   Skin:  Negative for color change, pallor, rash and wound.   Allergic/Immunologic: Negative for environmental allergies, food allergies and immunocompromised state.   Neurological:  Negative for dizziness, tremors, seizures, syncope, facial asymmetry, speech difficulty, weakness, light-headedness, numbness and headaches.   Hematological:  Negative for adenopathy. Does not bruise/bleed easily.   Psychiatric/Behavioral:  Negative for agitation, behavioral problems, confusion, decreased concentration, dysphoric mood, hallucinations, self-injury, sleep disturbance and suicidal ideas. The patient is not nervous/anxious and is not hyperactive.    All other systems reviewed and are negative.      Objective   Vitals:  /70 (BP Location: Left arm, Patient Position: Sitting, BP Cuff Size: Adult)   Pulse 61   Resp 16   Ht 1.575 m (5' 2\")   Wt 60.5 kg (133 lb 6.4 oz)   SpO2 97%   BMI 24.40 kg/m²       Physical Exam  Vitals reviewed.   Constitutional:       General: She is not in acute distress.     Appearance: Normal appearance. She is normal weight. She is not ill-appearing or diaphoretic.   HENT:      Head: Normocephalic.      Right Ear: Tympanic membrane and external ear normal.      Left Ear: Tympanic membrane and external ear normal.      Nose: Nose normal. No congestion.      Mouth/Throat:      Pharynx: No posterior oropharyngeal erythema.   Eyes:      General:         Right eye: No discharge.         Left eye: No discharge.      Extraocular Movements: Extraocular movements intact.      Conjunctiva/sclera: Conjunctivae normal.      Pupils: Pupils are equal, round, and reactive to light.   Cardiovascular:      Rate and Rhythm: Normal rate and regular rhythm.      Pulses: Normal pulses.      Heart sounds: Normal heart sounds. No murmur heard.  Pulmonary:      Effort: Pulmonary effort is normal. No respiratory distress.      Breath sounds: Normal " breath sounds. No wheezing or rales.   Chest:      Chest wall: No tenderness.   Abdominal:      General: Abdomen is flat. Bowel sounds are normal. There is no distension.      Palpations: There is no mass.      Tenderness: There is no abdominal tenderness. There is no guarding.   Musculoskeletal:         General: No tenderness. Normal range of motion.      Cervical back: Normal range of motion and neck supple. No tenderness.      Right lower leg: No edema.      Left lower leg: No edema.   Skin:     General: Skin is dry.      Coloration: Skin is not jaundiced.      Findings: No bruising, erythema or rash.   Neurological:      General: No focal deficit present.      Mental Status: She is alert and oriented to person, place, and time. Mental status is at baseline.      Cranial Nerves: No cranial nerve deficit.      Sensory: No sensory deficit.      Coordination: Coordination normal.      Gait: Gait normal.   Psychiatric:         Mood and Affect: Mood normal.         Thought Content: Thought content normal.         Judgment: Judgment normal.         Assessment & Plan  Primary hypertension    Orders:    Follow Up In [facility] - PCP - Established    amLODIPine (Norvasc) 10 mg tablet; Take 1 tablet (10 mg) by mouth once daily.    Follow Up In [facility] - PCP - Established; Future    Muscle pain    Orders:    Follow Up In [facility] - PCP - Established    cyclobenzaprine (Flexeril) 10 mg tablet; Take 1 tablet (10 mg) by mouth 2 times a day as needed for muscle spasms.    Follow Up In [facility] - PCP - Established; Future    Nausea    Orders:    Follow Up In [facility] - PCP - Established    Follow Up In [facility] - PCP - Established; Future    Hyperthyroidism    Orders:    Follow Up In [facility] - PCP - Established    Follow Up In [facility] - PCP - Established; Future    Thyroid Stimulating Hormone; Future    Free T4 Index;  Future    Vitamin D deficiency    Orders:    Follow Up In [facility] PCP - Established    Follow Up In [facility]; Future    Vitamin D 25-Hydroxy,Total (for eval of Vitamin D levels); Future    Mixed hyperlipidemia    Orders:    Follow Up In [facility] Established    atorvastatin (Lipitor) 20 mg tablet; Take 1 tablet (20 mg) by mouth once daily.    Follow Up In [facility]; Future    Lipid Panel; Future    Stage 3b chronic kidney disease (Multi)    Orders:    Follow Up In [facility] PCP  Established    Follow Up In [facility]; Future    Mild intermittent reactive airway disease without complication ([facility]-HCC)    Orders:    Follow Up In [facility]    Follow Up In [facility]; Future    Gastroesophageal reflux disease without esophagitis    Orders:    Follow Up In [facility]    Follow Up In [facility]; Future    History of cholecystectomy    Orders:    Follow Up In [facility]    Follow Up In [facility]; Future    Gastroesophageal reflux disease, unspecified whether esophagitis present    Orders:    Follow Up In [facility]    famotidine (Pepcid) 20 mg tablet; Take 1 tablet (20 mg) by mouth once daily at bedtime.    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)    Orders:    Follow Up In [facility] PCP [facility]    Follow Up In [facility]; Future    CBC and Auto  Differential; Future    Comprehensive Metabolic Panel; Future    Follow Up In [facility] - Pharmacy; Future    Muscle spasms of lower extremity, unspecified laterality    Orders:    Follow Up In [facility] - PCP - Established; Future    Routine general medical examination at health care facility    Orders:    Follow Up In [facility] - PCP - Established; Future    1 Year Follow Up In [facility] - PCP - Wellness Exam; Future     Do knee raises and ice area.     continue farxiga 10 mg daily for CKD  Pharmacy referral done     Metamucil every day    BW prior     Flu next then prevnar & RSV after    Last surgery was 2/23/24.  Off work since 12/23 and RTW 5/1/24 per Dr Johnson  No strenuous activity or heavy lifting until 6 weeks post-op     Scribe Attestation  By signing my name below, I, Mariah Sanchez MA, Scribe   attest that this documentation has been prepared under the direction and in the presence of Ace Dhillon MD.    Provider Attestation - Scribe documentation    All medical record entries made by the Scribe were at my direction and personally dictated by me. I have reviewed the chart and agree that the record accurately reflects my personal performance of the history, physical exam, discussion and plan.    Continue current medications and therapy for chronic medical conditions

## 2024-10-07 NOTE — ASSESSMENT & PLAN NOTE
Orders:    Follow Up In [facility] - PCP - Established    Follow Up In [facility] - PCP - Established; Future    Thyroid Stimulating Hormone; Future    Free T4 Index; Future

## 2024-10-07 NOTE — ASSESSMENT & PLAN NOTE
Orders:    Follow Up In [facility] - PCP - Established    famotidine (Pepcid) 20 mg tablet; Take 1 tablet (20 mg) by mouth once daily at bedtime.    Follow Up In [facility] - PCP - Established; Future

## 2024-10-07 NOTE — ASSESSMENT & PLAN NOTE
Orders:    Follow Up In [facility] - PCP - Established    atorvastatin (Lipitor) 20 mg tablet; Take 1 tablet (20 mg) by mouth once daily.    Follow Up In [facility] - PCP - Established; Future    Lipid Panel; Future

## 2024-10-07 NOTE — ASSESSMENT & PLAN NOTE
Orders:    Follow Up In [facility] - PCP - Established    Follow Up In [facility] - PCP - Established; Future    Vitamin D 25-Hydroxy,Total (for eval of Vitamin D levels); Future

## 2024-10-07 NOTE — ASSESSMENT & PLAN NOTE
Orders:    Follow Up In [facility] - PCP - Established    Follow Up In [facility] - PCP - Established; Future

## 2024-10-07 NOTE — PROGRESS NOTES
"Subjective   Reason for Visit: Maggy Gore is an 88 y.o. female here for a Medicare Wellness visit.     Past Medical, Surgical, and Family History reviewed and updated in chart.    Reviewed all medications by prescribing practitioner or clinical pharmacist (such as prescriptions, OTCs, herbal therapies and supplements) and documented in the medical record.    HPI    Patient Care Team:  Ace Dhillon MD as PCP - General (Family Medicine)  Ace Dhill[facility] MD [facility] P[facility]ributed [facility]  Jas Blanchard MD as Consulting Physician (Cardiology)  Arabella Rojas as Care Manager (Case Management)     Review of Systems    Objective   Vitals:  /70 (BP Location: Left arm, Patient Position: Sitting, BP Cuff Size: Adult)   Pulse 61   Resp 16   Ht 1.575 m (5' 2\")   Wt 60.5 kg (133 lb 6.4 oz)   SpO2 97%   BMI 24.40 kg/m²       Physical Exam    Assessment & Plan  Primary hypertension    Orders:    Follow Up In [facility] - PCP - Established      Muscle pain    Orders:    Follow Up In [facility] - PCP - Established      Nausea    Orders:    Follow Up In [facility] - PCP - Established      Hyperthyroidism    Orders:    Follow Up In [facility] - PCP - Established      Vitamin D deficiency    Orders:    Follow Up In [facility] - PCP - Established      Mixed hyperlipidemia    Orders:    Follow Up In [facility] - PCP - Established      Stage 3b chronic kidney disease (Multi)    Orders:    Follow Up In [facility] - PCP - Established      Mild intermittent reactive airway disease without complication ([facility])    Orders:    Follow Up In [facility] - PCP - Established      Gastroesophageal reflux disease without esophagitis    Orders:    Follow Up In [facility] - PCP - Established      History of cholecystectomy    Orders:    Follow Up In [facility] - PCP - Established      Gastroesophageal reflux " disease, unspecified whether esophagitis present    Orders:    Follow Up In [facility] - PCP - Established      Hyperopia with presbyopia of both eyes    Orders:    Follow Up In [facility] - PCP - Established      CKD stage G4/A1, 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

## 2024-10-07 NOTE — ASSESSMENT & PLAN NOTE
Orders:    Follow Up In [facility] - PCP - Established    Follow Up In [facility] - PCP - Established; Future    CBC and Auto Differential; Future    Comprehensive Metabolic Panel; Future    Follow Up In [facility] - Pharmacy; Future

## 2024-10-08 ENCOUNTER — PATIENT OUTREACH (OUTPATIENT)
Dept: PRIMARY CARE | Facility: CLINIC | Age: 89
End: 2024-10-08
Payer: COMMERCIAL

## 2024-10-08 DIAGNOSIS — I10 PRIMARY HYPERTENSION: ICD-10-CM

## 2024-10-08 DIAGNOSIS — N18.4 CKD STAGE G4/A1, GFR 15-29 AND ALBUMIN CREATININE RATIO <30 MG/G (MULTI): ICD-10-CM

## 2024-10-08 RX ORDER — AMLODIPINE BESYLATE 10 MG/1
10 TABLET ORAL DAILY
Qty: 30 TABLET | Refills: 0 | Status: SHIPPED | OUTPATIENT
Start: 2024-10-08

## 2024-10-08 NOTE — PROGRESS NOTES
Spoke with patient for monthly CCM outreach. Chart reviewed. Introduced self.  Doing well.  Bilateral feet go numb intermittently. Will feel like a cramp is going to start. Notified primary provider.  Left knee has been hurting. History of fractured patella. Has appointment to see ortho provider.  Wants to see about getting spine injection. Had in past. Will call to schedule follow up with provider.  Recently lost job at [employer]. Filed for unemployment. 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  She is going to [place] end of month with daughter for family birthday.  May go to [place] to see granddaughter.  Confirmed upcoming appointments, pharmacy and orthopedic.  Encouraged to call with questions, concerns or needs.

## 2024-10-09 ENCOUNTER — TELEMEDICINE (OUTPATIENT)
Dept: PHARMACY | Facility: HOSPITAL | Age: 89
End: 2024-10-09
Payer: COMMERCIAL

## 2024-10-09 DIAGNOSIS — N18.4 CKD STAGE G4/A1, GFR 15-29 AND ALBUMIN CREATININE RATIO <30 MG/G (MULTI): ICD-10-CM

## 2024-10-09 DIAGNOSIS — Z00.00 ROUTINE GENERAL MEDICAL EXAMINATION AT HEALTH CARE FACILITY: ICD-10-CM

## 2024-10-09 NOTE — PROGRESS NOTES
Clinical Pharmacy Appointment    Patient ID: Maggy Gore is a 88 y.o. female who presents for Chronic Kidney Disease.    Pt is here for Follow Up appointment.     Referring Provider: Ace Dhillon MD  PCP: Ace Dhillon MD   Last visit with PCP: 10/7/2024   Next visit with PCP: 2024      Subjective     Interval History  Patient was paying $33/90 days of Farxiga, but has now hit the donut hole and medication is expensive.    HPI  CHRONIC KIDNEY DISEASE ASSESSMENT  Does patient see nephrology? No    Lab Results   Component Value Date    EGFR 25 (L) 2024     Stage: 4 (eGFR 15-29)  Albuminuria: no recent lab  ACE/ARB: Yes, Valsartan/hydrochlorothiazide 320/25mg daily    Hypertension History:  HTN diagnosis: yes  Current Regimen  Amlodipine 10mg daily  Valsartan/hydrochlorothiazide 320/25mg daily  HTN at goal? yes    Hyperlipidemia History:  Diagnosis? yes  Current Regimen  Atorvastatin 20mg daily  At goal? yes  Current LDL: 90mg/dL  Current Tmg/dL    Diabetes History:  Diagnosis? no    Medication Review  The following medications were identified as inappropriate per current kidney function: patients CrCl calculated as 19 mL/min (9/3/2024)  Famotidine 20mg; recommend 10mg daily or 20mg every other day for CrCl <30 mL/min    Drug Interactions  No relevant drug interactions were noted.    Medication System Management  Patient's preferred pharmacy: [facility]  Adherence/Organization: No issues reported  Affordability/Accessibility: Will assess for  Patient Assistance for Farxiga     Patient Assistance Screening (VAF)  Patient verbally reports monthly or yearly income which is less than 400% federal poverty level  Application for program has been submitted for the following medications:   Farxiga 10mg daily  Patient aware this process may take up to 2 weeks once income documents have been sent to the team.  If approved, medication must be filled through [facility] pharmacy and  "may be picked up or mailed to patient.   If approved, medication will be billed through insurance, and patient assistance team will pay the copay. This will result in a $0 copay for the patient.  Counseled patient on mechanism of action, side effects, contraindications, and what to do if the patient misses a dose. All patients questions were answered.       Objective   Allergies   Allergen Reactions    Sulfa (Sulfonamide Antibiotics) Rash     Social History     Social History Narrative    Not on file      Medication Review  Current Outpatient Medications   Medication Instructions    acebutolol (SECTRAL) 200 mg, oral, 2 times daily    acetaminophen (TYLENOL) 1,000 mg, oral, Every 6 hours PRN    amLODIPine (NORVASC) 10 mg, oral, Daily    amLODIPine (NORVASC) 10 mg, oral, Daily    atorvastatin (LIPITOR) 20 mg, oral, Daily    cholecalciferol (VITAMIN D3) 2,000 Units, oral, Daily    cyclobenzaprine (FLEXERIL) 10 mg, oral, 2 times daily PRN    famotidine (PEPCID) 20 mg, oral, Nightly    Farxiga 10 mg, oral, Daily    HYDROcodone-acetaminophen (Norco) 5-325 mg tablet 1 tablet, oral, Every 6 hours PRN    methIMAzole (TAPAZOLE) 5 mg, oral, Daily, qd    naloxone (NARCAN) 4 mg, nasal, As needed, May repeat every 2-3 minutes if needed, alternating nostrils, until medical assistance becomes available.    ondansetron (ZOFRAN) 8 mg, oral, Every 8 hours PRN    pantoprazole (PROTONIX) 40 mg, oral, Daily, do not crush chew or split    polyethylene glycol (GLYCOLAX, MIRALAX) 17 g, oral, Daily    promethazine (PHENERGAN) 25 mg, oral, Every 12 hours PRN    valsartan-hydrochlorothiazide (Diovan-HCT) 320-25 mg tablet 1 tablet, oral, Daily      Vitals  BP Readings from Last 2 Encounters:   10/07/24 106/70   08/06/24 138/78     BMI Readings from Last 1 Encounters:   10/07/24 24.40 kg/m²      Labs  A1C  No results found for: \"HGBA1C\"  [facility]  Lab Results   Component Value Date    CALCIUM 10.0 08/06/2024     08/06/2024    K 5.3 08/06/2024 "    CO2 31 08/06/2024    CL 97 (L) 08/06/2024    BUN 44 (H) 08/06/2024    CREATININE 1.94 (H) 08/06/2024    EGFR 25 (L) 08/06/2024     LFTs  Lab Results   Component Value Date    ALT 14 08/06/2024    AST 19 08/06/2024    ALKPHOS 87 08/06/2024    BILITOT 0.7 08/06/2024     FLP  Lab Results   Component Value Date    TRIG 87 08/06/2024    CHOL 176 08/06/2024    LDLF 71 08/01/2023    LDLCALC 90 08/06/2024    HDL 68.4 08/06/2024     Urine Microalbumin  Lab Results   Component Value Date    MICROALBCREA 1,061.1 (H) 06/16/2023     Weight Management  Wt Readings from Last 3 Encounters:   10/07/24 60.5 kg (133 lb 6.4 oz)   08/06/24 59.1 kg (130 lb 6.4 oz)   06/24/24 57.9 kg (127 lb 9.6 oz)      There is no height or weight on file to calculate BMI.     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 Will continue on Farxiga and look into patient assistance as patient states is now expensive    Medication Changes:  Continue  Farxiga 10mg daily    Monitoring and Education Discussed:  Reviewed CKD lab results and benefits of good hydration  Discussed avoidance of NSAIDs, and use of Tylenol for headaches/pain  Recommended need for good control of blood pressure and blood glucose  Advised to report any changes in fluid retention, weight or decreased urinary output  Counseled patient on MOA, expectations, duration of therapy, contraindications, administration, and monitoring parameters   Answered all patient questions and concerns    Farxiga Education:  Counseled patient on Farxiga MOA, expectations, side effects, duration of therapy, administration, and monitoring parameters.  Reviewed the benefits of SGLT-2i therapy, such as glycemic control and kidney and CV protection.  Advised patient to practice proper  hygiene to reduce risk  of UTIs or yeast infections.  Advised patient to maintain adequate fluid intake to remain hydrated while on SGLT2i therapy.  Answered all patient questions and concerns.          Other Visit Diagnoses       Routine general medical examination at health care facility              Clinical Pharmacist follow-up: once there has been a determination on  Patient Assistance, Telehealth visit    Continue all meds under the continuation of care with the referring provider and clinical pharmacy team.    Thank you,  Crissy Amezquita, PharmD  Clinical Pharmacist  [phone]    Verbal consent to manage patient's drug therapy was obtained from the patient. They were informed they may decline to participate or withdraw from participation in pharmacy services at any time.

## 2024-10-10 ENCOUNTER — HOSPITAL ENCOUNTER (OUTPATIENT)
Dept: RADIOLOGY | Facility: CLINIC | Age: 89
Discharge: HOME | End: 2024-10-10
Payer: COMMERCIAL

## 2024-10-10 ENCOUNTER — OFFICE VISIT (OUTPATIENT)
Dept: ORTHOPEDIC SURGERY | Facility: CLINIC | Age: 89
End: 2024-10-10
Payer: COMMERCIAL

## 2024-10-10 DIAGNOSIS — M25.562 LEFT KNEE PAIN, UNSPECIFIED CHRONICITY: ICD-10-CM

## 2024-10-10 PROCEDURE — 73562 X-RAY EXAM OF KNEE 3: CPT | Mod: LT

## 2024-10-10 PROCEDURE — 99213 OFFICE O/P EST LOW 20 MIN: CPT | Performed by: INTERNAL MEDICINE

## 2024-10-10 NOTE — PROGRESS NOTES
CC:   Chief Complaint   Patient presents with    Left Knee - Follow-up       HPI: Maggy is a 88 y.o. female presents today for evaluation for work-related left knee pain which started more than a year ago after she had a patellar fracture.  She is here for persistent left knee pain since her work-related injury.        Review of Systems   GENERAL: Negative for malaise, significant weight loss, fever  MUSCULOSKELETAL: See HPI  NEURO:  Negative for numbness / tingling     Past Medical History  Past Medical History:   Diagnosis Date    AAA (abdominal aortic aneurysm) without rupture (CMS-HCC)     Aortic stenosis     CKD (chronic kidney disease)     Closed comminuted fracture of left patella with routine healing 07/31/2023    Colon polyp     GERD (gastroesophageal reflux disease)     Gout     HTN (hypertension)     Hyperlipidemia     Hyperthyroidism        Medication review  Medication Documentation Review Audit       Reviewed by Mariah Sanchez MA (Medical Assistant) on 10/07/24 at 1147      Medication Order Taking? Sig Documenting Provider Last Dose Status   acebutolol (Sectral) 200 mg capsule [id number] Yes Take 1 capsule (200 mg) by mouth 2 times a day. Ace Dhillon MD Taking Active   acetaminophen (Tylenol) 500 mg tablet [id number]  Take 2 tablets (1,000 mg) by mouth every 6 hours if needed for mild pain (1 - 3). Historical Provider, MD  Active   amLODIPine (Norvasc) 10 mg tablet [id number] Yes TAKE ONE TABLET BY MOUTH DAILY Ace Dhillon MD Taking Active   atorvastatin (Lipitor) 20 mg tablet [id number] Yes Take 1 tablet (20 mg) by mouth once daily. Ace Dhillon MD Taking Active   cholecalciferol (Vitamin D3) 50 MCG (2000 UT) tablet [id number] Yes Take 1 tablet (2,000 Units) by mouth once daily. Ace Dhillon MD Taking Active   cyclobenzaprine (Flexeril) 10 mg tablet [id number] Yes Take 1 tablet (10 mg) by mouth 2 times a day as needed for muscle spasms. Ace Dhillon MD Taking Active    dapagliflozin propanediol (Farxiga) 10 mg [id number] Yes Take 1 tablet (10 mg) by mouth once daily. Ace Dhillon MD Taking Active   famotidine (Pepcid) 20 mg tablet [id number] Yes Take 1 tablet (20 mg) by mouth once daily at bedtime. Ace Dhillon MD Taking Active   HYDROcodone-acetaminophen (Norco) 5-325 mg tablet [id number] Yes Take 1 tablet by mouth every 6 hours if needed for severe pain (7 - 10). Pari Alejandro MD Taking Active   methIMAzole (Tapazole) 5 mg tablet [id number] Yes Take 1 tablet (5 mg) by mouth once daily. qd Ace Dhillon MD Taking Active   naloxone (Narcan) 4 mg/0.1 mL nasal spray [id number] Yes Administer 1 spray (4 mg) into affected nostril(s) if needed for opioid reversal. May repeat every 2-3 minutes if needed, alternating nostrils, until medical assistance becomes available. Ace Dhillon MD Taking Active   ondansetron (Zofran) 8 mg tablet [id number] Yes Take 1 tablet (8 mg) by mouth every 8 hours if needed for nausea or vomiting. Ace Dhillon MD Taking Active   pantoprazole (ProtoNix) 40 mg EC tablet [id number] Yes Take 1 tablet (40 mg) by mouth once daily. do not crush chew or split Ace Dhillon MD Taking Active   polyethylene glycol (Glycolax, Miralax) 17 gram packet [id number] Yes Take 17 g by mouth once daily. Ace Dhillon MD Taking Active   promethazine (Phenergan) 25 mg tablet [id number] Yes Take 1 tablet (25 mg) by mouth every 12 hours if needed for nausea or vomiting. Ace Dhillon MD Taking Active   valsartan-hydrochlorothiazide (Diovan-HCT) 320-25 mg tablet [id number] Yes Take 1 tablet by mouth once daily. Ace Dhillon MD Taking Active                    Allergies  Allergies   Allergen Reactions    Sulfa (Sulfonamide Antibiotics) Rash       Social History  Social History     Socioeconomic History    Marital status:      Spouse name: Not on file    Number of children: Not on file    Years of education: Not on file    Highest education  level: Not on file   Occupational History    Not on file   Tobacco Use    Smoking status: Former     Current packs/day: 0.00     Types: Cigarettes     Quit date: 2013     Years since quittin.7    Smokeless tobacco: Never   Substance and Sexual Activity    Alcohol use: Yes     Comment: Holidays    Drug use: Never    Sexual activity: Defer   Other Topics Concern    Not on file   Social History Narrative    Not on file     Social Determinants of Health     Financial Resource Strain: Patient Declined (2024)    Overall Financial Resource Strain (CARDIA)     Difficulty of Paying Living Expenses: Patient declined   Food Insecurity: Not on file   Transportation Needs: No Transportation Needs (2024)    OASIS : Transportation     Lack of Transportation (Medical): No     Lack of Transportation (Non-Medical): No     Patient Unable or Declines to Respond: No   Physical Activity: Not on file   Stress: Not on file   Social Connections: Feeling Socially Integrated (2024)    OASIS : Social Isolation     Frequency of experiencing loneliness or isolation: Never   Intimate Partner Violence: Not on file   Housing Stability: Unknown (2024)    Housing Stability Vital Sign     Unable to Pay for Housing in the Last Year: Patient declined     Number of Places Lived in the Last Year: 1     Unstable Housing in the Last Year: No       Surgical History  Past Surgical History:   Procedure Laterality Date    APPENDECTOMY      HYSTERECTOMY      IR BILIARY DRAIN  2023    IR BILIARY DRAIN 2023 Ace Osorio MD [facility]       Physical Exam:  GENERAL:  Patient is awake, alert, and oriented to person place and time.  Patient appears well nourished and well kept.  Affect Calm, Not Acutely Distressed.  HEENT:  Normocephalic, Atraumatic, EOMI  CARDIOVASCULAR:  Hemodynamically stable.  RESPIRATORY:  Normal respirations with unlabored breathing.  Extremity: Left knee examination shows skin is intact.  There  is no erythema or warmth.  No effusion.  Can flex the right knee to 130 degrees.  Full extension at 0 degrees.  Pain over the medial joint line.  Pain over the lateral joint line.  Pain over the medial and lateral patellar facets.  There is no pain over the patellar or quadricep tendon.  No pain over the proximal tibia.  No pain over the popliteal fossa.  Negative valgus stress test.  Negative varus stress test.  Negative Colton's test medially with no instability.  Negative Colton's test laterally with no instability.  Negative Lachman's test.  Patellar and quadricep mechanism intact.  Negative anterior and posterior drawer test.  Negative patellar apprehension test.  Distal pulses are palpable, neurovascularly intact.  Walking with no significant antalgic gait.      Diagnostics: X-rays reviewed        Procedure: None    Assessment:  1.  Left knee patellar fracture    Plan: Maggy presents today for initial evaluation for  work-related left knee pain which started more than a year ago after injury sustained at work. 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  She would benefit from an ultrasound guided intra-articular corticosteroid injection to the left knee, we will submit a C9 for ultrasound-guided cortisone injection to the left knee. She will follow-up upon approval of the injection.  She is currently working with no restrictions.    No orders of the defined types were placed in this encounter.     At the conclusion of the visit there were no further questions by the patient/family regarding their plan of care.  Patient was instructed to call or return with any issues, questions, or concerns regarding their injury and/or treatment plan described above.     10/10/24 at 2:32 PM - MD [employer]t Art Attestation  By signing my name below, IBrennon Scribe   attest that this documentation has been prepared under the direction and in the presence of Dale Abbott,  MD.    Office: [phone]    This note was prepared using voice recognition software.  The details of this note are correct and have been reviewed, and corrected to the best of my ability.  Some grammatical errors may persist related to the Dragon software.

## 2024-10-18 PROCEDURE — RXMED WILLOW AMBULATORY MEDICATION CHARGE

## 2024-10-24 ENCOUNTER — PHARMACY VISIT (OUTPATIENT)
Dept: PHARMACY | Facility: CLINIC | Age: 89
End: 2024-10-24
Payer: COMMERCIAL

## 2024-10-25 DIAGNOSIS — E05.90 HYPERTHYROIDISM: ICD-10-CM

## 2024-10-25 RX ORDER — METHIMAZOLE 5 MG/1
5 TABLET ORAL DAILY
Qty: 90 TABLET | Refills: 0 | Status: SHIPPED | OUTPATIENT
Start: 2024-10-25

## 2024-10-30 DIAGNOSIS — E55.9 VITAMIN D DEFICIENCY: ICD-10-CM

## 2024-10-30 RX ORDER — CHOLECALCIFEROL (VITAMIN D3) 50 MCG
2000 TABLET ORAL DAILY
Qty: 90 TABLET | Refills: 0 | Status: SHIPPED | OUTPATIENT
Start: 2024-10-30

## 2024-11-08 ENCOUNTER — LAB (OUTPATIENT)
Dept: LAB | Facility: LAB | Age: 89
End: 2024-11-08
Payer: COMMERCIAL

## 2024-11-08 DIAGNOSIS — E78.2 MIXED HYPERLIPIDEMIA: ICD-10-CM

## 2024-11-08 DIAGNOSIS — N18.4 CKD STAGE G4/A1, GFR 15-29 AND ALBUMIN CREATININE RATIO <30 MG/G (MULTI): ICD-10-CM

## 2024-11-08 DIAGNOSIS — E05.90 HYPERTHYROIDISM: ICD-10-CM

## 2024-11-08 DIAGNOSIS — E55.9 VITAMIN D DEFICIENCY: ICD-10-CM

## 2024-11-08 LAB
25(OH)D3 SERPL-MCNC: 66 NG/ML (ref 30–100)
ALBUMIN SERPL BCP-MCNC: 4.3 G/DL (ref 3.4–5)
ALP SERPL-CCNC: 98 U/L (ref 33–136)
ALT SERPL W P-5'-P-CCNC: 11 U/L (ref 7–45)
ANION GAP SERPL CALC-SCNC: 15 MMOL/L (ref 10–20)
AST SERPL W P-5'-P-CCNC: 14 U/L (ref 9–39)
BASOPHILS # BLD AUTO: 0.09 X10*3/UL (ref 0–0.1)
BASOPHILS NFR BLD AUTO: 1.3 %
BILIRUB SERPL-MCNC: 0.6 MG/DL (ref 0–1.2)
BUN SERPL-MCNC: 51 MG/DL (ref 6–23)
CALCIUM SERPL-MCNC: 9.5 MG/DL (ref 8.6–10.3)
CHLORIDE SERPL-SCNC: 104 MMOL/L (ref 98–107)
CHOLEST SERPL-MCNC: 180 MG/DL (ref 0–199)
CHOLESTEROL/HDL RATIO: 2.7
CO2 SERPL-SCNC: 26 MMOL/L (ref 21–32)
CREAT SERPL-MCNC: 2 MG/DL (ref 0.5–1.05)
EGFRCR SERPLBLD CKD-EPI 2021: 24 ML/MIN/1.73M*2
EOSINOPHIL # BLD AUTO: 0.41 X10*3/UL (ref 0–0.4)
EOSINOPHIL NFR BLD AUTO: 5.8 %
ERYTHROCYTE [DISTWIDTH] IN BLOOD BY AUTOMATED COUNT: 13.4 % (ref 11.5–14.5)
FT4I SERPL CALC-MCNC: 3 (ref 1.6–4.7)
GLUCOSE SERPL-MCNC: 108 MG/DL (ref 74–99)
HCT VFR BLD AUTO: 38 % (ref 36–46)
HDLC SERPL-MCNC: 67.1 MG/DL
HGB BLD-MCNC: 12.5 G/DL (ref 12–16)
IMM GRANULOCYTES # BLD AUTO: 0.02 X10*3/UL (ref 0–0.5)
IMM GRANULOCYTES NFR BLD AUTO: 0.3 % (ref 0–0.9)
LDLC SERPL CALC-MCNC: 94 MG/DL
LYMPHOCYTES # BLD AUTO: 2.01 X10*3/UL (ref 0.8–3)
LYMPHOCYTES NFR BLD AUTO: 28.5 %
MCH RBC QN AUTO: 29.7 PG (ref 26–34)
MCHC RBC AUTO-ENTMCNC: 32.9 G/DL (ref 32–36)
MCV RBC AUTO: 90 FL (ref 80–100)
MONOCYTES # BLD AUTO: 0.71 X10*3/UL (ref 0.05–0.8)
MONOCYTES NFR BLD AUTO: 10.1 %
NEUTROPHILS # BLD AUTO: 3.81 X10*3/UL (ref 1.6–5.5)
NEUTROPHILS NFR BLD AUTO: 54 %
NON HDL CHOLESTEROL: 113 MG/DL (ref 0–149)
NRBC BLD-RTO: 0 /100 WBCS (ref 0–0)
PLATELET # BLD AUTO: 462 X10*3/UL (ref 150–450)
POTASSIUM SERPL-SCNC: 5.6 MMOL/L (ref 3.5–5.3)
PROT SERPL-MCNC: 7.3 G/DL (ref 6.4–8.2)
RBC # BLD AUTO: 4.21 X10*6/UL (ref 4–5.2)
SODIUM SERPL-SCNC: 139 MMOL/L (ref 136–145)
T3RU NFR SERPL: 31 % (ref 24–41)
T4 SERPL-MCNC: 9.6 UG/DL (ref 4.5–11.1)
TRIGL SERPL-MCNC: 94 MG/DL (ref 0–149)
TSH SERPL-ACNC: 2.48 MIU/L (ref 0.44–3.98)
VLDL: 19 MG/DL (ref 0–40)
WBC # BLD AUTO: 7.1 X10*3/UL (ref 4.4–11.3)

## 2024-11-08 PROCEDURE — 84443 ASSAY THYROID STIM HORMONE: CPT

## 2024-11-08 PROCEDURE — 84436 ASSAY OF TOTAL THYROXINE: CPT

## 2024-11-08 PROCEDURE — 80053 COMPREHEN METABOLIC PANEL: CPT

## 2024-11-08 PROCEDURE — 80061 LIPID PANEL: CPT

## 2024-11-08 PROCEDURE — 82306 VITAMIN D 25 HYDROXY: CPT

## 2024-11-08 PROCEDURE — 85025 COMPLETE CBC W/AUTO DIFF WBC: CPT

## 2024-11-08 PROCEDURE — 84479 ASSAY OF THYROID (T3 OR T4): CPT

## 2024-11-11 ENCOUNTER — DOCUMENTATION (OUTPATIENT)
Dept: PRIMARY CARE | Facility: CLINIC | Age: 89
End: 2024-11-11
Payer: COMMERCIAL

## 2024-11-12 ENCOUNTER — APPOINTMENT (OUTPATIENT)
Dept: PRIMARY CARE | Facility: CLINIC | Age: 89
End: 2024-11-12
Payer: COMMERCIAL

## 2024-11-12 VITALS
RESPIRATION RATE: 16 BRPM | HEIGHT: 63 IN | HEART RATE: 61 BPM | WEIGHT: 132.6 LBS | OXYGEN SATURATION: 99 % | DIASTOLIC BLOOD PRESSURE: 60 MMHG | BODY MASS INDEX: 23.5 KG/M2 | SYSTOLIC BLOOD PRESSURE: 110 MMHG | TEMPERATURE: 97.1 F

## 2024-11-12 DIAGNOSIS — I25.10 CORONARY ARTERY DISEASE INVOLVING NATIVE CORONARY ARTERY OF NATIVE HEART WITHOUT ANGINA PECTORIS: ICD-10-CM

## 2024-11-12 DIAGNOSIS — N18.4 CKD STAGE G4/A1, GFR 15-29 AND ALBUMIN CREATININE RATIO <30 MG/G (MULTI): ICD-10-CM

## 2024-11-12 DIAGNOSIS — E78.2 MIXED HYPERLIPIDEMIA: ICD-10-CM

## 2024-11-12 DIAGNOSIS — Z90.49 HISTORY OF CHOLECYSTECTOMY: ICD-10-CM

## 2024-11-12 DIAGNOSIS — E55.9 VITAMIN D DEFICIENCY: ICD-10-CM

## 2024-11-12 DIAGNOSIS — J45.20 MILD INTERMITTENT REACTIVE AIRWAY DISEASE WITHOUT COMPLICATION (HHS-HCC): ICD-10-CM

## 2024-11-12 DIAGNOSIS — M62.838 MUSCLE SPASMS OF LOWER EXTREMITY, UNSPECIFIED LATERALITY: ICD-10-CM

## 2024-11-12 DIAGNOSIS — K21.9 GASTROESOPHAGEAL REFLUX DISEASE, UNSPECIFIED WHETHER ESOPHAGITIS PRESENT: ICD-10-CM

## 2024-11-12 DIAGNOSIS — Z00.00 ROUTINE GENERAL MEDICAL EXAMINATION AT HEALTH CARE FACILITY: ICD-10-CM

## 2024-11-12 DIAGNOSIS — I10 PRIMARY HYPERTENSION: ICD-10-CM

## 2024-11-12 DIAGNOSIS — N18.32 STAGE 3B CHRONIC KIDNEY DISEASE (MULTI): ICD-10-CM

## 2024-11-12 DIAGNOSIS — M79.10 MUSCLE PAIN: ICD-10-CM

## 2024-11-12 DIAGNOSIS — E05.90 HYPERTHYROIDISM: ICD-10-CM

## 2024-11-12 DIAGNOSIS — H52.03 HYPEROPIA WITH PRESBYOPIA OF BOTH EYES: ICD-10-CM

## 2024-11-12 DIAGNOSIS — R53.83 FATIGUE, UNSPECIFIED TYPE: Primary | ICD-10-CM

## 2024-11-12 DIAGNOSIS — K21.9 GASTROESOPHAGEAL REFLUX DISEASE WITHOUT ESOPHAGITIS: ICD-10-CM

## 2024-11-12 DIAGNOSIS — H52.4 HYPEROPIA WITH PRESBYOPIA OF BOTH EYES: ICD-10-CM

## 2024-11-12 DIAGNOSIS — Z23 FLU VACCINE NEED: ICD-10-CM

## 2024-11-12 DIAGNOSIS — R11.0 NAUSEA: ICD-10-CM

## 2024-11-12 PROCEDURE — 99214 OFFICE O/P EST MOD 30 MIN: CPT | Performed by: FAMILY MEDICINE

## 2024-11-12 PROCEDURE — 1123F ACP DISCUSS/DSCN MKR DOCD: CPT | Performed by: FAMILY MEDICINE

## 2024-11-12 PROCEDURE — 3074F SYST BP LT 130 MM HG: CPT | Performed by: FAMILY MEDICINE

## 2024-11-12 PROCEDURE — G0008 ADMIN INFLUENZA VIRUS VAC: HCPCS | Performed by: FAMILY MEDICINE

## 2024-11-12 PROCEDURE — 90662 IIV NO PRSV INCREASED AG IM: CPT | Performed by: FAMILY MEDICINE

## 2024-11-12 PROCEDURE — 1158F ADVNC CARE PLAN TLK DOCD: CPT | Performed by: FAMILY MEDICINE

## 2024-11-12 PROCEDURE — G2211 COMPLEX E/M VISIT ADD ON: HCPCS | Performed by: FAMILY MEDICINE

## 2024-11-12 PROCEDURE — 1036F TOBACCO NON-USER: CPT | Performed by: FAMILY MEDICINE

## 2024-11-12 PROCEDURE — 3078F DIAST BP <80 MM HG: CPT | Performed by: FAMILY MEDICINE

## 2024-11-12 PROCEDURE — 1159F MED LIST DOCD IN RCRD: CPT | Performed by: FAMILY MEDICINE

## 2024-11-12 RX ORDER — PROMETHAZINE HYDROCHLORIDE 25 MG/1
25 TABLET ORAL EVERY 12 HOURS PRN
Qty: 30 TABLET | Refills: 1 | Status: SHIPPED | OUTPATIENT
Start: 2024-11-12

## 2024-11-12 RX ORDER — AMLODIPINE BESYLATE 10 MG/1
10 TABLET ORAL DAILY
Qty: 30 TABLET | Refills: 0 | Status: SHIPPED | OUTPATIENT
Start: 2024-11-12

## 2024-11-12 RX ORDER — METHIMAZOLE 5 MG/1
5 TABLET ORAL DAILY
Qty: 90 TABLET | Refills: 0 | Status: SHIPPED | OUTPATIENT
Start: 2024-11-12

## 2024-11-12 RX ORDER — CHOLECALCIFEROL (VITAMIN D3) 50 MCG
2000 TABLET ORAL DAILY
Qty: 90 TABLET | Refills: 0 | Status: SHIPPED | OUTPATIENT
Start: 2024-11-12

## 2024-11-12 RX ORDER — ACEBUTOLOL HYDROCHLORIDE 200 MG/1
200 CAPSULE ORAL 2 TIMES DAILY
Qty: 180 CAPSULE | Refills: 1 | Status: SHIPPED | OUTPATIENT
Start: 2024-11-12

## 2024-11-12 RX ORDER — ONDANSETRON HYDROCHLORIDE 8 MG/1
8 TABLET, FILM COATED ORAL EVERY 8 HOURS PRN
Qty: 30 TABLET | Refills: 1 | Status: SHIPPED | OUTPATIENT
Start: 2024-11-12

## 2024-11-12 RX ORDER — FAMOTIDINE 20 MG/1
20 TABLET, FILM COATED ORAL NIGHTLY
Qty: 90 TABLET | Refills: 0 | Status: SHIPPED | OUTPATIENT
Start: 2024-11-12

## 2024-11-12 RX ORDER — VALSARTAN AND HYDROCHLOROTHIAZIDE 320; 25 MG/1; MG/1
1 TABLET, FILM COATED ORAL DAILY
Qty: 90 TABLET | Refills: 1 | Status: SHIPPED | OUTPATIENT
Start: 2024-11-12

## 2024-11-12 RX ORDER — ATORVASTATIN CALCIUM 20 MG/1
20 TABLET, FILM COATED ORAL DAILY
Qty: 90 TABLET | Refills: 0 | Status: SHIPPED | OUTPATIENT
Start: 2024-11-12

## 2024-11-12 ASSESSMENT — ENCOUNTER SYMPTOMS
DIARRHEA: 0
BRUISES/BLEEDS EASILY: 0
WHEEZING: 0
EYE DISCHARGE: 0
APPETITE CHANGE: 0
APNEA: 0
UNEXPECTED WEIGHT CHANGE: 0
PALPITATIONS: 0
NECK PAIN: 0
FLANK PAIN: 0
ARTHRALGIAS: 0
BLOOD IN STOOL: 0
HYPERACTIVE: 0
GASTROINTESTINAL NEGATIVE: 1
AGITATION: 0
WEAKNESS: 0
DECREASED CONCENTRATION: 0
VOMITING: 0
SINUS PAIN: 0
FACIAL SWELLING: 0
NERVOUS/ANXIOUS: 0
RECTAL PAIN: 0
NECK STIFFNESS: 0
MYALGIAS: 0
JOINT SWELLING: 0
LIGHT-HEADEDNESS: 0
DIFFICULTY URINATING: 0
RHINORRHEA: 0
STRIDOR: 0
FATIGUE: 0
WOUND: 0
DIZZINESS: 0
COUGH: 0
DYSURIA: 0
SINUS PRESSURE: 0
NUMBNESS: 0
CHOKING: 0
EYE ITCHING: 0
SHORTNESS OF BREATH: 0
CONSTIPATION: 0
SEIZURES: 0
SORE THROAT: 0
SLEEP DISTURBANCE: 0
FEVER: 0
CARDIOVASCULAR NEGATIVE: 1
SPEECH DIFFICULTY: 0
TROUBLE SWALLOWING: 0
NAUSEA: 0
ADENOPATHY: 0
FREQUENCY: 0
POLYDIPSIA: 0
PHOTOPHOBIA: 0
HEADACHES: 0
TREMORS: 0
CHEST TIGHTNESS: 0
VOICE CHANGE: 0
FACIAL ASYMMETRY: 0
EYE REDNESS: 0
ANAL BLEEDING: 0
CONSTITUTIONAL NEGATIVE: 1
ACTIVITY CHANGE: 0
ABDOMINAL DISTENTION: 0
POLYPHAGIA: 0
DIAPHORESIS: 0
ABDOMINAL PAIN: 0
CHILLS: 0
EYE PAIN: 0
DYSPHORIC MOOD: 0
HEMATURIA: 0
BACK PAIN: 0
HALLUCINATIONS: 0
CONFUSION: 0
COLOR CHANGE: 0

## 2024-11-12 NOTE — PROGRESS NOTES
Subjective   Patient ID: Maggy Gore is a 89 y.o. female who presents for hypertension      Symptoms: pt is in office for a routine follow up visit for hypertension and medication refills  Length of symptoms: chronic  OTC: none  Related information:   HPI     Review of Systems   Constitutional: Negative.  Negative for activity change, appetite change, chills, diaphoresis, fatigue, fever and unexpected weight change.   HENT: Negative.  Negative for congestion, dental problem, ear discharge, facial swelling, hearing loss, mouth sores, nosebleeds, postnasal drip, rhinorrhea, sinus pressure, sinus pain, sneezing, sore throat, tinnitus, trouble swallowing and voice change.    Eyes:  Negative for photophobia, pain, discharge, redness, itching and visual disturbance.   Respiratory:  Negative for apnea, cough, choking, chest tightness, shortness of breath, wheezing and stridor.    Cardiovascular: Negative.  Negative for chest pain, palpitations and leg swelling.   Gastrointestinal: Negative.  Negative for abdominal distention, abdominal pain, anal bleeding, blood in stool, constipation, diarrhea, nausea, rectal pain and vomiting.   Endocrine: Negative for cold intolerance, heat intolerance, polydipsia, polyphagia and polyuria.   Genitourinary:  Negative for decreased urine volume, difficulty urinating, dysuria, enuresis, flank pain, frequency, hematuria and urgency.   Musculoskeletal:  Negative for arthralgias, back pain, gait problem, joint swelling, myalgias, neck pain and neck stiffness.   Skin:  Negative for color change, pallor, rash and wound.   Allergic/Immunologic: Negative for environmental allergies, food allergies and immunocompromised state.   Neurological:  Negative for dizziness, tremors, seizures, syncope, facial asymmetry, speech difficulty, weakness, light-headedness, numbness and headaches.   Hematological:  Negative for adenopathy. Does not bruise/bleed easily.   Psychiatric/Behavioral:  Negative for  "agitation, behavioral problems, confusion, decreased concentration, dysphoric mood, hallucinations, self-injury, sleep disturbance and suicidal ideas. The patient is not nervous/anxious and is not hyperactive.    All other systems reviewed and are negative.      Objective   /60   Pulse 61   Temp 36.2 °C (97.1 °F)   Resp 16   Ht 1.6 m (5' 3\")   Wt 60.1 kg (132 lb 9.6 oz)   SpO2 99%   BMI 23.49 kg/m²     Physical Exam  Vitals reviewed.   Constitutional:       General: She is not in acute distress.     Appearance: Normal appearance. She is normal weight. She is not ill-appearing or diaphoretic.   HENT:      Head: Normocephalic.      Right Ear: Tympanic membrane and external ear normal.      Left Ear: Tympanic membrane and external ear normal.      Nose: Nose normal. No congestion.      Mouth/Throat:      Pharynx: No posterior oropharyngeal erythema.   Eyes:      General:         Right eye: No discharge.         Left eye: No discharge.      Extraocular Movements: Extraocular movements intact.      Conjunctiva/sclera: Conjunctivae normal.      Pupils: Pupils are equal, round, and reactive to light.   Cardiovascular:      Rate and Rhythm: Normal rate and regular rhythm.      Pulses: Normal pulses.      Heart sounds: Normal heart sounds. No murmur heard.  Pulmonary:      Effort: Pulmonary effort is normal. No respiratory distress.      Breath sounds: Normal breath sounds. No wheezing or rales.   Chest:      Chest wall: No tenderness.   Abdominal:      General: Abdomen is flat. Bowel sounds are normal. There is no distension.      Palpations: There is no mass.      Tenderness: There is no abdominal tenderness. There is no guarding.   Musculoskeletal:         General: No tenderness. Normal range of motion.      Cervical back: Normal range of motion and neck supple. No tenderness.      Right lower leg: No edema.      Left lower leg: No edema.   Skin:     General: Skin is dry.      Coloration: Skin is not " jaundiced.      Findings: No bruising, erythema or rash.   Neurological:      General: No focal deficit present.      Mental Status: She is alert and oriented to person, place, and time. Mental status is at baseline.      Cranial Nerves: No cranial nerve deficit.      Sensory: No sensory deficit.      Coordination: Coordination normal.      Gait: Gait normal.   Psychiatric:         Mood and Affect: Mood normal.         Thought Content: Thought content normal.         Judgment: Judgment normal.         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    Relevant Orders    Follow Up In [facility] - PCP - Established    Mixed hyperlipidemia E78.2    Relevant Medications    atorvastatin (Lipitor) 20 mg tablet    Other Relevant Orders    Follow Up [facility] - PCP - Established    Comprehensive Metabolic Panel    Hyperthyroidism E05.90    Relevant Medications    methIMAzole (Tapazole) 5 mg tablet    Other Relevant Orders    Follow Up [facility] - PCP - Established    Vitamin D deficiency E55.9    Relevant Medications    cholecalciferol (Vitamin D-3) 50 MCG (2000 UT) tablet    Other Relevant Orders    Follow Up [facility] - PCP - Established    Reactive airway disease without complication ([facility]-HCC) J45.909    Relevant Orders    Follow Up [facility] - PCP - Established    Primary hypertension I10    Relevant Medications    acebutolol (Sectral) 200 mg capsule    amLODIPine (Norvasc) 10 mg tablet    amLODIPine (Norvasc) 10 mg tablet    valsartan-hydrochlorothiazide (Diovan-HCT) 320-25 mg tablet    Other Relevant Orders    Follow Up [facility] - PCP - Established    Gastroesophageal reflux disease K21.9    Relevant Medications    famotidine (Pepcid) 20 mg tablet    Other Relevant Orders    Follow Up [facility] - PCP - Established    Follow Up In  [facility] - PCP - Established    Fatigue - Primary R53.83    Relevant Orders    CBC and Auto Differential    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    Relevant Orders    Follow Up [facility] - PCP - Established    Coronary artery 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 I25.10    Relevant Medications    acebutolol (Sectral) 200 mg capsule    amLODIPine (Norvasc) 10 mg tablet    amLODIPine (Norvasc) 10 mg tablet    Other Relevant Orders    Follow Up [facility] - PCP - Established    CKD stage G4/A1, 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) N18.4    Relevant Orders    Follow Up [facility] - PCP - Established    History of cholecystectomy Z90.49    Relevant Orders    Follow Up [facility] - PCP - Established     Other Visit Diagnoses         Codes    Muscle pain     M79.10    Relevant Orders    Follow Up [facility] - PCP - Established    Nausea     R11.0    Relevant Medications    promethazine (Phenergan) 25 mg tablet    ondansetron (Zofran) 8 mg tablet    Other Relevant Orders    Follow Up [facility] - PCP - Established    Muscle spasms of lower extremity, unspecified laterality     M62.838    Relevant Orders    Follow Up [facility] - PCP - Established    Routine general medical examination at health care facility     Z00.00    Relevant Orders    Follow Up [facility] - PCP - Established    Flu vaccine need     Z23    Relevant Orders    Flu vaccine, trivalent, preservative free, HIGH-DOSE, age 65y+ (Fluzone) (Completed)    Follow Up In [facility] - PCP - Established        Do knee raises and ice area.     continue farxiga 10 mg daily for CKD  Pharmacy referral done     Metamucil every day     BW prior      prevnar next       & RSV now pharmacy    Last surgery was 2/23/24.  Off work since 12/23 and RTW 5/1/24 per Dr Johnson cholecystectomy   No  strenuous activity or heavy lifting until 6 weeks post-op     Scribe Attestation  By signing my name below, I, Mariah Sanchez MA, Scribe   attest that this documentation has been prepared under the direction and in the presence of Ace Dhillon MD.    Provider Attestation - Scribe documentation    All medical record entries made by the Scribe were at my direction and personally dictated by me. I have reviewed the chart and agree that the record accurately reflects my personal performance of the history, physical exam, discussion and plan.    Continue current medications and therapy for chronic medical conditions

## 2024-11-14 ENCOUNTER — PATIENT OUTREACH (OUTPATIENT)
Dept: PRIMARY CARE | Facility: CLINIC | Age: 89
End: 2024-11-14
Payer: COMMERCIAL

## 2024-11-14 DIAGNOSIS — E78.2 MIXED HYPERLIPIDEMIA: ICD-10-CM

## 2024-11-14 DIAGNOSIS — I10 ESSENTIAL HYPERTENSION: ICD-10-CM

## 2024-11-14 DIAGNOSIS — M79.10 MUSCLE PAIN: ICD-10-CM

## 2024-11-14 DIAGNOSIS — N18.32 STAGE 3B CHRONIC KIDNEY DISEASE (MULTI): ICD-10-CM

## 2024-11-14 DIAGNOSIS — K21.9 GASTROESOPHAGEAL REFLUX DISEASE WITHOUT ESOPHAGITIS: ICD-10-CM

## 2024-11-14 NOTE — PROGRESS NOTES
"Phone call to patient for monthly RN CM outreach and introduction.    Pt states feeling well but having back and left knee pain. Pt sustained fall approximately 1 year ago at work and fractured her left knee cap. Since, knee will ache at times. Pt with ongoing back pain, sees pain management for \"injections\" and plans to call today for an appointment. Pt sees Dr. Ana Maria Olivarez for this. Pt anxious to have injection done as she is planning to travel with her daughter to [place] the first week in December to visit her grandson for 5 days.    Discussed pt's last PCP visit on 11/12/24. BP appears well controlled. Pt denies headache, lightheadedness or dizziness. Pt States taking her medications as prescribed. Pt acknowledged order for RSV vaccine and is planning to go to pharmacy today to have it done. Pt states did receive her FLU vaccine this year.     Pt admits to fall this halloween outside of her home. Pt states she placed her hand on a broken statue and when she leaned on it, it gave way and she fell. Pt denies injury or LOC. Pt states she immediately knew she made a mistake when she leaned into it and has since thrown it away. Pt was outside doing yard work yesterday and taking care of leaves. Pt denies use of assistive devices, states no difficulty with ambulation.     Mediations reviewed with pt and discusses significance of each as it relates to her HTN, CKD, HLD and GERD. Discussed use of Norco for pain and caution with adding any additional APAP through the day. Reminded pt that recommendation is to not exceed 3,000 mg APAP / day. Pt states no difficulty obtaining her medications.     Pt eating and drinking okay. Prefers low fat diet. No difficulty obtaining adequate food.    Chart reviewed prior to phone call. Pt wishes to continue with CCM at this time. Reminded pt of upcoming appointments:  12/17/2024 - Cardiology with Dr. Blanchard  12/19/2024 - PCP with Dr. Dhillon    Encouraged pt to call with any " questions or concerns. Direct phone number provided to patient.

## 2024-12-09 DIAGNOSIS — R11.0 NAUSEA: ICD-10-CM

## 2024-12-10 RX ORDER — ONDANSETRON HYDROCHLORIDE 8 MG/1
TABLET, FILM COATED ORAL
Qty: 30 TABLET | Refills: 0 | Status: SHIPPED | OUTPATIENT
Start: 2024-12-10

## 2024-12-12 ENCOUNTER — PATIENT OUTREACH (OUTPATIENT)
Dept: PRIMARY CARE | Facility: CLINIC | Age: 89
End: 2024-12-12
Payer: MEDICARE

## 2024-12-12 DIAGNOSIS — I10 PRIMARY HYPERTENSION: ICD-10-CM

## 2024-12-12 DIAGNOSIS — N18.32 STAGE 3B CHRONIC KIDNEY DISEASE (MULTI): ICD-10-CM

## 2024-12-12 NOTE — PROGRESS NOTES
Phone call for monthly RN CM outreach.  Chart reviewed prior to call    Pt denies complaints or concerns. Pt says she still hasn't found a job but attended a meeting to ensure she would still receive money owed from former employer. Pt denies problems affording bills, food or medications at this time.    Pt had not gotten her RSV vaccine yet but plans to speak with Dr. Dhillon more about it at her next visit on 12/19/24.    Pt not taking her BP at home but does have a machine. Pt denies episodes of lightheadedness, dizziness, headaches, or blurred vision. Encouraged low sodium diet. Discussed importance of BP control and it's relation to kidney health.     Pt denies falls.    Pt denies medication needs at this time.     12/17/24 Cardiology with Dr. Blanchard  12/19/24 PCP with Dr. Dhillon    Pt wishes to continue with CCM at this time. This RN CM ensured pt has direct contact number and encouraged to call with any questions or concerns.

## 2024-12-13 DIAGNOSIS — R11.0 NAUSEA: ICD-10-CM

## 2024-12-13 NOTE — TELEPHONE ENCOUNTER
Recent Visits  Date Type Provider Dept   11/12/24 Office Visit Ace Dhillon MD [facility]   10/07/24 Office Visit Ace Dhillon MD [facility]   08/06/24 Office Visit Ace Dhillon MD [facility]   06/24/24 Office Visit Ace Dhillon MD [facility]   Showing recent visits within past 180 days and meeting all other requirements  Future Appointments  Date Type Provider Dept   12/19/24 Appointment Ace Dhillon MD [facility]   Showing future appointments within next 90 days and meeting all other requirements

## 2024-12-14 RX ORDER — PROMETHAZINE HYDROCHLORIDE 25 MG/1
TABLET ORAL
Qty: 30 TABLET | Refills: 0 | Status: SHIPPED | OUTPATIENT
Start: 2024-12-14

## 2024-12-16 ENCOUNTER — LAB (OUTPATIENT)
Dept: LAB | Facility: LAB | Age: 89
End: 2024-12-16
Payer: COMMERCIAL

## 2024-12-16 ENCOUNTER — TELEPHONE (OUTPATIENT)
Dept: PRIMARY CARE | Facility: CLINIC | Age: 89
End: 2024-12-16

## 2024-12-16 DIAGNOSIS — E78.2 MIXED HYPERLIPIDEMIA: ICD-10-CM

## 2024-12-16 DIAGNOSIS — R53.83 FATIGUE, UNSPECIFIED TYPE: ICD-10-CM

## 2024-12-16 LAB
ALBUMIN SERPL BCP-MCNC: 4.2 G/DL (ref 3.4–5)
ALP SERPL-CCNC: 93 U/L (ref 33–136)
ALT SERPL W P-5'-P-CCNC: 10 U/L (ref 7–45)
ANION GAP SERPL CALC-SCNC: 12 MMOL/L (ref 10–20)
AST SERPL W P-5'-P-CCNC: 14 U/L (ref 9–39)
BASOPHILS # BLD AUTO: 0.06 X10*3/UL (ref 0–0.1)
BASOPHILS NFR BLD AUTO: 0.9 %
BILIRUB SERPL-MCNC: 0.5 MG/DL (ref 0–1.2)
BUN SERPL-MCNC: 33 MG/DL (ref 6–23)
CALCIUM SERPL-MCNC: 9.2 MG/DL (ref 8.6–10.3)
CHLORIDE SERPL-SCNC: 104 MMOL/L (ref 98–107)
CO2 SERPL-SCNC: 29 MMOL/L (ref 21–32)
CREAT SERPL-MCNC: 1.81 MG/DL (ref 0.5–1.05)
EGFRCR SERPLBLD CKD-EPI 2021: 27 ML/MIN/1.73M*2
EOSINOPHIL # BLD AUTO: 0.37 X10*3/UL (ref 0–0.4)
EOSINOPHIL NFR BLD AUTO: 5.8 %
ERYTHROCYTE [DISTWIDTH] IN BLOOD BY AUTOMATED COUNT: 13.3 % (ref 11.5–14.5)
GLUCOSE SERPL-MCNC: 91 MG/DL (ref 74–99)
HCT VFR BLD AUTO: 37.6 % (ref 36–46)
HGB BLD-MCNC: 12.3 G/DL (ref 12–16)
IMM GRANULOCYTES # BLD AUTO: 0.02 X10*3/UL (ref 0–0.5)
IMM GRANULOCYTES NFR BLD AUTO: 0.3 % (ref 0–0.9)
LYMPHOCYTES # BLD AUTO: 1.88 X10*3/UL (ref 0.8–3)
LYMPHOCYTES NFR BLD AUTO: 29.2 %
MCH RBC QN AUTO: 30.4 PG (ref 26–34)
MCHC RBC AUTO-ENTMCNC: 32.7 G/DL (ref 32–36)
MCV RBC AUTO: 93 FL (ref 80–100)
MONOCYTES # BLD AUTO: 0.56 X10*3/UL (ref 0.05–0.8)
MONOCYTES NFR BLD AUTO: 8.7 %
NEUTROPHILS # BLD AUTO: 3.54 X10*3/UL (ref 1.6–5.5)
NEUTROPHILS NFR BLD AUTO: 55.1 %
NRBC BLD-RTO: 0 /100 WBCS (ref 0–0)
PLATELET # BLD AUTO: 414 X10*3/UL (ref 150–450)
POTASSIUM SERPL-SCNC: 4.6 MMOL/L (ref 3.5–5.3)
PROT SERPL-MCNC: 7.3 G/DL (ref 6.4–8.2)
RBC # BLD AUTO: 4.04 X10*6/UL (ref 4–5.2)
SODIUM SERPL-SCNC: 140 MMOL/L (ref 136–145)
WBC # BLD AUTO: 6.4 X10*3/UL (ref 4.4–11.3)

## 2024-12-16 PROCEDURE — 85025 COMPLETE CBC W/AUTO DIFF WBC: CPT

## 2024-12-16 PROCEDURE — 80053 COMPREHEN METABOLIC PANEL: CPT

## 2024-12-16 NOTE — TELEPHONE ENCOUNTER
Patient of Dr. Alejo SHEA  submitted for promethazine 25mg    Prior auth approved for promethazine through [facility] from 12/02/2024 until further notice  Auth number [id number]

## 2024-12-17 ENCOUNTER — APPOINTMENT (OUTPATIENT)
Dept: CARDIOLOGY | Facility: CLINIC | Age: 89
End: 2024-12-17
Payer: MEDICARE

## 2024-12-17 VITALS
WEIGHT: 134 LBS | HEART RATE: 64 BPM | SYSTOLIC BLOOD PRESSURE: 114 MMHG | HEIGHT: 63 IN | BODY MASS INDEX: 23.74 KG/M2 | DIASTOLIC BLOOD PRESSURE: 60 MMHG

## 2024-12-17 DIAGNOSIS — I35.0 NONRHEUMATIC AORTIC VALVE STENOSIS: ICD-10-CM

## 2024-12-17 DIAGNOSIS — I25.10 CORONARY ARTERY DISEASE INVOLVING NATIVE CORONARY ARTERY OF NATIVE HEART WITHOUT ANGINA PECTORIS: ICD-10-CM

## 2024-12-17 DIAGNOSIS — I10 PRIMARY HYPERTENSION: Primary | ICD-10-CM

## 2024-12-17 DIAGNOSIS — E78.2 MIXED HYPERLIPIDEMIA: ICD-10-CM

## 2024-12-17 PROCEDURE — 3078F DIAST BP <80 MM HG: CPT | Performed by: INTERNAL MEDICINE

## 2024-12-17 PROCEDURE — 1160F RVW MEDS BY RX/DR IN RCRD: CPT | Performed by: INTERNAL MEDICINE

## 2024-12-17 PROCEDURE — 3074F SYST BP LT 130 MM HG: CPT | Performed by: INTERNAL MEDICINE

## 2024-12-17 PROCEDURE — 1036F TOBACCO NON-USER: CPT | Performed by: INTERNAL MEDICINE

## 2024-12-17 PROCEDURE — 1123F ACP DISCUSS/DSCN MKR DOCD: CPT | Performed by: INTERNAL MEDICINE

## 2024-12-17 PROCEDURE — G2211 COMPLEX E/M VISIT ADD ON: HCPCS | Performed by: INTERNAL MEDICINE

## 2024-12-17 PROCEDURE — 99214 OFFICE O/P EST MOD 30 MIN: CPT | Performed by: INTERNAL MEDICINE

## 2024-12-17 PROCEDURE — 1159F MED LIST DOCD IN RCRD: CPT | Performed by: INTERNAL MEDICINE

## 2024-12-17 NOTE — ASSESSMENT & PLAN NOTE
Aortic stenosis is mild by echo December 2023.  A repeat echo is not necessary this year.  Will follow her prospectively to reevaluate.  Orders:    Follow Up In Cardiology    Follow Up In Cardiology; Future

## 2024-12-17 NOTE — PROGRESS NOTES
"Chief Complaint:   Please see below.     History Of Present Illness:    Maggy Gore is a 89 y.o. female presenting with aortic stenosis.    This 89-year-old hypertensive, hyperlipidemic woman with chronic kidney disease stage III and osteoarthritis has stable class I coronary artery disease and returns to the office in routine follow-up. A prior SPECT in January 2023 disclosed evidence of a lateral wall infarct with no demonstrable ischemia. The patient's echocardiogram dated December 2023 revealed an EF of 55%,.  V-max across the aortic valve was 2.52 m/s; peak gradient 25 mm; mean gradient 1 5 mm; dimensionless index 0.43.    The patient denies chest discomfort, dyspnea, palpitations, orthopnea, PND, syncope, and near syncope.  The patient can do household chores such as vacuuming, cooking, cleaning, and shopping, mowing the lawn, yard work etc.    PMH: CKD-3       Last Recorded Vitals:  Vitals:    12/17/24 0900   BP: 114/60   BP Location: Right arm   Patient Position: Sitting   Pulse: 64   Weight: 60.8 kg (134 lb)   Height: 1.6 m (5' 3\")       Past Medical History:  She has a past medical history of AAA (abdominal aortic aneurysm) without rupture (CMS-[facility]), Aortic stenosis, CKD (chronic kidney disease), Closed comminuted fracture of left patella with routine healing (07/31/2023), Colon polyp, GERD (gastroesophageal reflux disease), Gout, HTN (hypertension), Hyperlipidemia, and Hyperthyroidism.    Past Surgical History:  She has a past surgical history that includes Appendectomy; Hysterectomy; and IR biliary drain (12/26/2023).      Social History:  She reports that she quit smoking about 11 years ago. Her smoking use included cigarettes. She has never used smokeless tobacco. She reports current alcohol use. She reports that she does not use drugs.    Family History:  Family History   Problem Relation Name Age of Onset    Heart attack Mother      Other (enlarged heart.) Father      Heart defect Daughter   "        Allergies:  Sulfa (sulfonamide antibiotics)    Outpatient Medications:  Current Outpatient Medications   Medication Instructions    acebutolol (SECTRAL) 200 mg, oral, 2 times daily    acetaminophen (TYLENOL) 1,000 mg, Every 6 hours PRN    amLODIPine (NORVASC) 10 mg, oral, Daily    amLODIPine (NORVASC) 10 mg, oral, Daily    atorvastatin (LIPITOR) 20 mg, oral, Daily    cholecalciferol (VITAMIN D-3) 2,000 Units, oral, Daily    cyclobenzaprine (FLEXERIL) 10 mg, oral, 2 times daily PRN    famotidine (PEPCID) 20 mg, oral, Nightly    Farxiga 10 mg, oral, Daily    HYDROcodone-acetaminophen (Norco) 5-325 mg tablet 1 tablet, Every 6 hours PRN    methIMAzole (TAPAZOLE) 5 mg, oral, Daily    naloxone (NARCAN) 4 mg, nasal, As needed, May repeat every 2-3 minutes if needed, alternating nostrils, until medical assistance becomes available.    ondansetron (Zofran) 8 mg tablet TAKE ONE TABLET BY MOUTH EVERY 8 HOURS AS NEEDED FOR NAUSEA OR VOMITING.    pantoprazole (PROTONIX) 40 mg, oral, Daily, do not crush chew or split    polyethylene glycol (GLYCOLAX, MIRALAX) 17 g, oral, Daily    promethazine (Phenergan) 25 mg tablet TAKE ONE TABLET BY MOUTH EVERY 12 HOURS AS NEEDED FOR NAUSEA OR VOMITING.    valsartan-hydrochlorothiazide (Diovan-HCT) 320-25 mg tablet 1 tablet, oral, Daily       Physical Exam:  GENERAL:  pleasant 89 year-old  HEENT: No xanthelasma  NECK: Supple, no palpable adenopathy or thyromegaly  CHEST: Clear to auscultation, respiratory effort unlabored  CARDIAC: RRR, normal S1 and S2, no audible  rub, gallop, carotids are brisk, PMI is not displaced; there is a grade 2 systolic murmur heard best at the RSB.  ABD: Active bowel sounds, nontender, no organomegaly, no evidence of ascites  EXT: No clubbing, cyanosis, edema, or tenderness  NEURO: Awake, alert, appropriate, speech is fluent       Last Labs:  CBC -  Lab Results   Component Value Date    WBC 6.4 12/16/2024    HGB 12.3 12/16/2024    HCT 37.6 12/16/2024    MCV  93 12/16/2024     12/16/2024       CMP -  Lab Results   Component Value Date    CALCIUM 9.2 12/16/2024    PHOS 4.7 06/16/2023    PROT 7.3 12/16/2024    ALBUMIN 4.2 12/16/2024    AST 14 12/16/2024    ALT 10 12/16/2024    ALKPHOS 93 12/16/2024    BILITOT 0.5 12/16/2024       LIPID PANEL -   Lab Results   Component Value Date    CHOL 180 11/08/2024    TRIG 94 11/08/2024    HDL 67.1 11/08/2024    CHHDL 2.7 11/08/2024    LDLF 71 08/01/2023    VLDL 19 11/08/2024    NHDL 113 11/08/2024       RENAL FUNCTION PANEL -   Lab Results   Component Value Date    GLUCOSE 91 12/16/2024     12/16/2024    K 4.6 12/16/2024     12/16/2024    CO2 29 12/16/2024    ANIONGAP 12 12/16/2024    BUN 33 (H) 12/16/2024    CREATININE 1.81 (H) 12/16/2024    CALCIUM 9.2 12/16/2024    PHOS 4.7 06/16/2023    ALBUMIN 4.2 12/16/2024        Lab Results   Component Value Date     (H) 11/22/2022         Lab review: I have Chemistry CMP:   Lab Results   Component Value Date    ALBUMIN 4.2 12/16/2024    CALCIUM 9.2 12/16/2024    CO2 29 12/16/2024    CREATININE 1.81 (H) 12/16/2024    GLUCOSE 91 12/16/2024    BILITOT 0.5 12/16/2024    PROT 7.3 12/16/2024    ALT 10 12/16/2024    AST 14 12/16/2024    ALKPHOS 93 12/16/2024   , Chemistry BMP   Lab Results   Component Value Date    GLUCOSE 91 12/16/2024    CALCIUM 9.2 12/16/2024    CO2 29 12/16/2024    CREATININE 1.81 (H) 12/16/2024   , CBC:  Lab Results   Component Value Date    WBC 6.4 12/16/2024    RBC 4.04 12/16/2024    HGB 12.3 12/16/2024    HCT 37.6 12/16/2024    MCV 93 12/16/2024    MCH 30.4 12/16/2024    MCHC 32.7 12/16/2024    RDW 13.3 12/16/2024    NRBC 0.0 12/16/2024   , and Lipids:   Lab Results   Component Value Date    CHOL 180 11/08/2024    HDL 67.1 11/08/2024    LDLCALC 94 11/08/2024    TRIG 94 11/08/2024       Assessment/Plan   Assessment & Plan  Coronary artery disease involving native coronary artery of native heart without angina pectoris  CAD: The patient has stable,  functional class I CAD, and is doing well.  No additional testing is necessary at present. Important aspects of lifestyle modification were discussed in detail with the patient.  Recent labs and testing have been reviewed.    Orders:    Follow Up In Cardiology    Follow Up In Cardiology; Future    Nonrheumatic aortic valve stenosis  Aortic stenosis is mild by echo December 2023.  A repeat echo is not necessary this year.  Will follow her prospectively to reevaluate.  Orders:    Follow Up In Cardiology    Follow Up In Cardiology; Future    Primary hypertension  HTN:  BP is well controlled.   We discussed sodium restriction, lifestyle modification, and the DASH diet.  I advised the patient to check BPs at home daily, and to contact me with an update in one month.         Mixed hyperlipidemia  Risk factor modification: educational materials were provided to the patient.                Jas Blanchard MD

## 2024-12-17 NOTE — ASSESSMENT & PLAN NOTE
CAD: The patient has stable, functional class I CAD, and is doing well.  No additional testing is necessary at present. Important aspects of lifestyle modification were discussed in detail with the patient.  Recent labs and testing have been reviewed.    Orders:    Follow Up In Cardiology    Follow Up In Cardiology; Future

## 2024-12-17 NOTE — PATIENT INSTRUCTIONS

## 2024-12-19 ENCOUNTER — APPOINTMENT (OUTPATIENT)
Dept: PRIMARY CARE | Facility: CLINIC | Age: 89
End: 2024-12-19
Payer: COMMERCIAL

## 2024-12-19 VITALS
HEART RATE: 68 BPM | OXYGEN SATURATION: 96 % | BODY MASS INDEX: 23.24 KG/M2 | DIASTOLIC BLOOD PRESSURE: 62 MMHG | WEIGHT: 131.2 LBS | SYSTOLIC BLOOD PRESSURE: 120 MMHG

## 2024-12-19 DIAGNOSIS — N18.32 STAGE 3B CHRONIC KIDNEY DISEASE (MULTI): ICD-10-CM

## 2024-12-19 DIAGNOSIS — Z90.49 HISTORY OF CHOLECYSTECTOMY: ICD-10-CM

## 2024-12-19 DIAGNOSIS — K21.9 GASTROESOPHAGEAL REFLUX DISEASE, UNSPECIFIED WHETHER ESOPHAGITIS PRESENT: ICD-10-CM

## 2024-12-19 DIAGNOSIS — H52.4 HYPEROPIA WITH PRESBYOPIA OF BOTH EYES: ICD-10-CM

## 2024-12-19 DIAGNOSIS — I10 PRIMARY HYPERTENSION: ICD-10-CM

## 2024-12-19 DIAGNOSIS — M79.10 MUSCLE PAIN: ICD-10-CM

## 2024-12-19 DIAGNOSIS — R11.0 NAUSEA: ICD-10-CM

## 2024-12-19 DIAGNOSIS — E05.90 HYPERTHYROIDISM: ICD-10-CM

## 2024-12-19 DIAGNOSIS — E78.2 MIXED HYPERLIPIDEMIA: ICD-10-CM

## 2024-12-19 DIAGNOSIS — M62.838 MUSCLE SPASMS OF LOWER EXTREMITY, UNSPECIFIED LATERALITY: ICD-10-CM

## 2024-12-19 DIAGNOSIS — K52.9 ACUTE GASTROENTERITIS: ICD-10-CM

## 2024-12-19 DIAGNOSIS — J45.20 MILD INTERMITTENT REACTIVE AIRWAY DISEASE WITHOUT COMPLICATION (HHS-HCC): ICD-10-CM

## 2024-12-19 DIAGNOSIS — Z23 FLU VACCINE NEED: ICD-10-CM

## 2024-12-19 DIAGNOSIS — N18.4 CKD STAGE G4/A1, GFR 15-29 AND ALBUMIN CREATININE RATIO <30 MG/G (MULTI): ICD-10-CM

## 2024-12-19 DIAGNOSIS — K21.9 GASTROESOPHAGEAL REFLUX DISEASE WITHOUT ESOPHAGITIS: ICD-10-CM

## 2024-12-19 DIAGNOSIS — E55.9 VITAMIN D DEFICIENCY: ICD-10-CM

## 2024-12-19 DIAGNOSIS — I25.10 CORONARY ARTERY DISEASE INVOLVING NATIVE CORONARY ARTERY OF NATIVE HEART WITHOUT ANGINA PECTORIS: ICD-10-CM

## 2024-12-19 DIAGNOSIS — H52.03 HYPEROPIA WITH PRESBYOPIA OF BOTH EYES: ICD-10-CM

## 2024-12-19 DIAGNOSIS — Z00.00 ROUTINE GENERAL MEDICAL EXAMINATION AT HEALTH CARE FACILITY: ICD-10-CM

## 2024-12-19 DIAGNOSIS — R53.83 FATIGUE, UNSPECIFIED TYPE: ICD-10-CM

## 2024-12-19 PROCEDURE — 99214 OFFICE O/P EST MOD 30 MIN: CPT | Performed by: FAMILY MEDICINE

## 2024-12-19 PROCEDURE — G2211 COMPLEX E/M VISIT ADD ON: HCPCS | Performed by: FAMILY MEDICINE

## 2024-12-19 RX ORDER — CIPROFLOXACIN 250 MG/1
250 TABLET, FILM COATED ORAL 2 TIMES DAILY
Qty: 14 TABLET | Refills: 0 | Status: SHIPPED | OUTPATIENT
Start: 2024-12-19 | End: 2024-12-26

## 2024-12-19 ASSESSMENT — ENCOUNTER SYMPTOMS
PALPITATIONS: 0
CONSTITUTIONAL NEGATIVE: 1
CONSTIPATION: 0
BACK PAIN: 0
ABDOMINAL DISTENTION: 0
SINUS PAIN: 0
ABDOMINAL PAIN: 0
DIAPHORESIS: 0
NECK PAIN: 0
HALLUCINATIONS: 0
NECK STIFFNESS: 0
RECTAL PAIN: 0
DIFFICULTY URINATING: 0
EYE DISCHARGE: 0
EYE ITCHING: 0
ACTIVITY CHANGE: 0
FACIAL SWELLING: 0
EYE REDNESS: 0
DIARRHEA: 0
CHILLS: 0
WOUND: 0
HEMATURIA: 0
FREQUENCY: 0
SORE THROAT: 0
POLYPHAGIA: 0
SHORTNESS OF BREATH: 0
APNEA: 0
HYPERACTIVE: 0
CHEST TIGHTNESS: 0
NUMBNESS: 0
ARTHRALGIAS: 0
VOMITING: 1
HEADACHES: 0
COUGH: 0
CARDIOVASCULAR NEGATIVE: 1
EYE PAIN: 0
APPETITE CHANGE: 0
NERVOUS/ANXIOUS: 0
FEVER: 0
SEIZURES: 0
FACIAL ASYMMETRY: 0
CONFUSION: 0
FATIGUE: 0
DIZZINESS: 0
RHINORRHEA: 0
PHOTOPHOBIA: 0
POLYDIPSIA: 0
SINUS PRESSURE: 0
BLOOD IN STOOL: 0
SLEEP DISTURBANCE: 0
UNEXPECTED WEIGHT CHANGE: 0
ADENOPATHY: 0
CHOKING: 0
ANAL BLEEDING: 0
NAUSEA: 1
COLOR CHANGE: 0
TREMORS: 0
TROUBLE SWALLOWING: 0
LIGHT-HEADEDNESS: 0
WEAKNESS: 0
SPEECH DIFFICULTY: 0
STRIDOR: 0
AGITATION: 0
MYALGIAS: 0
BRUISES/BLEEDS EASILY: 0
JOINT SWELLING: 0
WHEEZING: 0
DYSPHORIC MOOD: 0
VOICE CHANGE: 0
DYSURIA: 0
FLANK PAIN: 0
DECREASED CONCENTRATION: 0

## 2024-12-19 NOTE — TELEPHONE ENCOUNTER
Recent Visits  Date Type Provider Dept   11/12/24 Office Visit Ace Dhillon MD [facility]   10/07/24 Office Visit Ace Dhillon MD [facility]   08/06/24 Office Visit Ace Dhillon MD [facility]   06/24/24 Office Visit Ace Dhillon MD [facility]   Showing recent visits within past 180 days and meeting all other requirements  Today's Visits  Date Type Provider Dept   12/19/24 Appointment Ace Dhillon MD [facility]   Showing today's visits and meeting all other requirements  Future Appointments  No visits were found meeting these conditions.  Showing future appointments within next 90 days and meeting all other requirements

## 2024-12-19 NOTE — PROGRESS NOTES
Subjective   Patient ID: Maggy Gore is a 89 y.o. female who presents for Hypertension.    Pt c/o nausea and vomiting X reoccurring     This patient is here today to follow up on HTN. This patient is currently taking amlodipine. This patient states that their current medication treatment for this issue is working well for them.  This patient denies any symptoms of headache, dizziness, blurry vision, or lightheadedness.           Review of Systems   Constitutional: Negative.  Negative for activity change, appetite change, chills, diaphoresis, fatigue, fever and unexpected weight change.   HENT: Negative.  Negative for congestion, dental problem, ear discharge, facial swelling, hearing loss, mouth sores, nosebleeds, postnasal drip, rhinorrhea, sinus pressure, sinus pain, sneezing, sore throat, tinnitus, trouble swallowing and voice change.    Eyes:  Negative for photophobia, pain, discharge, redness, itching and visual disturbance.   Respiratory:  Negative for apnea, cough, choking, chest tightness, shortness of breath, wheezing and stridor.    Cardiovascular: Negative.  Negative for chest pain, palpitations and leg swelling.   Gastrointestinal:  Positive for nausea and vomiting. Negative for abdominal distention, abdominal pain, anal bleeding, blood in stool, constipation, diarrhea and rectal pain.   Endocrine: Negative for cold intolerance, heat intolerance, polydipsia, polyphagia and polyuria.   Genitourinary:  Negative for decreased urine volume, difficulty urinating, dysuria, enuresis, flank pain, frequency, hematuria and urgency.   Musculoskeletal:  Negative for arthralgias, back pain, gait problem, joint swelling, myalgias, neck pain and neck stiffness.   Skin:  Negative for color change, pallor, rash and wound.   Allergic/Immunologic: Negative for environmental allergies, food allergies and immunocompromised state.   Neurological:  Negative for dizziness, tremors, seizures, syncope, facial asymmetry,  speech difficulty, weakness, light-headedness, numbness and headaches.   Hematological:  Negative for adenopathy. Does not bruise/bleed easily.   Psychiatric/Behavioral:  Negative for agitation, behavioral problems, confusion, decreased concentration, dysphoric mood, hallucinations, self-injury, sleep disturbance and suicidal ideas. The patient is not nervous/anxious and is not hyperactive.    All other systems reviewed and are negative.      Objective   /62   Pulse 68   Wt 59.5 kg (131 lb 3.2 oz)   SpO2 96%   BMI 23.24 kg/m²     Physical Exam  Vitals reviewed.   Constitutional:       General: She is not in acute distress.     Appearance: Normal appearance. She is normal weight. She is not ill-appearing or diaphoretic.   HENT:      Head: Normocephalic.      Right Ear: Tympanic membrane and external ear normal.      Left Ear: Tympanic membrane and external ear normal.      Nose: Nose normal. No congestion.      Mouth/Throat:      Pharynx: No posterior oropharyngeal erythema.   Eyes:      General:         Right eye: No discharge.         Left eye: No discharge.      Extraocular Movements: Extraocular movements intact.      Conjunctiva/sclera: Conjunctivae normal.      Pupils: Pupils are equal, round, and reactive to light.   Cardiovascular:      Rate and Rhythm: Normal rate and regular rhythm.      Pulses: Normal pulses.      Heart sounds: Normal heart sounds. No murmur heard.  Pulmonary:      Effort: Pulmonary effort is normal. No respiratory distress.      Breath sounds: Normal breath sounds. No wheezing or rales.   Chest:      Chest wall: No tenderness.   Abdominal:      General: Abdomen is flat. Bowel sounds are normal. There is no distension.      Palpations: There is no mass.      Tenderness: There is no abdominal tenderness. There is no guarding.   Musculoskeletal:         General: No tenderness. Normal range of motion.      Cervical back: Normal range of motion and neck supple. No tenderness.       Right lower leg: No edema.      Left lower leg: No edema.   Skin:     General: Skin is dry.      Coloration: Skin is not jaundiced.      Findings: No bruising, erythema or rash.   Neurological:      General: No focal deficit present.      Mental Status: She is alert and oriented to person, place, and time. Mental status is at baseline.      Cranial Nerves: No cranial nerve deficit.      Sensory: No sensory deficit.      Coordination: Coordination normal.      Gait: Gait normal.   Psychiatric:         Mood and Affect: Mood normal.         Thought Content: Thought content normal.         Judgment: Judgment normal.         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    Relevant Orders    Follow Up In [facility] - PCP - Established    Mixed hyperlipidemia E78.2    Relevant Orders    Follow Up [facility] - PCP - Established    Hyperthyroidism E05.90    Relevant Orders    Follow Up [facility] - PCP - Established    Vitamin D deficiency E55.9    Relevant Orders    Follow Up [facility] - PCP - Established    Reactive airway disease without complication ([facility]) J45.909    Relevant Orders    Follow Up [facility] - PCP - Established    Primary hypertension I10    Relevant Orders    Follow Up [facility] - PCP - Established    Gastroesophageal reflux disease K21.9    Relevant Orders    Follow Up [facility] - PCP - Established    Follow Up [facility] - PCP - Established    Fatigue R53.83    Relevant Orders    Follow Up [facility] - PCP - Established    CBC and Auto Differential    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    Relevant Orders    Follow Up [facility] - PCP - Established    Comprehensive Metabolic Panel    Coronary artery 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  I25.10    Relevant Orders    Follow Up [facility] - PCP - Established    CKD stage G4/A1, 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) N18.4    Relevant Orders    Follow Up [facility] - PCP - Established    History of cholecystectomy Z90.49    Relevant Orders    Follow Up [facility] - PCP - Established    Acute gastroenteritis K52.9    Relevant Medications    ciprofloxacin (Cipro) 250 mg tablet    Other Relevant Orders    Follow Up [facility] - PCP - Established     Other Visit Diagnoses         Codes    Muscle pain     M79.10    Relevant Orders    Follow Up [facility] - PCP - Established    Nausea     R11.0    Relevant Orders    Follow Up [facility] - PCP - Established    Muscle spasms of lower extremity, unspecified laterality     M62.838    Relevant Orders    Follow Up [facility] - PCP - Established    Routine general medical examination at health care facility     Z00.00    Relevant Orders    Follow Up [facility] - PCP - Established    Flu vaccine need     Z23    Relevant Orders    Follow Up [facility] - PCP - Established          Do knee raises and ice area.     continue farxiga 10 mg daily for CKD  Pharmacy referral done     Metamucil every day     BW prior       prevnar next       & RSV now pharmacy     Drink Gatorade with calories     Start Cipro  250 mg BID     Last surgery was 2/23/24.  Off work since 12/23 and RTW 5/1/24 per Dr Johnson cholecystectomy   No strenuous activity or heavy lifting until 6 weeks post-op      Scribe Attestation  By signing my name below, I, BEST Langley , Scribe   attest that this documentation has been prepared under the direction and in the presence of Ace Dhillon MD.     All medical record entries made by the Scribe were at my direction and personally dictated by me. I have reviewed the chart and agree that the record accurately  reflects my personal performance of the history, physical exam, discussion and plan.

## 2024-12-20 RX ORDER — PANTOPRAZOLE SODIUM 40 MG/1
40 TABLET, DELAYED RELEASE ORAL DAILY
Qty: 90 TABLET | Refills: 0 | Status: SHIPPED | OUTPATIENT
Start: 2024-12-20

## 2025-01-04 DIAGNOSIS — E78.2 MIXED HYPERLIPIDEMIA: ICD-10-CM

## 2025-01-04 RX ORDER — ATORVASTATIN CALCIUM 20 MG/1
20 TABLET, FILM COATED ORAL DAILY
Qty: 90 TABLET | Refills: 0 | Status: SHIPPED | OUTPATIENT
Start: 2025-01-04

## 2025-01-04 NOTE — TELEPHONE ENCOUNTER
Recent Visits  Date Type Provider Dept   12/19/24 Office Visit Ace Dhillon MD [facility]   11/12/24 Office Visit Ace Dhillon MD [facility]   10/07/24 Office Visit Ace Dhillon MD [facility]   08/06/24 Office Visit Ace Dhillon MD [facility]   Showing recent visits within past 180 days and meeting all other requirements  Future Appointments  Date Type Provider Dept   01/29/25 Appointment Ace Dhillon MD [facility]   Showing future appointments within next 90 days and meeting all other requirements

## 2025-01-10 ENCOUNTER — PATIENT OUTREACH (OUTPATIENT)
Dept: PRIMARY CARE | Facility: CLINIC | Age: 89
End: 2025-01-10
Payer: COMMERCIAL

## 2025-01-10 DIAGNOSIS — N18.32 STAGE 3B CHRONIC KIDNEY DISEASE (MULTI): ICD-10-CM

## 2025-01-10 DIAGNOSIS — I10 PRIMARY HYPERTENSION: ICD-10-CM

## 2025-01-10 NOTE — PROGRESS NOTES
Phone call for monthly RN CM outreach.  Chart reviewed prior to call.    Reviewed recent appointments with Dr. Blanchard and Dr. Dhillon. Pt says she is well recovered from her gastroenteritis. Denies complaints. BP was well controlled at both visits.     Dr. Blanchard expressed concern to pt about her renal function. Pt does not see a nephrologist but has in the past.     Pt was able to travel to [place] over the holidays, visited with family. Pt having no trouble with obtaining food and medications, using good judgement about going out in the snow, colder weather. Pt denies falls.    Talked about diet, incorporating more vegetables, fruits, whole grains into diet and being aware of sodium.     01/29/25 PCP with Dr. Dhillon  12/16/25 Cardiology with Dr. Blanchard    Pt wishes to continue with CCM at this time. This RN CM ensured pt has direct contact number and encouraged to call with any questions or concerns.     [id number]

## 2025-01-29 ENCOUNTER — APPOINTMENT (OUTPATIENT)
Dept: PRIMARY CARE | Facility: CLINIC | Age: 89
End: 2025-01-29
Payer: COMMERCIAL

## 2025-01-29 VITALS
WEIGHT: 135.4 LBS | HEIGHT: 63 IN | SYSTOLIC BLOOD PRESSURE: 114 MMHG | HEART RATE: 65 BPM | OXYGEN SATURATION: 95 % | TEMPERATURE: 98.2 F | DIASTOLIC BLOOD PRESSURE: 54 MMHG | RESPIRATION RATE: 16 BRPM | BODY MASS INDEX: 23.99 KG/M2

## 2025-01-29 DIAGNOSIS — N18.4 CKD STAGE G4/A1, GFR 15-29 AND ALBUMIN CREATININE RATIO <30 MG/G (MULTI): ICD-10-CM

## 2025-01-29 DIAGNOSIS — I10 PRIMARY HYPERTENSION: ICD-10-CM

## 2025-01-29 DIAGNOSIS — R11.0 NAUSEA: ICD-10-CM

## 2025-01-29 DIAGNOSIS — I25.10 CORONARY ARTERY DISEASE INVOLVING NATIVE CORONARY ARTERY OF NATIVE HEART WITHOUT ANGINA PECTORIS: ICD-10-CM

## 2025-01-29 DIAGNOSIS — Z00.00 ROUTINE GENERAL MEDICAL EXAMINATION AT HEALTH CARE FACILITY: ICD-10-CM

## 2025-01-29 DIAGNOSIS — E78.2 MIXED HYPERLIPIDEMIA: ICD-10-CM

## 2025-01-29 DIAGNOSIS — M62.838 MUSCLE SPASMS OF LOWER EXTREMITY, UNSPECIFIED LATERALITY: ICD-10-CM

## 2025-01-29 DIAGNOSIS — E55.9 VITAMIN D DEFICIENCY: ICD-10-CM

## 2025-01-29 DIAGNOSIS — E05.90 HYPERTHYROIDISM: ICD-10-CM

## 2025-01-29 DIAGNOSIS — J45.20 MILD INTERMITTENT REACTIVE AIRWAY DISEASE WITHOUT COMPLICATION (HHS-HCC): ICD-10-CM

## 2025-01-29 DIAGNOSIS — R53.83 FATIGUE, UNSPECIFIED TYPE: ICD-10-CM

## 2025-01-29 DIAGNOSIS — K52.9 ACUTE GASTROENTERITIS: ICD-10-CM

## 2025-01-29 DIAGNOSIS — K21.9 GASTROESOPHAGEAL REFLUX DISEASE WITHOUT ESOPHAGITIS: ICD-10-CM

## 2025-01-29 DIAGNOSIS — H52.03 HYPEROPIA WITH PRESBYOPIA OF BOTH EYES: ICD-10-CM

## 2025-01-29 DIAGNOSIS — N18.32 STAGE 3B CHRONIC KIDNEY DISEASE (MULTI): ICD-10-CM

## 2025-01-29 DIAGNOSIS — H52.4 HYPEROPIA WITH PRESBYOPIA OF BOTH EYES: ICD-10-CM

## 2025-01-29 DIAGNOSIS — M79.10 MUSCLE PAIN: ICD-10-CM

## 2025-01-29 DIAGNOSIS — Z23 FLU VACCINE NEED: ICD-10-CM

## 2025-01-29 DIAGNOSIS — I50.32 CHRONIC DIASTOLIC (CONGESTIVE) HEART FAILURE: ICD-10-CM

## 2025-01-29 DIAGNOSIS — Z90.49 HISTORY OF CHOLECYSTECTOMY: ICD-10-CM

## 2025-01-29 DIAGNOSIS — K21.9 GASTROESOPHAGEAL REFLUX DISEASE, UNSPECIFIED WHETHER ESOPHAGITIS PRESENT: ICD-10-CM

## 2025-01-29 PROCEDURE — G2211 COMPLEX E/M VISIT ADD ON: HCPCS | Performed by: FAMILY MEDICINE

## 2025-01-29 PROCEDURE — 90677 PCV20 VACCINE IM: CPT | Performed by: FAMILY MEDICINE

## 2025-01-29 PROCEDURE — G0009 ADMIN PNEUMOCOCCAL VACCINE: HCPCS | Performed by: FAMILY MEDICINE

## 2025-01-29 PROCEDURE — 99214 OFFICE O/P EST MOD 30 MIN: CPT | Performed by: FAMILY MEDICINE

## 2025-01-29 RX ORDER — AMLODIPINE BESYLATE 10 MG/1
10 TABLET ORAL DAILY
Qty: 30 TABLET | Refills: 0 | Status: SHIPPED | OUTPATIENT
Start: 2025-01-29

## 2025-01-29 RX ORDER — METHIMAZOLE 5 MG/1
5 TABLET ORAL DAILY
Qty: 90 TABLET | Refills: 0 | Status: SHIPPED | OUTPATIENT
Start: 2025-01-29

## 2025-01-29 RX ORDER — ACEBUTOLOL HYDROCHLORIDE 200 MG/1
200 CAPSULE ORAL 2 TIMES DAILY
Qty: 180 CAPSULE | Refills: 1 | Status: SHIPPED | OUTPATIENT
Start: 2025-01-29

## 2025-01-29 RX ORDER — FAMOTIDINE 20 MG/1
20 TABLET, FILM COATED ORAL NIGHTLY
Qty: 90 TABLET | Refills: 0 | Status: SHIPPED | OUTPATIENT
Start: 2025-01-29

## 2025-01-29 RX ORDER — ATORVASTATIN CALCIUM 20 MG/1
20 TABLET, FILM COATED ORAL DAILY
Qty: 90 TABLET | Refills: 0 | Status: SHIPPED | OUTPATIENT
Start: 2025-01-29

## 2025-01-29 RX ORDER — PANTOPRAZOLE SODIUM 40 MG/1
40 TABLET, DELAYED RELEASE ORAL DAILY
Qty: 90 TABLET | Refills: 0 | Status: SHIPPED | OUTPATIENT
Start: 2025-01-29

## 2025-01-29 ASSESSMENT — ENCOUNTER SYMPTOMS
RECTAL PAIN: 0
ACTIVITY CHANGE: 0
EYE DISCHARGE: 0
FLANK PAIN: 0
SLEEP DISTURBANCE: 0
NAUSEA: 0
DIFFICULTY URINATING: 0
DIAPHORESIS: 0
APPETITE CHANGE: 0
WOUND: 0
NECK STIFFNESS: 0
FACIAL SWELLING: 0
EYE REDNESS: 0
BACK PAIN: 0
POLYPHAGIA: 0
GASTROINTESTINAL NEGATIVE: 1
CONSTITUTIONAL NEGATIVE: 1
SINUS PRESSURE: 0
HALLUCINATIONS: 0
ANAL BLEEDING: 0
DYSPHORIC MOOD: 0
NUMBNESS: 0
FEVER: 0
APNEA: 0
PHOTOPHOBIA: 0
LOSS OF SENSATION IN FEET: 0
STRIDOR: 0
WEAKNESS: 0
AGITATION: 0
RHINORRHEA: 0
ARTHRALGIAS: 0
HEADACHES: 0
VOMITING: 0
SINUS PAIN: 0
NERVOUS/ANXIOUS: 0
EYE ITCHING: 0
CARDIOVASCULAR NEGATIVE: 1
DECREASED CONCENTRATION: 0
DEPRESSION: 0
TREMORS: 0
NECK PAIN: 0
CHILLS: 0
ADENOPATHY: 0
POLYDIPSIA: 0
ABDOMINAL PAIN: 0
WHEEZING: 0
HEMATURIA: 0
FREQUENCY: 0
UNEXPECTED WEIGHT CHANGE: 0
HYPERACTIVE: 0
OCCASIONAL FEELINGS OF UNSTEADINESS: 0
DIARRHEA: 0
ABDOMINAL DISTENTION: 0
CONFUSION: 0
EYE PAIN: 0
CONSTIPATION: 0
CHOKING: 0
MYALGIAS: 0
BRUISES/BLEEDS EASILY: 0
LIGHT-HEADEDNESS: 0
JOINT SWELLING: 0
SHORTNESS OF BREATH: 0
VOICE CHANGE: 0
BLOOD IN STOOL: 0
COUGH: 0
SPEECH DIFFICULTY: 0
TROUBLE SWALLOWING: 0
CHEST TIGHTNESS: 0
COLOR CHANGE: 0
DIZZINESS: 0
SEIZURES: 0
SORE THROAT: 0
DYSURIA: 0
FATIGUE: 0
PALPITATIONS: 0
FACIAL ASYMMETRY: 0

## 2025-01-29 ASSESSMENT — PATIENT HEALTH QUESTIONNAIRE - PHQ9
2. FEELING DOWN, DEPRESSED OR HOPELESS: NOT AT ALL
1. LITTLE INTEREST OR PLEASURE IN DOING THINGS: NOT AT ALL
SUM OF ALL RESPONSES TO PHQ9 QUESTIONS 1 AND 2: 0

## 2025-01-29 NOTE — PROGRESS NOTES
Subjective   Patient ID: Maggy Gore is a 89 y.o. female who presents for Med Refill.    HPI   Patient is at the office today to get medications refills.    Patient reports other concerns today.  Review of Systems   Constitutional: Negative.  Negative for activity change, appetite change, chills, diaphoresis, fatigue, fever and unexpected weight change.   HENT: Negative.  Negative for congestion, dental problem, ear discharge, facial swelling, hearing loss, mouth sores, nosebleeds, postnasal drip, rhinorrhea, sinus pressure, sinus pain, sneezing, sore throat, tinnitus, trouble swallowing and voice change.    Eyes:  Negative for photophobia, pain, discharge, redness, itching and visual disturbance.   Respiratory:  Negative for apnea, cough, choking, chest tightness, shortness of breath, wheezing and stridor.    Cardiovascular: Negative.  Negative for chest pain, palpitations and leg swelling.   Gastrointestinal: Negative.  Negative for abdominal distention, abdominal pain, anal bleeding, blood in stool, constipation, diarrhea, nausea, rectal pain and vomiting.   Endocrine: Negative for cold intolerance, heat intolerance, polydipsia, polyphagia and polyuria.   Genitourinary:  Negative for decreased urine volume, difficulty urinating, dysuria, enuresis, flank pain, frequency, hematuria and urgency.   Musculoskeletal:  Negative for arthralgias, back pain, gait problem, joint swelling, myalgias, neck pain and neck stiffness.   Skin:  Negative for color change, pallor, rash and wound.   Allergic/Immunologic: Negative for environmental allergies, food allergies and immunocompromised state.   Neurological:  Negative for dizziness, tremors, seizures, syncope, facial asymmetry, speech difficulty, weakness, light-headedness, numbness and headaches.   Hematological:  Negative for adenopathy. Does not bruise/bleed easily.   Psychiatric/Behavioral:  Negative for agitation, behavioral problems, confusion, decreased  "concentration, dysphoric mood, hallucinations, self-injury, sleep disturbance and suicidal ideas. The patient is not nervous/anxious and is not hyperactive.    All other systems reviewed and are negative.      Objective   /54 (BP Location: Left arm, Patient Position: Sitting, BP Cuff Size: Adult)   Pulse 65   Temp 36.8 °C (98.2 °F) (Temporal)   Resp 16   Ht 1.6 m (5' 3\")   Wt 61.4 kg (135 lb 6.4 oz)   SpO2 95%   BMI 23.99 kg/m²     Physical Exam  Vitals reviewed.   Constitutional:       General: She is not in acute distress.     Appearance: Normal appearance. She is normal weight. She is not ill-appearing or diaphoretic.   HENT:      Head: Normocephalic.      Right Ear: Tympanic membrane and external ear normal.      Left Ear: Tympanic membrane and external ear normal.      Nose: Nose normal. No congestion.      Mouth/Throat:      Pharynx: No posterior oropharyngeal erythema.   Eyes:      General:         Right eye: No discharge.         Left eye: No discharge.      Extraocular Movements: Extraocular movements intact.      Conjunctiva/sclera: Conjunctivae normal.      Pupils: Pupils are equal, round, and reactive to light.   Cardiovascular:      Rate and Rhythm: Normal rate and regular rhythm.      Pulses: Normal pulses.      Heart sounds: Normal heart sounds. No murmur heard.  Pulmonary:      Effort: Pulmonary effort is normal. No respiratory distress.      Breath sounds: Normal breath sounds. No wheezing or rales.   Chest:      Chest wall: No tenderness.   Abdominal:      General: Abdomen is flat. Bowel sounds are normal. There is no distension.      Palpations: There is no mass.      Tenderness: There is no abdominal tenderness. There is no guarding.   Musculoskeletal:         General: No tenderness. Normal range of motion.      Cervical back: Normal range of motion and neck supple. No tenderness.      Right lower leg: No edema.      Left lower leg: No edema.   Skin:     General: Skin is dry.      " Coloration: Skin is not jaundiced.      Findings: No bruising, erythema or rash.   Neurological:      General: No focal deficit present.      Mental Status: She is alert and oriented to person, place, and time. Mental status is at baseline.      Cranial Nerves: No cranial nerve deficit.      Sensory: No sensory deficit.      Coordination: Coordination normal.      Gait: Gait normal.   Psychiatric:         Mood and Affect: Mood normal.         Thought Content: Thought content normal.         Judgment: Judgment normal.     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    Relevant Orders    Follow Up [facility] - PCP - Established    Mixed hyperlipidemia E78.2    Relevant Medications    atorvastatin (Lipitor) 20 mg tablet    Other Relevant Orders    Follow Up [facility] - PCP - Established    Lipid Panel    Hyperthyroidism E05.90    Relevant Medications    methIMAzole (Tapazole) 5 mg tablet    Other Relevant Orders    Follow [facility] - PCP - Established    Thyroid Stimulating Hormone    Free T4 Index    Vitamin D deficiency E55.9    Relevant Orders    Follow [facility] - PCP - Established    Vitamin D 25-Hydroxy,Total (for eval of Vitamin D levels)    Reactive airway disease without complication ([facility]) J45.909    Relevant Orders    Follow Up [facility] - PCP - Established    Primary hypertension I10    Relevant Medications    amLODIPine (Norvasc) 10 mg tablet    acebutolol (Sectral) 200 mg capsule    amLODIPine (Norvasc) 10 mg tablet    Other Relevant Orders    Follow Up [facility] - PCP - Established    Gastroesophageal reflux disease K21.9    Relevant Medications    famotidine (Pepcid) 20 mg tablet    pantoprazole (ProtoNix) 40 mg EC tablet    Other Relevant Orders    Follow Up [facility] - PCP - Established    Fatigue  R53.83    Relevant Orders    Follow Up [facility] - PCP - Established    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32    Relevant Medications    amLODIPine (Norvasc) 10 mg tablet    acebutolol (Sectral) 200 mg capsule    amLODIPine (Norvasc) 10 mg tablet    History of cholecystectomy Z90.49    Acute gastroenteritis K52.9    Relevant Orders    Follow Up [facility] - PCP - Established     Other Visit Diagnoses         Codes    Muscle pain     M79.10    Nausea     R11.0    Muscle spasms of lower extremity, unspecified laterality     M62.838    Relevant Orders    Follow Up [facility] - PCP - Established    Routine general medical examination at health care facility     Z00.00    Relevant Orders    Follow Up [facility] - PCP - Established    Flu vaccine need     Z23    Relevant Orders    Follow Up [facility] - PCP - Established        Scribe Attestation  By signing my name below, I, Colt Dewey MA   attest that this documentation has been prepared under the direction and in the presence of Ace Dhillon MD.    Provider Attestation - Scribe documentation    All medical record entries made by the Scribe were at my direction and personally dictated by me. I have reviewed the chart and agree that the record accurately reflects my personal performance  of the history, physical exam, discussion and plan.    Continue current medications and therapy for chronic medical conditions    Do knee raises and ice area.     continue farxiga 10 mg daily for CKD  Pharmacy referral done     Metamucil every day     BW prior       prevnar today       & [facility]     Drink Gatorade with calories       Cipro  250 mg BID done       Sx resolved     Last surgery was 2/23/24.  Off work since 12/23 and RTW 5/1/24 per Dr Johnson cholecystectomy   No strenuous activity or heavy lifting until 6 weeks post-op

## 2025-02-06 ENCOUNTER — PATIENT OUTREACH (OUTPATIENT)
Dept: PRIMARY CARE | Facility: CLINIC | Age: 89
End: 2025-02-06
Payer: COMMERCIAL

## 2025-02-06 DIAGNOSIS — I10 PRIMARY HYPERTENSION: ICD-10-CM

## 2025-02-06 DIAGNOSIS — K21.9 GASTROESOPHAGEAL REFLUX DISEASE, UNSPECIFIED WHETHER ESOPHAGITIS PRESENT: ICD-10-CM

## 2025-02-06 NOTE — PROGRESS NOTES
Phone call to patient for monthly RN CM outreach. No answer, left message requesting call back and provided direct contact number.

## 2025-02-10 DIAGNOSIS — E55.9 VITAMIN D DEFICIENCY: ICD-10-CM

## 2025-02-10 NOTE — TELEPHONE ENCOUNTER
Recent Visits  Date Type Provider Dept   01/29/25 Office Visit Ace Dhillon MD [facility]   12/19/24 Office Visit Ace Dhillon MD [facility]   11/12/24 Office Visit Ace Dhillon MD [facility]   10/07/24 Office Visit Ace Dhillon MD [facility]   Showing recent visits within past 180 days and meeting all other requirements  Future Appointments  Date Type Provider Dept   02/28/25 Appointment Ace Dhillon MD [facility]   Showing future appointments within next 90 days and meeting all other requirements

## 2025-02-11 RX ORDER — CHOLECALCIFEROL (VITAMIN D3) 50 MCG
2000 TABLET ORAL DAILY
Qty: 90 TABLET | Refills: 0 | Status: SHIPPED | OUTPATIENT
Start: 2025-02-11

## 2025-02-20 DIAGNOSIS — K52.9 ACUTE GASTROENTERITIS: ICD-10-CM

## 2025-02-21 RX ORDER — CIPROFLOXACIN 250 MG/1
250 TABLET, FILM COATED ORAL 2 TIMES DAILY
Qty: 14 TABLET | Refills: 0 | Status: SHIPPED | OUTPATIENT
Start: 2025-02-21 | End: 2025-02-28

## 2025-02-26 ENCOUNTER — PATIENT OUTREACH (OUTPATIENT)
Dept: PRIMARY CARE | Facility: CLINIC | Age: 89
End: 2025-02-26
Payer: MEDICARE

## 2025-02-26 DIAGNOSIS — N18.32 STAGE 3B CHRONIC KIDNEY DISEASE (MULTI): ICD-10-CM

## 2025-02-26 DIAGNOSIS — E78.2 MIXED HYPERLIPIDEMIA: ICD-10-CM

## 2025-02-26 DIAGNOSIS — I10 PRIMARY HYPERTENSION: ICD-10-CM

## 2025-02-26 DIAGNOSIS — R11.0 NAUSEA: ICD-10-CM

## 2025-02-26 LAB
25(OH)D3+25(OH)D2 SERPL-MCNC: 66 NG/ML (ref 30–100)
CHOLEST SERPL-MCNC: 169 MG/DL
CHOLEST/HDLC SERPL: 2.5 (CALC)
FT4I SERPL CALC-MCNC: 2.2 (ref 1.4–3.8)
HDLC SERPL-MCNC: 67 MG/DL
LDLC SERPL CALC-MCNC: 83 MG/DL (CALC)
NONHDLC SERPL-MCNC: 102 MG/DL (CALC)
T3RU NFR SERPL: 28 % (ref 22–35)
T4 SERPL-MCNC: 8 MCG/DL (ref 5.1–11.9)
TRIGL SERPL-MCNC: 101 MG/DL
TSH SERPL-ACNC: 2.34 MIU/L (ref 0.4–4.5)

## 2025-02-26 PROCEDURE — 99490 CHRNC CARE MGMT STAFF 1ST 20: CPT | Performed by: FAMILY MEDICINE

## 2025-02-26 NOTE — PROGRESS NOTES
Phone call for monthly RN CM outreach.  Chart reviewed prior to call.    Pt not feeling well at time of call. Pt c/o N/V since yesterday evening. Pt denies diarrhea and fever. Pt not taking in food but says she is drinking water and keeping it down. Pt suspects she ate something bad yesterday at dinner. Pt encouraged to continue drinking as much as she can tolerate to stay hydrated, then introduce bland food as able. Pt has existing appointment with Dr. Dhillon on 02/28/25 in the morning, can advise if symptoms haven't improved.    Pt was happy to talk about recent trip to [place] to visit family. Pt in good spirits despite not feeling well.    Pt says she will start her last bottle of Farxiga then will need it filled. This RN confirmed pt does have 1 refill left, encouraged her to call to process the order. Pt denies any other medication needs at this time.     Pt with labs drawn 02/25/25, will review with Dr. Dhillon at her visit.    02/28/25 PCP with Dr. Dhillon  12/16/25 Cardiology with Dr. Blanchard    Pt wishes to continue with [facility] at this time. This RN CM ensured pt has direct contact number and encouraged to call with any questions or concerns.

## 2025-02-27 DIAGNOSIS — M79.10 MUSCLE PAIN: ICD-10-CM

## 2025-02-28 ENCOUNTER — APPOINTMENT (OUTPATIENT)
Dept: PRIMARY CARE | Facility: CLINIC | Age: 89
End: 2025-02-28
Payer: COMMERCIAL

## 2025-02-28 VITALS
HEIGHT: 63 IN | TEMPERATURE: 97.3 F | HEART RATE: 61 BPM | SYSTOLIC BLOOD PRESSURE: 108 MMHG | WEIGHT: 133.8 LBS | BODY MASS INDEX: 23.71 KG/M2 | RESPIRATION RATE: 16 BRPM | OXYGEN SATURATION: 97 % | DIASTOLIC BLOOD PRESSURE: 56 MMHG

## 2025-02-28 DIAGNOSIS — Z23 FLU VACCINE NEED: ICD-10-CM

## 2025-02-28 DIAGNOSIS — H52.03 HYPEROPIA WITH PRESBYOPIA OF BOTH EYES: ICD-10-CM

## 2025-02-28 DIAGNOSIS — K21.9 GASTROESOPHAGEAL REFLUX DISEASE WITHOUT ESOPHAGITIS: ICD-10-CM

## 2025-02-28 DIAGNOSIS — K52.9 ACUTE GASTROENTERITIS: ICD-10-CM

## 2025-02-28 DIAGNOSIS — R53.83 FATIGUE, UNSPECIFIED TYPE: ICD-10-CM

## 2025-02-28 DIAGNOSIS — E55.9 VITAMIN D DEFICIENCY: ICD-10-CM

## 2025-02-28 DIAGNOSIS — H52.4 HYPEROPIA WITH PRESBYOPIA OF BOTH EYES: ICD-10-CM

## 2025-02-28 DIAGNOSIS — N18.4 CKD STAGE G4/A1, GFR 15-29 AND ALBUMIN CREATININE RATIO <30 MG/G (MULTI): ICD-10-CM

## 2025-02-28 DIAGNOSIS — E78.2 MIXED HYPERLIPIDEMIA: ICD-10-CM

## 2025-02-28 DIAGNOSIS — N18.32 STAGE 3B CHRONIC KIDNEY DISEASE (MULTI): ICD-10-CM

## 2025-02-28 DIAGNOSIS — J45.20 MILD INTERMITTENT REACTIVE AIRWAY DISEASE WITHOUT COMPLICATION (HHS-HCC): ICD-10-CM

## 2025-02-28 DIAGNOSIS — I25.10 CORONARY ARTERY DISEASE INVOLVING NATIVE CORONARY ARTERY OF NATIVE HEART WITHOUT ANGINA PECTORIS: ICD-10-CM

## 2025-02-28 DIAGNOSIS — Z00.00 ROUTINE GENERAL MEDICAL EXAMINATION AT HEALTH CARE FACILITY: ICD-10-CM

## 2025-02-28 DIAGNOSIS — M79.10 MUSCLE PAIN: ICD-10-CM

## 2025-02-28 DIAGNOSIS — M62.838 MUSCLE SPASMS OF LOWER EXTREMITY, UNSPECIFIED LATERALITY: ICD-10-CM

## 2025-02-28 DIAGNOSIS — I10 PRIMARY HYPERTENSION: ICD-10-CM

## 2025-02-28 DIAGNOSIS — E05.90 HYPERTHYROIDISM: ICD-10-CM

## 2025-02-28 PROCEDURE — RXMED WILLOW AMBULATORY MEDICATION CHARGE

## 2025-02-28 PROCEDURE — G2211 COMPLEX E/M VISIT ADD ON: HCPCS | Performed by: FAMILY MEDICINE

## 2025-02-28 PROCEDURE — 99214 OFFICE O/P EST MOD 30 MIN: CPT | Performed by: FAMILY MEDICINE

## 2025-02-28 RX ORDER — CYCLOBENZAPRINE HCL 10 MG
10 TABLET ORAL 2 TIMES DAILY PRN
Qty: 90 TABLET | Refills: 0 | Status: SHIPPED | OUTPATIENT
Start: 2025-02-28 | End: 2025-02-28 | Stop reason: SDUPTHER

## 2025-02-28 RX ORDER — AMLODIPINE BESYLATE 10 MG/1
10 TABLET ORAL DAILY
Qty: 30 TABLET | Refills: 0 | Status: CANCELLED | OUTPATIENT
Start: 2025-02-28

## 2025-02-28 RX ORDER — ATORVASTATIN CALCIUM 20 MG/1
20 TABLET, FILM COATED ORAL DAILY
Qty: 90 TABLET | Refills: 0 | Status: SHIPPED | OUTPATIENT
Start: 2025-02-28

## 2025-02-28 RX ORDER — CYCLOBENZAPRINE HCL 10 MG
10 TABLET ORAL 2 TIMES DAILY PRN
Qty: 90 TABLET | Refills: 0 | Status: SHIPPED | OUTPATIENT
Start: 2025-02-28

## 2025-02-28 RX ORDER — AMLODIPINE BESYLATE 5 MG/1
5 TABLET ORAL DAILY
Qty: 90 TABLET | Refills: 1 | Status: SHIPPED | OUTPATIENT
Start: 2025-02-28

## 2025-02-28 ASSESSMENT — ENCOUNTER SYMPTOMS
HEMATURIA: 0
EYE PAIN: 0
SPEECH DIFFICULTY: 0
ABDOMINAL DISTENTION: 0
ADENOPATHY: 0
POLYPHAGIA: 0
WOUND: 0
VOICE CHANGE: 0
CARDIOVASCULAR NEGATIVE: 1
LIGHT-HEADEDNESS: 0
NUMBNESS: 0
SORE THROAT: 0
POLYDIPSIA: 0
NERVOUS/ANXIOUS: 0
FATIGUE: 0
DYSPHORIC MOOD: 0
OCCASIONAL FEELINGS OF UNSTEADINESS: 0
LOSS OF SENSATION IN FEET: 0
NECK STIFFNESS: 0
CONSTITUTIONAL NEGATIVE: 1
DIFFICULTY URINATING: 0
COLOR CHANGE: 0
FACIAL SWELLING: 0
FREQUENCY: 0
DIARRHEA: 0
PHOTOPHOBIA: 0
SINUS PAIN: 0
BLOOD IN STOOL: 0
UNEXPECTED WEIGHT CHANGE: 0
DEPRESSION: 0
HEADACHES: 0
HALLUCINATIONS: 0
DIZZINESS: 0
WHEEZING: 0
MYALGIAS: 0
HYPERACTIVE: 0
ABDOMINAL PAIN: 0
VOMITING: 0
RHINORRHEA: 0
BRUISES/BLEEDS EASILY: 0
FEVER: 0
WEAKNESS: 0
SHORTNESS OF BREATH: 0
TREMORS: 0
ARTHRALGIAS: 0
GASTROINTESTINAL NEGATIVE: 1
SEIZURES: 0
JOINT SWELLING: 0
AGITATION: 0
PALPITATIONS: 0
DECREASED CONCENTRATION: 0
EYE DISCHARGE: 0
SINUS PRESSURE: 0
CONFUSION: 0
STRIDOR: 0
CHILLS: 0
APPETITE CHANGE: 0
FLANK PAIN: 0
NAUSEA: 0
EYE REDNESS: 0
CHEST TIGHTNESS: 0
COUGH: 0
TROUBLE SWALLOWING: 0
NECK PAIN: 0
RECTAL PAIN: 0
ACTIVITY CHANGE: 0
APNEA: 0
CHOKING: 0
ANAL BLEEDING: 0
CONSTIPATION: 0
SLEEP DISTURBANCE: 0
DIAPHORESIS: 0
BACK PAIN: 0
EYE ITCHING: 0
FACIAL ASYMMETRY: 0
DYSURIA: 0

## 2025-02-28 NOTE — PROGRESS NOTES
Subjective   Patient ID: Maggy Gore is a 89 y.o. female who presents for Results.    HPI   Patient is at the office today to discuss BW performed on 02/25/2025.    Patient reports that she is feeling out of balance and knee weakness lately. Onset of symptoms 1 week. Back pain reported as well.    Review of Systems   Constitutional: Negative.  Negative for activity change, appetite change, chills, diaphoresis, fatigue, fever and unexpected weight change.   HENT: Negative.  Negative for congestion, dental problem, ear discharge, facial swelling, hearing loss, mouth sores, nosebleeds, postnasal drip, rhinorrhea, sinus pressure, sinus pain, sneezing, sore throat, tinnitus, trouble swallowing and voice change.    Eyes:  Negative for photophobia, pain, discharge, redness, itching and visual disturbance.   Respiratory:  Negative for apnea, cough, choking, chest tightness, shortness of breath, wheezing and stridor.    Cardiovascular: Negative.  Negative for chest pain, palpitations and leg swelling.   Gastrointestinal: Negative.  Negative for abdominal distention, abdominal pain, anal bleeding, blood in stool, constipation, diarrhea, nausea, rectal pain and vomiting.   Endocrine: Negative for cold intolerance, heat intolerance, polydipsia, polyphagia and polyuria.   Genitourinary:  Negative for decreased urine volume, difficulty urinating, dysuria, enuresis, flank pain, frequency, hematuria and urgency.   Musculoskeletal:  Negative for arthralgias, back pain, gait problem, joint swelling, myalgias, neck pain and neck stiffness.   Skin:  Negative for color change, pallor, rash and wound.   Allergic/Immunologic: Negative for environmental allergies, food allergies and immunocompromised state.   Neurological:  Negative for dizziness, tremors, seizures, syncope, facial asymmetry, speech difficulty, weakness, light-headedness, numbness and headaches.   Hematological:  Negative for adenopathy. Does not bruise/bleed easily.  "  Psychiatric/Behavioral:  Negative for agitation, behavioral problems, confusion, decreased concentration, dysphoric mood, hallucinations, self-injury, sleep disturbance and suicidal ideas. The patient is not nervous/anxious and is not hyperactive.    All other systems reviewed and are negative.      Objective   /56 (BP Location: Left arm, Patient Position: Sitting, BP Cuff Size: Adult)   Pulse 61   Temp 36.3 °C (97.3 °F) (Temporal)   Resp 16   Ht 1.6 m (5' 3\")   Wt 60.7 kg (133 lb 12.8 oz)   SpO2 97%   BMI 23.70 kg/m²     Physical Exam  Vitals reviewed.   Constitutional:       General: She is not in acute distress.     Appearance: Normal appearance. She is normal weight. She is not ill-appearing or diaphoretic.   HENT:      Head: Normocephalic.      Right Ear: Tympanic membrane and external ear normal.      Left Ear: Tympanic membrane and external ear normal.      Nose: Nose normal. No congestion.      Mouth/Throat:      Pharynx: No posterior oropharyngeal erythema.   Eyes:      General:         Right eye: No discharge.         Left eye: No discharge.      Extraocular Movements: Extraocular movements intact.      Conjunctiva/sclera: Conjunctivae normal.      Pupils: Pupils are equal, round, and reactive to light.   Cardiovascular:      Rate and Rhythm: Normal rate and regular rhythm.      Pulses: Normal pulses.      Heart sounds: Normal heart sounds. No murmur heard.  Pulmonary:      Effort: Pulmonary effort is normal. No respiratory distress.      Breath sounds: Normal breath sounds. No wheezing or rales.   Chest:      Chest wall: No tenderness.   Abdominal:      General: Abdomen is flat. Bowel sounds are normal. There is no distension.      Palpations: There is no mass.      Tenderness: There is no abdominal tenderness. There is no guarding.   Musculoskeletal:         General: No tenderness. Normal range of motion.      Cervical back: Normal range of motion and neck supple. No tenderness.      Right " lower leg: No edema.      Left lower leg: No edema.   Skin:     General: Skin is dry.      Coloration: Skin is not jaundiced.      Findings: No bruising, erythema or rash.   Neurological:      General: No focal deficit present.      Mental Status: She is alert and oriented to person, place, and time. Mental status is at baseline.      Cranial Nerves: No cranial nerve deficit.      Sensory: No sensory deficit.      Coordination: Coordination normal.      Gait: Gait normal.   Psychiatric:         Mood and Affect: Mood normal.         Thought Content: Thought content normal.         Judgment: Judgment normal.         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    Relevant Orders    Follow Up In [facility] - PCP - Established    Mixed hyperlipidemia E78.2    Relevant Medications    atorvastatin (Lipitor) 20 mg tablet    Other Relevant Orders    Follow Up [facility] - PCP - Established    Hyperthyroidism E05.90    Relevant Orders    Follow Up [facility] - PCP - Established    Vitamin D deficiency E55.9    Relevant Orders    Follow Up [facility] - PCP - Established    Reactive airway disease without complication ([facility]) J45.909    Relevant Orders    Follow Up In [facility] - PCP - Established    Primary hypertension I10    Relevant Medications    amLODIPine (Norvasc) 5 mg tablet    Other Relevant Orders    Follow Up [facility] - PCP - Established    Gastroesophageal reflux disease K21.9    Relevant Orders    Follow Up [facility] - PCP - Established    Fatigue R53.83    Relevant Orders    Follow Up [facility] - PCP - Established    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    Relevant Orders    Follow Up [facility] - PCP - Established    Referral to Clinical Pharmacy    CBC and Auto Differential     Comprehensive Metabolic Panel    Coronary artery 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 I25.10    Relevant Medications    amLODIPine (Norvasc) 5 mg tablet    Other Relevant Orders    Follow Up In [facility] - PCP - Established    CKD stage G4/A1, 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) N18.4    Relevant Orders    Follow Up [facility] - PCP - Established    Acute gastroenteritis K52.9    Relevant Orders    Follow Up [facility] - PCP - Established     Other Visit Diagnoses         Codes    Muscle spasms of lower extremity, unspecified laterality     M62.838    Relevant Orders    Follow Up [facility] - PCP - Established    Routine general medical examination at health care facility     Z00.00    Relevant Orders    Follow Up [facility] - PCP - Established    Flu vaccine need     Z23    Relevant Orders    Follow Up [facility] - PCP - Established    Muscle pain     M79.10    Relevant Medications    cyclobenzaprine (Flexeril) 10 mg tablet    Other Relevant Orders    Follow Up In [facility] - PCP - Established        Scribe Attestation  By signing my name below, I, Mariah Sanchez MA, Viktoriaibshay   attest that this documentation has been prepared under the direction and in the presence of Ace Dhillon MD.    Provider Attestation - Scribe documentation    All medical record entries made by the Scribe were at my direction and personally dictated by me. I have reviewed the chart and agree that the record accurately reflects my personal performance of the history, physical exam, discussion and plan.    Continue current medications and therapy for chronic medical conditions    Do knee raises and ice area.     continue farxiga 10 mg daily for CKD  Pharmacy referral done     Metamucil every day     BW prior       prevnar today       & [facility]     Drink Gatorade with calories        Last surgery  was 2/23/24.  Off work since 12/23 and RTW 5/1/24 per Dr Johnson cholecystectomy   No strenuous activity or heavy lifting until 6 weeks post-op

## 2025-03-06 ENCOUNTER — PHARMACY VISIT (OUTPATIENT)
Dept: PHARMACY | Facility: CLINIC | Age: 89
End: 2025-03-06
Payer: COMMERCIAL

## 2025-03-11 LAB
ALBUMIN SERPL-MCNC: 4.2 G/DL (ref 3.6–5.1)
ALP SERPL-CCNC: 470 U/L (ref 37–153)
ALT SERPL-CCNC: 608 U/L (ref 6–29)
ANION GAP SERPL CALCULATED.4IONS-SCNC: 11 MMOL/L (CALC) (ref 7–17)
AST SERPL-CCNC: 1121 U/L (ref 10–35)
BASOPHILS # BLD AUTO: 42 CELLS/UL (ref 0–200)
BASOPHILS NFR BLD AUTO: 0.7 %
BILIRUB SERPL-MCNC: 0.7 MG/DL (ref 0.2–1.2)
BUN SERPL-MCNC: 57 MG/DL (ref 7–25)
CALCIUM SERPL-MCNC: 8.9 MG/DL (ref 8.6–10.4)
CHLORIDE SERPL-SCNC: 98 MMOL/L (ref 98–110)
CO2 SERPL-SCNC: 27 MMOL/L (ref 20–32)
CREAT SERPL-MCNC: 2.12 MG/DL (ref 0.6–0.95)
EGFRCR SERPLBLD CKD-EPI 2021: 22 ML/MIN/1.73M2
EOSINOPHIL # BLD AUTO: 222 CELLS/UL (ref 15–500)
EOSINOPHIL NFR BLD AUTO: 3.7 %
ERYTHROCYTE [DISTWIDTH] IN BLOOD BY AUTOMATED COUNT: 12.6 % (ref 11–15)
GLUCOSE SERPL-MCNC: 86 MG/DL (ref 65–99)
HCT VFR BLD AUTO: 37.3 % (ref 35–45)
HGB BLD-MCNC: 12.2 G/DL (ref 11.7–15.5)
LYMPHOCYTES # BLD AUTO: 1230 CELLS/UL (ref 850–3900)
LYMPHOCYTES NFR BLD AUTO: 20.5 %
MCH RBC QN AUTO: 30.2 PG (ref 27–33)
MCHC RBC AUTO-ENTMCNC: 32.7 G/DL (ref 32–36)
MCV RBC AUTO: 92.3 FL (ref 80–100)
MONOCYTES # BLD AUTO: 618 CELLS/UL (ref 200–950)
MONOCYTES NFR BLD AUTO: 10.3 %
NEUTROPHILS # BLD AUTO: 3888 CELLS/UL (ref 1500–7800)
NEUTROPHILS NFR BLD AUTO: 64.8 %
PLATELET # BLD AUTO: 392 THOUSAND/UL (ref 140–400)
PMV BLD REES-ECKER: 9.9 FL (ref 7.5–12.5)
POTASSIUM SERPL-SCNC: 4.7 MMOL/L (ref 3.5–5.3)
PROT SERPL-MCNC: 6.9 G/DL (ref 6.1–8.1)
RBC # BLD AUTO: 4.04 MILLION/UL (ref 3.8–5.1)
SODIUM SERPL-SCNC: 136 MMOL/L (ref 135–146)
WBC # BLD AUTO: 6 THOUSAND/UL (ref 3.8–10.8)

## 2025-03-14 ENCOUNTER — APPOINTMENT (OUTPATIENT)
Dept: PRIMARY CARE | Facility: CLINIC | Age: 89
End: 2025-03-14
Payer: MEDICARE

## 2025-03-17 ENCOUNTER — PATIENT OUTREACH (OUTPATIENT)
Dept: PRIMARY CARE | Facility: CLINIC | Age: 89
End: 2025-03-17
Payer: COMMERCIAL

## 2025-03-17 DIAGNOSIS — N18.32 STAGE 3B CHRONIC KIDNEY DISEASE (MULTI): ICD-10-CM

## 2025-03-17 DIAGNOSIS — I10 PRIMARY HYPERTENSION: ICD-10-CM

## 2025-03-17 DIAGNOSIS — R11.0 NAUSEA: ICD-10-CM

## 2025-03-17 DIAGNOSIS — K21.9 GASTROESOPHAGEAL REFLUX DISEASE WITHOUT ESOPHAGITIS: ICD-10-CM

## 2025-03-17 NOTE — PROGRESS NOTES
Phone call for monthly RN CM outreach.  Chart reviewed prior to call.  Verified pt identity with name and .    Pt says she still isn't feeling back to herself. She is still having intermittent nausea, taking her Zofran as needed. This is effective. Pt says she is getting plenty to eat but knows she isn't drinking enough. Pt is trying to be more intentional with her fluid intake.  Pt says she has a headache today and her head is stuffy. Pt has taken acetaminophen for this but it's not provided much relief. Pt feels it's allergies and plans to speak with Dr. Dhillon about it at her office visit tomorrow morning. Pt denies fever, vomiting and diarrhea.     Pt denies any swelling of the hands, legs, ankles or feet. Pt says her BP was lower at her 25 visit so Dr. Dhillon cut her Norvasc in half to 5 mg. Pt has been taking her medication as ordered.   BP Readings from Last 3 Encounters:   25 108/56   25 114/54   24 120/62     Pt expressed concern about her renal function declining. Labs to be discussed at tomorrows visit.     Pt is switching pain management physicians. She was previously seeing Dr. Olivarez but he is decreasing is volume. She will see Dr. Nova and establish care for her chronic back pain.     25 PCP with Dr. Dhillon  25 Pain Medicine with Dr. Nova (initial visit)    Pt wishes to continue with [facility] at this time. This RN CM ensured pt has direct contact number and encouraged to call with any questions or concerns.

## 2025-03-18 ENCOUNTER — APPOINTMENT (OUTPATIENT)
Dept: PRIMARY CARE | Facility: CLINIC | Age: 89
End: 2025-03-18
Payer: MEDICARE

## 2025-03-18 VITALS
TEMPERATURE: 97.1 F | WEIGHT: 134.8 LBS | OXYGEN SATURATION: 96 % | DIASTOLIC BLOOD PRESSURE: 58 MMHG | SYSTOLIC BLOOD PRESSURE: 114 MMHG | HEART RATE: 72 BPM | BODY MASS INDEX: 23.88 KG/M2

## 2025-03-18 DIAGNOSIS — E05.90 HYPERTHYROIDISM: ICD-10-CM

## 2025-03-18 DIAGNOSIS — E78.2 MIXED HYPERLIPIDEMIA: ICD-10-CM

## 2025-03-18 DIAGNOSIS — H52.4 HYPEROPIA WITH PRESBYOPIA OF BOTH EYES: ICD-10-CM

## 2025-03-18 DIAGNOSIS — J01.10 ACUTE NON-RECURRENT FRONTAL SINUSITIS: Primary | ICD-10-CM

## 2025-03-18 DIAGNOSIS — N18.32 STAGE 3B CHRONIC KIDNEY DISEASE (MULTI): ICD-10-CM

## 2025-03-18 DIAGNOSIS — E55.9 VITAMIN D DEFICIENCY: ICD-10-CM

## 2025-03-18 DIAGNOSIS — M62.838 MUSCLE SPASMS OF LOWER EXTREMITY, UNSPECIFIED LATERALITY: ICD-10-CM

## 2025-03-18 DIAGNOSIS — J45.20 MILD INTERMITTENT REACTIVE AIRWAY DISEASE WITHOUT COMPLICATION (HHS-HCC): ICD-10-CM

## 2025-03-18 DIAGNOSIS — Z23 FLU VACCINE NEED: ICD-10-CM

## 2025-03-18 DIAGNOSIS — N18.4 CKD STAGE G4/A1, GFR 15-29 AND ALBUMIN CREATININE RATIO <30 MG/G (MULTI): ICD-10-CM

## 2025-03-18 DIAGNOSIS — Z00.00 ROUTINE GENERAL MEDICAL EXAMINATION AT HEALTH CARE FACILITY: ICD-10-CM

## 2025-03-18 DIAGNOSIS — I25.10 CORONARY ARTERY DISEASE INVOLVING NATIVE CORONARY ARTERY OF NATIVE HEART WITHOUT ANGINA PECTORIS: ICD-10-CM

## 2025-03-18 DIAGNOSIS — H52.03 HYPEROPIA WITH PRESBYOPIA OF BOTH EYES: ICD-10-CM

## 2025-03-18 DIAGNOSIS — K21.9 GASTROESOPHAGEAL REFLUX DISEASE WITHOUT ESOPHAGITIS: ICD-10-CM

## 2025-03-18 DIAGNOSIS — I10 PRIMARY HYPERTENSION: ICD-10-CM

## 2025-03-18 DIAGNOSIS — R53.83 FATIGUE, UNSPECIFIED TYPE: ICD-10-CM

## 2025-03-18 DIAGNOSIS — M79.10 MUSCLE PAIN: ICD-10-CM

## 2025-03-18 DIAGNOSIS — K21.9 GASTROESOPHAGEAL REFLUX DISEASE, UNSPECIFIED WHETHER ESOPHAGITIS PRESENT: ICD-10-CM

## 2025-03-18 DIAGNOSIS — K52.9 ACUTE GASTROENTERITIS: ICD-10-CM

## 2025-03-18 PROCEDURE — G2211 COMPLEX E/M VISIT ADD ON: HCPCS | Performed by: FAMILY MEDICINE

## 2025-03-18 PROCEDURE — 99214 OFFICE O/P EST MOD 30 MIN: CPT | Performed by: FAMILY MEDICINE

## 2025-03-18 RX ORDER — ATORVASTATIN CALCIUM 20 MG/1
20 TABLET, FILM COATED ORAL DAILY
Qty: 90 TABLET | Refills: 1 | Status: CANCELLED | OUTPATIENT
Start: 2025-03-18

## 2025-03-18 RX ORDER — AMLODIPINE BESYLATE 2.5 MG/1
2.5 TABLET ORAL DAILY
Qty: 90 TABLET | Refills: 1 | Status: SHIPPED | OUTPATIENT
Start: 2025-03-18

## 2025-03-18 RX ORDER — FAMOTIDINE 20 MG/1
20 TABLET, FILM COATED ORAL NIGHTLY
Qty: 90 TABLET | Refills: 1 | Status: SHIPPED | OUTPATIENT
Start: 2025-03-18

## 2025-03-18 RX ORDER — CEFDINIR 300 MG/1
300 CAPSULE ORAL 2 TIMES DAILY
Qty: 20 CAPSULE | Refills: 0 | Status: SHIPPED | OUTPATIENT
Start: 2025-03-18 | End: 2025-03-28

## 2025-03-18 RX ORDER — PANTOPRAZOLE SODIUM 40 MG/1
40 TABLET, DELAYED RELEASE ORAL DAILY
Qty: 90 TABLET | Refills: 1 | Status: SHIPPED | OUTPATIENT
Start: 2025-03-18

## 2025-03-18 ASSESSMENT — ENCOUNTER SYMPTOMS
SPEECH DIFFICULTY: 0
NECK STIFFNESS: 0
HEMATURIA: 0
NERVOUS/ANXIOUS: 0
CONSTITUTIONAL NEGATIVE: 1
CARDIOVASCULAR NEGATIVE: 1
EYE DISCHARGE: 0
HYPERACTIVE: 0
DIZZINESS: 0
DYSURIA: 0
ANAL BLEEDING: 0
DIFFICULTY URINATING: 0
HALLUCINATIONS: 0
AGITATION: 0
FACIAL ASYMMETRY: 0
NUMBNESS: 0
UNEXPECTED WEIGHT CHANGE: 0
RECTAL PAIN: 0
EYE ITCHING: 0
BLOOD IN STOOL: 0
NECK PAIN: 0
ACTIVITY CHANGE: 0
JOINT SWELLING: 0
HYPERTENSION: 1
MYALGIAS: 0
DECREASED CONCENTRATION: 0
TREMORS: 0
TROUBLE SWALLOWING: 0
FATIGUE: 0
SINUS PAIN: 0
CONFUSION: 0
APPETITE CHANGE: 0
FLANK PAIN: 0
FEVER: 0
CHILLS: 0
ABDOMINAL PAIN: 0
SINUS PRESSURE: 0
CHEST TIGHTNESS: 0
PALPITATIONS: 0
DIARRHEA: 0
NAUSEA: 0
COLOR CHANGE: 0
HEADACHES: 0
COUGH: 0
EYE PAIN: 0
POLYPHAGIA: 0
DYSPHORIC MOOD: 0
CHOKING: 0
STRIDOR: 0
PHOTOPHOBIA: 0
WEAKNESS: 0
WHEEZING: 0
SORE THROAT: 0
FACIAL SWELLING: 0
DIAPHORESIS: 0
BACK PAIN: 0
GASTROINTESTINAL NEGATIVE: 1
CONSTIPATION: 0
APNEA: 0
RHINORRHEA: 0
SLEEP DISTURBANCE: 0
VOICE CHANGE: 0
VOMITING: 0
SHORTNESS OF BREATH: 0
BRUISES/BLEEDS EASILY: 0
FREQUENCY: 0
SEIZURES: 0
ADENOPATHY: 0
WOUND: 0
ABDOMINAL DISTENTION: 0
EYE REDNESS: 0
ARTHRALGIAS: 0
POLYDIPSIA: 0
LIGHT-HEADEDNESS: 0

## 2025-03-18 NOTE — PROGRESS NOTES
Subjective   Patient ID: Maggy Gore is a 89 y.o. female who presents for Hypertension.    Hypertension  This is a recurrent problem. The current episode started more than 1 year ago. The problem has been gradually improving since onset. The problem is controlled. Pertinent negatives include no chest pain, headaches, neck pain, palpitations or shortness of breath. There are no associated agents to hypertension. Past treatments include ACE inhibitors, beta blockers and diuretics. The current treatment provides significant improvement. There are no compliance problems.         Review of Systems   Constitutional: Negative.  Negative for activity change, appetite change, chills, diaphoresis, fatigue, fever and unexpected weight change.   HENT: Negative.  Negative for congestion, dental problem, ear discharge, facial swelling, hearing loss, mouth sores, nosebleeds, postnasal drip, rhinorrhea, sinus pressure, sinus pain, sneezing, sore throat, tinnitus, trouble swallowing and voice change.    Eyes:  Negative for photophobia, pain, discharge, redness, itching and visual disturbance.   Respiratory:  Negative for apnea, cough, choking, chest tightness, shortness of breath, wheezing and stridor.    Cardiovascular: Negative.  Negative for chest pain, palpitations and leg swelling.   Gastrointestinal: Negative.  Negative for abdominal distention, abdominal pain, anal bleeding, blood in stool, constipation, diarrhea, nausea, rectal pain and vomiting.   Endocrine: Negative for cold intolerance, heat intolerance, polydipsia, polyphagia and polyuria.   Genitourinary:  Negative for decreased urine volume, difficulty urinating, dysuria, enuresis, flank pain, frequency, hematuria and urgency.   Musculoskeletal:  Negative for arthralgias, back pain, gait problem, joint swelling, myalgias, neck pain and neck stiffness.   Skin:  Negative for color change, pallor, rash and wound.   Allergic/Immunologic: Negative for environmental  allergies, food allergies and immunocompromised state.   Neurological:  Negative for dizziness, tremors, seizures, syncope, facial asymmetry, speech difficulty, weakness, light-headedness, numbness and headaches.   Hematological:  Negative for adenopathy. Does not bruise/bleed easily.   Psychiatric/Behavioral:  Negative for agitation, behavioral problems, confusion, decreased concentration, dysphoric mood, hallucinations, self-injury, sleep disturbance and suicidal ideas. The patient is not nervous/anxious and is not hyperactive.    All other systems reviewed and are negative.      Objective   /58 (BP Location: Right arm, Patient Position: Sitting, BP Cuff Size: Adult)   Pulse 72   Temp 36.2 °C (97.1 °F) (Temporal)   Wt 61.1 kg (134 lb 12.8 oz)   SpO2 96%   BMI 23.88 kg/m²     Physical Exam  Vitals reviewed.   Constitutional:       General: She is not in acute distress.     Appearance: Normal appearance. She is normal weight. She is not ill-appearing or diaphoretic.   HENT:      Head: Normocephalic.      Right Ear: Tympanic membrane and external ear normal.      Left Ear: Tympanic membrane and external ear normal.      Nose: Nose normal. No congestion.      Mouth/Throat:      Pharynx: No posterior oropharyngeal erythema.   Eyes:      General:         Right eye: No discharge.         Left eye: No discharge.      Extraocular Movements: Extraocular movements intact.      Conjunctiva/sclera: Conjunctivae normal.      Pupils: Pupils are equal, round, and reactive to light.   Cardiovascular:      Rate and Rhythm: Normal rate and regular rhythm.      Pulses: Normal pulses.      Heart sounds: Normal heart sounds. No murmur heard.  Pulmonary:      Effort: Pulmonary effort is normal. No respiratory distress.      Breath sounds: Normal breath sounds. No wheezing or rales.   Chest:      Chest wall: No tenderness.   Abdominal:      General: Abdomen is flat. Bowel sounds are normal. There is no distension.       Palpations: There is no mass.      Tenderness: There is no abdominal tenderness. There is no guarding.   Musculoskeletal:         General: No tenderness. Normal range of motion.      Cervical back: Normal range of motion and neck supple. No tenderness.      Right lower leg: No edema.      Left lower leg: No edema.   Skin:     General: Skin is dry.      Coloration: Skin is not jaundiced.      Findings: No bruising, erythema or rash.   Neurological:      General: No focal deficit present.      Mental Status: She is alert and oriented to person, place, and time. Mental status is at baseline.      Cranial Nerves: No cranial nerve deficit.      Sensory: No sensory deficit.      Coordination: Coordination normal.      Gait: Gait normal.   Psychiatric:         Mood and Affect: Mood normal.         Thought Content: Thought content normal.         Judgment: Judgment normal.         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    Relevant Orders    Follow [facility] - PCP - Established    Mixed hyperlipidemia E78.2    Relevant Orders    [facility] - PCP - Established    Hyperthyroidism E05.90    Relevant Orders    [facility] - PCP - Established    Vitamin D deficiency E55.9    Relevant Orders    [facility] - PCP - Established    Reactive airway disease without complication ([facility]) J45.909    Relevant Orders    Follow [facility] - PCP - Established    Primary hypertension I10    Relevant Medications    amLODIPine (Norvasc) 2.5 mg tablet    Other Relevant Orders    Comprehensive Metabolic Panel    Follow [facility] - PCP - Established    Gastroesophageal reflux disease K21.9    Relevant Medications    famotidine (Pepcid) 20 mg tablet    pantoprazole (ProtoNix) 40 mg EC tablet    Other Relevant Orders    Follow Up [facility] - PCP - Established    Follow [facility] - PCP - Established    Fatigue R53.83    Relevant Orders    Follow [facility] - PCP - Established    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    Relevant Orders    CBC and Auto Differential    Comprehensive Metabolic Panel    [facility] - PCP - Established    Coronary artery 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 I25.10    Relevant Medications    amLODIPine (Norvasc) 2.5 mg tablet    Other Relevant Orders    Follow [facility] - PCP - Established    CKD stage G4/A1, 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) N18.4    Relevant Orders    [facility] - PCP - Established    Acute gastroenteritis K52.9    Relevant Orders    Follow [facility] - PCP - Established     Other Visit Diagnoses         Codes    Acute non-recurrent frontal sinusitis    -  Primary J01.10    Relevant Medications    cefdinir (Omnicef) 300 mg capsule    Other Relevant Orders    [facility] - PCP - Established    Muscle spasms of lower extremity, unspecified laterality     M62.838    Relevant Orders    Follow [facility] - PCP - Established    Routine general medical examination at health care facility     Z00.00    Relevant Orders    [facility] - PCP - Established    Flu vaccine need     Z23    Relevant Orders    [facility] - PCP - Established    Muscle pain     M79.10    Relevant Orders    Follow [facility] - PCP - Established        INC LFTs       Stop lipitor    Decr norvasc to 2.5 mg every day    Fu 2 weeks     CBC & CMP prior    Continue current medications and therapy for chronic medical conditions     Do knee raises and ice area.     continue farxiga 10 mg daily for CKD  Pharmacy referral done     Metamucil every day     BW  prior       prevnar today       & [facility]     Drink Gatorade with calories         Last surgery was 2/23/24.  Off work since 12/23 and RTW 5/1/24 per Dr Johnson cholecystectomy   No strenuous activity or heavy lifting until 6 weeks post-op

## 2025-03-28 ENCOUNTER — HOSPITAL ENCOUNTER (OUTPATIENT)
Dept: RADIOLOGY | Facility: HOSPITAL | Age: 89
Discharge: HOME | End: 2025-03-28
Payer: MEDICARE

## 2025-03-28 ENCOUNTER — OFFICE VISIT (OUTPATIENT)
Dept: PAIN MEDICINE | Facility: CLINIC | Age: 89
End: 2025-03-28
Payer: MEDICARE

## 2025-03-28 DIAGNOSIS — M48.062 LUMBAR STENOSIS WITH NEUROGENIC CLAUDICATION: Primary | ICD-10-CM

## 2025-03-28 DIAGNOSIS — M48.062 LUMBAR STENOSIS WITH NEUROGENIC CLAUDICATION: ICD-10-CM

## 2025-03-28 PROCEDURE — 1123F ACP DISCUSS/DSCN MKR DOCD: CPT | Performed by: PAIN MEDICINE

## 2025-03-28 PROCEDURE — 72100 X-RAY EXAM L-S SPINE 2/3 VWS: CPT

## 2025-03-28 PROCEDURE — G2211 COMPLEX E/M VISIT ADD ON: HCPCS | Performed by: PAIN MEDICINE

## 2025-03-28 PROCEDURE — 1159F MED LIST DOCD IN RCRD: CPT | Performed by: PAIN MEDICINE

## 2025-03-28 PROCEDURE — 99204 OFFICE O/P NEW MOD 45 MIN: CPT | Performed by: PAIN MEDICINE

## 2025-03-28 PROCEDURE — 99214 OFFICE O/P EST MOD 30 MIN: CPT | Performed by: PAIN MEDICINE

## 2025-03-28 RX ORDER — HYDROCODONE BITARTRATE AND ACETAMINOPHEN 5; 325 MG/1; MG/1
1 TABLET ORAL 3 TIMES DAILY PRN
Qty: 90 TABLET | Refills: 0 | Status: SHIPPED | OUTPATIENT
Start: 2025-04-01

## 2025-03-28 ASSESSMENT — PAIN SCALES - GENERAL: PAINLEVEL_OUTOF10: 10 - WORST POSSIBLE PAIN

## 2025-03-28 ASSESSMENT — PAIN DESCRIPTION - DESCRIPTORS: DESCRIPTORS: ACHING

## 2025-03-28 ASSESSMENT — PAIN - FUNCTIONAL ASSESSMENT: PAIN_FUNCTIONAL_ASSESSMENT: 0-10

## 2025-03-28 NOTE — PROGRESS NOTES
Subjective   Maggy Gore is a 89 y.o. female who presents for evaluation of her back pain.  The pain is located in the lumbar area.The pt was referred by Dr. Olivarez  to come and see Dr. Nova to take ovr her care.     Past Medical History:   Diagnosis Date    AAA (abdominal aortic aneurysm) without rupture     Aortic stenosis     CKD (chronic kidney disease)     Closed comminuted fracture of left patella with routine healing 07/31/2023    Colon polyp     GERD (gastroesophageal reflux disease)     Gout     HTN (hypertension)     Hyperlipidemia     Hyperthyroidism      Past Surgical History:   Procedure Laterality Date    APPENDECTOMY      HYSTERECTOMY      IR BILIARY DRAIN  12/26/2023    IR BILIARY DRAIN 12/26/2023 Ace Osorio MD [facility]       I have reviewed the nurses notes and am aware of family/social history.     Review of Systems    Objective   Physical Exam    ASSESSMENT:     PLAN:  Discussed treatment plan with patient.   Call or return to clinic prn if these symptoms worsen or fail to improve as anticipated.     Karen Mccall RN

## 2025-03-28 NOTE — PATIENT INSTRUCTIONS
Chronic Opioid Treatment Fact Sheet  While you are using opioids from Dr. William Nova , you may not consume Alcohol, Use Marijuana (recreational or medical), use any other illegal drugs or use controlled substances that have not been personally prescribed to you.  A breach in any of the above will cause us to no longer prescribe opioids for you.     Using opioid medicines to treat your pain  You and your doctor have decided that opioid pain medicine might help reduce your pain and improve your function. Opioids are not likely to make your pain go away completely.  It is important to understand that this treatment involves potential risks and benefits. It is also important that you follow the guidelines in this handout and let your doctor know what you expect from your treatment. Your doctor may ask you to sign an Opioid Patient Care Agreement    What are the goals and possible benefits of opioid treatment?  The goals of treatment are to reduce your pain and improve your daily function. The benefits of opioid medicines are different from person to person. Opioids typically reduce chronic pain by about 30%. Some people find that they can function better day to day, but research has shown that this is not typical.    Experts agree that opioids may actually make pain worse, especially at high doses. “Flare-ups” are common and should not usually be treated by increasing the dose or taking extra medicine.  Your doctor will monitor how you are doing by asking you to rate your pain level and your daily functioning. He or she may want to know how far you can walk, how long you can sit, if you are able to work or do housework, and what kinds of activities you do alone or with family and friends.    What are the common side effects and risks of opioids?  Opioids cause common side effects that can be unpleasant. They can also increase risks of serious health effects that occur less often. Because opioids have risks that  can be serious, your doctor may ask you for urine or blood sample to help protect your safety.    Side effects vary from person to person. You and your doctor will work together to monitor how opioids affect you. Your doctor may need to adjust your dose until you find the right balance between pain reduction, improved function, and side effects.    It is normal to develop physical dependence to opioids:   Physical dependence means your body has adapted to the medicine and you will experience tolerance and withdrawal.   Tolerance means you need to take more of the medicine to get the same effect.   Withdrawal means you will have symptoms when you stop using the medicine.   Opioids Induced Hyperalgesia (paradoxical hyperalgesia) is serious but less common side effect where the opioids medication became the source of increased pain. Your physician may have to wean you off the meds to help you with the pain.    Withdrawal symptoms are usually the opposite of the effects of the medicine.For example, if the medicine causes constipation, the withdrawal symptom would be diarrhea. If the medicine reduces pain, the symptom would be increased pain. Withdrawal from opioids is temporary and usually not dangerous.    Babies born to mothers taking opioids will be dependent on opioids at birth. You should not take opioids if you are trying to get pregnant. If you do get pregnant while taking opioids, let your doctor know right away.    People who have had problems with mental health, drugs, or alcohol are more likely to have problems with opioids. You must tell your doctor about any mental illness, substance abuse, or addiction of any type have experienced in the past. You must also tell your doctor if anyone in your family has had these problems. Research shows these problems sometimes run in families.  Experts agree that people with active substance abuse or addiction problems should not use opioids for chronic non-cancer pain.  If you have problems with substance abuse or addiction, it is important to let your doctor know so you can get the help you need. Tell your doctor right away if you feel you are becoming addicted to opioids.          Common side effects  Constipation  Opioid medicines cause constipation. You may need to be treated for this while you are taking opioids.  Sedation  Many opioid medications can make you feel drowsy, slow your reaction time, and cause loss of  coordination. They can also make it hard to concentrate and think clearly.  Do not drive or use dangerous equipment until you are sure that opioids do not affect your reaction time or thinking ability. It may take a week or longer before you know if you can drive safely while taking opioids.   If you are in a traffic accident while driving on opioids, you may be considered to be “driving  under the influence” (DUI).    Other side effects and withdrawal symptoms:     Other side effect Withdrawal Symptoms   Rash and/or Itching Sweating   Dry eyes Nausea   Blurred vision Abdominal pain/cramping   Nausea and vomiting Diarrhea   Inability to urinate Trouble sleeping   Low blood pressure Muscle ahes   Slow heart beat Fast heart beat   Depressed mood Anxiety   Slow breathing Runny nose   Problem with balance “Goose bumps”   Decrease sex drive (Decrease Testosterone)    Decrease Immune function    Swelling in hands and feet    Jerking of arms and legs    Increased Sensitivity to pain    Distruption of Normal sleep    Dental problems    Apathy    Falls resulting in fractures        Risk of serious bodily harm or death  Opioid pain medicines can cause serious bodily harm or death. Higher doses appear to cause more side effects, some of which can lead to injuries like serious fractures due to falls.   Higher doses increase the risk of overdose. An overdose of opioids, whether by accident or on purpose, can cause serious bodily harm or death. Research continues to show more  and more problems with long-term opioid use, especially at high doses.    Using more opioids than your doctor prescribes can cause you to become dangerously sedated, to stop breathing, or to overdose. Combining opioids with certain other medicines or with alcohol or drugs can have the same effect.          Some opioids have higher risks  There are special problems with some opioids. For example:  meperidine (Demerol) and tramadol (Ultram) are associated with increased seizure risk.  Methadone stays in the body for many days, which increases the risk of overdose. It can also cause heart rhythm problems.   Opioids, that contain acetaminophen, such as Vicodin and Percocet, can harm the liver when taken long term or at high doses.        Are there alternatives to opioid treatment for chronic non-cancer pain?  Your doctor may prescribe other treatments to help your pain and to help you do daily activities better.  These may include exercise, psychological counseling, and medicines that are not opioids. Please be sure to discuss these options with your doctor.    Questions?  Please ask your doctor any questions you have about taking opioids.

## 2025-03-28 NOTE — H&P
History Of Present Illness  Maggy Gore is a 89 y.o. female presenting  for evaluation of her back pain.  The pain is located in the lumbar area.The pt was referred by Dr. Olivarez  to come and see Dr. Nova to take ovr her care.   Dr. Olivarez retired and currently the patient is trying to establish herself with a new pain doctor in order to continue her treatment currently she is on the Norco 5/325 an allowance of 3 pills/day the patient is describing it as being beneficial assisting her with her day-to-day activity her last prescription was issued on March 3, 2025.  Currently denies  any side effect associated with the usage of the medication.  Tried with injection in the lumbar spine area and described no significant improvement she believes her last injection was performed in December by Dr. Olivarez and prior to that she was with Dr. Lang.  Thing her pain at 9 out of 10 when she walks seems the pain improves if she sits.Sharp pain .          Past Medical History  Past Medical History:   Diagnosis Date    AAA (abdominal aortic aneurysm) without rupture     Aortic stenosis     CKD (chronic kidney disease)     Closed comminuted fracture of left patella with routine healing 07/31/2023    Colon polyp     GERD (gastroesophageal reflux disease)     Gout     HTN (hypertension)     Hyperlipidemia     Hyperthyroidism      Surgical History  Past Surgical History:   Procedure Laterality Date    APPENDECTOMY      HYSTERECTOMY      IR BILIARY DRAIN  12/26/2023    IR BILIARY DRAIN 12/26/2023 Ace Osorio MD [facility]     Social History  She reports that she quit smoking about 12 years ago. Her smoking use included cigarettes. She has never used smokeless tobacco. She reports current alcohol use. She reports that she does not use drugs.    Family History  Family History   Problem Relation Name Age of Onset    Heart attack Mother      Other (enlarged heart.) Father      Heart defect Daughter        CURRENT  MEDICATIONS  Current Outpatient Medications   Medication Sig Dispense Refill    acebutolol (Sectral) 200 mg capsule Take 1 capsule (200 mg) by mouth 2 times a day. 180 capsule 1    acetaminophen (Tylenol) 500 mg tablet Take 2 tablets (1,000 mg) by mouth every 6 hours if needed for mild pain (1 - 3).      amLODIPine (Norvasc) 2.5 mg tablet Take 1 tablet (2.5 mg) by mouth once daily. 90 tablet 1    cefdinir (Omnicef) 300 mg capsule Take 1 capsule (300 mg) by mouth 2 times a day for 10 days. 20 capsule 0    cholecalciferol (Vitamin D-3) 50 MCG (2000 UT) tablet TAKE ONE TABLET BY MOUTH ONCE DAILY 90 tablet 0    cyclobenzaprine (Flexeril) 10 mg tablet Take 1 tablet (10 mg) by mouth 2 times a day as needed for muscle spasms. 90 tablet 0    dapagliflozin propanediol (Farxiga) 10 mg Take 1 tablet (10 mg) by mouth once daily. 90 tablet 3    famotidine (Pepcid) 20 mg tablet Take 1 tablet (20 mg) by mouth once daily at bedtime. 90 tablet 1    HYDROcodone-acetaminophen (Norco) 5-325 mg tablet Take 1 tablet by mouth every 6 hours if needed for severe pain (7 - 10).      methIMAzole (Tapazole) 5 mg tablet Take 1 tablet (5 mg) by mouth once daily. 90 tablet 0    naloxone (Narcan) 4 mg/0.1 mL nasal spray Administer 1 spray (4 mg) into affected nostril(s) if needed for opioid reversal. May repeat every 2-3 minutes if needed, alternating nostrils, until medical assistance becomes available. 2 each 0    ondansetron (Zofran) 8 mg tablet TAKE ONE TABLET BY MOUTH EVERY 8 HOURS AS NEEDED FOR NAUSEA OR VOMITING. 30 tablet 0    pantoprazole (ProtoNix) 40 mg EC tablet Take 1 tablet (40 mg) by mouth once daily. DO NOT CRUSH CHEW OR SPLIT 90 tablet 1    polyethylene glycol (Glycolax, Miralax) 17 gram packet Take 17 g by mouth once daily. 90 packet 1    promethazine (Phenergan) 25 mg tablet TAKE ONE TABLET BY MOUTH EVERY 12 HOURS AS NEEDED FOR NAUSEA OR VOMITING. 30 tablet 0    valsartan-hydrochlorothiazide (Diovan-HCT) 320-25 mg tablet Take 1  tablet by mouth once daily. 90 tablet 1     No current facility-administered medications for this visit.       Allergies  Allergies   Allergen Reactions    Sulfa (Sulfonamide Antibiotics) Rash     Review of Systems   12 Systems have been reviewed as follows.   Constitutional: Fever, weight gain, weight loss, appetite change, night sweats, fatigue, chills.  Eyes : blurry, double vision, vision, loss, tearing, redness, pain, sensitivity to light, glaucoma.  Ears, nose, mouth, and throat: Hearing loss, ringing in the ears, ear pain, nasal congestion, nasal drainage, nosebleeds, mouth, throat, irritation tooth problem.  Cardiovascular :chest pain, pressure, heart tracing,palpaitations , sweating, leg swelling, high or low blood pressure  Pulmonary: Cough, yellow or green sputum, blood and sputum, shortness of breath, wheezing  Gastrointestinal: Nause, vomiting, diarrhea, constipation, pain, blood in stool, or vomitus, heartburn, difficulty swallowing  Genitourinary: incontinence, abnormal bleeding, abnormal discharge, urinary frequency, urinary hesitancy, pain, impotence sexual problem, infection, urinary retention  Musculoskeletal: Pain, stiffness, joint, redness or warmth, arthritis, back pain, weakness, muscle wasting, sprain or fracture  Neuro: Weight weakness, dizziness, change in voice, change in taste change in vision, change in hearing, loss, or change of sensation, trouble walking, balance problems coordination problems, shaking, speech problem  Endocrine , cold or heat intolerance, blood sugar problem, weight gain or loss missed periods hot flashes, sweats, change in body hair, change in libido, increased thirst, increased urination  Heme/lymph: Swelling, bleeding, problem anemia, bruising, enlarged lymph nodes  Allergic/immunologic: H. plus nasal drip, watery itchy eyes, nasal drainage, immunosuppressed  The above, were reviewed and noted negative except as noted.     Physical Exam   Vital signs reviewed,  documented in chart     General:  Appears well, does not look in any major distress  Alert    HEENT:  Head atraumatic  Eyes normal inspection  PERRL  Normal ENT inspection  No signs of dehydration    NECK:  Normal inspection  Range of motion within normal     RESPIRATORY:  No respiratory distress    CVS:  Heart rate and rhythm regular    ABDOMEN/GI  Soft  Non-tender  No distention  No organomegaly      BACK:  Normal inspection, flexion and extension within normal limit   positive tenderness upon the palpation of the facet joint  Si joints none tender to palpations     EXTREMITIES:  Non-Tender  Full ROM  Normal appearance  No Pedal edema  Power symmetrical , sensory examination preserved.    NEURO:  Alert and oriented X 3  CNS normal as tested without focal neurological deficit   Sensation normal  Motor normal  reflexes normal    PSYCH:  Mood normal  Affect normal    SKIN:  Color normal  No rash  Warm  Dry  no sign of skin marking supportive of IV drug usage /abuse.     Last Recorded Vitals  There were no vitals taken for this visit.  REVIEW OF LABORATORY DATA  I have reviewed the following lab results:  WHITE BLOOD CELL COUNT   Date Value Ref Range Status   03/11/2025 6.0 3.8 - 10.8 Thousand/uL Final     RED BLOOD CELL COUNT   Date Value Ref Range Status   03/11/2025 4.04 3.80 - 5.10 Million/uL Final     HEMOGLOBIN   Date Value Ref Range Status   03/11/2025 12.2 11.7 - 15.5 g/dL Final     HEMATOCRIT   Date Value Ref Range Status   03/11/2025 37.3 35.0 - 45.0 % Final     MCV   Date Value Ref Range Status   03/11/2025 92.3 80.0 - 100.0 fL Final     MCH   Date Value Ref Range Status   03/11/2025 30.2 27.0 - 33.0 pg Final     MCHC   Date Value Ref Range Status   03/11/2025 32.7 32.0 - 36.0 g/dL Final     Comment:     For adults, a slight decrease in the calculated MCHC  value (in the range of 30 to 32 g/dL) is most likely  not clinically significant; however, it should be  interpreted with caution in correlation with  other  red cell parameters and the patient's clinical  condition.       RDW   Date Value Ref Range Status   03/11/2025 12.6 11.0 - 15.0 % Final     PLATELET COUNT   Date Value Ref Range Status   03/11/2025 392 140 - 400 Thousand/uL Final     MPV   Date Value Ref Range Status   03/11/2025 9.9 7.5 - 12.5 fL Final     SODIUM   Date Value Ref Range Status   03/11/2025 136 135 - 146 mmol/L Final     POTASSIUM   Date Value Ref Range Status   03/11/2025 4.7 3.5 - 5.3 mmol/L Final     CARBON DIOXIDE   Date Value Ref Range Status   03/11/2025 27 20 - 32 mmol/L Final     UREA NITROGEN (BUN)   Date Value Ref Range Status   03/11/2025 57 (H) 7 - 25 mg/dL Final     CALCIUM   Date Value Ref Range Status   03/11/2025 8.9 8.6 - 10.4 mg/dL Final     Protime   Date Value Ref Range Status   02/23/2024 11.9 9.8 - 12.8 seconds Final     INR   Date Value Ref Range Status   02/23/2024 1.1 0.9 - 1.1 Final         REVIEW OF RADIOLOGY   I have reviewed the following:  Radiology Studies           XR lumbar spine complete 4+ views  Order: [id number]  Impression    No acute osseous findings.  Degenerative changes cervical and lumbar spine as  discussed.  Narrative    EXAMINATION:  5 XRAY VIEWS OF THE CERVICAL SPINE; 5 XRAY VIEWS OF THE LUMBAR SPINE    10/4/2022 11:09 am    COMPARISON:  Cervical spine MRI 06/25/2018.  Lumbar MRI 02/07/2018.  Radiographs from  10/03/2008.    HISTORY:  ORDERING SYSTEM PROVIDED HISTORY: Neck pain  TECHNOLOGIST PROVIDED HISTORY:  What reading provider will be dictating this exam?->CRC; ORDERING SYSTEM  PROVIDED HISTORY: Low back pain, unspecified back pain laterality,  unspecified chronicity, unspecified whether sciatica present  TECHNOLOGIST PROVIDED HISTORY:  What reading provider will be dictating this exam?->CRC    FINDINGS:  Cervical spine: Counting reference of craniocervical junction.    Straightening of the cervical lordosis.  No radiographic evidence for acute  fracture.  Advanced multilevel degenerative  changes with disc height loss,  endplate osteophytes, endplate sclerosis, and facet/uncovertebral  degenerative changes with multilevel high-grade bony foraminal narrowing.  Underlying decreased bone marrow density.  Prevertebral soft tissues  maintained.  Lung apices unremarkable.    Lumbar spine: Counting reference of L5-S1 as the last well-formed disc space.    Straightening of the lumbar lordosis.  No radiographic evidence for acute  fracture.  Vertebral body heights maintained.  Multilevel degenerative  changes with disc height loss, endplate osteophytes, and facet degenerative  changes, especially involving the lower lumbar spine.  Sacrum grossly intact.  Underlying decreased bone mineral density.  SI joints intact.  Degenerative  changes in the imaged hips.  Diffuse calcifications of the aorta.  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  There are NO signs of opioid intoxication, abuse, addiction or withdrawal.  Pupils are equal, reactive to light bilateral, appropriate speech and cognition. Patient denies any opioid induced constipation. The [place] registry followed periodically, urine toxicology completed and appropriate.     Patient is advised and warned  in specific detail about potential benefits of opioids along with risks and side effects including, but not limited to, dependency, addiction, tolerance, hyperalgesia, anxiety, depression,  insomnia, endocrine changes, immunologic disturbances, respiratory depression and death.     Caution advised regarding the use of medications prescribed at this office and specific mention made regarding not to drive or operate heavy machinery if feeling side effects from this the medications. Patient  expressed an understanding in regards to these particular concerns.     Discussed non-opioid options including (But no limited), physical wellness, antiinflammatory diet, icing and heating, meditation, relaxation, massage and acupuncture.    We will continue to monitor and adjust medications as needed.    Patient signed the agreement for opiate contract we will be obtaining today toxicology screening and that she will be provided with the opiate fax sheet I will be following up with her in 1 months or if needed any sooner   I advised the patient to always take the least needed of the medication to keep the  pain under control. I encouraged the patient to keep a pain dairy so that during subsequent visit we could look at the trend of the  response to the pain medication and titrate the medication accordingly. Patient verbalized understanding and agreement with the plan and will be following-up with  the pain clinic within 1 month duration, or if needed any sooner.      The above clinical summary has been dictated with voice recognition software. It has not been proofread for grammatical errors, typographical mistakes, or other semantic inconsistencies.    Thank you for visiting our office today. It was our pleasure to take part in your healthcare.     Please do not hesitate to contact the pain clinic after your visit with any questions or concerns at  M-F 8-4 pm       William Nova M.D.  Medical Director , Division of Pain Medicine [facility]   of Anesthesiology and Pain Medicine  [facility]  [facility]  [address] [address]  [address]  [place] [address]     Office: [phone]  Fax: [phone]      William Nova MD

## 2025-03-30 ENCOUNTER — HOSPITAL ENCOUNTER (OUTPATIENT)
Dept: RADIOLOGY | Facility: HOSPITAL | Age: 89
Discharge: HOME | End: 2025-03-30
Payer: MEDICARE

## 2025-03-30 DIAGNOSIS — M48.062 LUMBAR STENOSIS WITH NEUROGENIC CLAUDICATION: ICD-10-CM

## 2025-03-30 PROCEDURE — 72148 MRI LUMBAR SPINE W/O DYE: CPT

## 2025-03-30 PROCEDURE — 72148 MRI LUMBAR SPINE W/O DYE: CPT | Performed by: RADIOLOGY

## 2025-04-01 ENCOUNTER — APPOINTMENT (OUTPATIENT)
Dept: PRIMARY CARE | Facility: CLINIC | Age: 89
End: 2025-04-01
Payer: MEDICARE

## 2025-04-01 VITALS
SYSTOLIC BLOOD PRESSURE: 136 MMHG | BODY MASS INDEX: 23.74 KG/M2 | WEIGHT: 134 LBS | DIASTOLIC BLOOD PRESSURE: 74 MMHG | HEART RATE: 56 BPM | OXYGEN SATURATION: 99 %

## 2025-04-01 DIAGNOSIS — M79.10 MUSCLE PAIN: ICD-10-CM

## 2025-04-01 DIAGNOSIS — R53.83 FATIGUE, UNSPECIFIED TYPE: ICD-10-CM

## 2025-04-01 DIAGNOSIS — H52.4 HYPEROPIA WITH PRESBYOPIA OF BOTH EYES: ICD-10-CM

## 2025-04-01 DIAGNOSIS — Z76.89 ENCOUNTER FOR NAIL CARE: Primary | ICD-10-CM

## 2025-04-01 DIAGNOSIS — I10 PRIMARY HYPERTENSION: ICD-10-CM

## 2025-04-01 DIAGNOSIS — Z00.00 ROUTINE GENERAL MEDICAL EXAMINATION AT HEALTH CARE FACILITY: ICD-10-CM

## 2025-04-01 DIAGNOSIS — J45.20 MILD INTERMITTENT REACTIVE AIRWAY DISEASE WITHOUT COMPLICATION (HHS-HCC): ICD-10-CM

## 2025-04-01 DIAGNOSIS — E55.9 VITAMIN D DEFICIENCY: ICD-10-CM

## 2025-04-01 DIAGNOSIS — K21.9 GASTROESOPHAGEAL REFLUX DISEASE WITHOUT ESOPHAGITIS: ICD-10-CM

## 2025-04-01 DIAGNOSIS — E78.2 MIXED HYPERLIPIDEMIA: ICD-10-CM

## 2025-04-01 DIAGNOSIS — J01.10 ACUTE NON-RECURRENT FRONTAL SINUSITIS: ICD-10-CM

## 2025-04-01 DIAGNOSIS — M62.838 MUSCLE SPASMS OF LOWER EXTREMITY, UNSPECIFIED LATERALITY: ICD-10-CM

## 2025-04-01 DIAGNOSIS — K21.9 GASTROESOPHAGEAL REFLUX DISEASE, UNSPECIFIED WHETHER ESOPHAGITIS PRESENT: ICD-10-CM

## 2025-04-01 DIAGNOSIS — Z23 FLU VACCINE NEED: ICD-10-CM

## 2025-04-01 DIAGNOSIS — I25.10 CORONARY ARTERY DISEASE INVOLVING NATIVE CORONARY ARTERY OF NATIVE HEART WITHOUT ANGINA PECTORIS: ICD-10-CM

## 2025-04-01 DIAGNOSIS — H52.03 HYPEROPIA WITH PRESBYOPIA OF BOTH EYES: ICD-10-CM

## 2025-04-01 DIAGNOSIS — N18.32 STAGE 3B CHRONIC KIDNEY DISEASE (MULTI): ICD-10-CM

## 2025-04-01 DIAGNOSIS — K52.9 ACUTE GASTROENTERITIS: ICD-10-CM

## 2025-04-01 DIAGNOSIS — N18.4 CKD STAGE G4/A1, GFR 15-29 AND ALBUMIN CREATININE RATIO <30 MG/G (MULTI): ICD-10-CM

## 2025-04-01 DIAGNOSIS — E05.90 HYPERTHYROIDISM: ICD-10-CM

## 2025-04-01 LAB
ALBUMIN SERPL-MCNC: 4.2 G/DL (ref 3.6–5.1)
ALP SERPL-CCNC: 174 U/L (ref 37–153)
ALT SERPL-CCNC: 33 U/L (ref 6–29)
ANION GAP SERPL CALCULATED.4IONS-SCNC: 8 MMOL/L (CALC) (ref 7–17)
AST SERPL-CCNC: 31 U/L (ref 10–35)
BASOPHILS # BLD AUTO: 69 CELLS/UL (ref 0–200)
BASOPHILS NFR BLD AUTO: 1 %
BILIRUB SERPL-MCNC: 0.6 MG/DL (ref 0.2–1.2)
BUN SERPL-MCNC: 38 MG/DL (ref 7–25)
CALCIUM SERPL-MCNC: 9.6 MG/DL (ref 8.6–10.4)
CHLORIDE SERPL-SCNC: 104 MMOL/L (ref 98–110)
CO2 SERPL-SCNC: 28 MMOL/L (ref 20–32)
CREAT SERPL-MCNC: 1.95 MG/DL (ref 0.6–0.95)
EGFRCR SERPLBLD CKD-EPI 2021: 24 ML/MIN/1.73M2
EOSINOPHIL # BLD AUTO: 331 CELLS/UL (ref 15–500)
EOSINOPHIL NFR BLD AUTO: 4.8 %
ERYTHROCYTE [DISTWIDTH] IN BLOOD BY AUTOMATED COUNT: 12.5 % (ref 11–15)
GLUCOSE SERPL-MCNC: 91 MG/DL (ref 65–139)
HCT VFR BLD AUTO: 36.4 % (ref 35–45)
HGB BLD-MCNC: 12.1 G/DL (ref 11.7–15.5)
LYMPHOCYTES # BLD AUTO: 2601 CELLS/UL (ref 850–3900)
LYMPHOCYTES NFR BLD AUTO: 37.7 %
MCH RBC QN AUTO: 30.6 PG (ref 27–33)
MCHC RBC AUTO-ENTMCNC: 33.2 G/DL (ref 32–36)
MCV RBC AUTO: 91.9 FL (ref 80–100)
MONOCYTES # BLD AUTO: 697 CELLS/UL (ref 200–950)
MONOCYTES NFR BLD AUTO: 10.1 %
NEUTROPHILS # BLD AUTO: 3202 CELLS/UL (ref 1500–7800)
NEUTROPHILS NFR BLD AUTO: 46.4 %
PLATELET # BLD AUTO: 384 THOUSAND/UL (ref 140–400)
PMV BLD REES-ECKER: 9.7 FL (ref 7.5–12.5)
POTASSIUM SERPL-SCNC: 5.8 MMOL/L (ref 3.5–5.3)
PROT SERPL-MCNC: 6.9 G/DL (ref 6.1–8.1)
RBC # BLD AUTO: 3.96 MILLION/UL (ref 3.8–5.1)
SODIUM SERPL-SCNC: 140 MMOL/L (ref 135–146)
WBC # BLD AUTO: 6.9 THOUSAND/UL (ref 3.8–10.8)

## 2025-04-01 PROCEDURE — 1123F ACP DISCUSS/DSCN MKR DOCD: CPT | Performed by: FAMILY MEDICINE

## 2025-04-01 PROCEDURE — G2211 COMPLEX E/M VISIT ADD ON: HCPCS | Performed by: FAMILY MEDICINE

## 2025-04-01 PROCEDURE — 1158F ADVNC CARE PLAN TLK DOCD: CPT | Performed by: FAMILY MEDICINE

## 2025-04-01 PROCEDURE — 1036F TOBACCO NON-USER: CPT | Performed by: FAMILY MEDICINE

## 2025-04-01 PROCEDURE — 3075F SYST BP GE 130 - 139MM HG: CPT | Performed by: FAMILY MEDICINE

## 2025-04-01 PROCEDURE — 3078F DIAST BP <80 MM HG: CPT | Performed by: FAMILY MEDICINE

## 2025-04-01 PROCEDURE — 99214 OFFICE O/P EST MOD 30 MIN: CPT | Performed by: FAMILY MEDICINE

## 2025-04-01 PROCEDURE — 1159F MED LIST DOCD IN RCRD: CPT | Performed by: FAMILY MEDICINE

## 2025-04-01 ASSESSMENT — ENCOUNTER SYMPTOMS
HYPERTENSION: 1
DIFFICULTY URINATING: 0
ABDOMINAL DISTENTION: 0
ANAL BLEEDING: 0
EYE DISCHARGE: 0
APNEA: 0
CONSTITUTIONAL NEGATIVE: 1
HEADACHES: 0
FACIAL ASYMMETRY: 0
EYE ITCHING: 0
SINUS PAIN: 0
CHOKING: 0
COUGH: 0
ABDOMINAL PAIN: 0
WOUND: 0
POLYPHAGIA: 0
FATIGUE: 0
TREMORS: 0
POLYDIPSIA: 0
SHORTNESS OF BREATH: 0
PHOTOPHOBIA: 0
LOSS OF SENSATION IN FEET: 0
PALPITATIONS: 0
ADENOPATHY: 0
ARTHRALGIAS: 0
DIAPHORESIS: 0
VOMITING: 0
NUMBNESS: 0
UNEXPECTED WEIGHT CHANGE: 0
DEPRESSION: 0
OCCASIONAL FEELINGS OF UNSTEADINESS: 0
CHEST TIGHTNESS: 0
HEMATURIA: 0
EYE PAIN: 0
SORE THROAT: 0
NERVOUS/ANXIOUS: 0
RECTAL PAIN: 0
RHINORRHEA: 0
LIGHT-HEADEDNESS: 0
CHILLS: 0
SINUS PRESSURE: 0
SEIZURES: 0
DIARRHEA: 0
FACIAL SWELLING: 0
DIZZINESS: 0
EYE REDNESS: 0
GASTROINTESTINAL NEGATIVE: 1
COLOR CHANGE: 0
APPETITE CHANGE: 0
JOINT SWELLING: 0
DECREASED CONCENTRATION: 0
NECK STIFFNESS: 0
MYALGIAS: 0
DYSPHORIC MOOD: 0
WHEEZING: 0
STRIDOR: 0
SLEEP DISTURBANCE: 0
TROUBLE SWALLOWING: 0
AGITATION: 0
HYPERACTIVE: 0
SPEECH DIFFICULTY: 0
BLOOD IN STOOL: 0
FEVER: 0
ACTIVITY CHANGE: 0
HALLUCINATIONS: 0
CARDIOVASCULAR NEGATIVE: 1
DYSURIA: 0
BACK PAIN: 0
CONSTIPATION: 0
NAUSEA: 0
WEAKNESS: 0
FREQUENCY: 0
NECK PAIN: 0
FLANK PAIN: 0
VOICE CHANGE: 0
BRUISES/BLEEDS EASILY: 0
CONFUSION: 0

## 2025-04-01 NOTE — PROGRESS NOTES
Subjective   Patient ID: Maggy Gore is a 89 y.o. female who presents for Hypertension.    This patient is here today to follow up on HTN. This patient is currently taking valsartan. This patient states that their current medication treatment for this issue is working well for them. This patient does take their blood pressure at home and states that it is usually within normal ranges. This patient denies any symptoms of headache, dizziness, blurry vision, or lightheadedness.      Hypertension  Pertinent negatives include no chest pain, headaches, neck pain, palpitations or shortness of breath.        Review of Systems   Constitutional: Negative.  Negative for activity change, appetite change, chills, diaphoresis, fatigue, fever and unexpected weight change.   HENT: Negative.  Negative for congestion, dental problem, ear discharge, facial swelling, hearing loss, mouth sores, nosebleeds, postnasal drip, rhinorrhea, sinus pressure, sinus pain, sneezing, sore throat, tinnitus, trouble swallowing and voice change.    Eyes:  Negative for photophobia, pain, discharge, redness, itching and visual disturbance.   Respiratory:  Negative for apnea, cough, choking, chest tightness, shortness of breath, wheezing and stridor.    Cardiovascular: Negative.  Negative for chest pain, palpitations and leg swelling.   Gastrointestinal: Negative.  Negative for abdominal distention, abdominal pain, anal bleeding, blood in stool, constipation, diarrhea, nausea, rectal pain and vomiting.   Endocrine: Negative for cold intolerance, heat intolerance, polydipsia, polyphagia and polyuria.   Genitourinary:  Negative for decreased urine volume, difficulty urinating, dysuria, enuresis, flank pain, frequency, hematuria and urgency.   Musculoskeletal:  Negative for arthralgias, back pain, gait problem, joint swelling, myalgias, neck pain and neck stiffness.   Skin:  Negative for color change, pallor, rash and wound.   Allergic/Immunologic:  Negative for environmental allergies, food allergies and immunocompromised state.   Neurological:  Negative for dizziness, tremors, seizures, syncope, facial asymmetry, speech difficulty, weakness, light-headedness, numbness and headaches.   Hematological:  Negative for adenopathy. Does not bruise/bleed easily.   Psychiatric/Behavioral:  Negative for agitation, behavioral problems, confusion, decreased concentration, dysphoric mood, hallucinations, self-injury, sleep disturbance and suicidal ideas. The patient is not nervous/anxious and is not hyperactive.    All other systems reviewed and are negative.      Objective   /74   Pulse 56   Wt 60.8 kg (134 lb)   SpO2 99%   BMI 23.74 kg/m²     Physical Exam  Vitals reviewed.   Constitutional:       General: She is not in acute distress.     Appearance: Normal appearance. She is normal weight. She is not ill-appearing or diaphoretic.   HENT:      Head: Normocephalic.      Right Ear: Tympanic membrane and external ear normal.      Left Ear: Tympanic membrane and external ear normal.      Nose: Nose normal. No congestion.      Mouth/Throat:      Pharynx: No posterior oropharyngeal erythema.   Eyes:      General:         Right eye: No discharge.         Left eye: No discharge.      Extraocular Movements: Extraocular movements intact.      Conjunctiva/sclera: Conjunctivae normal.      Pupils: Pupils are equal, round, and reactive to light.   Cardiovascular:      Rate and Rhythm: Normal rate and regular rhythm.      Pulses: Normal pulses.      Heart sounds: Normal heart sounds. No murmur heard.  Pulmonary:      Effort: Pulmonary effort is normal. No respiratory distress.      Breath sounds: Normal breath sounds. No wheezing or rales.   Chest:      Chest wall: No tenderness.   Abdominal:      General: Abdomen is flat. Bowel sounds are normal. There is no distension.      Palpations: There is no mass.      Tenderness: There is no abdominal tenderness. There is no  guarding.   Musculoskeletal:         General: No tenderness. Normal range of motion.      Cervical back: Normal range of motion and neck supple. No tenderness.      Right lower leg: No edema.      Left lower leg: No edema.   Skin:     General: Skin is dry.      Coloration: Skin is not jaundiced.      Findings: No bruising, erythema or rash.   Neurological:      General: No focal deficit present.      Mental Status: She is alert and oriented to person, place, and time. Mental status is at baseline.      Cranial Nerves: No cranial nerve deficit.      Sensory: No sensory deficit.      Coordination: Coordination normal.      Gait: Gait normal.   Psychiatric:         Mood and Affect: Mood normal.         Thought Content: Thought content normal.         Judgment: Judgment normal.         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    Relevant Orders    Follow Up In [facility] - PCP - Established    Mixed hyperlipidemia E78.2    Relevant Orders    Follow Up In [facility] - PCP - Established    Hyperthyroidism E05.90    Relevant Orders    Follow Up In [facility] - PCP - Established    Vitamin D deficiency E55.9    Relevant Orders    Follow Up In [facility] - PCP - Established    Reactive airway disease without complication ([facility]) J45.909    Relevant Orders    Follow Up In [facility] - PCP - Established    Primary hypertension I10    Relevant Orders    Follow Up In [facility] - PCP - Established    Gastroesophageal reflux disease K21.9    Relevant Orders    Follow Up [facility] - PCP - Established    Follow Up [facility] - PCP - Established    Fatigue R53.83    Relevant Orders    Follow Up [facility] - PCP - Established    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    Relevant Orders    Follow Up In [facility]  - PCP - Established    Comprehensive Metabolic Panel    Coronary artery 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 I25.10    Relevant Orders    Follow Up In [facility] - PCP - Established    CKD stage G4/A1, 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) N18.4    Relevant Orders    Follow Up In [facility] - PCP - Established    CBC and Auto Differential    Acute gastroenteritis K52.9    Relevant Orders    Follow Up In [facility] - PCP - Established     Other Visit Diagnoses         Codes    Muscle spasms of lower extremity, unspecified laterality     M62.838    Relevant Orders    Follow Up In [facility] - PCP - Established    Routine general medical examination at health care facility     Z00.00    Relevant Orders    Follow Up In [facility] - PCP - Established    Flu vaccine need     Z23    Relevant Orders    Follow Up In [facility] - PCP - Established    Muscle pain     M79.10    Relevant Orders    Follow Up In [facility] - PCP - Established    Acute non-recurrent frontal sinusitis     J01.10    Relevant Orders    Follow Up In [facility] - PCP - Established        INC LFTs       Stop lipitor     Decr norvasc to 2.5 mg every day     Fu 2 weeks     CBC & CMP prior     Continue current medications and therapy for chronic medical conditions     Do knee raises and ice area.     continue farxiga 10 mg daily for CKD  Pharmacy referral done     Metamucil every day     BW prior       prevnar done        & [facility] pharmacy soon     Drink Gatorade with calories      Last surgery was 2/23/24.  Off work since 12/23 and RTW 5/1/24 per Dr Johnson cholecystectomy   No strenuous activity or heavy lifting until 6 weeks post-op     Scribe Attestation  By signing my name below, I, Mariah Sanchez MA, Scribe   attest that this documentation has been prepared under the direction and in the presence of Ace Dhillon,  MD.    Provider Attestation - Scribe documentation    All medical record entries made by the Scribe were at my direction and personally dictated by me. I have reviewed the chart and agree that the record accurately reflects my personal performance of the history, physical exam, discussion and plan.

## 2025-04-03 LAB — QUEST FLEXITEST1 RESULTS:: NORMAL

## 2025-04-17 ENCOUNTER — PATIENT OUTREACH (OUTPATIENT)
Dept: PRIMARY CARE | Facility: CLINIC | Age: 89
End: 2025-04-17
Payer: COMMERCIAL

## 2025-04-17 DIAGNOSIS — K21.9 GASTROESOPHAGEAL REFLUX DISEASE, UNSPECIFIED WHETHER ESOPHAGITIS PRESENT: ICD-10-CM

## 2025-04-17 DIAGNOSIS — E78.5 DYSLIPIDEMIA: ICD-10-CM

## 2025-04-17 DIAGNOSIS — K59.00 CONSTIPATION, UNSPECIFIED CONSTIPATION TYPE: ICD-10-CM

## 2025-04-17 DIAGNOSIS — I10 PRIMARY HYPERTENSION: ICD-10-CM

## 2025-04-24 ENCOUNTER — PATIENT OUTREACH (OUTPATIENT)
Dept: PRIMARY CARE | Facility: CLINIC | Age: 89
End: 2025-04-24
Payer: COMMERCIAL

## 2025-04-24 DIAGNOSIS — N18.32 STAGE 3B CHRONIC KIDNEY DISEASE (MULTI): ICD-10-CM

## 2025-04-24 DIAGNOSIS — I10 PRIMARY HYPERTENSION: ICD-10-CM

## 2025-04-24 DIAGNOSIS — K58.1 IRRITABLE BOWEL SYNDROME WITH CONSTIPATION: ICD-10-CM

## 2025-04-24 DIAGNOSIS — K59.00 CONSTIPATION, UNSPECIFIED CONSTIPATION TYPE: ICD-10-CM

## 2025-04-24 SDOH — ECONOMIC STABILITY: FOOD INSECURITY: WITHIN THE PAST 12 MONTHS, THE FOOD YOU BOUGHT JUST DIDN'T LAST AND YOU DIDN'T HAVE MONEY TO GET MORE.: NEVER TRUE

## 2025-04-24 SDOH — ECONOMIC STABILITY: INCOME INSECURITY: IN THE LAST 12 MONTHS, WAS THERE A TIME WHEN YOU WERE NOT ABLE TO PAY THE MORTGAGE OR RENT ON TIME?: NO

## 2025-04-24 SDOH — ECONOMIC STABILITY: FOOD INSECURITY: WITHIN THE PAST 12 MONTHS, YOU WORRIED THAT YOUR FOOD WOULD RUN OUT BEFORE YOU GOT MONEY TO BUY MORE.: NEVER TRUE

## 2025-04-24 SDOH — HEALTH STABILITY: PHYSICAL HEALTH: ON AVERAGE, HOW MANY DAYS PER WEEK DO YOU ENGAGE IN MODERATE TO STRENUOUS EXERCISE (LIKE A BRISK WALK)?: 0 DAYS

## 2025-04-24 SDOH — ECONOMIC STABILITY: TRANSPORTATION INSECURITY
IN THE PAST 12 MONTHS, HAS THE LACK OF TRANSPORTATION KEPT YOU FROM MEDICAL APPOINTMENTS OR FROM GETTING MEDICATIONS?: NO

## 2025-04-24 SDOH — ECONOMIC STABILITY: GENERAL
WHICH OF THE FOLLOWING DO YOU KNOW HOW TO USE AND HAVE ACCESS TO EVERY DAY? (CHOOSE ALL THAT APPLY): SMARTPHONE WITH CELLULAR DATA PLAN

## 2025-04-24 SDOH — ECONOMIC STABILITY: TRANSPORTATION INSECURITY
IN THE PAST 12 MONTHS, HAS LACK OF TRANSPORTATION KEPT YOU FROM MEETINGS, WORK, OR FROM GETTING THINGS NEEDED FOR DAILY LIVING?: NO

## 2025-04-24 SDOH — HEALTH STABILITY: PHYSICAL HEALTH: ON AVERAGE, HOW MANY MINUTES DO YOU ENGAGE IN EXERCISE AT THIS LEVEL?: 0 MIN

## 2025-04-24 SDOH — ECONOMIC STABILITY: GENERAL
WHICH OF THE FOLLOWING WOULD YOU LIKE TO GET CONNECTED TO IN ORDER TO RECEIVE A DISCOUNT OR FOR FREE? (CHOOSE ALL THAT APPLY): NO ASSISTANCE NEEDED

## 2025-04-24 ASSESSMENT — SOCIAL DETERMINANTS OF HEALTH (SDOH)
HOW OFTEN DO YOU ATTEND CHURCH OR RELIGIOUS SERVICES?: MORE THAN 4 TIMES PER YEAR
WITHIN THE LAST YEAR, HAVE YOU BEEN AFRAID OF YOUR PARTNER OR EX-PARTNER?: NO
HOW HARD IS IT FOR YOU TO PAY FOR THE VERY BASICS LIKE FOOD, HOUSING, MEDICAL CARE, AND HEATING?: NOT HARD AT ALL
DO YOU BELONG TO ANY CLUBS OR ORGANIZATIONS SUCH AS CHURCH GROUPS UNIONS, FRATERNAL OR ATHLETIC GROUPS, OR SCHOOL GROUPS?: NO
WITHIN THE LAST YEAR, HAVE YOU BEEN HUMILIATED OR EMOTIONALLY ABUSED IN OTHER WAYS BY YOUR PARTNER OR EX-PARTNER?: NO
WITHIN THE LAST YEAR, HAVE TO BEEN RAPED OR FORCED TO HAVE ANY KIND OF SEXUAL ACTIVITY BY YOUR PARTNER OR EX-PARTNER?: NO
HOW OFTEN DO YOU GET TOGETHER WITH FRIENDS OR RELATIVES?: MORE THAN THREE TIMES A WEEK
HOW OFTEN DO YOU ATTENT MEETINGS OF THE CLUB OR ORGANIZATION YOU BELONG TO?: NEVER
WITHIN THE LAST YEAR, HAVE YOU BEEN KICKED, HIT, SLAPPED, OR OTHERWISE PHYSICALLY HURT BY YOUR PARTNER OR EX-PARTNER?: NO
IN THE PAST 12 MONTHS, HAS THE ELECTRIC, GAS, OIL, OR WATER COMPANY THREATENED TO SHUT OFF SERVICE IN YOUR HOME?: NO
IN A TYPICAL WEEK, HOW MANY TIMES DO YOU TALK ON THE PHONE WITH FAMILY, FRIENDS, OR NEIGHBORS?: MORE THAN THREE TIMES A WEEK

## 2025-04-24 ASSESSMENT — LIFESTYLE VARIABLES
SKIP TO QUESTIONS 9-10: 1
AUDIT-C TOTAL SCORE: 1
HOW OFTEN DO YOU HAVE A DRINK CONTAINING ALCOHOL: MONTHLY OR LESS
HOW OFTEN DO YOU HAVE SIX OR MORE DRINKS ON ONE OCCASION: NEVER
HOW MANY STANDARD DRINKS CONTAINING ALCOHOL DO YOU HAVE ON A TYPICAL DAY: 1 OR 2
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
## 2025-04-25 ENCOUNTER — APPOINTMENT (OUTPATIENT)
Dept: PODIATRY | Facility: CLINIC | Age: 89
End: 2025-04-25
Payer: MEDICARE

## 2025-04-25 DIAGNOSIS — L60.3 NAIL DYSTROPHY: ICD-10-CM

## 2025-04-25 DIAGNOSIS — Z76.89 ENCOUNTER FOR NAIL CARE: ICD-10-CM

## 2025-04-25 DIAGNOSIS — B35.1 DERMATOPHYTOSIS OF NAIL: Primary | ICD-10-CM

## 2025-04-25 DIAGNOSIS — L84 CALLUS: ICD-10-CM

## 2025-04-25 NOTE — PROGRESS NOTES
Chief Complaint   Patient presents with    Toenail Problem       PCP Aec Dhillon MD  Last visit 04/01/25    History Of Present Illness  Maggy Gore is a 89 y.o. female presenting with chief complaint of bilateral nail pain and calluses. She states she typically gets her nails cut every 3 months. Unfortunately her podiatrist retired. She states her nails are painful in shoe gear.     Past Medical History  She has a past medical history of AAA (abdominal aortic aneurysm) without rupture, Aortic stenosis, CKD (chronic kidney disease), Closed comminuted fracture of left patella with routine healing (07/31/2023), Colon polyp, GERD (gastroesophageal reflux disease), Gout, HTN (hypertension), Hyperlipidemia, and Hyperthyroidism.    Surgical History  She has a past surgical history that includes Appendectomy; Hysterectomy; and IR biliary drain (12/26/2023).     Social History  She reports that she quit smoking about 12 years ago. Her smoking use included cigarettes. She has never used smokeless tobacco. She reports current alcohol use. She reports that she does not use drugs.    Family History  Family History[1]     Allergies  Sulfa (sulfonamide antibiotics)    Medications  Current Medications[2]    Review of Systems    REVIEW OF SYSTEMS  GENERAL:  Negative for malaise, significant weight loss, fever      Objective:   Vasc: DP and PT pulses are palpable bilateral.  CFT is less than 3 seconds bilateral.  Skin temperature is warm to cool proximal to distal bilateral.      Neuro:  Light touch is intact to the foot bilateral.        Derm: Bilateral hallux and fifth digit nails are thickened and discolored with subungual debris consistent with mycosis. Remaining nails are incurvated and dystrophic. There are hyperkeratotic lesions noted sub 5th metatarsal base bilateral. No signs of verrucae.       Ortho: There is pain with dorsal plantar compression of nails 1-5 bilateral. Pain with side to side compression of calluses  sub 5th met base.    Assessment/Plan     Diagnoses and all orders for this visit:  Dermatophytosis of nail  Encounter for nail care  -     Referral to Podiatry  Nail dystrophy  Callus      Painful onychomycosis and nail dystrophy  Per the patient's request her nails were debrided in length and thickness today.  She understands she is not diabetic and does not have class findings and therefore this may not be a covered service by Medicare.  She understands this.    2. Calluses  Calluses debrided today. She understands these are pressure areas and will likely recur      Karen Rodriguez DPM       [1]   Family History  Problem Relation Name Age of Onset    Heart attack Mother      Other (enlarged heart.) Father      Heart defect Daughter     [2]   Current Outpatient Medications   Medication Sig Dispense Refill    acebutolol (Sectral) 200 mg capsule Take 1 capsule (200 mg) by mouth 2 times a day. 180 capsule 1    acetaminophen (Tylenol) 500 mg tablet Take 2 tablets (1,000 mg) by mouth every 6 hours if needed for mild pain (1 - 3).      amLODIPine (Norvasc) 2.5 mg tablet Take 1 tablet (2.5 mg) by mouth once daily. 90 tablet 1    cholecalciferol (Vitamin D-3) 50 MCG (2000 UT) tablet TAKE ONE TABLET BY MOUTH ONCE DAILY 90 tablet 0    cyclobenzaprine (Flexeril) 10 mg tablet Take 1 tablet (10 mg) by mouth 2 times a day as needed for muscle spasms. 90 tablet 0    dapagliflozin propanediol (Farxiga) 10 mg Take 1 tablet (10 mg) by mouth once daily. 90 tablet 3    famotidine (Pepcid) 20 mg tablet Take 1 tablet (20 mg) by mouth once daily at bedtime. 90 tablet 1    HYDROcodone-acetaminophen (Norco) 5-325 mg tablet Take 1 tablet by mouth 3 times a day as needed for severe pain (7 - 10). Do not fill before April 1, 2025. 90 tablet 0    methIMAzole (Tapazole) 5 mg tablet Take 1 tablet (5 mg) by mouth once daily. 90 tablet 0    naloxone (Narcan) 4 mg/0.1 mL nasal spray Administer 1 spray (4 mg) into affected nostril(s) if needed for  opioid reversal. May repeat every 2-3 minutes if needed, alternating nostrils, until medical assistance becomes available. 2 each 0    ondansetron (Zofran) 8 mg tablet TAKE ONE TABLET BY MOUTH EVERY 8 HOURS AS NEEDED FOR NAUSEA OR VOMITING. 30 tablet 0    pantoprazole (ProtoNix) 40 mg EC tablet Take 1 tablet (40 mg) by mouth once daily. DO NOT CRUSH CHEW OR SPLIT 90 tablet 1    polyethylene glycol (Glycolax, Miralax) 17 gram packet Take 17 g by mouth once daily. 90 packet 1    promethazine (Phenergan) 25 mg tablet TAKE ONE TABLET BY MOUTH EVERY 12 HOURS AS NEEDED FOR NAUSEA OR VOMITING. 30 tablet 0    valsartan-hydrochlorothiazide (Diovan-HCT) 320-25 mg tablet Take 1 tablet by mouth once daily. 90 tablet 1     No current facility-administered medications for this visit.

## 2025-04-30 ENCOUNTER — OFFICE VISIT (OUTPATIENT)
Dept: PAIN MEDICINE | Facility: CLINIC | Age: 89
End: 2025-04-30
Payer: MEDICARE

## 2025-04-30 VITALS
OXYGEN SATURATION: 95 % | SYSTOLIC BLOOD PRESSURE: 153 MMHG | TEMPERATURE: 97.5 F | DIASTOLIC BLOOD PRESSURE: 74 MMHG | HEART RATE: 67 BPM

## 2025-04-30 DIAGNOSIS — M54.16 LUMBAR RADICULOPATHY: Primary | ICD-10-CM

## 2025-04-30 DIAGNOSIS — M48.062 LUMBAR STENOSIS WITH NEUROGENIC CLAUDICATION: ICD-10-CM

## 2025-04-30 LAB
ALBUMIN SERPL-MCNC: 3.9 G/DL (ref 3.6–5.1)
ALP SERPL-CCNC: 101 U/L (ref 37–153)
ALT SERPL-CCNC: 9 U/L (ref 6–29)
ANION GAP SERPL CALCULATED.4IONS-SCNC: 8 MMOL/L (CALC) (ref 7–17)
AST SERPL-CCNC: 12 U/L (ref 10–35)
BASOPHILS # BLD AUTO: 62 CELLS/UL (ref 0–200)
BASOPHILS NFR BLD AUTO: 0.9 %
BILIRUB SERPL-MCNC: 0.4 MG/DL (ref 0.2–1.2)
BUN SERPL-MCNC: 51 MG/DL (ref 7–25)
CALCIUM SERPL-MCNC: 8.6 MG/DL (ref 8.6–10.4)
CHLORIDE SERPL-SCNC: 102 MMOL/L (ref 98–110)
CO2 SERPL-SCNC: 28 MMOL/L (ref 20–32)
CREAT SERPL-MCNC: 2.07 MG/DL (ref 0.6–0.95)
EGFRCR SERPLBLD CKD-EPI 2021: 23 ML/MIN/1.73M2
EOSINOPHIL # BLD AUTO: 269 CELLS/UL (ref 15–500)
EOSINOPHIL NFR BLD AUTO: 3.9 %
ERYTHROCYTE [DISTWIDTH] IN BLOOD BY AUTOMATED COUNT: 12.5 % (ref 11–15)
GLUCOSE SERPL-MCNC: 91 MG/DL (ref 65–99)
HCT VFR BLD AUTO: 35.1 % (ref 35–45)
HGB BLD-MCNC: 11.2 G/DL (ref 11.7–15.5)
LYMPHOCYTES # BLD AUTO: 2070 CELLS/UL (ref 850–3900)
LYMPHOCYTES NFR BLD AUTO: 30 %
MCH RBC QN AUTO: 30 PG (ref 27–33)
MCHC RBC AUTO-ENTMCNC: 31.9 G/DL (ref 32–36)
MCV RBC AUTO: 94.1 FL (ref 80–100)
MONOCYTES # BLD AUTO: 704 CELLS/UL (ref 200–950)
MONOCYTES NFR BLD AUTO: 10.2 %
NEUTROPHILS # BLD AUTO: 3795 CELLS/UL (ref 1500–7800)
NEUTROPHILS NFR BLD AUTO: 55 %
PLATELET # BLD AUTO: 378 THOUSAND/UL (ref 140–400)
PMV BLD REES-ECKER: 9.6 FL (ref 7.5–12.5)
POTASSIUM SERPL-SCNC: 5.3 MMOL/L (ref 3.5–5.3)
PROT SERPL-MCNC: 6.6 G/DL (ref 6.1–8.1)
RBC # BLD AUTO: 3.73 MILLION/UL (ref 3.8–5.1)
SODIUM SERPL-SCNC: 138 MMOL/L (ref 135–146)
WBC # BLD AUTO: 6.9 THOUSAND/UL (ref 3.8–10.8)

## 2025-04-30 PROCEDURE — 1125F AMNT PAIN NOTED PAIN PRSNT: CPT

## 2025-04-30 PROCEDURE — 99213 OFFICE O/P EST LOW 20 MIN: CPT

## 2025-04-30 PROCEDURE — 1123F ACP DISCUSS/DSCN MKR DOCD: CPT

## 2025-04-30 PROCEDURE — 3078F DIAST BP <80 MM HG: CPT

## 2025-04-30 PROCEDURE — 3077F SYST BP >= 140 MM HG: CPT

## 2025-04-30 PROCEDURE — 1159F MED LIST DOCD IN RCRD: CPT

## 2025-04-30 RX ORDER — HYDROCODONE BITARTRATE AND ACETAMINOPHEN 5; 325 MG/1; MG/1
1 TABLET ORAL 3 TIMES DAILY PRN
Qty: 90 TABLET | Refills: 0 | Status: SHIPPED | OUTPATIENT
Start: 2025-06-30 | End: 2025-07-30

## 2025-04-30 RX ORDER — HYDROCODONE BITARTRATE AND ACETAMINOPHEN 5; 325 MG/1; MG/1
1 TABLET ORAL 3 TIMES DAILY PRN
Qty: 90 TABLET | Refills: 0 | Status: SHIPPED | OUTPATIENT
Start: 2025-05-01 | End: 2025-05-31

## 2025-04-30 RX ORDER — HYDROCODONE BITARTRATE AND ACETAMINOPHEN 5; 325 MG/1; MG/1
1 TABLET ORAL 3 TIMES DAILY PRN
Qty: 90 TABLET | Refills: 0 | Status: SHIPPED | OUTPATIENT
Start: 2025-05-31 | End: 2025-06-30

## 2025-04-30 ASSESSMENT — PAIN SCALES - GENERAL: PAINLEVEL_OUTOF10: 7

## 2025-04-30 NOTE — PROGRESS NOTES
Subjective   Patient ID: Maggy Gore is a 89 y.o. female who presents for Med Refill.  HPI    88 yo female presents today for medication refill. She is known to this clinic for chronic low back pain. She is maintained on hydrocodone- acetaminophen 5- 325mg three times daily. She reports at least 50% pain reduction from the medication and confirms that it improves her quality of life. Pain today is rated 7/10 in the low back with radiation to the right leg. The patient denies any side effects associated with the usage of the medication patient described the medication improving the quality of life and allowing participation in day-to-day activity patient denies otherwise any new complaint patient is requesting a refill. Patient denies usage of any other opiate. Patient denies usage of any recreational drugs. Patient confirms that the opiate prescription is being used as prescribed.       Review of Systems  All 13 systems were reviewed and are within normal levels except as noted below or per HPI. Positive and pertinent negative responses are noted below or in the HPI   Denied any fever or chills. No weight loss and no night sweats. No cough or sputum production. No diarrhea   Denies constipation.   No bladder and bowel incontinence and no other changes in bladder and bowel. No skin changes. Reports tiredness and fatigability only if the pain is not controlled.   Denied opioids diversion and abuse and denies alcoholism. Denies overuse of the pain medications.      Objective   Physical Exam  General   Alert and oriented x4, pleasant and cooperative.      HEENT  Pupils are equal and normal in size. Ears, nose, mouth, and throat appear to be WNL.  Head atraumatic, symmetric.      No signs of sedation or signs of withdrawal apparent.     Psychiatric   No signs of depression apparent. Appropriate mood and affect.     Neuro   No focal neurological deficit apparent. Ambulation at baseline.      Respiratory  No  respiratory distress, respirations equal and unlabored.     Abdomen  Soft and nontender, no distention noted.     Skin  Warm, dry and intact. No skin markings supportive of recent IV drug usage .            Assessment/Plan     88 yo female with chronic low back pain associated with lumbar spinal stenosis and lumbar degenerative disc disease requiring maintenance on opiate therapy.     I have personally reviewed the [place] report for this patient. I have considered the risks of abuse, dependence, addiction and diversion. I believe that it is clinically appropriate for this patient to be prescribed this medication based on documented diagnosis.  I believe the benefits of the continuation of the opiate outweighs the risk. Patient has described positive response to the present medication therapy. Patient denies any side effects associated with the usage of the medication. Patient did not elicit any signs supportive of misuse or abuse. The review of the [place] Automated Reporting prescription is not suggestive of any worrisome pattern. Patient believes the usage of this medication has improved the quality of life and allow him to participate in day-to-day activity. Therefore  I recommend the continuation of this medication and I will be refilling the medication prescription.    The patient was counseled regarding diagnostic results, instructions for management, risk factor reductions, prognosis, patient and family education, impressions, risks and benefits of treatment options and importance of compliance with treatment.     Continue to take hydrocodone- acetaminophen 5- 325mg 1 tablet up to three times daily as needed for pain.     Physical therapy referral provided.     Follow up in 3 months or as needed.     Explained plan to this patient, and patient verbalized understanding and agreement with the plan.     Please do not hesitate to contact the pain clinic after your visit with any questions or concerns at   M-F 8-4 pm     Patient was reminded not to share medications, not to take prescription medications that were not prescribed to the patient, and not to increase or change dose without consulting the pain clinic. I advised the patient to always take the least amount of medication needed to keep symptoms under control.          Sandra Pressley, DEB-CNP 04/30/25 8:03 AM

## 2025-04-30 NOTE — PATIENT INSTRUCTIONS
Chronic Opioid Treatment Fact Sheet  While you are using opioids from [facility] you may not consume Alcohol, Use Marijuana (recreational or medical), use any other illegal drugs or use controlled substances that have not been personally prescribed to you.  A breach in any of the above will cause us to no longer prescribe opioids for you.      Using opioid medicines to treat your pain  You and your doctor have decided that opioid pain medicine might help reduce your pain and improve your function. Opioids are not likely to make your pain go away completely.  It is important to understand that this treatment involves potential risks and benefits. It is also important that you follow the guidelines in this handout and let your doctor know what you expect from your treatment. Your doctor may ask you to sign an Opioid Patient Care Agreement     What are the goals and possible benefits of opioid treatment?  The goals of treatment are to reduce your pain and improve your daily function. The benefits of opioid medicines are different from person to person. Opioids typically reduce chronic pain by about 30%. Some people find that they can function better day to day, but research has shown that this is not typical.     Experts agree that opioids may actually make pain worse, especially at high doses. “Flare-ups” are common and should not usually be treated by increasing the dose or taking extra medicine.  Your doctor will monitor how you are doing by asking you to rate your pain level and your daily functioning. He or she may want to know how far you can walk, how long you can sit, if you are able to work or do housework, and what kinds of activities you do alone or with family and friends.     What are the common side effects and risks of opioids?  Opioids cause common side effects that can be unpleasant. They can also increase risks of serious health effects that occur less often. Because opioids have risks that can  be serious, your doctor may ask you for urine or blood sample to help protect your safety.     Side effects vary from person to person. You and your doctor will work together to monitor how opioids affect you. Your doctor may need to adjust your dose until you find the right balance between pain reduction, improved function, and side effects.     It is normal to develop physical dependence to opioids:   Physical dependence means your body has adapted to the medicine and you will experience tolerance and withdrawal.   Tolerance means you need to take more of the medicine to get the same effect.   Withdrawal means you will have symptoms when you stop using the medicine.   Opioids Induced Hyperalgesia (paradoxical hyperalgesia) is serious but less common side effect where the opioids medication became the source of increased pain. Your physician may have to wean you off the meds to help you with the pain.     Withdrawal symptoms are usually the opposite of the effects of the medicine.For example, if the medicine causes constipation, the withdrawal symptom would be diarrhea. If the medicine reduces pain, the symptom would be increased pain. Withdrawal from opioids is temporary and usually not dangerous.     Babies born to mothers taking opioids will be dependent on opioids at birth. You should not take opioids if you are trying to get pregnant. If you do get pregnant while taking opioids, let your doctor know right away.     People who have had problems with mental health, drugs, or alcohol are more likely to have problems with opioids. You must tell your doctor about any mental illness, substance abuse, or addiction of any type have experienced in the past. You must also tell your doctor if anyone in your family has had these problems. Research shows these problems sometimes run in families.  Experts agree that people with active substance abuse or addiction problems should not use opioids for chronic non-cancer pain.  If you have problems with substance abuse or addiction, it is important to let your doctor know so you can get the help you need. Tell your doctor right away if you feel you are becoming addicted to opioids.              Common side effects  Constipation  Opioid medicines cause constipation. You may need to be treated for this while you are taking opioids.  Sedation  Many opioid medications can make you feel drowsy, slow your reaction time, and cause loss of  coordination. They can also make it hard to concentrate and think clearly.  Do not drive or use dangerous equipment until you are sure that opioids do not affect your reaction time or thinking ability. It may take a week or longer before you know if you can drive safely while taking opioids.   If you are in a traffic accident while driving on opioids, you may be considered to be “driving  under the influence” (DUI).     Other side effects and withdrawal symptoms:     Other side effect Withdrawal Symptoms   Rash and/or Itching Sweating   Dry eyes Nausea   Blurred vision Abdominal pain/cramping   Nausea and vomiting Diarrhea   Inability to urinate Trouble sleeping   Low blood pressure Muscle ahes   Slow heart beat Fast heart beat   Depressed mood Anxiety   Slow breathing Runny nose   Problem with balance “Goose bumps”   Decrease sex drive (Decrease Testosterone)     Decrease Immune function     Swelling in hands and feet     Jerking of arms and legs     Increased Sensitivity to pain     Distruption of Normal sleep     Dental problems     Apathy     Falls resulting in fractures           Risk of serious bodily harm or death  Opioid pain medicines can cause serious bodily harm or death. Higher doses appear to cause more side effects, some of which can lead to injuries like serious fractures due to falls.   Higher doses increase the risk of overdose. An overdose of opioids, whether by accident or on purpose, can cause serious bodily harm or death. Research continues  to show more and more problems with long-term opioid use, especially at high doses.     Using more opioids than your doctor prescribes can cause you to become dangerously sedated, to stop breathing, or to overdose. Combining opioids with certain other medicines or with alcohol or drugs can have the same effect.              Some opioids have higher risks  There are special problems with some opioids. For example:  meperidine (Demerol) and tramadol (Ultram) are associated with increased seizure risk.  Methadone stays in the body for many days, which increases the risk of overdose. It can also cause heart rhythm problems.   Opioids, that contain acetaminophen, such as Vicodin and Percocet, can harm the liver when taken long term or at high doses.           Are there alternatives to opioid treatment for chronic non-cancer pain?  Your doctor may prescribe other treatments to help your pain and to help you do daily activities better.  These may include exercise, psychological counseling, and medicines that are not opioids. Please be sure to discuss these options with your doctor.     Questions?  Please ask your doctor any questions you have about taking opioids.

## 2025-05-07 ENCOUNTER — APPOINTMENT (OUTPATIENT)
Dept: PRIMARY CARE | Facility: CLINIC | Age: 89
End: 2025-05-07
Payer: COMMERCIAL

## 2025-05-07 VITALS
DIASTOLIC BLOOD PRESSURE: 68 MMHG | HEART RATE: 68 BPM | WEIGHT: 135 LBS | SYSTOLIC BLOOD PRESSURE: 126 MMHG | BODY MASS INDEX: 23.91 KG/M2 | OXYGEN SATURATION: 98 %

## 2025-05-07 DIAGNOSIS — K21.9 GASTROESOPHAGEAL REFLUX DISEASE WITHOUT ESOPHAGITIS: ICD-10-CM

## 2025-05-07 DIAGNOSIS — M62.838 MUSCLE SPASMS OF LOWER EXTREMITY, UNSPECIFIED LATERALITY: ICD-10-CM

## 2025-05-07 DIAGNOSIS — M47.26 OSTEOARTHRITIS OF SPINE WITH RADICULOPATHY, LUMBAR REGION: ICD-10-CM

## 2025-05-07 DIAGNOSIS — I10 PRIMARY HYPERTENSION: ICD-10-CM

## 2025-05-07 DIAGNOSIS — H52.03 HYPEROPIA WITH PRESBYOPIA OF BOTH EYES: ICD-10-CM

## 2025-05-07 DIAGNOSIS — I25.10 CORONARY ARTERY DISEASE INVOLVING NATIVE CORONARY ARTERY OF NATIVE HEART WITHOUT ANGINA PECTORIS: ICD-10-CM

## 2025-05-07 DIAGNOSIS — N18.4 CKD STAGE G4/A1, GFR 15-29 AND ALBUMIN CREATININE RATIO <30 MG/G (MULTI): ICD-10-CM

## 2025-05-07 DIAGNOSIS — M25.473 ANKLE EDEMA: Primary | ICD-10-CM

## 2025-05-07 DIAGNOSIS — E05.90 HYPERTHYROIDISM: ICD-10-CM

## 2025-05-07 DIAGNOSIS — R53.83 FATIGUE, UNSPECIFIED TYPE: ICD-10-CM

## 2025-05-07 DIAGNOSIS — J45.20 MILD INTERMITTENT REACTIVE AIRWAY DISEASE WITHOUT COMPLICATION (HHS-HCC): ICD-10-CM

## 2025-05-07 DIAGNOSIS — K21.9 GASTROESOPHAGEAL REFLUX DISEASE, UNSPECIFIED WHETHER ESOPHAGITIS PRESENT: ICD-10-CM

## 2025-05-07 DIAGNOSIS — E78.2 MIXED HYPERLIPIDEMIA: ICD-10-CM

## 2025-05-07 DIAGNOSIS — Z00.00 ROUTINE GENERAL MEDICAL EXAMINATION AT HEALTH CARE FACILITY: ICD-10-CM

## 2025-05-07 DIAGNOSIS — E55.9 VITAMIN D DEFICIENCY: ICD-10-CM

## 2025-05-07 DIAGNOSIS — K52.9 ACUTE GASTROENTERITIS: ICD-10-CM

## 2025-05-07 DIAGNOSIS — H52.4 HYPEROPIA WITH PRESBYOPIA OF BOTH EYES: ICD-10-CM

## 2025-05-07 DIAGNOSIS — N18.32 STAGE 3B CHRONIC KIDNEY DISEASE (MULTI): ICD-10-CM

## 2025-05-07 PROCEDURE — G2211 COMPLEX E/M VISIT ADD ON: HCPCS | Performed by: FAMILY MEDICINE

## 2025-05-07 PROCEDURE — 99214 OFFICE O/P EST MOD 30 MIN: CPT | Performed by: FAMILY MEDICINE

## 2025-05-07 RX ORDER — METHIMAZOLE 5 MG/1
5 TABLET ORAL DAILY
Qty: 90 TABLET | Refills: 0 | Status: SHIPPED | OUTPATIENT
Start: 2025-05-07

## 2025-05-07 RX ORDER — FUROSEMIDE 40 MG/1
40 TABLET ORAL DAILY
Qty: 30 TABLET | Refills: 11 | Status: SHIPPED | OUTPATIENT
Start: 2025-05-07 | End: 2026-05-07

## 2025-05-07 RX ORDER — FUROSEMIDE 20 MG/1
TABLET ORAL
Qty: 270 TABLET | Refills: 3 | Status: CANCELLED | OUTPATIENT
Start: 2025-05-07 | End: 2026-05-07

## 2025-05-07 ASSESSMENT — ENCOUNTER SYMPTOMS
LOSS OF SENSATION IN FEET: 0
DEPRESSION: 0
OCCASIONAL FEELINGS OF UNSTEADINESS: 0

## 2025-05-07 NOTE — PROGRESS NOTES
Subjective   Patient ID: Maggy Gore is a 89 y.o. female who presents for Hypertension (This patient is here today to follow up on HTN. This patient is currently taking valsartan. This patient states that their current medication treatment for this issue is working well for them.  This patient denies any symptoms of headache, dizziness, blurry vision, or lightheadedness./).  History of Present Illness  Maggy Gore is an 89 year old female who presents for follow-up and management of her conditions.    She feels generally better but experiences frequent tiredness and 'stupid headaches'. She continues to take Tapazole once daily for her hyperthyroidism. Her vitamin D deficiency is reportedly well-managed.    She has a history of back pain, which has been bothering her recently. 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 She wears a belt provided by workman's compensation when the pain is significant and has previously used a TENS unit. She is supposed to start therapy for her back pain but has not yet attended any sessions.    She takes Lasix 40 mg as needed for knee swelling, approximately once or twice a week. She has a history of heartburn, which she describes as hurting 'off and on'.    She has had cataracts removed and reports that her eyes are doing okay. She mentions having hyperopia and presbyopia.    Her kidney disease is described as 'holding its own', and she notes that her coronary artery disease is 'fine'. She is no longer taking Lipitor due to increased liver function tests, and her cholesterol is reportedly doing well without it.    Review of Systems  12 Systems have been reviewed as follows.  Constitutional: Fever, weight gain, weight loss, appetite change, night sweats, fatigue, chills.  Eyes : blurry, double vision, vision, loss, tearing, redness, pain, sensitivity to light, glaucoma.  Ears, nose, mouth, and  throat: Hearing loss, ringing in the ears, ear pain, nasal congestion, nasal drainage, nosebleeds, mouth, throat, irritation tooth problem.  Cardiovascular :chest pain, pressure, heart racing, palpitations, sweating, leg swelling, high or low blood pressure  Pulmonary: Cough, yellow or green sputum, blood and sputum, shortness of breath, wheezing  Gastrointestinal: Nausea, vomiting, diarrhea, constipation, pain, blood in stool, or vomitus, heartburn, difficulty swallowing  Genitourinary: incontinence, abnormal bleeding, abnormal discharge, urinary frequency, urinary hesitancy, pain, impotence sexual problem, infection, urinary retention  Musculoskeletal: Pain, stiffness, joint, redness or warmth, arthritis, back pain, weakness, muscle wasting, sprain or fracture  Neuro: Weight weakness, dizziness, change in voice, change in taste change in vision, change in hearing, loss, or change of sensation, trouble walking, balance problems coordination problems, shaking, speech problem  Endocrine , cold or heat intolerance, blood sugar problem, weight gain or loss missed periods hot flashes, sweats, change in body hair, change in libido, increased thirst, increased urination  Heme/lymph: Swelling, bleeding, problem anemia, bruising, enlarged lymph nodes  Allergic/immunologic: H. plus nasal drip, watery itchy eyes, nasal drainage, immunosuppressed  The above were reviewed and noted negative except as noted in HPI and Problem List.    Objective     /68   Pulse 68   Wt 61.2 kg (135 lb)   SpO2 98%   BMI 23.91 kg/m²      Physical Exam    Constitutional: Well developed, well nourished, alert and in no acute distress   Eyes: Normal external exam. Pupils equally round and reactive to light with normal accommodation and extraocular movements intact.  Neck: Supple, no lymphadenopathy or masses.   Cardiovascular: Regular rate and rhythm, normal S1 and S2, no murmurs, gallops, or rubs. Radial pulses normal. No peripheral  edema.  Pulmonary: No respiratory distress, lungs clear to auscultation bilaterally. No wheezes, rhonchi, rales.  Abdomen: soft,non tender, non distended, without masses or HSM  Skin: Warm, well perfused, normal skin turgor and color.   Neurologic: Cranial nerves II-XII grossly intact.   Psychiatric: Mood calm and affect normal  Musculoskeletal: Moving all extremities without restriction  The above were reviewed and noted negative except as noted in HPI and Problem List.      Results  LABS  WBC: 11.2         Assessment & Plan  Back pain  Chronic lower back pain exacerbated by previous work-related injury. She uses a belt for support and has been advised to undergo physical therapy but has not yet attended.  - Arrange physical therapy referral for lower back pain.    Hyperthyroidism  Hyperthyroidism is managed with Tapazole.  - Continue Tapazole.  - Order blood tests to monitor thyroid function.    Primary hypertension  Blood pressure is well-controlled.    Chronic kidney disease, unspecified  Chronic kidney disease is well-managed.    Vitamin D deficiency  Vitamin D deficiency is well-managed.    Cataracts  Cataracts have been surgically removed and are not currently an issue.    Problem List Items Addressed This Visit       Hyperopia with presbyopia of both eyes    Relevant Orders    Follow Up In [facility] - PCP - Established    Mixed hyperlipidemia    Relevant Orders    Comprehensive Metabolic Panel    Lipid Panel    Follow Up [facility] - PCP - Established    Hyperthyroidism    Relevant Medications    methIMAzole (Tapazole) 5 mg tablet    Other Relevant Orders    Thyroid Stimulating Hormone    Free T4 Index    Follow Up [facility] - PCP - Established    Vitamin D deficiency    Relevant Orders    Vitamin D 25-Hydroxy,Total (for eval of Vitamin D levels)    Follow Up [facility] - PCP - Established    Reactive airway disease without complication (HHS-HCC)     Relevant Orders    Follow Up [facility] - PCP - Established    Primary hypertension    Relevant Orders    Follow Up [facility] - PCP - Established    Gastroesophageal reflux disease    Relevant Orders    Follow Up [facility] - PCP - Established    Follow Up [facility] - PCP - Established    Fatigue    Relevant Orders    CBC and Auto Differential    Follow Up [facility] - PCP - Established    Stage 3b chronic kidney disease (Multi)    Relevant Orders    Follow Up [facility] - PCP - Established    Coronary artery 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    Relevant Orders    Follow Up [facility] - PCP - Established    CKD stage G4/A1, 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)    Relevant Orders    Follow Up [facility] - PCP - Established    Acute gastroenteritis    Relevant Orders    Follow Up [facility] - PCP - Established     Other Visit Diagnoses         Ankle edema    -  Primary    Relevant Medications    furosemide (Lasix) 40 mg tablet    Other Relevant Orders    Follow Up [facility] - PCP - Established      Muscle spasms of lower extremity, unspecified laterality        Relevant Orders    Follow Up [facility] - PCP - Established      Routine general medical examination at health care facility        Relevant Orders    Follow Up [facility] - PCP - Established      Osteoarthritis of spine with radiculopathy, lumbar region        Relevant Orders    Referral to Physical Therapy    Follow Up [facility] - PCP - Established         Continue current medications and therapy for chronic medical conditions    INC LFTs       Stop lipitor     Decr norvasc to 2.5 mg every day     Fu weeks    BW prior     Continue current medications and therapy for chronic medical conditions     Do knee raises and ice area.      continue farxiga 10 mg daily for CKD  Pharmacy referral done     Metamucil every day     BW prior       prevnar done        & [facility] pharmacy soon     Drink Gatorade with calories      Last surgery was 2/23/24.  Off work since 12/23 and RTW 5/1/24 per Dr Johnson cholecystectomy     Ace Dhillon MD       This medical note was created with the assistance of artificial intelligence (AI) for documentation purposes. The content has been reviewed and confirmed by the healthcare provider for accuracy and completeness. Patient consented to the use of audio recording and use of AI during their visit.

## 2025-05-14 ENCOUNTER — PATIENT OUTREACH (OUTPATIENT)
Dept: PRIMARY CARE | Facility: CLINIC | Age: 89
End: 2025-05-14
Payer: COMMERCIAL

## 2025-05-14 DIAGNOSIS — I10 PRIMARY HYPERTENSION: ICD-10-CM

## 2025-05-14 DIAGNOSIS — N18.32 STAGE 3B CHRONIC KIDNEY DISEASE (MULTI): ICD-10-CM

## 2025-05-14 DIAGNOSIS — E78.2 MIXED HYPERLIPIDEMIA: ICD-10-CM

## 2025-05-14 NOTE — PROGRESS NOTES
Care Management Monthly Outreach  Chart review completed  Confirmation of at least 2 patient identifiers  Change in insurance? No    Has patient been to ER/Urgent Care since last outreach? No    Last Office Visit with PCP: 5/7/2025   Next Office Visit with PCP: 6/9/2025   [facility] Collaboration: Pharmacy    Chronic Conditions and Outreach Summary:   Mixed hyperlipidemia    Primary hypertension    Stage 3b chronic kidney disease (Multi)    Pt stopped taking Lipitor due to increased LFT's. This has been removed from her medication list. Last lipid panel in February was unremarkable. Lab orders are in to be drawn before 6/9/25 appointment and include another lipid panel for comparison.     Pt denies concerns related to HTN. No s/s of hypertension or hypotension. Pt says she does get headaches but has started taking an allergy pill which helps.    Pt eating and drinking adequately.     Pt will start PT soon for her back pain. Pt also using TENS unit which she said helps. Pt keeps busy around the house and is considering applying for a part time job.     Medications:   Are there medication changes since last visit? No  Refills needed? No    Social Drivers of Health: Addressed in the last 6 months  Care Gaps Addressed? Deferred  Care Plan addressed: Yes    Upcoming Appointments:   Future Appointments       Date / Time Provider Department Dept Phone    6/2/2025 9:30 AM Emili Beltran, PT [facility] [phone]    6/9/2025 1:15 PM (Arrive by 1:00 PM) Ace Dhillon MD [facility] [phone]    7/23/2025 9:30 AM (Arrive by 9:15 AM) Sandra Pressley, APRN-CNP [facility] [phone]    7/25/2025 9:30 AM Karen Rodriguez DPM [facility] [phone]    12/16/2025 10:00 AM Jas Blanchard MD [facility] [phone]          Blood Pressures Reviewed  BP Readings from Last 3 Encounters:   05/07/25 126/68   04/30/25 153/74   04/01/25 136/74     Labs  "Reviewed:  Lab Results   Component Value Date    CREATININE 2.07 (H) 04/30/2025    GLUCOSE 91 04/30/2025    ALKPHOS 101 04/30/2025    K 5.3 04/30/2025    PROT 6.6 04/30/2025     04/30/2025    CALCIUM 8.6 04/30/2025    AST 12 04/30/2025    ALT 9 04/30/2025    BUN 51 (H) 04/30/2025    MG 2.04 12/28/2023    PHOS 4.7 06/16/2023    GFRF 32 (A) 08/07/2023     Lab Results   Component Value Date    TRIG 101 02/25/2025    CHOL 169 02/25/2025    LDLCALC 83 02/25/2025    HDL 67 02/25/2025     No results found for: \"HGBA1C\"  Lab Results   Component Value Date    WBC 6.9 04/30/2025    RBC 3.73 (L) 04/30/2025    HGB 11.2 (L) 04/30/2025     04/30/2025   No other concerns at this time.  Agreeable to continue monthly outreaches.  Encouraged to call if questions or concerns arise.    Ileana Snell, RN  Chronic Care Manager  [phone]    "

## 2025-06-02 ENCOUNTER — EVALUATION (OUTPATIENT)
Dept: PHYSICAL THERAPY | Facility: CLINIC | Age: 89
End: 2025-06-02
Payer: MEDICARE

## 2025-06-02 DIAGNOSIS — M47.26 OSTEOARTHRITIS OF SPINE WITH RADICULOPATHY, LUMBAR REGION: ICD-10-CM

## 2025-06-02 DIAGNOSIS — G89.29 CHRONIC BILATERAL LOW BACK PAIN WITH BILATERAL SCIATICA: Primary | ICD-10-CM

## 2025-06-02 DIAGNOSIS — M54.42 CHRONIC BILATERAL LOW BACK PAIN WITH BILATERAL SCIATICA: Primary | ICD-10-CM

## 2025-06-02 DIAGNOSIS — M54.41 CHRONIC BILATERAL LOW BACK PAIN WITH BILATERAL SCIATICA: Primary | ICD-10-CM

## 2025-06-02 LAB
25(OH)D3+25(OH)D2 SERPL-MCNC: 67 NG/ML (ref 30–100)
ALBUMIN SERPL-MCNC: 4.1 G/DL (ref 3.6–5.1)
ALP SERPL-CCNC: 92 U/L (ref 37–153)
ALT SERPL-CCNC: 10 U/L (ref 6–29)
ANION GAP SERPL CALCULATED.4IONS-SCNC: 9 MMOL/L (CALC) (ref 7–17)
AST SERPL-CCNC: 14 U/L (ref 10–35)
BASOPHILS # BLD AUTO: 67 CELLS/UL (ref 0–200)
BASOPHILS NFR BLD AUTO: 1 %
BILIRUB SERPL-MCNC: 0.6 MG/DL (ref 0.2–1.2)
BUN SERPL-MCNC: 40 MG/DL (ref 7–25)
CALCIUM SERPL-MCNC: 9.2 MG/DL (ref 8.6–10.4)
CHLORIDE SERPL-SCNC: 103 MMOL/L (ref 98–110)
CHOLEST SERPL-MCNC: 210 MG/DL
CHOLEST/HDLC SERPL: 3.1 (CALC)
CO2 SERPL-SCNC: 26 MMOL/L (ref 20–32)
CREAT SERPL-MCNC: 1.61 MG/DL (ref 0.6–0.95)
EGFRCR SERPLBLD CKD-EPI 2021: 31 ML/MIN/1.73M2
EOSINOPHIL # BLD AUTO: 241 CELLS/UL (ref 15–500)
EOSINOPHIL NFR BLD AUTO: 3.6 %
ERYTHROCYTE [DISTWIDTH] IN BLOOD BY AUTOMATED COUNT: 12.8 % (ref 11–15)
FT4I SERPL CALC-MCNC: 2.5 (ref 1.4–3.8)
GLUCOSE SERPL-MCNC: 97 MG/DL (ref 65–99)
HCT VFR BLD AUTO: 38.4 % (ref 35–45)
HDLC SERPL-MCNC: 68 MG/DL
HGB BLD-MCNC: 12.3 G/DL (ref 11.7–15.5)
LDLC SERPL CALC-MCNC: 123 MG/DL (CALC)
LYMPHOCYTES # BLD AUTO: 2017 CELLS/UL (ref 850–3900)
LYMPHOCYTES NFR BLD AUTO: 30.1 %
MCH RBC QN AUTO: 30.1 PG (ref 27–33)
MCHC RBC AUTO-ENTMCNC: 32 G/DL (ref 32–36)
MCV RBC AUTO: 94.1 FL (ref 80–100)
MONOCYTES # BLD AUTO: 670 CELLS/UL (ref 200–950)
MONOCYTES NFR BLD AUTO: 10 %
NEUTROPHILS # BLD AUTO: 3705 CELLS/UL (ref 1500–7800)
NEUTROPHILS NFR BLD AUTO: 55.3 %
NONHDLC SERPL-MCNC: 142 MG/DL (CALC)
PLATELET # BLD AUTO: 416 THOUSAND/UL (ref 140–400)
PMV BLD REES-ECKER: 9.5 FL (ref 7.5–12.5)
POTASSIUM SERPL-SCNC: 5.1 MMOL/L (ref 3.5–5.3)
PROT SERPL-MCNC: 7.1 G/DL (ref 6.1–8.1)
RBC # BLD AUTO: 4.08 MILLION/UL (ref 3.8–5.1)
SODIUM SERPL-SCNC: 138 MMOL/L (ref 135–146)
T3RU NFR SERPL: 31 % (ref 22–35)
T4 SERPL-MCNC: 8.2 MCG/DL (ref 5.1–11.9)
TRIGL SERPL-MCNC: 90 MG/DL
TSH SERPL-ACNC: 1.6 MIU/L (ref 0.4–4.5)
WBC # BLD AUTO: 6.7 THOUSAND/UL (ref 3.8–10.8)

## 2025-06-02 PROCEDURE — 97110 THERAPEUTIC EXERCISES: CPT | Mod: GP

## 2025-06-02 PROCEDURE — 97161 PT EVAL LOW COMPLEX 20 MIN: CPT | Mod: GP

## 2025-06-02 ASSESSMENT — ENCOUNTER SYMPTOMS
DEPRESSION: 0
LOSS OF SENSATION IN FEET: 1
OCCASIONAL FEELINGS OF UNSTEADINESS: 1

## 2025-06-02 NOTE — PROGRESS NOTES
"Patient Name: Maggy Gore  MRN: [id number]  Today's Date: 6/2/2025  Visit #1  Time Calculation  Start Time: 0930  Stop Time: 1015  Time Calculation (min): 45 min  2025 // 0% coins, $257 ded (met), no copay, bmn jenelle yr, no PA.    Medicare A/B // Med [place] [employer]  $0 spent towards therapy cap as of 5/7/25.  KX mod needed after ~20v jenelle yr.  20560 - 20561, 03269 not covered    Referring Provider: Ace Dhillon MD    Subjective:  Chief Complaint: Pt complains of chronic low back pain with pain that shoots down both legs and has been progressing into the right foot since this past fall/ winter. Pain has been worsening over the years. Pt also complains of numbness/tingling in both legs that goes past the knees.   Onset Date: 1979, worsening since December 2025  Pain intensity at Best: 5/10  Pain intensity at Worst: 10/10  Aggravating factors: Walking too much, sit to stand, bending forward  Easing factors: TENS, pain medication (Hydrocodone), lumbar support belt  PLOF: Independent with all ADLSs  Current Activity Status: Takes more breaks, is unable to participate in physical activity due to pain  PMH Prevalent to episode: Stage 4 CKD, CHF  Occupation: [employer] at [employer]  Sleep Status: Sleeps 2 hours at a time   Therapy Goals: \"less pain\"    Objective:  Measurement and OM  *Several tests and measures have been deferred due to severity of pain and difficulty tolerating position changes, as well as time constraint  **Denotes pain    Review of Systems:  Pt denies any bowel/bladder changes/ saddle anesthesias    Posture and Observation:  Anterior pelvic tilt  Pt sits with forward head posture and excessive thoracic kyphosis    Palpation and Joint Mobility Testing:   TTP lumbar spine, paraspinals  Decreased joint mobility of B hips, empty end feel    Sensation Testing:  Decreased sensation discrimination R compared to left, R distal to the knee unable to discrimination touch (L4, L5 " dermatomes)    Functional Assessment:  Tandem Stance: B <5s, requiring UE use **  Heel Raise: 25% depth, UE use **  Gait: Antalgic gait, slow sue decreased heel strike on R, forward trunk lean  5x STS: 49.6s, decreased posterior chain control, UE use required **pain fluctuated throughout    Lumbar ROM  Flexion: 25%**   Extension: 0%**  Side bend (L/R): 25%**/25%**  Rotation (L/R): 50%**/50%**    Hip PROM (L/R) (grossly measured)  Flexion: 120°/115°  Abduction: 30°/30°  Extension: 5°/3°  External Rotation: 30°/25°  Internal rotation: 15°/15°    Trunk Flexors Strenth: 2/5    LQ Neuro Screen / LE Strength  Myotomes (L/R):  L2 Hip Flexion: 3+/5, 3/5  L3 Knee Extension: 4/5, 4-/5  L4 Ankle Dorsiflexion: 4/5, 3+/5    Special Tests  Straight Leg Raise: Positive R    Modified PACO: 40%    Treatment:  PT Evaluation Time Entry  PT Evaluation (Low) Time Entry: 20  PT Therapeutic Procedures Time Entry  Therapeutic Exercise Time Entry: 25  HEP Initiated and Reviewed  Access Code: [email or web address]  Exercises  - Seated Posterior Pelvic Tilt  - 2 x daily - 7 x weekly - 1 sets - 10 reps  - Seated March  - 2 x daily - 7 x weekly - 1 sets - 10 reps  - Seated Long Arc Quad  - 2 x daily - 7 x weekly - 1 sets - 10 reps  - Seated Ankle Pumps  - 2 x daily - 7 x weekly - 1 sets - 10 reps  Education regarding prevalent anatomy, activity modifications, and prognosis     Assessment:   Maggy is an 89 year old female who presents with chronic low back pain with radiating pain that is likely due to degenerative changes as well as a mechanical injury years ago. Pt demonstrates signs and symptoms consistent with lumbar radiculopathy. Tests and measures indicate pain reducing mobility and weakness of bilateral lower extremities R>L. Pt would benefit from skilled PT intervention to address deficits and return to prior functional levels. Pt treatment includes: manual techniques to decrease pain and improve joint/soft-tissue mobility, as well as exercises  to increase strength, endurance, and neuromotor control.      This patient's clinical presentation demonstrates minimal factors influencing daily activities, indicating a low complexity. There are no significant comorbidities that complicate the desired treatment plan.  PT services are warranted in order to account measurable change in the above outcome measures and achieve improvements in the patient's functional status as well as prevention of future episodes.     Plan:   Planned interventions include: education/instruction, manual therapy, neuromuscular re-education, self care/home management, therapeutic activities and therapeutic exercises.   Potential to achieve rehab goals: Fair    POC: 1-2x per week/ x20 visits    Goals:   Pt will demonstrate compliance and independence to home exercise program.   Pt will have a self reported resting pain pain score decrease >2 points to meet the MCID.  Pt will decrease score of Modified PACO by > 6 points to meet the MCID and return to ADLs.   Pt will decrease time on 5x sit to stand test by > 2.3 sec to meet the MCID to reduce fall risk.   Pt will improve tandem balance to >10s B to improve walking balance.   Pt will sleep >6 hours to improve sleep hygiene to aid in recovery.     Emili Beltran, PT, DPT

## 2025-06-09 ENCOUNTER — APPOINTMENT (OUTPATIENT)
Dept: PRIMARY CARE | Facility: CLINIC | Age: 89
End: 2025-06-09
Payer: COMMERCIAL

## 2025-06-09 VITALS
WEIGHT: 133.6 LBS | OXYGEN SATURATION: 95 % | DIASTOLIC BLOOD PRESSURE: 72 MMHG | BODY MASS INDEX: 23.67 KG/M2 | SYSTOLIC BLOOD PRESSURE: 124 MMHG | HEART RATE: 74 BPM

## 2025-06-09 DIAGNOSIS — R53.83 FATIGUE, UNSPECIFIED TYPE: ICD-10-CM

## 2025-06-09 DIAGNOSIS — M62.838 MUSCLE SPASMS OF LOWER EXTREMITY, UNSPECIFIED LATERALITY: ICD-10-CM

## 2025-06-09 DIAGNOSIS — Z76.89 ENCOUNTER FOR NAIL CARE: Primary | ICD-10-CM

## 2025-06-09 DIAGNOSIS — M79.10 MUSCLE PAIN: ICD-10-CM

## 2025-06-09 DIAGNOSIS — N18.32 STAGE 3B CHRONIC KIDNEY DISEASE (MULTI): ICD-10-CM

## 2025-06-09 DIAGNOSIS — E78.2 MIXED HYPERLIPIDEMIA: ICD-10-CM

## 2025-06-09 DIAGNOSIS — H52.4 HYPEROPIA WITH PRESBYOPIA OF BOTH EYES: ICD-10-CM

## 2025-06-09 DIAGNOSIS — M47.26 OSTEOARTHRITIS OF SPINE WITH RADICULOPATHY, LUMBAR REGION: ICD-10-CM

## 2025-06-09 DIAGNOSIS — I10 PRIMARY HYPERTENSION: ICD-10-CM

## 2025-06-09 DIAGNOSIS — N18.4 CKD STAGE G4/A1, GFR 15-29 AND ALBUMIN CREATININE RATIO <30 MG/G (MULTI): ICD-10-CM

## 2025-06-09 DIAGNOSIS — K52.9 ACUTE GASTROENTERITIS: ICD-10-CM

## 2025-06-09 DIAGNOSIS — Z00.00 ROUTINE GENERAL MEDICAL EXAMINATION AT HEALTH CARE FACILITY: ICD-10-CM

## 2025-06-09 DIAGNOSIS — K21.9 GASTROESOPHAGEAL REFLUX DISEASE, UNSPECIFIED WHETHER ESOPHAGITIS PRESENT: ICD-10-CM

## 2025-06-09 DIAGNOSIS — J45.20 MILD INTERMITTENT REACTIVE AIRWAY DISEASE WITHOUT COMPLICATION (HHS-HCC): ICD-10-CM

## 2025-06-09 DIAGNOSIS — E05.90 HYPERTHYROIDISM: ICD-10-CM

## 2025-06-09 DIAGNOSIS — K21.9 GASTROESOPHAGEAL REFLUX DISEASE WITHOUT ESOPHAGITIS: ICD-10-CM

## 2025-06-09 DIAGNOSIS — M25.473 ANKLE EDEMA: ICD-10-CM

## 2025-06-09 DIAGNOSIS — H52.03 HYPEROPIA WITH PRESBYOPIA OF BOTH EYES: ICD-10-CM

## 2025-06-09 DIAGNOSIS — E55.9 VITAMIN D DEFICIENCY: ICD-10-CM

## 2025-06-09 DIAGNOSIS — I25.10 CORONARY ARTERY DISEASE INVOLVING NATIVE CORONARY ARTERY OF NATIVE HEART WITHOUT ANGINA PECTORIS: ICD-10-CM

## 2025-06-09 PROCEDURE — 99214 OFFICE O/P EST MOD 30 MIN: CPT | Performed by: FAMILY MEDICINE

## 2025-06-09 PROCEDURE — G2211 COMPLEX E/M VISIT ADD ON: HCPCS | Performed by: FAMILY MEDICINE

## 2025-06-09 RX ORDER — CHOLECALCIFEROL (VITAMIN D3) 50 MCG
50 TABLET ORAL DAILY
Qty: 90 TABLET | Refills: 0 | Status: SHIPPED | OUTPATIENT
Start: 2025-06-09

## 2025-06-09 RX ORDER — PANTOPRAZOLE SODIUM 40 MG/1
40 TABLET, DELAYED RELEASE ORAL DAILY
Qty: 90 TABLET | Refills: 1 | Status: SHIPPED | OUTPATIENT
Start: 2025-06-09

## 2025-06-09 RX ORDER — AMLODIPINE BESYLATE 2.5 MG/1
2.5 TABLET ORAL DAILY
Qty: 90 TABLET | Refills: 1 | Status: SHIPPED | OUTPATIENT
Start: 2025-06-09

## 2025-06-09 RX ORDER — CYCLOBENZAPRINE HCL 10 MG
10 TABLET ORAL 2 TIMES DAILY PRN
Qty: 90 TABLET | Refills: 0 | Status: SHIPPED | OUTPATIENT
Start: 2025-06-09

## 2025-06-09 NOTE — PROGRESS NOTES
Subjective   Patient ID: Maggy Gore is a 89 y.o. female who presents for Follow-up (She has a history of back pain, which has been bothering her recently.//Medication refills. ).  History of Present Illness  The patient presents for back pain, headaches, and medication management.    She reports experiencing significant back pain, which she attributes to her work activities. She has been referred to physical therapy for her back condition. She also mentions a recent incident where she experienced severe back pain after bending down while performing yard work, which resulted in temporary immobility. The pain radiates down her right leg and extends into her foot. She is scheduled to return to work next week, which involves lifting heavy objects such as tables. She is going to [place] at the end of the month for a couple of days for her [employer] birthday party.    She has been experiencing recurrent headaches.    She is seeking a referral to Dr. Ojeda, a podiatrist, for further evaluation and management of her foot condition.    She is currently on a regimen of pantoprazole, amlodipine, and cyclobenzaprine, the latter of which she takes as needed for muscle spasms. She also takes a pain medication concurrently with cyclobenzaprine. She has been prescribed Farxiga and has one refill remaining. She was previously on atorvastatin but was advised to discontinue it.  See Above  Review of Systems  12 Systems have been reviewed as follows.  Constitutional: Fever, weight gain, weight loss, appetite change, night sweats, fatigue, chills.  Eyes : blurry, double vision, vision, loss, tearing, redness, pain, sensitivity to light, glaucoma.  Ears, nose, mouth, and throat: Hearing loss, ringing in the ears, ear pain, nasal congestion, nasal drainage, nosebleeds, mouth, throat, irritation tooth problem.  Cardiovascular :chest pain, pressure, heart racing, palpitations, sweating, leg swelling, high or low blood  pressure  Pulmonary: Cough, yellow or green sputum, blood and sputum, shortness of breath, wheezing  Gastrointestinal: Nausea, vomiting, diarrhea, constipation, pain, blood in stool, or vomitus, heartburn, difficulty swallowing  Genitourinary: incontinence, abnormal bleeding, abnormal discharge, urinary frequency, urinary hesitancy, pain, impotence sexual problem, infection, urinary retention  Musculoskeletal: Pain, stiffness, joint, redness or warmth, arthritis, back pain, weakness, muscle wasting, sprain or fracture  Neuro: Weight weakness, dizziness, change in voice, change in taste change in vision, change in hearing, loss, or change of sensation, trouble walking, balance problems coordination problems, shaking, speech problem  Endocrine , cold or heat intolerance, blood sugar problem, weight gain or loss missed periods hot flashes, sweats, change in body hair, change in libido, increased thirst, increased urination  Heme/lymph: Swelling, bleeding, problem anemia, bruising, enlarged lymph nodes  Allergic/immunologic: H. plus nasal drip, watery itchy eyes, nasal drainage, immunosuppressed  The above were reviewed and noted negative except as noted in HPI and Problem List.    Objective     /72   Pulse 74   Wt 60.6 kg (133 lb 9.6 oz)   SpO2 95%   BMI 23.67 kg/m²      Physical Exam    Constitutional: Well developed, well nourished, alert and in no acute distress   Eyes: Normal external exam. Pupils equally round and reactive to light with normal accommodation and extraocular movements intact.  Neck: Supple, no lymphadenopathy or masses.   Cardiovascular: Regular rate and rhythm, normal S1 and S2, no murmurs, gallops, or rubs. Radial pulses normal. No peripheral edema.  Pulmonary: No respiratory distress, lungs clear to auscultation bilaterally. No wheezes, rhonchi, rales.  Abdomen: soft,non tender, non distended, without masses or HSM  Skin: Warm, well perfused, normal skin turgor and color.   Neurologic:  Cranial nerves II-XII grossly intact.   Psychiatric: Mood calm and affect normal  Musculoskeletal: Moving all extremities without restriction  The above were reviewed and noted negative except as noted in HPI and Problem List.      Results  Labs   - Vitamin D level: 67   - Thyroid function test: Normal   - Cholesterol level: 210   - GFR: Improved from 23 to 31   - Liver function test: Normal   - Red blood cell count: Normal   - White blood cell count: Normal         Assessment & Plan  1. Back Pain.  - Reports significant back pain, which radiates down the right leg and into the foot.  - Currently undergoing therapy for the back.  - Discussed concerns about work-related activities exacerbating the pain.  - Advised to continue with the current therapy regimen.    2. Headaches.  - Reports experiencing headaches again, particularly when bending down.  - Noted difficulty walking after bending down due to pain.  - Discussed using a TENS unit for pain management.  - Advised to monitor headache frequency and severity.    3. Chronic Kidney Disease.  - GFR improved from 23 to 31, indicating better kidney function.  - Blood work will be done before the next visit to monitor kidney function.  - Advised to continue current medication regimen, including Farxiga, which has been helpful.  - Discussed the importance of ongoing monitoring and follow-up.    4. Elevated Liver Enzymes.  - Liver function tests have normalized.  - Advised to continue avoiding atorvastatin as previously instructed.  - Reviewed recent lab results showing normal liver function.  - Discussed the positive impact of medication changes on liver health.    5. Medication Management.  - Currently taking pantoprazole, amlodipine, and cyclobenzaprine as needed for muscle spasms.  - Advised to continue these medications as prescribed.  - Refills for these medications will be sent to the pharmacy.  - Discussed the importance of adherence to prescribed  medications.    6. Referral to Podiatry.  - Referral to podiatry will be initiated to address foot-related concerns.  - Discussed the need for a new podiatrist due to dissatisfaction with the current provider.  - Referral process and expectations were reviewed.  - Advised to follow up with the new podiatrist for further evaluation and management.    Follow-up  The patient will follow up in 1 month.    Problem List Items Addressed This Visit       Hyperopia with presbyopia of both eyes    Relevant Orders    Follow Up In [facility] - PCP - Established    Mixed hyperlipidemia    Relevant Orders    Follow Up [facility] - PCP - Established    Hyperthyroidism    Relevant Orders    Follow Up [facility] - PCP - Established    Vitamin D deficiency    Relevant Medications    cholecalciferol (Vitamin D-3) 50 mcg (2,000 units) tablet    Other Relevant Orders    Follow Up [facility] - PCP - Established    Reactive airway disease without complication (HHS-HCC)    Relevant Orders    Follow Up [facility] - PCP - Established    Primary hypertension    Relevant Medications    amLODIPine (Norvasc) 2.5 mg tablet    Other Relevant Orders    Follow Up [facility] - PCP - Established    Gastroesophageal reflux disease    Relevant Medications    pantoprazole (ProtoNix) 40 mg EC tablet    Other Relevant Orders    Follow Up [facility] - PCP - Established    Follow Up [facility] - PCP - Established    Fatigue    Relevant Orders    CBC and Auto Differential    Follow Up [facility] - PCP - Established    Stage 3b chronic kidney disease (Multi)    Relevant Orders    Follow Up [facility] - PCP - Established    Coronary artery 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    Relevant Medications    amLODIPine (Norvasc) 2.5 mg tablet    Other Relevant Orders    Follow Up In [facility]  - PCP - Established    CKD stage G4/A1, 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)    Relevant Orders    CBC and Auto Differential    Comprehensive Metabolic Panel    Follow Up [facility] - PCP - Established    Acute gastroenteritis    Relevant Orders    Follow Up [facility] - PCP - Established     Other Visit Diagnoses         Encounter for nail care    -  Primary    Relevant Orders    Referral to Podiatry    Follow Up [facility] - PCP - Established      Muscle spasms of lower extremity, unspecified laterality        Relevant Orders    Follow Up [facility] - PCP - Established      Routine general medical examination at health care facility        Relevant Orders    Follow Up [facility] - PCP - Established      Ankle edema        Relevant Orders    Follow Up [facility] - PCP - Established      Osteoarthritis of spine with radiculopathy, lumbar region        Relevant Orders    Follow Up [facility] - PCP - Established      Muscle pain        Relevant Medications    cyclobenzaprine (Flexeril) 10 mg tablet    Other Relevant Orders    Follow Up [facility] - PCP - Established          Back pain  Chronic lower back pain exacerbated by previous work-related injury. She uses a belt for support and has been advised to undergo physical therapy but has not yet attended.  - Arrange physical therapy referral for lower back pain.     Hyperthyroidism  Hyperthyroidism is managed with Tapazole.  - Continue Tapazole.  - Order blood tests to monitor thyroid function.    Vitamin D deficiency  Vitamin D deficiency is well-managed.     Cataracts  Cataracts have been surgically removed and are not currently an issue.       Continue current medications and therapy for chronic medical conditions     INC LFTs       Stop lipitor     Decr norvasc to 2.5 mg every day     Fu 4 weeks    BW prior     Continue current  medications and therapy for chronic medical conditions     Do knee raises and ice area.     continue farxiga 10 mg daily for CKD  Pharmacy referral done     Metamucil every day     BW prior       prevnar done        & [facility] pharmacy soon     Drink Gatorade with calories      Last surgery was 2/23/24.  Off work since 12/23 and RTW 5/1/24 per Dr Johnson cholecystectomy     Ace Dhillon MD       This medical note was created with the assistance of artificial intelligence (AI) for documentation purposes. The content has been reviewed and confirmed by the healthcare provider for accuracy and completeness. Patient consented to the use of audio recording and use of AI during their visit.

## 2025-06-10 ENCOUNTER — TREATMENT (OUTPATIENT)
Dept: PHYSICAL THERAPY | Facility: CLINIC | Age: 89
End: 2025-06-10
Payer: MEDICARE

## 2025-06-10 DIAGNOSIS — M54.41 CHRONIC BILATERAL LOW BACK PAIN WITH BILATERAL SCIATICA: ICD-10-CM

## 2025-06-10 DIAGNOSIS — M54.42 CHRONIC BILATERAL LOW BACK PAIN WITH BILATERAL SCIATICA: ICD-10-CM

## 2025-06-10 DIAGNOSIS — G89.29 CHRONIC BILATERAL LOW BACK PAIN WITH BILATERAL SCIATICA: ICD-10-CM

## 2025-06-10 DIAGNOSIS — M47.26 OSTEOARTHRITIS OF SPINE WITH RADICULOPATHY, LUMBAR REGION: ICD-10-CM

## 2025-06-10 PROCEDURE — 97110 THERAPEUTIC EXERCISES: CPT | Mod: GP

## 2025-06-10 PROCEDURE — 97112 NEUROMUSCULAR REEDUCATION: CPT | Mod: GP

## 2025-06-10 NOTE — PROGRESS NOTES
Physical Therapy Treatment      Patient Name: Maggy Gore  MRN: [id number]  Today's Date: 6/10/2025  Visit #2  Time Calculation  Start Time: 1000  Stop Time: 1038  Time Calculation (min): 38 min    Insurance:  2025 // 0% coins, $257 ded (met), no copay, bmn jenelle yr, no PA.    Medicare A/B // Med [place] Medicare Suppl  $0 spent towards therapy cap as of 5/7/25.  KX mod needed after ~20v jenelle yr.  20560 - 20561, 32625 not covered    Assessment:  Maggy is being treated for chronic low back pain. This is her first follow up visit. She tolerated treatment well with moderate pain throughout. Initiated lumbar stability exercises with good tolerance.   Pt will continue to benefit from skilled PT interventions to address deficits and return to prior functional levels. Treatment includes: manual techniques to decrease pain and improve joint/soft-tissue mobility, as well as exercises to increase strength, endurance, and neuromotor control. Home exercise program has been updated to augment this session.    Plan:  Continue per POC.     Current Problem:   1. Osteoarthritis of spine with radiculopathy, lumbar region  Follow Up In Physical Therapy      2. Chronic bilateral low back pain with bilateral sciatica  Follow Up In Physical Therapy          Subjective   Maggy reports her pain has increased since her initial visit. She reports having to use her TENS unit for prolonged periods of time. Her current pain rating is 8/10. She took a pain pill at around 7:30 this AM.     Objective   Increased use of hip musculature with ankle eversion    Treatments:  PT Therapeutic Procedures Time Entry  Neuromuscular Re-Education Time Entry: 5  Therapeutic Exercise Time Entry: 33    Therapeutic Exercise (78554):  HEP review  Access Code: [email or web address]  Exercises  - Seated Posterior Pelvic Tilt  - 2 x daily - 7 x weekly - 1 sets - 10 reps  - Seated March  - 2 x daily - 7 x weekly - 1 sets - 10 reps  - Seated Long Arc Quad  - 2 x daily - 7 x weekly  - 1 sets - 10 reps  - Seated Ankle Pumps  - 2 x daily - 7 x weekly - 1 sets - 10 reps  Nustep  HS stretch  3 way ball roll out   Glute squeezes  Seated abductor isometric  LAQ  Ankle eversion rtb  Ankle DF rtb    Neuromuscular Re-education (87936):  Javid at foam pad    Education and discussion on HEP and treatment regarding the benefits related to current condition, POC, pathophysiology, and precautions

## 2025-06-13 ENCOUNTER — PATIENT OUTREACH (OUTPATIENT)
Dept: PRIMARY CARE | Facility: CLINIC | Age: 89
End: 2025-06-13
Payer: COMMERCIAL

## 2025-06-13 DIAGNOSIS — E78.2 MIXED HYPERLIPIDEMIA: ICD-10-CM

## 2025-06-13 DIAGNOSIS — I10 PRIMARY HYPERTENSION: ICD-10-CM

## 2025-06-13 RX ORDER — VALSARTAN AND HYDROCHLOROTHIAZIDE 320; 25 MG/1; MG/1
1 TABLET, FILM COATED ORAL DAILY
Qty: 90 TABLET | Refills: 0 | Status: SHIPPED | OUTPATIENT
Start: 2025-06-13

## 2025-06-13 NOTE — TELEPHONE ENCOUNTER
Recent Visits  Date Type Provider Dept   06/09/25 Office Visit Ace Dhillon MD [facility]   05/07/25 Office Visit Ace Dhillon MD [facility]   04/01/25 Office Visit Ace Dhillon MD [facility]   03/18/25 Office Visit Ace Dhillon MD [facility]   02/28/25 Office Visit Ace Dhillon MD [facility]   01/29/25 Office Visit Ace Dhillon MD [facility]   12/19/24 Office Visit Ace Dhillon MD [facility]   Showing recent visits within past 180 days and meeting all other requirements  Future Appointments  Date Type Provider Dept   07/10/25 Appointment Ace Dhillon MD [facility]   Showing future appointments within next 90 days and meeting all other requirements

## 2025-06-16 ENCOUNTER — TREATMENT (OUTPATIENT)
Dept: PHYSICAL THERAPY | Facility: CLINIC | Age: 89
End: 2025-06-16
Payer: MEDICARE

## 2025-06-16 DIAGNOSIS — M47.26 OSTEOARTHRITIS OF SPINE WITH RADICULOPATHY, LUMBAR REGION: ICD-10-CM

## 2025-06-16 DIAGNOSIS — M54.41 CHRONIC BILATERAL LOW BACK PAIN WITH BILATERAL SCIATICA: ICD-10-CM

## 2025-06-16 DIAGNOSIS — G89.29 CHRONIC BILATERAL LOW BACK PAIN WITH BILATERAL SCIATICA: ICD-10-CM

## 2025-06-16 DIAGNOSIS — M54.42 CHRONIC BILATERAL LOW BACK PAIN WITH BILATERAL SCIATICA: ICD-10-CM

## 2025-06-16 PROCEDURE — 97112 NEUROMUSCULAR REEDUCATION: CPT | Mod: GP

## 2025-06-16 PROCEDURE — 97110 THERAPEUTIC EXERCISES: CPT | Mod: GP

## 2025-06-16 NOTE — PROGRESS NOTES
"Physical Therapy Treatment      Patient Name: Maggy Gore  MRN: [id number]  Today's Date: 6/16/2025  Visit #3  Time Calculation  Start Time: 0800  Stop Time: 0838  Time Calculation (min): 38 min    Insurance:  2025 // 0% coins, $257 ded (met), no copay, bmn jenelle yr, no PA.    Medicare A/B // Med [place] Medicare Suppl  $0 spent towards therapy cap as of 5/7/25.  KX mod needed after ~20v jenelle yr.  20560 - 20561, 81407 not covered    Assessment:  Maggy is being treated for chronic low back pain. Continued stabilization exercise of the core and pelvis with good tolerance.     Patient's response to session: Increase motor control    Pt will continue to benefit from skilled PT interventions to address deficits and return to prior functional levels. Treatment includes: manual techniques to decrease pain and improve joint/soft-tissue mobility, as well as exercises to increase strength, endurance, and neuromotor control. Home exercise program has been updated to augment this session.    Plan:  Continue per POC.     Current Problem:   1. Osteoarthritis of spine with radiculopathy, lumbar region  Follow Up In Physical Therapy      2. Chronic bilateral low back pain with bilateral sciatica  Follow Up In Physical Therapy          Subjective   Maggy reports she \"just took a pill so pretty good\" when asked how her pain was. She denies falls since last session. Her current pain rating is 7/10.     Objective   Increase R sided LBP with righ flexion    Treatments:  PT Therapeutic Procedures Time Entry  Therapeutic Exercise Time Entry: 30  Neuromuscular Re-Education Time Entry: 8    Therapeutic Exercise (64195):  HEP review  Access Code: [email or web address]  Exercises  - Seated Posterior Pelvic Tilt  - 2 x daily - 7 x weekly - 1 sets - 10 reps  - Seated March  - 2 x daily - 7 x weekly - 1 sets - 10 reps  - Seated Long Arc Quad  - 2 x daily - 7 x weekly - 1 sets - 10 reps  - Seated Ankle Pumps  - 2 x daily - 7 x weekly - 1 sets - 10 " reps  Nustep  HS stretch  Standing heel raise  3 way ball roll out   Seated clamshells  Seated abductor isometric  LAQ  Ankle eversion rtb  Ankle DF rtb  Glute squeezes    Neuromuscular Re-education (35650):  Javid ordoñez foam pad   Tandem balance foam pad    Education and discussion on HEP and treatment regarding the benefits related to current condition, POC, pathophysiology, and precautions

## 2025-06-23 DIAGNOSIS — N18.4 CKD STAGE G4/A1, GFR 15-29 AND ALBUMIN CREATININE RATIO <30 MG/G (MULTI): ICD-10-CM

## 2025-06-23 PROCEDURE — RXMED WILLOW AMBULATORY MEDICATION CHARGE

## 2025-06-23 RX ORDER — DAPAGLIFLOZIN 10 MG/1
10 TABLET, FILM COATED ORAL DAILY
Qty: 90 TABLET | Refills: 3 | Status: SHIPPED | OUTPATIENT
Start: 2025-06-23 | End: 2025-06-23 | Stop reason: SDUPTHER

## 2025-06-23 RX ORDER — DAPAGLIFLOZIN 10 MG/1
10 TABLET, FILM COATED ORAL DAILY
Qty: 90 TABLET | Refills: 3 | Status: SHIPPED | OUTPATIENT
Start: 2025-06-23 | End: 2026-06-18

## 2025-06-23 NOTE — TELEPHONE ENCOUNTER
Pt needs refill on  dapagliflozin propanediol (Farxiga) 10 mg   [facility]

## 2025-06-24 ENCOUNTER — APPOINTMENT (OUTPATIENT)
Dept: PHYSICAL THERAPY | Facility: CLINIC | Age: 89
End: 2025-06-24
Payer: MEDICARE

## 2025-06-30 ENCOUNTER — PATIENT OUTREACH (OUTPATIENT)
Dept: PRIMARY CARE | Facility: CLINIC | Age: 89
End: 2025-06-30
Payer: COMMERCIAL

## 2025-06-30 ENCOUNTER — PHARMACY VISIT (OUTPATIENT)
Dept: PHARMACY | Facility: CLINIC | Age: 89
End: 2025-06-30
Payer: COMMERCIAL

## 2025-06-30 DIAGNOSIS — I10 PRIMARY HYPERTENSION: ICD-10-CM

## 2025-06-30 DIAGNOSIS — M62.838 MUSCLE SPASMS OF LOWER EXTREMITY, UNSPECIFIED LATERALITY: ICD-10-CM

## 2025-06-30 DIAGNOSIS — E78.2 MIXED HYPERLIPIDEMIA: ICD-10-CM

## 2025-06-30 NOTE — PROGRESS NOTES
Phone call to patient for monthly RN CM outreach. No answer, left message requesting call back and provided direct contact number.     2nd attempt at monthly outreach

## 2025-07-02 ENCOUNTER — TREATMENT (OUTPATIENT)
Dept: PHYSICAL THERAPY | Facility: CLINIC | Age: 89
End: 2025-07-02
Payer: MEDICARE

## 2025-07-02 DIAGNOSIS — M47.26 OSTEOARTHRITIS OF SPINE WITH RADICULOPATHY, LUMBAR REGION: ICD-10-CM

## 2025-07-02 DIAGNOSIS — G89.29 CHRONIC BILATERAL LOW BACK PAIN WITH BILATERAL SCIATICA: ICD-10-CM

## 2025-07-02 DIAGNOSIS — M54.42 CHRONIC BILATERAL LOW BACK PAIN WITH BILATERAL SCIATICA: ICD-10-CM

## 2025-07-02 DIAGNOSIS — M54.41 CHRONIC BILATERAL LOW BACK PAIN WITH BILATERAL SCIATICA: ICD-10-CM

## 2025-07-02 LAB
ALBUMIN SERPL-MCNC: 4 G/DL (ref 3.6–5.1)
ALP SERPL-CCNC: 82 U/L (ref 37–153)
ALT SERPL-CCNC: 12 U/L (ref 6–29)
ANION GAP SERPL CALCULATED.4IONS-SCNC: 7 MMOL/L (CALC) (ref 7–17)
AST SERPL-CCNC: 14 U/L (ref 10–35)
BASOPHILS # BLD AUTO: 68 CELLS/UL (ref 0–200)
BASOPHILS NFR BLD AUTO: 1.2 %
BILIRUB SERPL-MCNC: 0.5 MG/DL (ref 0.2–1.2)
BUN SERPL-MCNC: 40 MG/DL (ref 7–25)
CALCIUM SERPL-MCNC: 9.1 MG/DL (ref 8.6–10.4)
CHLORIDE SERPL-SCNC: 103 MMOL/L (ref 98–110)
CO2 SERPL-SCNC: 29 MMOL/L (ref 20–32)
CREAT SERPL-MCNC: 1.72 MG/DL (ref 0.6–0.95)
EGFRCR SERPLBLD CKD-EPI 2021: 28 ML/MIN/1.73M2
EOSINOPHIL # BLD AUTO: 239 CELLS/UL (ref 15–500)
EOSINOPHIL NFR BLD AUTO: 4.2 %
ERYTHROCYTE [DISTWIDTH] IN BLOOD BY AUTOMATED COUNT: 12.7 % (ref 11–15)
GLUCOSE SERPL-MCNC: 91 MG/DL (ref 65–99)
HCT VFR BLD AUTO: 36.3 % (ref 35–45)
HGB BLD-MCNC: 11.3 G/DL (ref 11.7–15.5)
LYMPHOCYTES # BLD AUTO: 1761 CELLS/UL (ref 850–3900)
LYMPHOCYTES NFR BLD AUTO: 30.9 %
MCH RBC QN AUTO: 29.8 PG (ref 27–33)
MCHC RBC AUTO-ENTMCNC: 31.1 G/DL (ref 32–36)
MCV RBC AUTO: 95.8 FL (ref 80–100)
MONOCYTES # BLD AUTO: 587 CELLS/UL (ref 200–950)
MONOCYTES NFR BLD AUTO: 10.3 %
NEUTROPHILS # BLD AUTO: 3044 CELLS/UL (ref 1500–7800)
NEUTROPHILS NFR BLD AUTO: 53.4 %
PLATELET # BLD AUTO: 375 THOUSAND/UL (ref 140–400)
PMV BLD REES-ECKER: 9.4 FL (ref 7.5–12.5)
POTASSIUM SERPL-SCNC: 4.4 MMOL/L (ref 3.5–5.3)
PROT SERPL-MCNC: 6.9 G/DL (ref 6.1–8.1)
RBC # BLD AUTO: 3.79 MILLION/UL (ref 3.8–5.1)
SODIUM SERPL-SCNC: 139 MMOL/L (ref 135–146)
WBC # BLD AUTO: 5.7 THOUSAND/UL (ref 3.8–10.8)

## 2025-07-02 PROCEDURE — 97110 THERAPEUTIC EXERCISES: CPT | Mod: GP

## 2025-07-02 NOTE — PROGRESS NOTES
Physical Therapy Treatment      Patient Name: Maggy Gore  MRN: [id number]  Today's Date: 7/2/2025  Visit #4  Time Calculation  Start Time: 1130  Stop Time: 1208  Time Calculation (min): 38 min    Insurance:  2025 // 0% coins, $257 ded (met), no copay, bmn jenelle yr, no PA.    Medicare A/B // Med [place] Medicare Suppl  $0 spent towards therapy cap as of 5/7/25.  KX mod needed after ~20v jenelle yr.  20560 - 20561, 80949 not covered    Assessment:  Maggy is being treated for chronic low back pain. Continued with LE strengthening exercises with good mechanics to improve postural awareness. Pt still has moderate pain with all activity.     Pt will continue to benefit from skilled PT interventions to address deficits and return to prior functional levels. Treatment includes: manual techniques to decrease pain and improve joint/soft-tissue mobility, as well as exercises to increase strength, endurance, and neuromotor control. Home exercise program has been updated to augment this session.    Plan:  Continue per POC.      Current Problem:   1. Osteoarthritis of spine with radiculopathy, lumbar region  Follow Up In Physical Therapy      2. Chronic bilateral low back pain with bilateral sciatica  Follow Up In Physical Therapy          Subjective   Maggy states that her pain is a 9/10 upon arrival. She took a pain pill before coming.     Objective   R aggravation with supine marches    Treatments:  PT Therapeutic Procedures Time Entry  Therapeutic Exercise Time Entry: 38    Therapeutic Exercise (81595):  HEP review  Access Code: [email or web address]  Exercises  - Seated Posterior Pelvic Tilt  - 2 x daily - 7 x weekly - 1 sets - 10 reps  - Seated March  - 2 x daily - 7 x weekly - 1 sets - 10 reps  - Seated Long Arc Quad  - 2 x daily - 7 x weekly - 1 sets - 10 reps  - Seated Ankle Pumps  - 2 x daily - 7 x weekly - 1 sets - 10 reps  Recumbent bike  Glute squeezes  Adductor squeeze  Clams rtb  Seated hurdles  Hooklying marches  Seated HS curl  10#    Education and discussion on HEP and treatment regarding the benefits related to current condition, POC, pathophysiology, and precautions

## 2025-07-08 NOTE — PROGRESS NOTES
Physical Therapy Treatment      Patient Name: Maggy Gore  MRN: [id number]  Today's Date: 7/9/2025  Visit #5  Time Calculation  Start Time: 1215  Stop Time: 1253  Time Calculation (min): 38 min    Insurance:  2025 // 0% coins, $257 ded (met), no copay, bmn jenelle yr, no PA.    Medicare A/B // Med [place] Medicare Suppl  $0 spent towards therapy cap as of 5/7/25.  KX mod needed after ~20v jenelle yr.  20560 - 20561, 88248 not covered    Assessment:  Continued LE strengthening exercises to reduce load on low back. Pt demonstrates decreased tolerance to weight bearing activity due to pain.     Patient's response to session: No change in pain    Pt will continue to benefit from skilled PT interventions to address deficits and return to prior functional levels. Treatment includes: manual techniques to decrease pain and improve joint/soft-tissue mobility, as well as exercises to increase strength, endurance, and neuromotor control. Home exercise program has been updated to augment this session.    Plan:  Continue per POC.     Current Problem:   1. Osteoarthritis of spine with radiculopathy, lumbar region  Follow Up In Physical Therapy      2. Chronic bilateral low back pain with bilateral sciatica  Follow Up In Physical Therapy          Subjective   Maggy states that she felt sore for 2 days following last session. She took a pain pill around 7am this morning.     Objective   Mod rest breaks required due to fatigue    Treatments:  PT Therapeutic Procedures Time Entry  Therapeutic Exercise Time Entry: 30  Neuromuscular Re-Education Time Entry: 8    Therapeutic Exercise (91784):  HEP review  Access Code: [email or web address]  Exercises  - Seated Posterior Pelvic Tilt  - 2 x daily - 7 x weekly - 1 sets - 10 reps  - Seated March  - 2 x daily - 7 x weekly - 1 sets - 10 reps  - Seated Long Arc Quad  - 2 x daily - 7 x weekly - 1 sets - 10 reps  - Seated Ankle Pumps  - 2 x daily - 7 x weekly - 1 sets - 10 reps  Recumbent bike  HS  stretch  Standing heel raise  Seated clamshells rtb  Seated adductor isometric  LAQ + ankle pump  Ankle eversion rtb  Ankle DF rtb  Glute squeezes    Neuromuscular Re-education (43530):  Rockerboard fwd + rocks  Foam pad marchgabriel    Education and discussion on HEP and treatment regarding the benefits related to current condition, POC, pathophysiology, and precautions

## 2025-07-09 ENCOUNTER — TREATMENT (OUTPATIENT)
Dept: PHYSICAL THERAPY | Facility: CLINIC | Age: 89
End: 2025-07-09
Payer: MEDICARE

## 2025-07-09 DIAGNOSIS — M47.26 OSTEOARTHRITIS OF SPINE WITH RADICULOPATHY, LUMBAR REGION: ICD-10-CM

## 2025-07-09 DIAGNOSIS — G89.29 CHRONIC BILATERAL LOW BACK PAIN WITH BILATERAL SCIATICA: ICD-10-CM

## 2025-07-09 DIAGNOSIS — M54.42 CHRONIC BILATERAL LOW BACK PAIN WITH BILATERAL SCIATICA: ICD-10-CM

## 2025-07-09 DIAGNOSIS — M54.41 CHRONIC BILATERAL LOW BACK PAIN WITH BILATERAL SCIATICA: ICD-10-CM

## 2025-07-09 PROCEDURE — 97110 THERAPEUTIC EXERCISES: CPT | Mod: GP

## 2025-07-09 PROCEDURE — 97112 NEUROMUSCULAR REEDUCATION: CPT | Mod: GP

## 2025-07-10 ENCOUNTER — APPOINTMENT (OUTPATIENT)
Dept: PRIMARY CARE | Facility: CLINIC | Age: 89
End: 2025-07-10
Payer: COMMERCIAL

## 2025-07-10 VITALS
DIASTOLIC BLOOD PRESSURE: 74 MMHG | BODY MASS INDEX: 23.52 KG/M2 | HEART RATE: 71 BPM | SYSTOLIC BLOOD PRESSURE: 126 MMHG | WEIGHT: 132.8 LBS | OXYGEN SATURATION: 95 %

## 2025-07-10 DIAGNOSIS — N18.4 CKD STAGE G4/A1, GFR 15-29 AND ALBUMIN CREATININE RATIO <30 MG/G (MULTI): ICD-10-CM

## 2025-07-10 DIAGNOSIS — M62.838 MUSCLE SPASMS OF LOWER EXTREMITY, UNSPECIFIED LATERALITY: ICD-10-CM

## 2025-07-10 DIAGNOSIS — E05.90 HYPERTHYROIDISM: ICD-10-CM

## 2025-07-10 DIAGNOSIS — M47.26 OSTEOARTHRITIS OF SPINE WITH RADICULOPATHY, LUMBAR REGION: ICD-10-CM

## 2025-07-10 DIAGNOSIS — M79.10 MUSCLE PAIN: ICD-10-CM

## 2025-07-10 DIAGNOSIS — K21.9 GASTROESOPHAGEAL REFLUX DISEASE WITHOUT ESOPHAGITIS: ICD-10-CM

## 2025-07-10 DIAGNOSIS — Z00.00 ROUTINE GENERAL MEDICAL EXAMINATION AT HEALTH CARE FACILITY: ICD-10-CM

## 2025-07-10 DIAGNOSIS — N18.32 STAGE 3B CHRONIC KIDNEY DISEASE (MULTI): ICD-10-CM

## 2025-07-10 DIAGNOSIS — M25.473 ANKLE EDEMA: ICD-10-CM

## 2025-07-10 DIAGNOSIS — Z76.89 ENCOUNTER FOR NAIL CARE: ICD-10-CM

## 2025-07-10 DIAGNOSIS — I10 PRIMARY HYPERTENSION: ICD-10-CM

## 2025-07-10 DIAGNOSIS — M48.062 LUMBAR STENOSIS WITH NEUROGENIC CLAUDICATION: ICD-10-CM

## 2025-07-10 DIAGNOSIS — H52.4 HYPEROPIA WITH PRESBYOPIA OF BOTH EYES: ICD-10-CM

## 2025-07-10 DIAGNOSIS — R53.83 FATIGUE, UNSPECIFIED TYPE: ICD-10-CM

## 2025-07-10 DIAGNOSIS — K21.9 GASTROESOPHAGEAL REFLUX DISEASE, UNSPECIFIED WHETHER ESOPHAGITIS PRESENT: ICD-10-CM

## 2025-07-10 DIAGNOSIS — I25.10 CORONARY ARTERY DISEASE INVOLVING NATIVE CORONARY ARTERY OF NATIVE HEART WITHOUT ANGINA PECTORIS: ICD-10-CM

## 2025-07-10 DIAGNOSIS — K52.9 ACUTE GASTROENTERITIS: ICD-10-CM

## 2025-07-10 DIAGNOSIS — J45.20 MILD INTERMITTENT REACTIVE AIRWAY DISEASE WITHOUT COMPLICATION (HHS-HCC): ICD-10-CM

## 2025-07-10 DIAGNOSIS — E78.2 MIXED HYPERLIPIDEMIA: ICD-10-CM

## 2025-07-10 DIAGNOSIS — E55.9 VITAMIN D DEFICIENCY: ICD-10-CM

## 2025-07-10 DIAGNOSIS — H52.03 HYPEROPIA WITH PRESBYOPIA OF BOTH EYES: ICD-10-CM

## 2025-07-10 PROCEDURE — G2211 COMPLEX E/M VISIT ADD ON: HCPCS | Performed by: FAMILY MEDICINE

## 2025-07-10 PROCEDURE — 99214 OFFICE O/P EST MOD 30 MIN: CPT | Performed by: FAMILY MEDICINE

## 2025-07-10 RX ORDER — HYDROCODONE BITARTRATE AND ACETAMINOPHEN 5; 325 MG/1; MG/1
1 TABLET ORAL 3 TIMES DAILY PRN
Qty: 90 TABLET | Refills: 0 | Status: CANCELLED | OUTPATIENT
Start: 2025-07-10 | End: 2025-08-09

## 2025-07-10 NOTE — PROGRESS NOTES
Subjective   Patient ID: Maggy Gore is a 89 y.o. female who presents for Hypertension (This patient is here today to follow up on HTN. This patient is currently taking valsartan. This patient states that their current medication treatment for this issue is working well for them.  This patient denies any symptoms of headache, dizziness, blurry vision, or lightheadedness).  History of Present Illness  The patient presents for evaluation of dizziness, chronic kidney disease, back pain, and headaches.    She reports experiencing fatigue and occasional dizziness, which she attributes to rapid changes in posture. She also mentions that she feels unwell when she starts sweating. Her recent blood work results were satisfactory.    She has been experiencing significant back pain, which was exacerbated after her last physical therapy session, resulting in her being bedridden for two days. She has a scheduled appointment with her pain management specialist next week. She is currently on hydrocodone for pain management.    She experiences headaches occasionally, but they are stable.    She recently visited a podiatrist for toenail care.  See Above  Review of Systems  12 Systems have been reviewed as follows.  Constitutional: Fever, weight gain, weight loss, appetite change, night sweats, fatigue, chills.  Eyes : blurry, double vision, vision, loss, tearing, redness, pain, sensitivity to light, glaucoma.  Ears, nose, mouth, and throat: Hearing loss, ringing in the ears, ear pain, nasal congestion, nasal drainage, nosebleeds, mouth, throat, irritation tooth problem.  Cardiovascular :chest pain, pressure, heart racing, palpitations, sweating, leg swelling, high or low blood pressure  Pulmonary: Cough, yellow or green sputum, blood and sputum, shortness of breath, wheezing  Gastrointestinal: Nausea, vomiting, diarrhea, constipation, pain, blood in stool, or vomitus, heartburn, difficulty swallowing  Genitourinary:  incontinence, abnormal bleeding, abnormal discharge, urinary frequency, urinary hesitancy, pain, impotence sexual problem, infection, urinary retention  Musculoskeletal: Pain, stiffness, joint, redness or warmth, arthritis, back pain, weakness, muscle wasting, sprain or fracture  Neuro: Weight weakness, dizziness, change in voice, change in taste change in vision, change in hearing, loss, or change of sensation, trouble walking, balance problems coordination problems, shaking, speech problem  Endocrine , cold or heat intolerance, blood sugar problem, weight gain or loss missed periods hot flashes, sweats, change in body hair, change in libido, increased thirst, increased urination  Heme/lymph: Swelling, bleeding, problem anemia, bruising, enlarged lymph nodes  Allergic/immunologic: H. plus nasal drip, watery itchy eyes, nasal drainage, immunosuppressed  The above were reviewed and noted negative except as noted in HPI and Problem List.    Objective     /74   Pulse 71   Wt 60.2 kg (132 lb 12.8 oz)   SpO2 95%   BMI 23.52 kg/m²      Physical Exam  General: No acute distress.  Cardiovascular: Blood pressure is normal.  Extremities: Toenails appear healthy.  Constitutional: Well developed, well nourished, alert and in no acute distress   Eyes: Normal external exam. Pupils equally round and reactive to light with normal accommodation and extraocular movements intact.  Neck: Supple, no lymphadenopathy or masses.   Cardiovascular: Regular rate and rhythm, normal S1 and S2, no murmurs, gallops, or rubs. Radial pulses normal. No peripheral edema.  Pulmonary: No respiratory distress, lungs clear to auscultation bilaterally. No wheezes, rhonchi, rales.  Abdomen: soft,non tender, non distended, without masses or HSM  Skin: Warm, well perfused, normal skin turgor and color.   Neurologic: Cranial nerves II-XII grossly intact.   Psychiatric: Mood calm and affect normal  Musculoskeletal: Moving all extremities without  restriction  The above were reviewed and noted negative except as noted in HPI and Problem List.      Results  Labs   - Kidney function: 28   - Hemoglobin: 11.3   - Liver function tests: Normal         Assessment & Plan  1. Dizziness.  - Likely due to rapid changes in posture.  - Advised to rise slowly from a seated or lying position to prevent dizziness.  - Blood pressure is normal today.  - Will continue to monitor symptoms.    2. Chronic kidney disease.  - Kidney function is currently at 28, previously recorded as 31 and 23.  - Stable condition.  - Hemoglobin level is slightly decreased at 11.3, indicating mild anemia.  - Will continue to monitor kidney function and hemoglobin with future blood work.    3. Back pain.  - Reports significant back pain and has been undergoing therapy.  - Advised to continue daily home exercises to maintain flexibility without overexertion.  - Currently taking hydrocodone for pain management.  - Will follow up with pain doctor next week.    4. Health maintenance.  - Cholesterol levels are within the normal range.  - Vitamin D levels are well-managed.  - Liver function tests are normal.  - Blood work, including CBC and CMP, will be ordered prior to next visit.    Follow-up  A follow-up visit is scheduled in 2 months.    Problem List Items Addressed This Visit       Hyperopia with presbyopia of both eyes    Relevant Orders    Follow Up In [facility] - PCP - Established    Mixed hyperlipidemia    Relevant Orders    Follow Up [facility] - PCP - Established    Hyperthyroidism    Relevant Orders    Follow Up [facility] - PCP - Established    Vitamin D deficiency    Relevant Orders    Follow Up [facility] - PCP - Established    Reactive airway disease without complication (HHS-HCC)    Relevant Orders    Follow Up [facility] - PCP - Established    Primary hypertension    Relevant Orders    Comprehensive Metabolic Panel     Follow Up [facility] - PCP - Established    Gastroesophageal reflux disease    Relevant Orders    Follow Up [facility] - PCP - Established    Follow Up [facility] - PCP - Established    Fatigue    Relevant Orders    CBC and Auto Differential    Follow Up [facility] - PCP - Established    Stage 3b chronic kidney disease (Multi)    Relevant Orders    Follow Up [facility] - PCP - Established    Coronary artery 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    Relevant Orders    Follow Up [facility] - PCP - Established    CKD stage G4/A1, 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)    Relevant Orders    Follow Up [facility] - PCP - Established    Acute gastroenteritis    Relevant Orders    Follow Up [facility] - PCP - Established     Other Visit Diagnoses         Muscle spasms of lower extremity, unspecified laterality        Relevant Orders    Follow Up [facility] - PCP - Established      Routine general medical examination at health care facility        Relevant Orders    Follow Up [facility] - PCP - Established      Ankle edema        Relevant Orders    Follow Up [facility] - PCP - Established      Osteoarthritis of spine with radiculopathy, lumbar region        Relevant Orders    Follow Up [facility] - PCP - Established      Muscle pain        Relevant Orders    Follow Up [facility] - PCP - Established      Encounter for nail care        Relevant Orders    Follow Up [facility] - PCP - Established      Lumbar stenosis with neurogenic claudication        Relevant Orders    Follow Up [facility] - PCP - Established         2. Headaches.  - Reports experiencing headaches again, particularly when bending down.  - Noted difficulty walking after bending down due to pain.  - Discussed  using a TENS unit for pain management.  - Advised to monitor headache frequency and severity.     4. Elevated Liver Enzymes.  - Liver function tests have normalized.  - Advised to continue avoiding atorvastatin as previously instructed.  - Reviewed recent lab results showing normal liver function.  - Discussed the positive impact of medication changes on liver health.  Ace Dhillon MD       This medical note was created with the assistance of artificial intelligence (AI) for documentation purposes. The content has been reviewed and confirmed by the healthcare provider for accuracy and completeness. Patient consented to the use of audio recording and use of AI during their visit.

## 2025-07-16 ENCOUNTER — TREATMENT (OUTPATIENT)
Dept: PHYSICAL THERAPY | Facility: CLINIC | Age: 89
End: 2025-07-16
Payer: MEDICARE

## 2025-07-16 ENCOUNTER — PATIENT OUTREACH (OUTPATIENT)
Dept: PRIMARY CARE | Facility: CLINIC | Age: 89
End: 2025-07-16
Payer: COMMERCIAL

## 2025-07-16 DIAGNOSIS — N18.32 STAGE 3B CHRONIC KIDNEY DISEASE (MULTI): ICD-10-CM

## 2025-07-16 DIAGNOSIS — M47.26 OSTEOARTHRITIS OF SPINE WITH RADICULOPATHY, LUMBAR REGION: ICD-10-CM

## 2025-07-16 DIAGNOSIS — M54.41 CHRONIC BILATERAL LOW BACK PAIN WITH BILATERAL SCIATICA: ICD-10-CM

## 2025-07-16 DIAGNOSIS — M62.838 MUSCLE SPASMS OF LOWER EXTREMITY, UNSPECIFIED LATERALITY: ICD-10-CM

## 2025-07-16 DIAGNOSIS — G89.29 CHRONIC BILATERAL LOW BACK PAIN WITH BILATERAL SCIATICA: ICD-10-CM

## 2025-07-16 DIAGNOSIS — M54.42 CHRONIC BILATERAL LOW BACK PAIN WITH BILATERAL SCIATICA: ICD-10-CM

## 2025-07-16 DIAGNOSIS — I10 PRIMARY HYPERTENSION: ICD-10-CM

## 2025-07-16 DIAGNOSIS — J45.20 MILD INTERMITTENT REACTIVE AIRWAY DISEASE WITHOUT COMPLICATION (HHS-HCC): ICD-10-CM

## 2025-07-16 PROCEDURE — 97110 THERAPEUTIC EXERCISES: CPT | Mod: GP

## 2025-07-16 NOTE — PROGRESS NOTES
Physical Therapy Treatment      Patient Name: Maggy Gore  MRN: [id number]  Today's Date: 7/16/2025  Visit #6       Insurance:  2025 // 0% coins, $257 ded (met), no copay, bmn jenelle yr, no PA.    Medicare A/B // [employer]  $0 spent towards therapy cap as of 5/7/25.  KX mod needed after ~20v jenelle yr.  20560 - 20561, 38569 not covered    Assessment:  Maggy is being treated for chronic low back pain. Continued lumbar stability and LE strengthening exercises.     Patient's response to session: No change in pain    Pt will continue to benefit from skilled PT interventions to address deficits and return to prior functional levels. Treatment includes: manual techniques to decrease pain and improve joint/soft-tissue mobility, as well as exercises to increase strength, endurance, and neuromotor control. Home exercise program has been updated to augment this session.    Plan:  Continue per POC.      Current Problem:   1. Osteoarthritis of spine with radiculopathy, lumbar region  Follow Up In Physical Therapy      2. Chronic bilateral low back pain with bilateral sciatica  Follow Up In Physical Therapy          Subjective   Maggy states that she isn't feeling too terrible today. She took pain medication at about 8 O'clock this morning.     Objective   Increased LBP with lumbar LTR to L    Treatments:       Therapeutic Exercise ([id number]):  HEP review  Access Code: [email or web address]  Exercises  - Seated Posterior Pelvic Tilt  - 2 x daily - 7 x weekly - 1 sets - 10 reps  - Seated March  - 2 x daily - 7 x weekly - 1 sets - 10 reps  - Seated Long Arc Quad  - 2 x daily - 7 x weekly - 1 sets - 10 reps  - Seated Ankle Pumps  - 2 x daily - 7 x weekly - 1 sets - 10 reps  Recumbent bike  HS stretch  Standing heel raise  Seated clamshells rtb  Seated adductor isometric  LAQ + ankle pump  Ankle eversion rtb  Ankle DF rtb  Glute squeezes  Hip abduction supine    Education and discussion on HEP and treatment regarding the benefits  related to current condition, POC, pathophysiology, and precautions

## 2025-07-23 ENCOUNTER — OFFICE VISIT (OUTPATIENT)
Dept: PAIN MEDICINE | Facility: CLINIC | Age: 89
End: 2025-07-23
Payer: COMMERCIAL

## 2025-07-23 VITALS — DIASTOLIC BLOOD PRESSURE: 84 MMHG | HEART RATE: 71 BPM | TEMPERATURE: 96.5 F | SYSTOLIC BLOOD PRESSURE: 188 MMHG

## 2025-07-23 DIAGNOSIS — M79.10 MUSCLE PAIN: ICD-10-CM

## 2025-07-23 DIAGNOSIS — M48.062 LUMBAR STENOSIS WITH NEUROGENIC CLAUDICATION: ICD-10-CM

## 2025-07-23 PROCEDURE — 3079F DIAST BP 80-89 MM HG: CPT

## 2025-07-23 PROCEDURE — 99212 OFFICE O/P EST SF 10 MIN: CPT

## 2025-07-23 PROCEDURE — 3077F SYST BP >= 140 MM HG: CPT

## 2025-07-23 PROCEDURE — 1125F AMNT PAIN NOTED PAIN PRSNT: CPT

## 2025-07-23 RX ORDER — HYDROCODONE BITARTRATE AND ACETAMINOPHEN 5; 325 MG/1; MG/1
1 TABLET ORAL 3 TIMES DAILY PRN
Qty: 90 TABLET | Refills: 0 | Status: SHIPPED | OUTPATIENT
Start: 2025-09-02 | End: 2025-10-02

## 2025-07-23 RX ORDER — CYCLOBENZAPRINE HCL 10 MG
10 TABLET ORAL 2 TIMES DAILY PRN
Qty: 90 TABLET | Refills: 0 | Status: SHIPPED | OUTPATIENT
Start: 2025-07-23

## 2025-07-23 RX ORDER — HYDROCODONE BITARTRATE AND ACETAMINOPHEN 5; 325 MG/1; MG/1
1 TABLET ORAL 3 TIMES DAILY PRN
Qty: 90 TABLET | Refills: 0 | Status: SHIPPED | OUTPATIENT
Start: 2025-10-02 | End: 2025-11-01

## 2025-07-23 RX ORDER — HYDROCODONE BITARTRATE AND ACETAMINOPHEN 5; 325 MG/1; MG/1
1 TABLET ORAL 3 TIMES DAILY PRN
Qty: 90 TABLET | Refills: 0 | Status: SHIPPED | OUTPATIENT
Start: 2025-08-03 | End: 2025-09-02

## 2025-07-23 ASSESSMENT — PAIN DESCRIPTION - DESCRIPTORS: DESCRIPTORS: ACHING

## 2025-07-23 ASSESSMENT — PAIN - FUNCTIONAL ASSESSMENT: PAIN_FUNCTIONAL_ASSESSMENT: 0-10

## 2025-07-23 ASSESSMENT — PAIN SCALES - GENERAL: PAINLEVEL_OUTOF10: 7

## 2025-07-23 NOTE — PROGRESS NOTES
Subjective   Patient ID: Maggy Gore is a 89 y.o. female who presents for Med Refill (Patient here for med refill, states back pain is 7/10).  HPI    89 yo female presents today for medication refills. She is known to this clinic for chronic low back pain and is maintained on hydrocodone- acetaminophen 5- 325mg three times daily. Today her pain is rated 7/10 in the low back. She is also taking cyclobenzaprine as needed. Reports 50% pain reduction and improvement in mobility from the medication. TENS unit is helpful for additional pain relief. She has recently completed PT, states that it did not really help with her pain. The patient denies any side effects associated with the usage of the medication patient described the medication improving the quality of life and allowing participation in day-to-day activity patient denies otherwise any new complaint patient is requesting a refill. Patient denies usage of any other opiate. Patient denies usage of any recreational drugs. Patient confirms that the opiate prescription is being used as prescribed.       Review of Systems  All 13 systems were reviewed and are within normal levels except as noted below or per HPI. Positive and pertinent negative responses are noted below or in the HPI   Denied any fever or chills. No weight loss and no night sweats. No cough or sputum production. No diarrhea   The constipation has been responding to fibers and over the counter medications.     No bladder and bowel incontinence and no other changes in bladder and bowel. No skin changes. Reports tiredness and fatigability only if the pain is not controlled.   Denied opioids diversion and abuse and denies alcoholism. Denies overuse of the pain medications.      Objective   Physical Exam  General   Alert and oriented x4, pleasant and cooperative.      HEENT  Pupils are equal and normal in size. Ears, nose, mouth, and throat appear to be WNL.  Head atraumatic, symmetric.      No signs  of sedation or signs of withdrawal apparent.     Psychiatric   No signs of depression apparent. Appropriate mood and affect.     Neuro   No focal neurological deficit apparent. Ambulation at baseline.      Respiratory  No respiratory distress, respirations equal and unlabored.     Abdomen  Soft and nontender, no distention noted.     Skin  Warm, dry and intact. No skin markings supportive of recent IV drug usage .            Assessment/Plan     89 yo female with chronic low back pain associated with lumbar spinal stenosis and lumbar degenerative disc disease requiring maintenance on opiate therapy.     I have personally reviewed the OAS report for this patient. I have considered the risks of abuse, dependence, addiction and diversion. I believe that it is clinically appropriate for this patient to be prescribed this medication based on documented diagnosis.  I believe the benefits of the continuation of the opiate outweighs the risk. Patient has described positive response to the present medication therapy. Patient denies any side effects associated with the usage of the medication. Patient did not elicit any signs supportive of misuse or abuse. The review of the [place] [employer] Reporting prescription is not suggestive of any worrisome pattern. Patient believes the usage of this medication has improved the quality of life and allow him to participate in day-to-day activity. Therefore  I recommend the continuation of this medication and I will be refilling the medication prescription.    The patient was counseled regarding diagnostic results, instructions for management, risk factor reductions, prognosis, patient and family education, impressions, risks and benefits of treatment options and importance of compliance with treatment.     Continue to hydrocodone- acetaminophen 5- 325mg 1 tablet 3 times daily and cyclobenzaprine as prescribed.     Follow up in 3 months or as needed.     Explained plan to this patient,  and patient verbalized understanding and agreement with the plan.     Please do not hesitate to contact the pain clinic after your visit with any questions or concerns at  M-F 8-4 pm     Patient was reminded not to share medications, not to take prescription medications that were not prescribed to the patient, and not to increase or change dose without consulting the pain clinic. I advised the patient to always take the least amount of medication needed to keep symptoms under control.           Sandra Pressley, DEB-CNP 07/23/25 9:39 AM

## 2025-07-23 NOTE — PATIENT INSTRUCTIONS
Chronic Opioid Treatment Fact Sheet  While you are using opioids from [facility] you may not consume Alcohol, Use Marijuana (recreational or medical), use any other illegal drugs or use controlled substances that have not been personally prescribed to you.  A breach in any of the above will cause us to no longer prescribe opioids for you.      Using opioid medicines to treat your pain  You and your doctor have decided that opioid pain medicine might help reduce your pain and improve your function. Opioids are not likely to make your pain go away completely.  It is important to understand that this treatment involves potential risks and benefits. It is also important that you follow the guidelines in this handout and let your doctor know what you expect from your treatment. Your doctor may ask you to sign an Opioid Patient Care Agreement     What are the goals and possible benefits of opioid treatment?  The goals of treatment are to reduce your pain and improve your daily function. The benefits of opioid medicines are different from person to person. Opioids typically reduce chronic pain by about 30%. Some people find that they can function better day to day, but research has shown that this is not typical.     Experts agree that opioids may actually make pain worse, especially at high doses. “Flare-ups” are common and should not usually be treated by increasing the dose or taking extra medicine.  Your doctor will monitor how you are doing by asking you to rate your pain level and your daily functioning. He or she may want to know how far you can walk, how long you can sit, if you are able to work or do housework, and what kinds of activities you do alone or with family and friends.     What are the common side effects and risks of opioids?  Opioids cause common side effects that can be unpleasant. They can also increase risks of serious health effects that occur less often. Because opioids have risks that can  be serious, your doctor may ask you for urine or blood sample to help protect your safety.     Side effects vary from person to person. You and your doctor will work together to monitor how opioids affect you. Your doctor may need to adjust your dose until you find the right balance between pain reduction, improved function, and side effects.     It is normal to develop physical dependence to opioids:   Physical dependence means your body has adapted to the medicine and you will experience tolerance and withdrawal.   Tolerance means you need to take more of the medicine to get the same effect.   Withdrawal means you will have symptoms when you stop using the medicine.   Opioids Induced Hyperalgesia (paradoxical hyperalgesia) is serious but less common side effect where the opioids medication became the source of increased pain. Your physician may have to wean you off the meds to help you with the pain.     Withdrawal symptoms are usually the opposite of the effects of the medicine.For example, if the medicine causes constipation, the withdrawal symptom would be diarrhea. If the medicine reduces pain, the symptom would be increased pain. Withdrawal from opioids is temporary and usually not dangerous.     Babies born to mothers taking opioids will be dependent on opioids at birth. You should not take opioids if you are trying to get pregnant. If you do get pregnant while taking opioids, let your doctor know right away.     People who have had problems with mental health, drugs, or alcohol are more likely to have problems with opioids. You must tell your doctor about any mental illness, substance abuse, or addiction of any type have experienced in the past. You must also tell your doctor if anyone in your family has had these problems. Research shows these problems sometimes run in families.  Experts agree that people with active substance abuse or addiction problems should not use opioids for chronic non-cancer pain.  If you have problems with substance abuse or addiction, it is important to let your doctor know so you can get the help you need. Tell your doctor right away if you feel you are becoming addicted to opioids.              Common side effects  Constipation  Opioid medicines cause constipation. You may need to be treated for this while you are taking opioids.  Sedation  Many opioid medications can make you feel drowsy, slow your reaction time, and cause loss of  coordination. They can also make it hard to concentrate and think clearly.  Do not drive or use dangerous equipment until you are sure that opioids do not affect your reaction time or thinking ability. It may take a week or longer before you know if you can drive safely while taking opioids.   If you are in a traffic accident while driving on opioids, you may be considered to be “driving  under the influence” (DUI).     Other side effects and withdrawal symptoms:     Other side effect Withdrawal Symptoms   Rash and/or Itching Sweating   Dry eyes Nausea   Blurred vision Abdominal pain/cramping   Nausea and vomiting Diarrhea   Inability to urinate Trouble sleeping   Low blood pressure Muscle ahes   Slow heart beat Fast heart beat   Depressed mood Anxiety   Slow breathing Runny nose   Problem with balance “Goose bumps”   Decrease sex drive (Decrease Testosterone)     Decrease Immune function     Swelling in hands and feet     Jerking of arms and legs     Increased Sensitivity to pain     Distruption of Normal sleep     Dental problems     Apathy     Falls resulting in fractures           Risk of serious bodily harm or death  Opioid pain medicines can cause serious bodily harm or death. Higher doses appear to cause more side effects, some of which can lead to injuries like serious fractures due to falls.   Higher doses increase the risk of overdose. An overdose of opioids, whether by accident or on purpose, can cause serious bodily harm or death. Research continues  to show more and more problems with long-term opioid use, especially at high doses.     Using more opioids than your doctor prescribes can cause you to become dangerously sedated, to stop breathing, or to overdose. Combining opioids with certain other medicines or with alcohol or drugs can have the same effect.              Some opioids have higher risks  There are special problems with some opioids. For example:  meperidine (Demerol) and tramadol (Ultram) are associated with increased seizure risk.  Methadone stays in the body for many days, which increases the risk of overdose. It can also cause heart rhythm problems.   Opioids, that contain acetaminophen, such as Vicodin and Percocet, can harm the liver when taken long term or at high doses.           Are there alternatives to opioid treatment for chronic non-cancer pain?  Your doctor may prescribe other treatments to help your pain and to help you do daily activities better.  These may include exercise, psychological counseling, and medicines that are not opioids. Please be sure to discuss these options with your doctor.     Questions?  Please ask your doctor any questions you have about taking opioids.

## 2025-07-25 ENCOUNTER — APPOINTMENT (OUTPATIENT)
Dept: PODIATRY | Facility: CLINIC | Age: 89
End: 2025-07-25
Payer: COMMERCIAL

## 2025-07-30 ENCOUNTER — PATIENT OUTREACH (OUTPATIENT)
Dept: PRIMARY CARE | Facility: CLINIC | Age: 89
End: 2025-07-30
Payer: COMMERCIAL

## 2025-07-30 DIAGNOSIS — N18.32 STAGE 3B CHRONIC KIDNEY DISEASE (MULTI): ICD-10-CM

## 2025-07-30 DIAGNOSIS — I10 PRIMARY HYPERTENSION: ICD-10-CM

## 2025-07-31 ENCOUNTER — PATIENT OUTREACH (OUTPATIENT)
Dept: PRIMARY CARE | Facility: CLINIC | Age: 89
End: 2025-07-31
Payer: COMMERCIAL

## 2025-07-31 DIAGNOSIS — N18.32 STAGE 3B CHRONIC KIDNEY DISEASE (MULTI): ICD-10-CM

## 2025-07-31 DIAGNOSIS — E78.2 MIXED HYPERLIPIDEMIA: ICD-10-CM

## 2025-07-31 DIAGNOSIS — I10 PRIMARY HYPERTENSION: ICD-10-CM

## 2025-07-31 DIAGNOSIS — M47.26 OSTEOARTHRITIS OF SPINE WITH RADICULOPATHY, LUMBAR REGION: ICD-10-CM

## 2025-07-31 NOTE — PROGRESS NOTES
Care Management Monthly Outreach  Chart review completed  Confirmation of at least 2 patient identifiers  Change in insurance? No    Has patient been to ER/Urgent Care since last outreach? No    Last Office Visit with PCP: 7/10/2025   Next Office Visit with PCP: 9/16/2025   APC Collaboration: n/a    Chronic Conditions and Outreach Summary:   Primary hypertension    Stage 3b chronic kidney disease (Multi)    Mixed hyperlipidemia    Osteoarthritis of spine with radiculopathy, lumbar region    Pt says she had a recent high BP at her pain management appointment on 7/23/25. Note documented BP of 188/84. Other recent BP WNL. Pt denies blurred vision or headaches. Pt says she does get dizzy but knows to change positions slowly to help with this. Pt does not check her BP at home. 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 Encouraged pt to use the BP machine near the pharmacy while at work to check her BP. Pt verbalized understanding and agreement. Discussed risks associated with uncontrolled HTN including stroke and worsening kidney function.     Pt's last lipid panel from 06/02/25 reviewed. Noted increase in total cholesterol with rise in LDL. Pt's Lipitor was discontinued in June of this year and pt can't remember why. She does not recall adverse effects. Plan is to discuss latest lipid profile with Dr. Dhillon when she sees him in September. Pt denies diet rich in fried foods or saturated fats. Pt says she does enjoy cheese but does not feel she eats this in excess. Pt is active around her home, push mows her own lawn and works a part time job. Chronic pain does prohibit her from engaging in moderate level activity beyond what she'd consider routine activity.     Pt is looking forward to upcoming family wedding. She has strong family support.    Medications:   Are there medication changes since last visit? No  Refills needed? No    Social Drivers of Health: Addressed in the last 6  "months  Care Gaps Addressed? Addressed in the last 6 months  Care Plan addressed: Yes    Upcoming Appointments:   Future Appointments       Date / Time Provider Department Dept Phone    9/16/2025 1:45 PM (Arrive by 1:30 PM) Ace Dhillon MD [facility] [phone]    10/15/2025 9:30 AM (Arrive by 9:15 AM) Sandra Pressley, APRN-CNP [facility] [phone]    12/16/2025 10:00 AM Jas Blanchard MD [facility] [phone]          Blood Pressures Reviewed  BP Readings from Last 3 Encounters:   07/23/25 (!) 188/84   07/10/25 126/74   06/09/25 124/72     Labs Reviewed:  Lab Results   Component Value Date    CREATININE 1.72 (H) 07/02/2025    GLUCOSE 91 07/02/2025    ALKPHOS 82 07/02/2025    K 4.4 07/02/2025    PROT 6.9 07/02/2025     07/02/2025    CALCIUM 9.1 07/02/2025    AST 14 07/02/2025    ALT 12 07/02/2025    BUN 40 (H) 07/02/2025    MG 2.04 12/28/2023    PHOS 4.7 06/16/2023    GFRF 32 (A) 08/07/2023     Lab Results   Component Value Date    TRIG 90 06/02/2025    CHOL 210 (H) 06/02/2025    LDLCALC 123 (H) 06/02/2025    HDL 68 06/02/2025     No results found for: \"HGBA1C\"  Lab Results   Component Value Date    WBC 5.7 07/02/2025    RBC 3.79 (L) 07/02/2025    HGB 11.3 (L) 07/02/2025     07/02/2025   No other concerns at this time.  Agreeable to continue monthly outreaches.  Encouraged to call if questions or concerns arise.    Ileana Snell RN  Chronic Care Manager  [phone]    "

## 2025-08-04 DIAGNOSIS — I10 PRIMARY HYPERTENSION: ICD-10-CM

## 2025-08-04 DIAGNOSIS — E05.90 HYPERTHYROIDISM: ICD-10-CM

## 2025-08-04 RX ORDER — ACEBUTOLOL HYDROCHLORIDE 200 MG/1
200 CAPSULE ORAL 2 TIMES DAILY
Qty: 180 CAPSULE | Refills: 0 | Status: SHIPPED | OUTPATIENT
Start: 2025-08-04

## 2025-08-04 RX ORDER — METHIMAZOLE 5 MG/1
5 TABLET ORAL DAILY
Qty: 90 TABLET | Refills: 0 | Status: SHIPPED | OUTPATIENT
Start: 2025-08-04

## 2025-08-04 NOTE — TELEPHONE ENCOUNTER
Recent Visits  Date Type Provider Dept   07/10/25 Office Visit Ace Dhillon MD [facility]   06/09/25 Office Visit Ace Dhillon MD [facility]   05/07/25 Office Visit Ace Dhillon MD [facility]   Showing recent visits within past 90 days and meeting all other requirements  Future Appointments  Date Type Provider Dept   09/16/25 Appointment Ace Dhillon MD [facility]   Showing future appointments within next 90 days and meeting all other requirements

## 2025-08-06 ENCOUNTER — TELEPHONE (OUTPATIENT)
Dept: PRIMARY CARE | Facility: CLINIC | Age: 89
End: 2025-08-06

## 2025-08-06 ENCOUNTER — TELEMEDICINE (OUTPATIENT)
Dept: PRIMARY CARE | Facility: CLINIC | Age: 89
End: 2025-08-06
Payer: MEDICARE

## 2025-08-06 DIAGNOSIS — E78.2 MIXED HYPERLIPIDEMIA: ICD-10-CM

## 2025-08-06 DIAGNOSIS — M25.473 ANKLE EDEMA: ICD-10-CM

## 2025-08-06 DIAGNOSIS — E05.90 HYPERTHYROIDISM: ICD-10-CM

## 2025-08-06 DIAGNOSIS — N18.32 STAGE 3B CHRONIC KIDNEY DISEASE (MULTI): ICD-10-CM

## 2025-08-06 DIAGNOSIS — M47.26 OSTEOARTHRITIS OF SPINE WITH RADICULOPATHY, LUMBAR REGION: ICD-10-CM

## 2025-08-06 DIAGNOSIS — I10 PRIMARY HYPERTENSION: Primary | ICD-10-CM

## 2025-08-06 DIAGNOSIS — E55.9 VITAMIN D DEFICIENCY: ICD-10-CM

## 2025-08-06 DIAGNOSIS — R53.83 FATIGUE, UNSPECIFIED TYPE: ICD-10-CM

## 2025-08-06 PROCEDURE — G2211 COMPLEX E/M VISIT ADD ON: HCPCS | Performed by: FAMILY MEDICINE

## 2025-08-06 PROCEDURE — 1159F MED LIST DOCD IN RCRD: CPT | Performed by: FAMILY MEDICINE

## 2025-08-06 PROCEDURE — 99214 OFFICE O/P EST MOD 30 MIN: CPT | Performed by: FAMILY MEDICINE

## 2025-08-06 NOTE — PROGRESS NOTES
Subjective   Patient ID: Maggy Gore is a 89 y.o. female who presents for Dizziness, Headache, Nausea (These only happen when pt get up from laying or sitting and or walking), and Diarrhea.  History of Present Illness    See Above  Review of Systems  12 Systems have been reviewed as follows.  Constitutional: Fever, weight gain, weight loss, appetite change, night sweats, fatigue, chills.  Eyes : blurry, double vision, vision, loss, tearing, redness, pain, sensitivity to light, glaucoma.  Ears, nose, mouth, and throat: Hearing loss, ringing in the ears, ear pain, nasal congestion, nasal drainage, nosebleeds, mouth, throat, irritation tooth problem.  Cardiovascular :chest pain, pressure, heart racing, palpitations, sweating, leg swelling, high or low blood pressure  Pulmonary: Cough, yellow or green sputum, blood and sputum, shortness of breath, wheezing  Gastrointestinal: Nausea, vomiting, diarrhea, constipation, pain, blood in stool, or vomitus, heartburn, difficulty swallowing  Genitourinary: incontinence, abnormal bleeding, abnormal discharge, urinary frequency, urinary hesitancy, pain, impotence sexual problem, infection, urinary retention  Musculoskeletal: Pain, stiffness, joint, redness or warmth, arthritis, back pain, weakness, muscle wasting, sprain or fracture  Neuro: Weight weakness, dizziness, change in voice, change in taste change in vision, change in hearing, loss, or change of sensation, trouble walking, balance problems coordination problems, shaking, speech problem  Endocrine , cold or heat intolerance, blood sugar problem, weight gain or loss missed periods hot flashes, sweats, change in body hair, change in libido, increased thirst, increased urination  Heme/lymph: Swelling, bleeding, problem anemia, bruising, enlarged lymph nodes  Allergic/immunologic: H. plus nasal drip, watery itchy eyes, nasal drainage, immunosuppressed  The above were reviewed and noted negative except as noted in HPI and  Problem List.    Objective     There were no vitals taken for this visit.     Physical Exam    Constitutional: Well developed, well nourished, alert and in no acute distress   Eyes: Normal external exam. Pupils equally round and reactive to light with normal accommodation and extraocular movements intact.  Neck: Supple, no lymphadenopathy or masses.   Cardiovascular: Regular rate and rhythm, normal S1 and S2, no murmurs, gallops, or rubs. Radial pulses normal. No peripheral edema.  Pulmonary: No respiratory distress, lungs clear to auscultation bilaterally. No wheezes, rhonchi, rales.  Abdomen: soft,non tender, non distended, without masses or HSM  Skin: Warm, well perfused, normal skin turgor and color.   Neurologic: Cranial nerves II-XII grossly intact.   Psychiatric: Mood calm and affect normal  Musculoskeletal: Moving all extremities without restriction  The above were reviewed and noted negative except as noted in HPI and Problem List.      Results           Assessment & Plan      Problem List Items Addressed This Visit    None       Ace Dhillon MD       This medical note was created with the assistance of artificial intelligence (AI) for documentation purposes. The content has been reviewed and confirmed by the healthcare provider for accuracy and completeness. Patient consented to the use of audio recording and use of AI during their visit.    and CMP, will be ordered prior to next visit.     Headaches.  - Reports experiencing headaches again, particularly when bending down.  - Noted difficulty walking after bending down due to pain.  - Discussed using a TENS unit for pain management.  - Advised to monitor headache frequency and severity.      Elevated Liver Enzymes.  - Liver function tests have normalized.  - Advised to continue avoiding atorvastatin as previously instructed.  - Reviewed recent lab results showing normal liver function.  - Discussed the positive impact of medication changes on liver health.  Problem List Items Addressed This Visit    None  Visit Diagnoses         Ankle edema        Relevant Medications    furosemide (Lasix) 20 mg tablet         Decr lasix 20 mg    BW prior      Virtual or Telephone Consent    An interactive audio and video telecommunication system which permits real time communications between the patient (at the originating site) and provider (at the distant site) was utilized to provide this telehealth service.   Verbal consent was requested and obtained from Maggy Gore on this date, 08/06/25 for a telehealth visit and the patient's location was confirmed at the time of the visit.        Ace Dhillon MD       This medical note was created with the assistance of artificial intelligence (AI) for documentation purposes. The content has been reviewed and confirmed by the healthcare provider for accuracy and completeness. Patient consented to the use of audio recording and use of AI during their visit.

## 2025-08-06 NOTE — PROGRESS NOTES
Phone call from pt, says every time she tries to sit up she becomes dizzy, nauseated and develops a bad headache. She has to lay back down for any relief. She denies vomiting. No falls or trauma proceeding symptom onset which was approximately 2 days ago. Pt is taking her medications. She has not checked her BP, she says her machine is not accurate. Pt is unable to drive to the office for an appointment.

## 2025-08-07 ENCOUNTER — TELEPHONE (OUTPATIENT)
Dept: PRIMARY CARE | Facility: CLINIC | Age: 89
End: 2025-08-07
Payer: COMMERCIAL

## 2025-08-07 NOTE — PROGRESS NOTES
Phone call from pt, says someone called yesterday, reviewed her medications and need for refills then hung up. Pt Dr. Dhillon did not call her for her phone visit yesterday. She tried to get up today but the nausea, dizziness and headache are persistent so she is still laying down. Pt is asking for telephone visit, wanting medication to help.

## 2025-08-20 ENCOUNTER — PATIENT OUTREACH (OUTPATIENT)
Dept: PRIMARY CARE | Facility: CLINIC | Age: 89
End: 2025-08-20
Payer: COMMERCIAL

## 2025-08-20 DIAGNOSIS — N18.32 STAGE 3B CHRONIC KIDNEY DISEASE (MULTI): ICD-10-CM

## 2025-08-20 DIAGNOSIS — I10 PRIMARY HYPERTENSION: ICD-10-CM

## 2025-08-22 ENCOUNTER — PATIENT OUTREACH (OUTPATIENT)
Dept: PRIMARY CARE | Facility: CLINIC | Age: 89
End: 2025-08-22
Payer: COMMERCIAL

## 2025-08-22 DIAGNOSIS — I10 PRIMARY HYPERTENSION: ICD-10-CM

## 2025-08-22 DIAGNOSIS — N18.32 STAGE 3B CHRONIC KIDNEY DISEASE (MULTI): ICD-10-CM

## 2025-08-22 DIAGNOSIS — M47.26 OSTEOARTHRITIS OF SPINE WITH RADICULOPATHY, LUMBAR REGION: ICD-10-CM

## 2025-08-22 DIAGNOSIS — M62.838 MUSCLE SPASMS OF LOWER EXTREMITY, UNSPECIFIED LATERALITY: ICD-10-CM

## 2025-08-24 RX ORDER — FUROSEMIDE 20 MG/1
20 TABLET ORAL DAILY
Qty: 90 TABLET | Refills: 1 | Status: SHIPPED | OUTPATIENT
Start: 2025-08-24 | End: 2026-08-24

## 2025-09-16 ENCOUNTER — APPOINTMENT (OUTPATIENT)
Dept: PRIMARY CARE | Facility: CLINIC | Age: 89
End: 2025-09-16
Payer: COMMERCIAL

## 2025-12-16 ENCOUNTER — APPOINTMENT (OUTPATIENT)
Dept: CARDIOLOGY | Facility: CLINIC | Age: 89
End: 2025-12-16
Payer: MEDICARE

## (undated) DEVICE — SOLUTION, IRRIGATION, STERILE WATER, 1000 ML, POUR BOTTLE

## (undated) DEVICE — RETRIEVAL SYSTEM, MONARCH INZII, 5MM

## (undated) DEVICE — DRAPE, UNDERBUTTOCKS

## (undated) DEVICE — SUTURE, VICRYL, 0, 18 IN,TIE, UNDYED

## (undated) DEVICE — TUBING SET, TRI-LUMEN, FILTERED, F/AIRSEAL

## (undated) DEVICE — ACCESS PORT, 8M M, LOW PROFILE W/BLADELESS OPTICAL TIP, 100MM LENGTH

## (undated) DEVICE — SUTURE, VICRYL, 3-0, 27 IN, SH

## (undated) DEVICE — SEAL, UNIVERSAL 5-8MM  XI

## (undated) DEVICE — TRAY, SURESTEP, SILICONE DRAINAGE BAG, STATLOCK, 16FR

## (undated) DEVICE — GLOVES, SURG BIOGEL, SZ-7.5, PF, LF

## (undated) DEVICE — TROCAR, KII OPTICAL BLADELESS 5MM Z THREAD 100MM LNGTH

## (undated) DEVICE — SUTURE, PDS II, 1, 36 IN, CT-1, VIOLET

## (undated) DEVICE — TOWEL PACK, STERILE, 4/PACK, BLUE

## (undated) DEVICE — OBTURATOR, BLADELESS , SU

## (undated) DEVICE — SOLUTION, IRRIGATION, SODIUM CHLORIDE 0.9%, 1000 ML, POUR BOTTLE

## (undated) DEVICE — SYRINGE, 60 CC, LUER LOCK, MONOJECT, W/O CAP, LF

## (undated) DEVICE — APPLICATOR, CHLORAPREP, W/ORANGE TINT, 26ML

## (undated) DEVICE — GOWN, ASTOUND, XL

## (undated) DEVICE — CATHETER, URETERAL, WHISTLE TIP, 4 FR, 70 CM, RIGHT

## (undated) DEVICE — DRAIN, JACKSON-PRATT, 15FR, W/ TROCAR, LF

## (undated) DEVICE — SOLUTION, INJECTION, CONTRAST, OMNIPAQUE 300 50ML,+PLUSPAK

## (undated) DEVICE — DEVICE, OMNICLOSE, 10MM

## (undated) DEVICE — CLIP, ENDO APPLIER LIGAMAX 5MM

## (undated) DEVICE — COVER, TABLE, 54X90

## (undated) DEVICE — DRAPE, LEGGINGS, 48 X 31 IN, STERILE, LF

## (undated) DEVICE — GRASPER, LAPAROSCOPIC, DIRECT DRIVE, 5MM X35CM, DISP

## (undated) DEVICE — Device

## (undated) DEVICE — SUTURE, VICRYL, 0, 27 IN, UR-6, VIOLET

## (undated) DEVICE — GLOVE, SURGICAL, BIOGEL, 7.5, PF, LATEX, GREEN

## (undated) DEVICE — STAPLER, SKIN, PLUS, WIDE, 35

## (undated) DEVICE — DRAPE, COLUMN, DAVINCI XI

## (undated) DEVICE — SUTURE, PDS II, 1, 27 IN, CT-1, VIOLET

## (undated) DEVICE — DRAPE, ARM XI

## (undated) DEVICE — SUTURE, V-LOC 3-0 V-20 6 GR 180 ABS"

## (undated) DEVICE — SUTURE, V-LOC, 2-0, 12IN, GS-21, GR 180 ABS

## (undated) DEVICE — SUTURE, VICRYL, 2-0, 27 IN, SH, UNDYED

## (undated) DEVICE — SCISSORS, METZ, CURVED, 3/4 BLADE

## (undated) DEVICE — COVER, TIP HOT SHEARS ENDOWRIST